# Patient Record
Sex: MALE | Race: WHITE | NOT HISPANIC OR LATINO | Employment: OTHER | URBAN - NONMETROPOLITAN AREA
[De-identification: names, ages, dates, MRNs, and addresses within clinical notes are randomized per-mention and may not be internally consistent; named-entity substitution may affect disease eponyms.]

---

## 2017-04-12 DIAGNOSIS — N40.0 HYPERTROPHY OF PROSTATE: Primary | ICD-10-CM

## 2017-04-12 LAB — PSA SERPL-MCNC: 2.8 NG/ML (ref 0–4)

## 2017-04-17 ENCOUNTER — OFFICE VISIT (OUTPATIENT)
Dept: UROLOGY | Facility: CLINIC | Age: 72
End: 2017-04-17

## 2017-04-17 VITALS
SYSTOLIC BLOOD PRESSURE: 144 MMHG | DIASTOLIC BLOOD PRESSURE: 82 MMHG | HEIGHT: 71 IN | BODY MASS INDEX: 29.82 KG/M2 | WEIGHT: 213 LBS | TEMPERATURE: 97.4 F

## 2017-04-17 DIAGNOSIS — N52.9 IMPOTENCE OF ORGANIC ORIGIN: ICD-10-CM

## 2017-04-17 DIAGNOSIS — N48.1 BALANITIS: ICD-10-CM

## 2017-04-17 DIAGNOSIS — N40.1 BPH (BENIGN PROSTATIC HYPERTROPHY) WITH URINARY OBSTRUCTION: Primary | ICD-10-CM

## 2017-04-17 DIAGNOSIS — N13.8 BPH (BENIGN PROSTATIC HYPERTROPHY) WITH URINARY OBSTRUCTION: Primary | ICD-10-CM

## 2017-04-17 LAB
BILIRUB BLD-MCNC: NEGATIVE MG/DL
CLARITY, POC: CLEAR
COLOR UR: YELLOW
GLUCOSE UR STRIP-MCNC: NEGATIVE MG/DL
KETONES UR QL: NEGATIVE
LEUKOCYTE EST, POC: NEGATIVE
NITRITE UR-MCNC: NEGATIVE MG/ML
PH UR: 6 [PH] (ref 5–8)
PROT UR STRIP-MCNC: NEGATIVE MG/DL
RBC # UR STRIP: NEGATIVE /UL
SP GR UR: 1.02 (ref 1–1.03)
UROBILINOGEN UR QL: NORMAL

## 2017-04-17 PROCEDURE — 81003 URINALYSIS AUTO W/O SCOPE: CPT | Performed by: UROLOGY

## 2017-04-17 PROCEDURE — 99214 OFFICE O/P EST MOD 30 MIN: CPT | Performed by: UROLOGY

## 2017-04-17 RX ORDER — GLYBURIDE 5 MG/1
TABLET ORAL
Refills: 1 | COMMUNITY
Start: 2017-04-12 | End: 2020-12-17

## 2017-04-17 RX ORDER — ASPIRIN 81 MG/1
81 TABLET ORAL DAILY
COMMUNITY

## 2017-04-17 RX ORDER — LOSARTAN POTASSIUM 50 MG/1
50 TABLET ORAL DAILY
COMMUNITY
End: 2020-12-17

## 2017-04-17 RX ORDER — LOVASTATIN 40 MG/1
TABLET ORAL
Refills: 3 | COMMUNITY
Start: 2017-04-06 | End: 2020-12-17

## 2017-04-17 RX ORDER — CLOTRIMAZOLE AND BETAMETHASONE DIPROPIONATE 10; .64 MG/G; MG/G
CREAM TOPICAL 2 TIMES DAILY
Qty: 15 G | Refills: 0 | Status: SHIPPED | OUTPATIENT
Start: 2017-04-17 | End: 2018-06-15 | Stop reason: SDUPTHER

## 2017-04-17 NOTE — PROGRESS NOTES
Subjective    Mr. Tinoco is 72 y.o. male    Chief Complaint: Penile Swelling    History of Present Illness     Benign Prostatic Hypertrophy  Patient complains of lower urinary tract symptoms. He reports frequency and urgency. He denies intermittency and weak stream. Patient states symptoms are of mild severity. Onset of symptoms was several years ago and was gradual in onset. His AUA Symptom Score is, 5/35.He reports a history of no complicating symptoms. He denies flank pain, gross hematuria, kidney stones and recurrent UTI.  Patient has tried Laser ablation therapy of prostate  with improvement. Last PSA was 2.8 .        Erectile Dysfunction  Patient complains of erectile dysfunction. Onset of dysfunction was several years ago and was gradual in onset.  Patient states the nature of difficulty is both attaining and maintaining erection. Full erections occur never. Partial erections occur never. Libido is not affected. Risk factors for ED include diabetes mellitus. Patient denies history of pelvic radiation.  Previous treatment of ED includes Viagra.    Genital Lesion  Patient is here for a genital lesion.  The location of the lesion is penis.  The lesion has been present for 2month(s). The description is swelling.  The courseimproving.  Treatment response is antibiotic cream.  Associated symptoms include no dysuria.     The following portions of the patient's history were reviewed and updated as appropriate: allergies, current medications, past family history, past medical history, past social history, past surgical history and problem list.    Review of Systems   Constitutional: Negative for chills and fever.   Gastrointestinal: Negative for abdominal pain, anal bleeding and blood in stool.   Genitourinary: Positive for penile pain. Negative for flank pain and hematuria.         Current Outpatient Prescriptions:   •  aspirin 81 MG EC tablet, Take 81 mg by mouth Daily., Disp: , Rfl:   •  glyBURIDE (DIAbeta) 5 MG  "tablet, TAKE 1 TABLET EVERY DAY, Disp: , Rfl: 1  •  losartan (COZAAR) 50 MG tablet, Take 50 mg by mouth Daily., Disp: , Rfl:   •  lovastatin (MEVACOR) 40 MG tablet, TAKE 1 TABLET BY MOUTH AT BEDTIME, Disp: , Rfl: 3  •  metFORMIN (GLUCOPHAGE) 500 MG tablet, Take 500 mg by mouth 2 (Two) Times a Day With Meals., Disp: , Rfl:   •  clotrimazole-betamethasone (LOTRISONE) 1-0.05 % cream, Apply  topically 2 (Two) Times a Day., Disp: 15 g, Rfl: 0    Past Medical History:   Diagnosis Date   • Diabetes mellitus    • Hyperlipidemia    • Hypertension        Past Surgical History:   Procedure Laterality Date   • HERNIA REPAIR     • KNEE SURGERY         Social History     Social History   • Marital status:      Spouse name: N/A   • Number of children: N/A   • Years of education: N/A     Social History Main Topics   • Smoking status: Never Smoker   • Smokeless tobacco: None   • Alcohol use No   • Drug use: None   • Sexual activity: Not Asked     Other Topics Concern   • None     Social History Narrative   • None       Family History   Problem Relation Age of Onset   • No Known Problems Father    • No Known Problems Mother        Objective    /82  Temp 97.4 °F (36.3 °C)  Ht 71\" (180.3 cm)  Wt 213 lb (96.6 kg)  BMI 29.71 kg/m2    Physical Exam   Constitutional: He is oriented to person, place, and time. He appears well-developed and well-nourished. No distress.   HENT:   Nose: Nose normal.   Neck: Normal range of motion. Neck supple. No tracheal deviation present. No thyromegaly present.   Cardiovascular: Normal rate, regular rhythm and intact distal pulses.    No significant edema or tenderness    Pulmonary/Chest: Breath sounds normal. No accessory muscle usage. No respiratory distress.   Abdominal: Soft. Bowel sounds are normal. He exhibits no distension, no ascites and no mass. There is no hepatosplenomegaly. There is no tenderness. There is no rebound, no guarding and no CVA tenderness. No hernia.   Stool " specimen is not indicated for my portion of the exam   Genitourinary: Testes normal. Rectal exam shows no mass, no tenderness, anal tone normal and guaiac negative stool. Tender: no nodules. Right testis shows no mass, no swelling and no tenderness. Left testis shows no mass, no swelling and no tenderness. No penile tenderness (no lesion or deformities).   Genitourinary Comments:  The urethral meatus normal in position without evidence of stricture. Epididymis without mass or tenderness. Vas Deferens is palpably normal.Penis with some erythema and swelling in coronal sulcus   Lymphadenopathy:     He has no cervical adenopathy. No inguinal adenopathy noted on the right or left side.        Right: No inguinal adenopathy present.        Left: No inguinal adenopathy present.   Neurological: He is alert and oriented to person, place, and time.   Skin: Skin is warm and dry. No rash noted. He is not diaphoretic. No pallor.   Psychiatric: He has a normal mood and affect. His behavior is normal.   Vitals reviewed.          Results for orders placed or performed in visit on 04/17/17   POC Urinalysis Dipstick, Automated   Result Value Ref Range    Color Yellow Yellow, Straw, Dark Yellow, Amanda    Clarity, UA Clear Clear    Glucose, UA Negative Negative, 1000 mg/dL (3+) mg/dL    Bilirubin Negative Negative    Ketones, UA Negative Negative    Specific Gravity  1.025 1.005 - 1.030    Blood, UA Negative Negative    pH, Urine 6.0 5.0 - 8.0    Protein, POC Negative Negative mg/dL    Urobilinogen, UA Normal Normal    Leukocytes Negative Negative    Nitrite, UA Negative Negative     Assessment and Plan    Gary was seen today for benign prostatic hypertrophy.    Diagnoses and all orders for this visit:    BPH (benign prostatic hypertrophy) with urinary obstruction  -     POC Urinalysis Dipstick, Automated  -     PSA; Future    Impotence of organic origin    Balanitis  -     clotrimazole-betamethasone (LOTRISONE) 1-0.05 % cream; Apply   topically 2 (Two) Times a Day.          Follow-up in 1 year with PSA.  I did give him clobetasol cream.  He has done very well following greenlight laser ablation of prostate.

## 2017-07-21 ENCOUNTER — HOSPITAL ENCOUNTER (OUTPATIENT)
Dept: VASCULAR LAB | Age: 72
Discharge: HOME | End: 2017-07-21
Payer: MEDICARE

## 2017-07-21 ENCOUNTER — HOSPITAL ENCOUNTER (OUTPATIENT)
Dept: ULTRASOUND IMAGING | Age: 72
Discharge: HOME | End: 2017-07-21
Payer: MEDICARE

## 2017-07-21 DIAGNOSIS — I71.9 AORTIC ANEURYSM WITHOUT RUPTURE, UNSPECIFIED PORTION OF AORTA (HCC): ICD-10-CM

## 2017-07-21 DIAGNOSIS — R09.89 SYMPTOMS INVOLVING CARDIOVASCULAR SYSTEM: Primary | ICD-10-CM

## 2017-07-21 PROCEDURE — 93978 VASCULAR STUDY: CPT

## 2017-07-21 PROCEDURE — 93880 EXTRACRANIAL BILAT STUDY: CPT

## 2018-01-25 ENCOUNTER — APPOINTMENT (OUTPATIENT)
Dept: CT IMAGING | Facility: HOSPITAL | Age: 73
End: 2018-01-25

## 2018-01-25 ENCOUNTER — HOSPITAL ENCOUNTER (EMERGENCY)
Facility: HOSPITAL | Age: 73
Discharge: HOME | End: 2018-01-25
Attending: EMERGENCY MEDICINE | Admitting: EMERGENCY MEDICINE

## 2018-01-25 VITALS
HEIGHT: 71 IN | OXYGEN SATURATION: 98 % | HEART RATE: 71 BPM | WEIGHT: 206 LBS | BODY MASS INDEX: 28.84 KG/M2 | SYSTOLIC BLOOD PRESSURE: 154 MMHG | RESPIRATION RATE: 16 BRPM | TEMPERATURE: 97.3 F | DIASTOLIC BLOOD PRESSURE: 79 MMHG

## 2018-01-25 DIAGNOSIS — R10.31 RIGHT LOWER QUADRANT ABDOMINAL PAIN: Primary | ICD-10-CM

## 2018-01-25 LAB
ALBUMIN SERPL-MCNC: 4.4 G/DL (ref 3.5–5)
ALBUMIN/GLOB SERPL: 1.4 G/DL (ref 1.1–2.5)
ALP SERPL-CCNC: 45 U/L (ref 24–120)
ALT SERPL W P-5'-P-CCNC: 43 U/L (ref 0–54)
ANION GAP SERPL CALCULATED.3IONS-SCNC: 16 MMOL/L (ref 4–13)
AST SERPL-CCNC: 38 U/L (ref 7–45)
BASOPHILS # BLD AUTO: 0.03 10*3/MM3 (ref 0–0.2)
BASOPHILS NFR BLD AUTO: 0.6 % (ref 0–2)
BILIRUB SERPL-MCNC: 0.5 MG/DL (ref 0.1–1)
BILIRUB UR QL STRIP: NEGATIVE
BUN BLD-MCNC: 18 MG/DL (ref 5–21)
BUN/CREAT SERPL: 21.7 (ref 7–25)
CALCIUM SPEC-SCNC: 9.4 MG/DL (ref 8.4–10.4)
CHLORIDE SERPL-SCNC: 99 MMOL/L (ref 98–110)
CLARITY UR: CLEAR
CO2 SERPL-SCNC: 23 MMOL/L (ref 24–31)
COLOR UR: YELLOW
CREAT BLD-MCNC: 0.83 MG/DL (ref 0.5–1.4)
DEPRECATED RDW RBC AUTO: 37 FL (ref 40–54)
EOSINOPHIL # BLD AUTO: 0.07 10*3/MM3 (ref 0–0.7)
EOSINOPHIL NFR BLD AUTO: 1.4 % (ref 0–4)
ERYTHROCYTE [DISTWIDTH] IN BLOOD BY AUTOMATED COUNT: 13 % (ref 12–15)
GFR SERPL CREATININE-BSD FRML MDRD: 91 ML/MIN/1.73
GLOBULIN UR ELPH-MCNC: 3.2 GM/DL
GLUCOSE BLD-MCNC: 208 MG/DL (ref 70–100)
GLUCOSE UR STRIP-MCNC: ABNORMAL MG/DL
HCT VFR BLD AUTO: 38.8 % (ref 40–52)
HGB BLD-MCNC: 13.3 G/DL (ref 14–18)
HGB UR QL STRIP.AUTO: NEGATIVE
IMM GRANULOCYTES # BLD: 0.02 10*3/MM3 (ref 0–0.03)
IMM GRANULOCYTES NFR BLD: 0.4 % (ref 0–5)
KETONES UR QL STRIP: NEGATIVE
LEUKOCYTE ESTERASE UR QL STRIP.AUTO: NEGATIVE
LIPASE SERPL-CCNC: 98 U/L (ref 23–203)
LYMPHOCYTES # BLD AUTO: 1.62 10*3/MM3 (ref 0.72–4.86)
LYMPHOCYTES NFR BLD AUTO: 33.3 % (ref 15–45)
MCH RBC QN AUTO: 27.3 PG (ref 28–32)
MCHC RBC AUTO-ENTMCNC: 34.3 G/DL (ref 33–36)
MCV RBC AUTO: 79.5 FL (ref 82–95)
MONOCYTES # BLD AUTO: 0.47 10*3/MM3 (ref 0.19–1.3)
MONOCYTES NFR BLD AUTO: 9.7 % (ref 4–12)
NEUTROPHILS # BLD AUTO: 2.66 10*3/MM3 (ref 1.87–8.4)
NEUTROPHILS NFR BLD AUTO: 54.6 % (ref 39–78)
NITRITE UR QL STRIP: NEGATIVE
NRBC BLD MANUAL-RTO: 0 /100 WBC (ref 0–0)
PH UR STRIP.AUTO: 5.5 [PH] (ref 5–8)
PLATELET # BLD AUTO: 174 10*3/MM3 (ref 130–400)
PMV BLD AUTO: 9.8 FL (ref 6–12)
POTASSIUM BLD-SCNC: 4.3 MMOL/L (ref 3.5–5.3)
PROT SERPL-MCNC: 7.6 G/DL (ref 6.3–8.7)
PROT UR QL STRIP: NEGATIVE
RBC # BLD AUTO: 4.88 10*6/MM3 (ref 4.8–5.9)
SODIUM BLD-SCNC: 138 MMOL/L (ref 135–145)
SP GR UR STRIP: 1.02 (ref 1–1.03)
UROBILINOGEN UR QL STRIP: ABNORMAL
WBC NRBC COR # BLD: 4.87 10*3/MM3 (ref 4.8–10.8)

## 2018-01-25 PROCEDURE — 83690 ASSAY OF LIPASE: CPT | Performed by: EMERGENCY MEDICINE

## 2018-01-25 PROCEDURE — 80053 COMPREHEN METABOLIC PANEL: CPT | Performed by: EMERGENCY MEDICINE

## 2018-01-25 PROCEDURE — 96361 HYDRATE IV INFUSION ADD-ON: CPT

## 2018-01-25 PROCEDURE — 99284 EMERGENCY DEPT VISIT MOD MDM: CPT

## 2018-01-25 PROCEDURE — 85025 COMPLETE CBC W/AUTO DIFF WBC: CPT | Performed by: EMERGENCY MEDICINE

## 2018-01-25 PROCEDURE — 96360 HYDRATION IV INFUSION INIT: CPT

## 2018-01-25 PROCEDURE — 81003 URINALYSIS AUTO W/O SCOPE: CPT | Performed by: EMERGENCY MEDICINE

## 2018-01-25 PROCEDURE — 74177 CT ABD & PELVIS W/CONTRAST: CPT

## 2018-01-25 PROCEDURE — 0 IOPAMIDOL 61 % SOLUTION: Performed by: EMERGENCY MEDICINE

## 2018-01-25 RX ORDER — SODIUM CHLORIDE 0.9 % (FLUSH) 0.9 %
10 SYRINGE (ML) INJECTION AS NEEDED
Status: DISCONTINUED | OUTPATIENT
Start: 2018-01-25 | End: 2018-01-25 | Stop reason: HOSPADM

## 2018-01-25 RX ORDER — SODIUM CHLORIDE 9 MG/ML
125 INJECTION, SOLUTION INTRAVENOUS CONTINUOUS
Status: DISCONTINUED | OUTPATIENT
Start: 2018-01-25 | End: 2018-01-25 | Stop reason: HOSPADM

## 2018-01-25 RX ADMIN — IOPAMIDOL 100 ML: 612 INJECTION, SOLUTION INTRAVENOUS at 09:44

## 2018-01-25 RX ADMIN — SODIUM CHLORIDE 125 ML/HR: 9 INJECTION, SOLUTION INTRAVENOUS at 08:58

## 2018-01-25 NOTE — ED PROVIDER NOTES
Subjective   HPI Comments: C/o pain in right inguinal area where he says he has had surgery for hernia x 2, once as child and again in 1980.  Started hurting again about 3 days ago and has waxed and waned but worse this AM.  Has not noticed any bulging.  Does seem more painful with laying down during night.    Patient is a 72 y.o. male presenting with abdominal pain.   History provided by:  Patient   used: No    Abdominal Pain   Pain location:  RLQ  Pain quality: aching    Pain radiates to:  Does not radiate  Pain severity:  Moderate  Onset quality:  Gradual  Duration:  3 days  Timing:  Constant  Progression:  Worsening  Chronicity:  New  Context: not alcohol use, not awakening from sleep, not diet changes, not eating, not laxative use, not medication withdrawal, not previous surgeries, not recent illness, not recent sexual activity, not recent travel, not retching, not sick contacts, not suspicious food intake and not trauma    Relieved by:  Nothing  Worsened by:  Nothing  Ineffective treatments:  None tried  Associated symptoms: nausea    Associated symptoms: no anorexia, no belching, no chest pain, no chills, no constipation, no cough, no diarrhea, no dysuria, no fatigue, no fever, no flatus, no hematemesis, no hematochezia, no hematuria, no melena, no shortness of breath, no sore throat, no vaginal bleeding, no vaginal discharge and no vomiting    Risk factors: no alcohol abuse, no aspirin use, not elderly, has not had multiple surgeries, no NSAID use, not obese, not pregnant and no recent hospitalization        Review of Systems   Constitutional: Negative.  Negative for chills, fatigue and fever.   HENT: Negative.  Negative for sore throat.    Respiratory: Negative.  Negative for cough and shortness of breath.    Cardiovascular: Negative.  Negative for chest pain.   Gastrointestinal: Positive for abdominal pain and nausea. Negative for anorexia, constipation, diarrhea, flatus, hematemesis,  hematochezia, melena and vomiting.   Genitourinary: Negative.  Negative for dysuria, hematuria, vaginal bleeding and vaginal discharge.   Musculoskeletal: Negative.    Neurological: Negative.    Hematological: Negative.    Psychiatric/Behavioral: Negative.    All other systems reviewed and are negative.      Past Medical History:   Diagnosis Date   • Diabetes mellitus    • Hyperlipidemia    • Hypertension        No Known Allergies    Past Surgical History:   Procedure Laterality Date   • HERNIA REPAIR     • KNEE SURGERY         Family History   Problem Relation Age of Onset   • No Known Problems Father    • No Known Problems Mother        Social History     Social History   • Marital status:      Spouse name: N/A   • Number of children: N/A   • Years of education: N/A     Social History Main Topics   • Smoking status: Never Smoker   • Smokeless tobacco: None   • Alcohol use No   • Drug use: None   • Sexual activity: Not Asked     Other Topics Concern   • None     Social History Narrative       Prior to Admission medications    Medication Sig Start Date End Date Taking? Authorizing Provider   aspirin 81 MG EC tablet Take 81 mg by mouth Daily.    Historical Provider, MD   clotrimazole-betamethasone (LOTRISONE) 1-0.05 % cream Apply  topically 2 (Two) Times a Day. 4/17/17   Braxton Messer MD   glyBURIDE (DIAbeta) 5 MG tablet TAKE 1 TABLET EVERY DAY 4/12/17   Historical Provider, MD   losartan (COZAAR) 50 MG tablet Take 50 mg by mouth Daily.    Historical Provider, MD   lovastatin (MEVACOR) 40 MG tablet TAKE 1 TABLET BY MOUTH AT BEDTIME 4/6/17   Historical Provider, MD   metFORMIN (GLUCOPHAGE) 500 MG tablet Take 500 mg by mouth 2 (Two) Times a Day With Meals.    Historical Provider, MD       Medications   sodium chloride 0.9 % flush 10 mL (not administered)   sodium chloride 0.9 % infusion (0 mL/hr Intravenous Stopped 1/25/18 1122)   iopamidol (ISOVUE-300) 61 % injection 100 mL (100 mL Intravenous Given  1/25/18 0944)       Vitals:    01/25/18 1121   BP: 154/79   Pulse: 71   Resp: 16   Temp: 97.3 °F (36.3 °C)   SpO2: 98%         Objective   Physical Exam   Constitutional: He is oriented to person, place, and time. He appears well-developed and well-nourished.   HENT:   Head: Normocephalic and atraumatic.   Neck: Normal range of motion. Neck supple.   Cardiovascular: Normal rate and regular rhythm.    Pulmonary/Chest: Effort normal and breath sounds normal.   Abdominal: Soft. Bowel sounds are normal. He exhibits no mass. There is no tenderness. No hernia.   Musculoskeletal: Normal range of motion.   Neurological: He is alert and oriented to person, place, and time.   Skin: Skin is warm and dry.   Psychiatric: He has a normal mood and affect. His behavior is normal.   Nursing note and vitals reviewed.      Procedures         Lab Results (last 24 hours)     Procedure Component Value Units Date/Time    CBC & Differential [[id number]] Collected:  01/25/18 0858    Specimen:  Blood Updated:  01/25/18 0906    Narrative:       The following orders were created for panel order CBC & Differential.  Procedure                               Abnormality         Status                     ---------                               -----------         ------                     CBC Auto Differential[[id number]]         Abnormal            Final result                 Please view results for these tests on the individual orders.    Comprehensive Metabolic Panel [[id number]]  (Abnormal) Collected:  01/25/18 0858    Specimen:  Blood Updated:  01/25/18 0915     Glucose 208 (H) mg/dL      BUN 18 mg/dL      Creatinine 0.83 mg/dL      Sodium 138 mmol/L      Potassium 4.3 mmol/L      Chloride 99 mmol/L      CO2 23.0 (L) mmol/L      Calcium 9.4 mg/dL      Total Protein 7.6 g/dL      Albumin 4.40 g/dL      ALT (SGPT) 43 U/L      AST (SGOT) 38 U/L      Alkaline Phosphatase 45 U/L      Total Bilirubin 0.5 mg/dL      eGFR Non African Amer 91 mL/min/1.73       Globulin 3.2 gm/dL      A/G Ratio 1.4 g/dL      BUN/Creatinine Ratio 21.7     Anion Gap 16.0 (H) mmol/L     Narrative:       The MDRD GFR formula is only valid for adults with stable renal function between ages 18 and 70.    Lipase [[id number]]  (Normal) Collected:  01/25/18 0858    Specimen:  Blood Updated:  01/25/18 0915     Lipase 98 U/L     CBC Auto Differential [[id number]]  (Abnormal) Collected:  01/25/18 0858    Specimen:  Blood Updated:  01/25/18 0906     WBC 4.87 10*3/mm3      RBC 4.88 10*6/mm3      Hemoglobin 13.3 (L) g/dL      Hematocrit 38.8 (L) %      MCV 79.5 (L) fL      MCH 27.3 (L) pg      MCHC 34.3 g/dL      RDW 13.0 %      RDW-SD 37.0 (L) fl      MPV 9.8 fL      Platelets 174 10*3/mm3      Neutrophil % 54.6 %      Lymphocyte % 33.3 %      Monocyte % 9.7 %      Eosinophil % 1.4 %      Basophil % 0.6 %      Immature Grans % 0.4 %      Neutrophils, Absolute 2.66 10*3/mm3      Lymphocytes, Absolute 1.62 10*3/mm3      Monocytes, Absolute 0.47 10*3/mm3      Eosinophils, Absolute 0.07 10*3/mm3      Basophils, Absolute 0.03 10*3/mm3      Immature Grans, Absolute 0.02 10*3/mm3      nRBC 0.0 /100 WBC     Urinalysis With / Culture If Indicated - Urine, Clean Catch [[id number]]  (Abnormal) Collected:  01/25/18 0918    Specimen:  Urine from Urine, Clean Catch Updated:  01/25/18 0935     Color, UA Yellow     Appearance, UA Clear     pH, UA 5.5     Specific Gravity, UA 1.021     Glucose,  mg/dL (1+) (A)     Ketones, UA Negative     Bilirubin, UA Negative     Blood, UA Negative     Protein, UA Negative     Leuk Esterase, UA Negative     Nitrite, UA Negative     Urobilinogen, UA 0.2 E.U./dL    Narrative:       Urine microscopic not indicated.          CT Abdomen Pelvis With Contrast   Final Result   Impression:       1. No acute findings in the abdomen or pelvis.   2. Marked sclerosis about the L4-L5 disc space. While this may be   degenerative, infectious etiology cannot be excluded. If there is back   pain  with signs of infection would recommend MRI with/without contrast.   3. Incidental fatty liver and prostatomegaly.   This report was finalized on 01/25/2018 10:15 by  Guillermo Block, .          ED Course  ED Course   Comment By Time   Told patient and his wife that CT was okay so this is probably just irritated tissues in that old surgical site.  Nothing critical at this point and can follow up with PCP or surgeon if problem continues. Vishal Bernardo Jr., MD 01/25 1223          MDM  Number of Diagnoses or Management Options  Right lower quadrant abdominal pain: new and requires workup     Amount and/or Complexity of Data Reviewed  Clinical lab tests: ordered and reviewed  Tests in the radiology section of CPT®: ordered and reviewed    Risk of Complications, Morbidity, and/or Mortality  Presenting problems: moderate  Diagnostic procedures: moderate  Management options: moderate    Patient Progress  Patient progress: stable      Final diagnoses:   Right lower quadrant abdominal pain          Vishal Bernardo Jr., MD  01/25/18 4715

## 2018-01-29 NOTE — ED NOTES
"ED Call Back Questions    1. How are you doing since leaving the Emergency Department?    Still in pain.  PCP did not help.  Told if worse to come back to ER.  2. Do you have any questions about your discharge instructions? No     3. Have you filled your new prescriptions yet? Yes   a. Do you have any questions about those medications? No     4. Were you able to make a follow-up appointment with the physician? Yes     5. Do you have a primary care physician? Yes   a. If No, would you like for me to set you up with one? N/A  i. If Yes, “I will have our ED  give you a call right back at this number to work with you on the best time for an appointment.”    6. We are always looking to get better at what we do. Do you have any suggestions for what we can do to be even better? No   a. If Yes, \"Thank you for sharing your concerns. I apologize. I will follow up with our manager and patient . Would you like someone to call you back?\" N/A    7. Is there anything else I can do for you? No     "

## 2018-04-17 ENCOUNTER — RESULTS ENCOUNTER (OUTPATIENT)
Dept: UROLOGY | Facility: CLINIC | Age: 73
End: 2018-04-17

## 2018-04-17 DIAGNOSIS — N40.1 BENIGN PROSTATIC HYPERPLASIA WITH URINARY OBSTRUCTION: ICD-10-CM

## 2018-04-17 DIAGNOSIS — N13.8 BENIGN PROSTATIC HYPERPLASIA WITH URINARY OBSTRUCTION: ICD-10-CM

## 2018-06-08 LAB — PSA SERPL-MCNC: 2.17 NG/ML (ref 0–4)

## 2018-06-14 NOTE — PROGRESS NOTES
Subjective    Mr. Tinoco is 73 y.o. male    Chief Complaint: Penile Swelling    History of Present Illness    Benign Prostatic Hypertrophy  Patient complains of lower urinary tract symptoms. He reports frequency and urgency. He denies intermittency and weak stream. Patient states symptoms are of mild severity. Onset of symptoms was several years ago and was gradual in onset. His AUA Symptom Score is, 1/35.He reports a history of no complicating symptoms. He denies flank pain, gross hematuria, kidney stones and recurrent UTI.  Patient has tried Laser ablation therapy of prostate  with improvement. Last PSA was 2.170 on 06/08/2018.     Erectile Dysfunction  Patient complains of erectile dysfunction. Onset of dysfunction was several years ago and was gradual in onset.  Patient states the nature of difficulty is both attaining and maintaining erection. Full erections occur never. Partial erections occur never. Libido is not affected. Risk factors for ED include diabetes mellitus. Patient denies history of pelvic radiation.  Previous treatment of ED includes Viagra.       The following portions of the patient's history were reviewed and updated as appropriate: allergies, current medications, past family history, past medical history, past social history, past surgical history and problem list.    Review of Systems   Constitutional: Negative for chills and fever.   Gastrointestinal: Negative for abdominal pain, anal bleeding and blood in stool.   Genitourinary: Negative for flank pain, frequency, hematuria and urgency.         Current Outpatient Prescriptions:   •  aspirin 81 MG EC tablet, Take 81 mg by mouth Daily., Disp: , Rfl:   •  clotrimazole-betamethasone (LOTRISONE) 1-0.05 % cream, Apply  topically Every 12 (Twelve) Hours., Disp: 15 g, Rfl: 0  •  glyBURIDE (DIAbeta) 5 MG tablet, TAKE 1 TABLET EVERY DAY, Disp: , Rfl: 1  •  losartan (COZAAR) 50 MG tablet, Take 50 mg by mouth Daily., Disp: , Rfl:   •  lovastatin  "(MEVACOR) 40 MG tablet, TAKE 1 TABLET BY MOUTH AT BEDTIME, Disp: , Rfl: 3  •  metFORMIN (GLUCOPHAGE) 500 MG tablet, Take 500 mg by mouth 2 (Two) Times a Day With Meals., Disp: , Rfl:     Past Medical History:   Diagnosis Date   • Diabetes mellitus    • Hyperlipidemia    • Hypertension        Past Surgical History:   Procedure Laterality Date   • HERNIA REPAIR     • KNEE SURGERY         Social History     Social History   • Marital status:      Social History Main Topics   • Smoking status: Never Smoker   • Alcohol use No   • Drug use: Unknown     Other Topics Concern   • Not on file       Family History   Problem Relation Age of Onset   • No Known Problems Father    • No Known Problems Mother        Objective    /84   Temp 98 °F (36.7 °C)   Ht 180.3 cm (71\")   Wt 96.2 kg (212 lb)   BMI 29.57 kg/m²     Physical Exam   Constitutional: He is oriented to person, place, and time. He appears well-developed and well-nourished. No distress.   HENT:   Nose: Nose normal.   Neck: Normal range of motion. Neck supple. No tracheal deviation present. No thyromegaly present.   Cardiovascular: Normal rate, regular rhythm and intact distal pulses.    No significant edema or tenderness    Pulmonary/Chest: Breath sounds normal. No accessory muscle usage. No respiratory distress.   Abdominal: Soft. Bowel sounds are normal. He exhibits no distension, no ascites and no mass. There is no hepatosplenomegaly. There is no tenderness. There is no rebound, no guarding and no CVA tenderness. No hernia.   Stool specimen is not indicated for my portion of the exam   Genitourinary: Testes normal. Rectal exam shows no mass, no tenderness, anal tone normal and guaiac negative stool. Tender: no nodules. Right testis shows no mass, no swelling and no tenderness. Left testis shows no mass, no swelling and no tenderness. No penile tenderness (no lesion or deformities).   Genitourinary Comments:  The urethral meatus normal in position " without evidence of stricture. Epididymis without mass or tenderness. Vas Deferens is palpably normal.Penis with some erythema and swelling in coronal sulcus   Lymphadenopathy:     He has no cervical adenopathy. No inguinal adenopathy noted on the right or left side.        Right: No inguinal adenopathy present.        Left: No inguinal adenopathy present.   Neurological: He is alert and oriented to person, place, and time.   Skin: Skin is warm and dry. No rash noted. He is not diaphoretic. No pallor.   Psychiatric: He has a normal mood and affect. His behavior is normal.   Vitals reviewed.          Results for orders placed or performed in visit on 06/15/18   POC Urinalysis Dipstick, Multipro   Result Value Ref Range    Color Yellow Yellow, Straw, Dark Yellow, Amanda    Clarity, UA Clear Clear    Glucose,  mg/dL (A) Negative, 1000 mg/dL (3+) mg/dL    Bilirubin Negative Negative    Ketones, UA Negative Negative    Specific Gravity  1.015 1.005 - 1.030    Blood, UA Negative Negative    pH, Urine 5.0 5.0 - 8.0    Protein, POC Negative Negative mg/dL    Urobilinogen, UA Normal Normal    Nitrite, UA Negative Negative    Leukocytes Negative Negative     Assessment and Plan    Gary was seen today for bph (benign prostatic hypertrophy) with urinary obstruction.    Diagnoses and all orders for this visit:    Benign prostatic hyperplasia with urinary obstruction  -     POC Urinalysis Dipstick, Multipro  -     PSA DIAGNOSTIC; Future    Balanitis  -     clotrimazole-betamethasone (LOTRISONE) 1-0.05 % cream; Apply  topically Every 12 (Twelve) Hours.    Impotence of organic origin      Patient doing very well.  His AUA symptom score is 1/35.  He has a history of greenlight laser ablation of prostate in the past.  He does have some issues with balanitis and he will continue the clotrimazole betamethasone cream.

## 2018-06-15 ENCOUNTER — OFFICE VISIT (OUTPATIENT)
Dept: UROLOGY | Facility: CLINIC | Age: 73
End: 2018-06-15

## 2018-06-15 VITALS
WEIGHT: 212 LBS | SYSTOLIC BLOOD PRESSURE: 138 MMHG | HEIGHT: 71 IN | BODY MASS INDEX: 29.68 KG/M2 | TEMPERATURE: 98 F | DIASTOLIC BLOOD PRESSURE: 84 MMHG

## 2018-06-15 DIAGNOSIS — N13.8 BENIGN PROSTATIC HYPERPLASIA WITH URINARY OBSTRUCTION: Primary | ICD-10-CM

## 2018-06-15 DIAGNOSIS — N40.1 BENIGN PROSTATIC HYPERPLASIA WITH URINARY OBSTRUCTION: Primary | ICD-10-CM

## 2018-06-15 DIAGNOSIS — N52.9 IMPOTENCE OF ORGANIC ORIGIN: ICD-10-CM

## 2018-06-15 DIAGNOSIS — N48.1 BALANITIS: ICD-10-CM

## 2018-06-15 LAB
BILIRUB BLD-MCNC: NEGATIVE MG/DL
CLARITY, POC: CLEAR
COLOR UR: YELLOW
GLUCOSE UR STRIP-MCNC: ABNORMAL MG/DL
KETONES UR QL: NEGATIVE
LEUKOCYTE EST, POC: NEGATIVE
NITRITE UR-MCNC: NEGATIVE MG/ML
PH UR: 5 [PH] (ref 5–8)
PROT UR STRIP-MCNC: NEGATIVE MG/DL
RBC # UR STRIP: NEGATIVE /UL
SP GR UR: 1.01 (ref 1–1.03)
UROBILINOGEN UR QL: NORMAL

## 2018-06-15 PROCEDURE — 81001 URINALYSIS AUTO W/SCOPE: CPT | Performed by: UROLOGY

## 2018-06-15 PROCEDURE — 99213 OFFICE O/P EST LOW 20 MIN: CPT | Performed by: UROLOGY

## 2018-06-15 RX ORDER — CLOTRIMAZOLE AND BETAMETHASONE DIPROPIONATE 10; .64 MG/G; MG/G
CREAM TOPICAL EVERY 12 HOURS SCHEDULED
Qty: 15 G | Refills: 0 | Status: SHIPPED | OUTPATIENT
Start: 2018-06-15 | End: 2018-06-15 | Stop reason: SDUPTHER

## 2018-06-15 RX ORDER — CLOTRIMAZOLE AND BETAMETHASONE DIPROPIONATE 10; .64 MG/G; MG/G
CREAM TOPICAL EVERY 12 HOURS SCHEDULED
Qty: 15 G | Refills: 11 | Status: SHIPPED | OUTPATIENT
Start: 2018-06-15 | End: 2019-06-19

## 2018-06-15 NOTE — PATIENT INSTRUCTIONS

## 2018-08-06 ENCOUNTER — TRANSCRIBE ORDERS (OUTPATIENT)
Dept: ADMINISTRATIVE | Facility: HOSPITAL | Age: 73
End: 2018-08-06

## 2018-08-06 DIAGNOSIS — M25.512 LEFT SHOULDER PAIN, UNSPECIFIED CHRONICITY: Primary | ICD-10-CM

## 2018-08-07 ENCOUNTER — HOSPITAL ENCOUNTER (OUTPATIENT)
Dept: MRI IMAGING | Facility: HOSPITAL | Age: 73
Discharge: HOME | End: 2018-08-07
Attending: PHYSICAL MEDICINE & REHABILITATION | Admitting: PHYSICAL MEDICINE & REHABILITATION

## 2018-08-07 DIAGNOSIS — M25.512 LEFT SHOULDER PAIN, UNSPECIFIED CHRONICITY: ICD-10-CM

## 2018-08-07 PROCEDURE — 73221 MRI JOINT UPR EXTREM W/O DYE: CPT

## 2018-09-06 DIAGNOSIS — N48.1 BALANITIS: ICD-10-CM

## 2018-09-06 RX ORDER — CLOTRIMAZOLE AND BETAMETHASONE DIPROPIONATE 10; .64 MG/G; MG/G
CREAM TOPICAL EVERY 12 HOURS SCHEDULED
Qty: 15 G | Refills: 0 | Status: SHIPPED | OUTPATIENT
Start: 2018-09-06 | End: 2019-04-15 | Stop reason: SDUPTHER

## 2019-02-26 ENCOUNTER — HOSPITAL ENCOUNTER (OUTPATIENT)
Dept: GENERAL RADIOLOGY | Facility: HOSPITAL | Age: 74
Discharge: HOME | End: 2019-02-26

## 2019-02-26 ENCOUNTER — HOSPITAL ENCOUNTER (OUTPATIENT)
Dept: ULTRASOUND IMAGING | Facility: HOSPITAL | Age: 74
Discharge: HOME | End: 2019-02-26
Admitting: INTERNAL MEDICINE

## 2019-02-26 ENCOUNTER — TRANSCRIBE ORDERS (OUTPATIENT)
Dept: ADMINISTRATIVE | Facility: HOSPITAL | Age: 74
End: 2019-02-26

## 2019-02-26 DIAGNOSIS — R07.0 THROAT BURNING: ICD-10-CM

## 2019-02-26 DIAGNOSIS — R06.2 WHEEZING ON AUSCULTATION: ICD-10-CM

## 2019-02-26 DIAGNOSIS — R06.2 WHEEZING ON AUSCULTATION: Primary | ICD-10-CM

## 2019-02-26 DIAGNOSIS — Q89.2 SUBSTERNAL THYROID: ICD-10-CM

## 2019-02-26 PROCEDURE — 71046 X-RAY EXAM CHEST 2 VIEWS: CPT

## 2019-02-26 PROCEDURE — 76536 US EXAM OF HEAD AND NECK: CPT

## 2019-04-15 DIAGNOSIS — N48.1 BALANITIS: ICD-10-CM

## 2019-04-15 RX ORDER — CLOTRIMAZOLE AND BETAMETHASONE DIPROPIONATE 10; .64 MG/G; MG/G
CREAM TOPICAL EVERY 12 HOURS SCHEDULED
Qty: 15 G | Refills: 0 | Status: SHIPPED | OUTPATIENT
Start: 2019-04-15 | End: 2019-06-19 | Stop reason: SDUPTHER

## 2019-06-13 ENCOUNTER — TELEPHONE (OUTPATIENT)
Dept: UROLOGY | Facility: CLINIC | Age: 74
End: 2019-06-13

## 2019-06-13 NOTE — TELEPHONE ENCOUNTER
Called and left patient a message about their missed lab appointment. I asked for them to call back to reschedule their lab appointment and I let them know that this has to be done prior to the appointment on 6/19/19 with .

## 2019-06-15 ENCOUNTER — RESULTS ENCOUNTER (OUTPATIENT)
Dept: UROLOGY | Facility: CLINIC | Age: 74
End: 2019-06-15

## 2019-06-15 DIAGNOSIS — N13.8 BENIGN PROSTATIC HYPERPLASIA WITH URINARY OBSTRUCTION: ICD-10-CM

## 2019-06-15 DIAGNOSIS — N40.1 BENIGN PROSTATIC HYPERPLASIA WITH URINARY OBSTRUCTION: ICD-10-CM

## 2019-06-15 LAB — PSA SERPL-MCNC: 2.32 NG/ML (ref 0–4)

## 2019-06-17 NOTE — PROGRESS NOTES
Subjective    Mr. Tinoco is 74 y.o. male    Chief Complaint: Penile Swelling    History of Present Illness     Benign Prostatic Hypertrophy  Patient complains of lower urinary tract symptoms. He reports frequency and urgency. He denies intermittency and weak stream. Patient states symptoms are of mild severity. Onset of symptoms was several years ago and was gradual in onset. His AUA Symptom Score is, 16/35.He reports a history of no complicating symptoms. He denies flank pain, gross hematuria, kidney stones and recurrent UTI.  Patient has tried Laser ablation therapy of prostate  with improvement. Last PSA was 2.32 .  Lab Results   Component Value Date    PSA 2.320 06/14/2019    PSA 2.170 06/08/2018    PSA 2.800 04/12/2017          Erectile Dysfunction  Patient complains of erectile dysfunction. Onset of dysfunction was several years ago and was gradual in onset.  Patient states the nature of difficulty is both attaining and maintaining erection. Full erections occur never. Partial erections occur never. Libido is not affected. Risk factors for ED include diabetes mellitus. Patient denies history of pelvic radiation.  Previous treatment of ED includes Viagra.    The following portions of the patient's history were reviewed and updated as appropriate: allergies, current medications, past family history, past medical history, past social history, past surgical history and problem list.    Review of Systems   Constitutional: Negative for chills and fever.   Gastrointestinal: Negative for abdominal pain, anal bleeding and blood in stool.   Genitourinary: Negative for decreased urine volume, difficulty urinating, discharge, dysuria, enuresis, flank pain, frequency, genital sores, hematuria, penile pain, penile swelling, scrotal swelling, testicular pain and urgency.         Current Outpatient Medications:   •  aspirin 81 MG EC tablet, Take 81 mg by mouth Daily., Disp: , Rfl:   •  clotrimazole-betamethasone (LOTRISONE)  "1-0.05 % cream, Apply  topically Every 12 (Twelve) Hours., Disp: 15 g, Rfl: 11  •  clotrimazole-betamethasone (LOTRISONE) 1-0.05 % cream, APPLY TOPICALLY EVERY 12 (TWELVE) HOURS., Disp: 15 g, Rfl: 0  •  glyBURIDE (DIAbeta) 5 MG tablet, TAKE 1 TABLET EVERY DAY, Disp: , Rfl: 1  •  lovastatin (MEVACOR) 40 MG tablet, TAKE 1 TABLET BY MOUTH AT BEDTIME, Disp: , Rfl: 3  •  metFORMIN (GLUCOPHAGE) 500 MG tablet, Take 500 mg by mouth 2 (Two) Times a Day With Meals., Disp: , Rfl:   •  losartan (COZAAR) 50 MG tablet, Take 50 mg by mouth Daily., Disp: , Rfl:     Past Medical History:   Diagnosis Date   • Diabetes mellitus (CMS/HCC)    • Hyperlipidemia    • Hypertension        Past Surgical History:   Procedure Laterality Date   • HERNIA REPAIR     • KNEE SURGERY         Social History     Socioeconomic History   • Marital status:      Spouse name: Not on file   • Number of children: Not on file   • Years of education: Not on file   • Highest education level: Not on file   Tobacco Use   • Smoking status: Never Smoker   Substance and Sexual Activity   • Alcohol use: No       Family History   Problem Relation Age of Onset   • No Known Problems Father    • No Known Problems Mother        Objective    Temp 98.2 °F (36.8 °C)   Ht 180.3 cm (71\")   Wt 91.1 kg (200 lb 12.8 oz)   BMI 28.01 kg/m²     Physical Exam   Constitutional: He is oriented to person, place, and time. He appears well-developed and well-nourished. No distress.   HENT:   Nose: Nose normal.   Neck: Normal range of motion. Neck supple. No tracheal deviation present. No thyromegaly present.   Cardiovascular: Normal rate, regular rhythm and intact distal pulses.   No significant edema or tenderness    Pulmonary/Chest: Breath sounds normal. No accessory muscle usage. No respiratory distress.   Abdominal: Soft. Bowel sounds are normal. He exhibits no distension, no ascites and no mass. There is no hepatosplenomegaly. There is no tenderness. There is no rebound, no " guarding and no CVA tenderness. No hernia.   Stool specimen is not indicated for my portion of the exam   Genitourinary: Testes normal. Rectal exam shows no mass, no tenderness, anal tone normal and guaiac negative stool. Tender: no nodules. Right testis shows no mass, no swelling and no tenderness. Left testis shows no mass, no swelling and no tenderness. No penile tenderness (no lesion or deformities).   Genitourinary Comments:  The urethral meatus normal in position without evidence of stricture. Epididymis without mass or tenderness. Vas Deferens is palpably normal.   Lymphadenopathy:     He has no cervical adenopathy. No inguinal adenopathy noted on the right or left side.        Right: No inguinal adenopathy present.        Left: No inguinal adenopathy present.   Neurological: He is alert and oriented to person, place, and time.   Skin: Skin is warm and dry. No rash noted. He is not diaphoretic. No pallor.   Psychiatric: He has a normal mood and affect. His behavior is normal.   Vitals reviewed.          Results for orders placed or performed in visit on 06/19/19   POC Urinalysis Dipstick, Multipro   Result Value Ref Range    Color Yellow Yellow, Straw, Dark Yellow, Amanda    Clarity, UA Clear Clear    Glucose, UA Negative Negative, 1000 mg/dL (3+) mg/dL    Bilirubin Negative Negative    Ketones, UA Negative Negative    Specific Gravity  1.020 1.005 - 1.030    Blood, UA Negative Negative    pH, Urine 5.5 5.0 - 8.0    Protein, POC 30 mg/dL (A) Negative mg/dL    Urobilinogen, UA Normal Normal    Nitrite, UA Negative Negative    Leukocytes Negative Negative   Patient's Body mass index is 28.01 kg/m². BMI is above normal parameters. Recommendations include: educational material.    Assessment and Plan    Diagnoses and all orders for this visit:    Benign prostatic hyperplasia with urinary obstruction  -     POC Urinalysis Dipstick, Multipro    Balanitis    Impotence of organic origin      Patient doing very well.   His AUA symptom score is 16/35.  He has a history of greenlight laser ablation of prostate in the past.  He does have some issues with balanitis and he will continue the clotrimazole betamethasone cream.

## 2019-06-19 ENCOUNTER — TELEPHONE (OUTPATIENT)
Dept: UROLOGY | Facility: CLINIC | Age: 74
End: 2019-06-19

## 2019-06-19 ENCOUNTER — OFFICE VISIT (OUTPATIENT)
Dept: UROLOGY | Facility: CLINIC | Age: 74
End: 2019-06-19

## 2019-06-19 VITALS — TEMPERATURE: 98.2 F | WEIGHT: 200.8 LBS | BODY MASS INDEX: 28.11 KG/M2 | HEIGHT: 71 IN

## 2019-06-19 DIAGNOSIS — N52.9 IMPOTENCE OF ORGANIC ORIGIN: ICD-10-CM

## 2019-06-19 DIAGNOSIS — N48.1 BALANITIS: ICD-10-CM

## 2019-06-19 DIAGNOSIS — N40.1 BENIGN PROSTATIC HYPERPLASIA WITH URINARY OBSTRUCTION: Primary | ICD-10-CM

## 2019-06-19 DIAGNOSIS — N13.8 BENIGN PROSTATIC HYPERPLASIA WITH URINARY OBSTRUCTION: Primary | ICD-10-CM

## 2019-06-19 LAB
BILIRUB BLD-MCNC: NEGATIVE MG/DL
CLARITY, POC: CLEAR
COLOR UR: YELLOW
GLUCOSE UR STRIP-MCNC: NEGATIVE MG/DL
KETONES UR QL: NEGATIVE
LEUKOCYTE EST, POC: NEGATIVE
NITRITE UR-MCNC: NEGATIVE MG/ML
PH UR: 5.5 [PH] (ref 5–8)
PROT UR STRIP-MCNC: ABNORMAL MG/DL
RBC # UR STRIP: NEGATIVE /UL
SP GR UR: 1.02 (ref 1–1.03)
UROBILINOGEN UR QL: NORMAL

## 2019-06-19 PROCEDURE — 99214 OFFICE O/P EST MOD 30 MIN: CPT | Performed by: UROLOGY

## 2019-06-19 PROCEDURE — 81001 URINALYSIS AUTO W/SCOPE: CPT | Performed by: UROLOGY

## 2019-06-19 RX ORDER — CLOTRIMAZOLE AND BETAMETHASONE DIPROPIONATE 10; .64 MG/G; MG/G
CREAM TOPICAL EVERY 12 HOURS SCHEDULED
Qty: 45 G | Refills: 1 | Status: SHIPPED | OUTPATIENT
Start: 2019-06-19 | End: 2020-07-22

## 2019-06-19 NOTE — PATIENT INSTRUCTIONS

## 2019-06-19 NOTE — TELEPHONE ENCOUNTER
Pt stopped by bc he forgot to let Dr Messer know at his appt today that he is no longer taking the losartan due to him coughing.    Losartan was prescribed by another provider.      Thanks,     Jorge

## 2019-08-12 ENCOUNTER — HOSPITAL ENCOUNTER (OUTPATIENT)
Dept: ULTRASOUND IMAGING | Facility: HOSPITAL | Age: 74
Discharge: HOME | End: 2019-08-12
Admitting: INTERNAL MEDICINE

## 2019-08-12 ENCOUNTER — TRANSCRIBE ORDERS (OUTPATIENT)
Dept: ADMINISTRATIVE | Facility: HOSPITAL | Age: 74
End: 2019-08-12

## 2019-08-12 DIAGNOSIS — E04.1 RIGHT THYROID NODULE: ICD-10-CM

## 2019-08-12 DIAGNOSIS — E04.1 RIGHT THYROID NODULE: Primary | ICD-10-CM

## 2019-08-12 PROCEDURE — 76536 US EXAM OF HEAD AND NECK: CPT

## 2020-07-22 ENCOUNTER — OFFICE VISIT (OUTPATIENT)
Dept: INTERNAL MEDICINE | Facility: CLINIC | Age: 75
End: 2020-07-22

## 2020-07-22 VITALS
OXYGEN SATURATION: 99 % | TEMPERATURE: 98.4 F | SYSTOLIC BLOOD PRESSURE: 144 MMHG | BODY MASS INDEX: 27.91 KG/M2 | DIASTOLIC BLOOD PRESSURE: 88 MMHG | HEART RATE: 79 BPM | WEIGHT: 199.38 LBS | RESPIRATION RATE: 18 BRPM | HEIGHT: 71 IN

## 2020-07-22 DIAGNOSIS — E11.65 TYPE 2 DIABETES MELLITUS WITH HYPERGLYCEMIA, UNSPECIFIED WHETHER LONG TERM INSULIN USE (HCC): Primary | ICD-10-CM

## 2020-07-22 DIAGNOSIS — I10 HYPERTENSION, BENIGN: ICD-10-CM

## 2020-07-22 PROBLEM — D63.8 ANEMIA OF CHRONIC DISEASE: Status: ACTIVE | Noted: 2020-07-22

## 2020-07-22 PROBLEM — E04.1 RIGHT THYROID NODULE: Status: ACTIVE | Noted: 2020-07-22

## 2020-07-22 PROBLEM — E11.9 TYPE 2 DIABETES MELLITUS WITHOUT COMPLICATION, WITHOUT LONG-TERM CURRENT USE OF INSULIN (HCC): Status: ACTIVE | Noted: 2020-07-22

## 2020-07-22 PROBLEM — Q89.2 SUBSTERNAL THYROID: Status: ACTIVE | Noted: 2020-07-22

## 2020-07-22 PROBLEM — Z12.5 SCREENING PSA (PROSTATE SPECIFIC ANTIGEN): Status: ACTIVE | Noted: 2020-07-22

## 2020-07-22 PROBLEM — R79.89 ABNORMAL LFTS: Status: ACTIVE | Noted: 2020-07-22

## 2020-07-22 LAB — HBA1C MFR BLD: 7.5 %

## 2020-07-22 PROCEDURE — 83036 HEMOGLOBIN GLYCOSYLATED A1C: CPT | Performed by: NURSE PRACTITIONER

## 2020-07-22 PROCEDURE — 99214 OFFICE O/P EST MOD 30 MIN: CPT | Performed by: NURSE PRACTITIONER

## 2020-07-22 NOTE — PATIENT INSTRUCTIONS

## 2020-07-22 NOTE — PROGRESS NOTES
"Subjective   Gary Tinoco is a 75 y.o. male.   Chief Complaint   Patient presents with   • Diabetes     3 month follow-up.  A1C - 7.5   • Hyperlipidemia   • Hypertension       Mr. Bonner is a 75-year-old male who presents to the clinic today as a diabetic follow-up.  His A1c today is 7.5 and was around 10 at the last visit.  Patient reports that he does occasionally forget to take his metformin or glipizide as prescribed.  Patient reports that at last follow-up with Dr. Bond he was advised to take his glipizide before breakfast and dinner and metformin twice a day with meals as well he reports that most consistently he is not taking the second pills.  He reports that this is related to intermittently forgetting to take the dose.  Patient states that this occasionally occurs with his blood pressure medicine as well.  Patient blood pressure initially evaluated was 144/88 with a heart rate of 79.  However I reexamined it after seated for 5 to 10 minutes and blood pressure had improved to 138/89 with a heart rate of 76.  Patient denies any side effects with the current medication would be the reason why he does not take it he just reports \"I occasionally just forget it and sometimes I like to DrTemitope myself and think I do not need the medication I need \".  Patient is motivated to take his medication as we have prescribed it.  His A1c had improved and requested to go back to metformin 850 once a day and glipizide 5 once a day.  He reports that he has been walking more and has been reducing his food portions, concentrated sweets, and drinking 6 to 8 glasses of water daily he is currently living at home with his wife and both are trying to motivate each other to be more active and healthy.       The following portions of the patient's history were reviewed and updated as appropriate: allergies, current medications, past family history, past medical history, past social history, past surgical history and problem " list.    Review of Systems   Constitutional: Negative for activity change, appetite change, fatigue, fever, unexpected weight gain and unexpected weight loss.   HENT: Negative for swollen glands, trouble swallowing and voice change.    Eyes: Negative for blurred vision and visual disturbance.   Respiratory: Negative for cough and shortness of breath.    Cardiovascular: Negative for chest pain, palpitations and leg swelling.   Gastrointestinal: Negative for abdominal pain, constipation, diarrhea, nausea, vomiting and indigestion.   Endocrine: Negative for cold intolerance, heat intolerance, polydipsia and polyphagia.   Genitourinary: Negative for dysuria and frequency.   Musculoskeletal: Negative for arthralgias, back pain, joint swelling and neck pain.   Skin: Negative for color change, rash and skin lesions.   Neurological: Negative for dizziness, weakness, headache, memory problem and confusion.   Hematological: Does not bruise/bleed easily.   Psychiatric/Behavioral: Negative for agitation, hallucinations and suicidal ideas. The patient is not nervous/anxious.        Objective   Past Medical History:   Diagnosis Date   • Diabetes mellitus (CMS/HCC)    • Hyperlipidemia    • Hypertension       Past Surgical History:   Procedure Laterality Date   • HERNIA REPAIR     • KNEE SURGERY          Current Outpatient Medications:   •  aspirin 81 MG EC tablet, Take 81 mg by mouth Daily., Disp: , Rfl:   •  glyBURIDE (DIAbeta) 5 MG tablet, TAKE 1 TABLET EVERY DAY, Disp: , Rfl: 1  •  losartan (COZAAR) 50 MG tablet, Take 50 mg by mouth Daily., Disp: , Rfl:   •  lovastatin (MEVACOR) 40 MG tablet, TAKE 1 TABLET BY MOUTH AT BEDTIME, Disp: , Rfl: 3  •  metFORMIN (GLUCOPHAGE) 850 MG tablet, Take 850 tablets by mouth Daily., Disp: , Rfl:      Vitals:    07/22/20 0917   BP: 144/88   Pulse: 79   Resp: 18   Temp: 98.4 °F (36.9 °C)   SpO2: 99%         07/22/20 0917   Weight: 90.4 kg (199 lb 6 oz)     Patient's Body mass index is 27.81  kg/m². BMI is within normal parameters. No follow-up required..      Physical Exam   Constitutional: He is oriented to person, place, and time. He appears well-developed and well-nourished.   HENT:   Head: Normocephalic and atraumatic.   Right Ear: Tympanic membrane, external ear and ear canal normal. Decreased hearing is noted.   Left Ear: Tympanic membrane, external ear and ear canal normal. Decreased hearing is noted.   Nose: Nose normal.   Mouth/Throat: Uvula is midline and oropharynx is clear and moist.   Wears hearing aids bilaterally   Eyes: Pupils are equal, round, and reactive to light. Conjunctivae and EOM are normal.   Neck: Normal range of motion. Neck supple. Carotid bruit is not present. No thyromegaly present.   Cardiovascular: Normal rate, regular rhythm, normal heart sounds and intact distal pulses.   Pulmonary/Chest: Effort normal and breath sounds normal.   Abdominal: Soft. Bowel sounds are normal. There is no tenderness.   Musculoskeletal: Normal range of motion.   Lymphadenopathy:     He has no cervical adenopathy.   Neurological: He is alert and oriented to person, place, and time. He has normal strength. He displays a negative Romberg sign.   Skin: Skin is warm, dry and intact. Capillary refill takes less than 2 seconds. Turgor is normal. No rash noted.   Psychiatric: He has a normal mood and affect. His speech is normal and behavior is normal. Judgment and thought content normal. Cognition and memory are normal.   Patient reported forgetting to take medication.  Asked questions about whether or not he is having memory changes.  Patient reports that he is not having memory changes, just occasionally forgetful.    Patient did not have time to complete Mini-Mental exam today.   Nursing note and vitals reviewed.            Assessment/Plan   Diagnoses and all orders for this visit:    1. Type 2 diabetes mellitus with hyperglycemia, unspecified whether long term insulin use (CMS/[facility]) (Primary)  -      POC Glycosylated Hemoglobin (Hb A1C)    2. Hypertension, benign      Patient will continue current diabetic regiment.  Advised to be compliant with recommendations.  Patient to continue increasing exercise as tolerated and working on reducing concentrated sweets and continue to drink 6 to 8 glasses of water a day.  Patient is due for annual assessment and would like Medicare wellness at next visit.  Advised patient for better medication compliance to use medication box.  Patient reports that he is using a med box, he just sometimes does not want to take it per patient report.  However, he reports that he will do better for the next 3 months.  Patient's blood pressure was initially elevated, however he admitted to intermittently taking his blood pressure medicine.  Advised patient on the risks of rebound hypertension and other adverse effects to not taking blood pressure medicine as prescribed.  Patient reported understanding and will take medication as prescribed.  We will see patient in 3 months to re-assess his A1c and to collect yearly labs and for Medicare wellness visit.  Patient would like to discuss immunizations at this next visit as well.

## 2020-09-11 ENCOUNTER — OFFICE VISIT (OUTPATIENT)
Dept: UROLOGY | Facility: CLINIC | Age: 75
End: 2020-09-11

## 2020-09-11 VITALS — HEIGHT: 71 IN | BODY MASS INDEX: 29.03 KG/M2 | WEIGHT: 207.4 LBS | TEMPERATURE: 97.8 F

## 2020-09-11 DIAGNOSIS — N40.1 BENIGN PROSTATIC HYPERPLASIA WITH URINARY OBSTRUCTION: Primary | ICD-10-CM

## 2020-09-11 DIAGNOSIS — N48.1 BALANITIS: ICD-10-CM

## 2020-09-11 DIAGNOSIS — N52.9 IMPOTENCE OF ORGANIC ORIGIN: ICD-10-CM

## 2020-09-11 DIAGNOSIS — N13.8 BENIGN PROSTATIC HYPERPLASIA WITH URINARY OBSTRUCTION: Primary | ICD-10-CM

## 2020-09-11 LAB
BILIRUB BLD-MCNC: NEGATIVE MG/DL
CLARITY, POC: CLEAR
COLOR UR: YELLOW
GLUCOSE UR STRIP-MCNC: NEGATIVE MG/DL
KETONES UR QL: NEGATIVE
LEUKOCYTE EST, POC: NEGATIVE
NITRITE UR-MCNC: NEGATIVE MG/ML
PH UR: 5 [PH] (ref 5–8)
PROT UR STRIP-MCNC: NEGATIVE MG/DL
RBC # UR STRIP: NEGATIVE /UL
SP GR UR: 1.01 (ref 1–1.03)
UROBILINOGEN UR QL: NORMAL

## 2020-09-11 PROCEDURE — 99214 OFFICE O/P EST MOD 30 MIN: CPT | Performed by: UROLOGY

## 2020-09-11 PROCEDURE — 81003 URINALYSIS AUTO W/O SCOPE: CPT | Performed by: UROLOGY

## 2020-09-11 RX ORDER — CLOTRIMAZOLE AND BETAMETHASONE DIPROPIONATE 10; .64 MG/G; MG/G
CREAM TOPICAL 2 TIMES DAILY
Qty: 45 G | Refills: 1 | Status: SHIPPED | OUTPATIENT
Start: 2020-09-11 | End: 2021-04-21

## 2020-09-11 NOTE — PROGRESS NOTES
Subjective    Mr. Tinoco is 75 y.o. male    Chief Complaint: BPH    History of Present Illness  Benign Prostatic Hypertrophy  Patient complains of lower urinary tract symptoms. He reports frequency and urgency. He denies intermittency and weak stream. Patient states symptoms are of mild severity. Onset of symptoms was several years ago and was gradual in onset. His AUA Symptom Score is, 9/35.He reports a history of no complicating symptoms. He denies flank pain, gross hematuria, kidney stones and recurrent UTI.  Patient has tried Laser ablation therapy of prostate  with improvement. Last PSA was 2.32 .  The following portions of the patient's history were reviewed and updated as appropriate: allergies, current medications, past family history, past medical history, past social history, past surgical history and problem list.    Review of Systems   Constitutional: Negative for chills and fever.   Gastrointestinal: Negative for abdominal pain, anal bleeding and blood in stool.   Genitourinary: Negative for dysuria, frequency, hematuria and urgency.         Current Outpatient Medications:   •  aspirin 81 MG EC tablet, Take 81 mg by mouth Daily., Disp: , Rfl:   •  glyBURIDE (DIAbeta) 5 MG tablet, TAKE 1 TABLET EVERY DAY, Disp: , Rfl: 1  •  losartan (COZAAR) 50 MG tablet, Take 50 mg by mouth Daily., Disp: , Rfl:   •  lovastatin (MEVACOR) 40 MG tablet, TAKE 1 TABLET BY MOUTH AT BEDTIME, Disp: , Rfl: 3  •  metFORMIN (GLUCOPHAGE) 850 MG tablet, Take 850 tablets by mouth Daily., Disp: , Rfl:   •  clotrimazole-betamethasone (LOTRISONE) 1-0.05 % cream, Apply  topically to the appropriate area as directed 2 (Two) Times a Day., Disp: 45 g, Rfl: 1    Past Medical History:   Diagnosis Date   • Diabetes mellitus (CMS/HCC)    • Hyperlipidemia    • Hypertension        Past Surgical History:   Procedure Laterality Date   • HERNIA REPAIR     • KNEE SURGERY         Social History     Socioeconomic History   • Marital status:       "Spouse name: Not on file   • Number of children: Not on file   • Years of education: Not on file   • Highest education level: Not on file   Tobacco Use   • Smoking status: Never Smoker   • Smokeless tobacco: Never Used   Substance and Sexual Activity   • Alcohol use: No   • Drug use: Never   • Sexual activity: Defer       Family History   Problem Relation Age of Onset   • No Known Problems Father    • No Known Problems Mother        Objective    Temp 97.8 °F (36.6 °C) (Temporal)   Ht 180.3 cm (71\")   Wt 94.1 kg (207 lb 6.4 oz)   BMI 28.93 kg/m²     Physical Exam   Constitutional: He is oriented to person, place, and time. He appears well-developed and well-nourished. No distress.   HENT:   Nose: Nose normal.   Neck: Normal range of motion. Neck supple. No tracheal deviation present. No thyromegaly present.   Cardiovascular: Normal rate, regular rhythm and intact distal pulses.   No significant edema or tenderness    Pulmonary/Chest: Breath sounds normal. No accessory muscle usage. No respiratory distress.   Abdominal: Soft. Bowel sounds are normal. He exhibits no distension, no ascites and no mass. There is no hepatosplenomegaly. There is no tenderness. There is no rebound, no guarding and no CVA tenderness. No hernia.   Stool specimen is not indicated for my portion of the exam   Genitourinary: Testes normal. Rectal exam shows no mass, no tenderness, anal tone normal and guaiac negative stool. Tender: no nodules. Right testis shows no mass, no swelling and no tenderness. Left testis shows no mass, no swelling and no tenderness. No penile tenderness (no lesion or deformities).   Genitourinary Comments:  The urethral meatus normal in position without evidence of stricture. Epididymis without mass or tenderness. Vas Deferens is palpably normal. 35 gm prostate non tender   Lymphadenopathy:     He has no cervical adenopathy. No inguinal adenopathy noted on the right or left side.        Right: No inguinal adenopathy " present.        Left: No inguinal adenopathy present.   Neurological: He is alert and oriented to person, place, and time.   Skin: Skin is warm and dry. No rash noted. He is not diaphoretic. No pallor.   Psychiatric: He has a normal mood and affect. His behavior is normal.   Vitals reviewed.          Results for orders placed or performed in visit on 09/11/20   POC Urinalysis Dipstick, Multipro   Result Value Ref Range    Color Yellow Yellow, Straw, Dark Yellow, Amanda    Clarity, UA Clear Clear    Glucose, UA Negative Negative, 1000 mg/dL (3+) mg/dL    Bilirubin Negative Negative    Ketones, UA Negative Negative    Specific Gravity  1.015 1.005 - 1.030    Blood, UA Negative Negative    pH, Urine 5.0 5.0 - 8.0    Protein, POC Negative Negative mg/dL    Urobilinogen, UA Normal Normal    Nitrite, UA Negative Negative    Leukocytes Negative Negative     Assessment and Plan    Diagnoses and all orders for this visit:    Benign prostatic hyperplasia with urinary obstruction  -     POC Urinalysis Dipstick, Multipro    Balanitis  -     clotrimazole-betamethasone (LOTRISONE) 1-0.05 % cream; Apply  topically to the appropriate area as directed 2 (Two) Times a Day.    Impotence of organic origin           Patient doing very well.  His AUA symptom score is 9/35.  He has a history of greenlight laser ablation of prostate in the past.  He does have some issues with balanitis and he will continue the clotrimazole betamethasone cream.

## 2020-12-17 RX ORDER — LOSARTAN POTASSIUM AND HYDROCHLOROTHIAZIDE 25; 100 MG/1; MG/1
TABLET ORAL
Qty: 90 TABLET | Refills: 3 | Status: SHIPPED | OUTPATIENT
Start: 2020-12-17 | End: 2021-12-07

## 2020-12-17 RX ORDER — LOVASTATIN 40 MG/1
TABLET ORAL
Qty: 90 TABLET | Refills: 3 | Status: SHIPPED | OUTPATIENT
Start: 2020-12-17 | End: 2021-12-07

## 2020-12-17 RX ORDER — GLYBURIDE 5 MG/1
TABLET ORAL
Qty: 180 TABLET | Refills: 3 | Status: SHIPPED | OUTPATIENT
Start: 2020-12-17 | End: 2021-12-07

## 2021-01-07 ENCOUNTER — TELEPHONE (OUTPATIENT)
Dept: INTERNAL MEDICINE | Facility: CLINIC | Age: 76
End: 2021-01-07

## 2021-01-07 NOTE — TELEPHONE ENCOUNTER
Checked Goodreads and Epic - he had pneumo 23 in 2018, so is due for Prevnar 13.  He plans to come in for flu and Prevnar 13 tomorrow, while Dr. Bond is here, in the 9:00 hour.

## 2021-01-07 NOTE — TELEPHONE ENCOUNTER
PT CALLED INQUIRING IF HE COULD RECEIVE A PNEUMONIA SHOT WHEN HE COMES IN FOR HIS FLU SHOT    CALLBACK [phone]

## 2021-01-08 ENCOUNTER — FLU SHOT (OUTPATIENT)
Dept: INTERNAL MEDICINE | Facility: CLINIC | Age: 76
End: 2021-01-08

## 2021-01-08 DIAGNOSIS — Z23 NEED FOR IMMUNIZATION AGAINST INFLUENZA: ICD-10-CM

## 2021-01-08 DIAGNOSIS — Z23 NEED FOR VACCINATION AGAINST STREPTOCOCCUS PNEUMONIAE USING PNEUMOCOCCAL CONJUGATE VACCINE 13: Primary | ICD-10-CM

## 2021-01-08 PROCEDURE — G0008 ADMIN INFLUENZA VIRUS VAC: HCPCS | Performed by: INTERNAL MEDICINE

## 2021-01-08 PROCEDURE — 90694 VACC AIIV4 NO PRSRV 0.5ML IM: CPT | Performed by: INTERNAL MEDICINE

## 2021-01-08 PROCEDURE — G0009 ADMIN PNEUMOCOCCAL VACCINE: HCPCS | Performed by: INTERNAL MEDICINE

## 2021-01-08 PROCEDURE — 90670 PCV13 VACCINE IM: CPT | Performed by: INTERNAL MEDICINE

## 2021-01-20 ENCOUNTER — OFFICE VISIT (OUTPATIENT)
Dept: INTERNAL MEDICINE | Facility: CLINIC | Age: 76
End: 2021-01-20

## 2021-01-20 VITALS
TEMPERATURE: 97.5 F | BODY MASS INDEX: 28.98 KG/M2 | DIASTOLIC BLOOD PRESSURE: 80 MMHG | WEIGHT: 207 LBS | OXYGEN SATURATION: 98 % | SYSTOLIC BLOOD PRESSURE: 132 MMHG | HEIGHT: 71 IN | HEART RATE: 75 BPM

## 2021-01-20 DIAGNOSIS — E11.9 ENCOUNTER FOR DIABETIC FOOT EXAM (HCC): ICD-10-CM

## 2021-01-20 DIAGNOSIS — Z11.59 NEED FOR HEPATITIS C SCREENING TEST: ICD-10-CM

## 2021-01-20 DIAGNOSIS — I10 HYPERTENSION, BENIGN: ICD-10-CM

## 2021-01-20 DIAGNOSIS — Z12.5 SCREENING PSA (PROSTATE SPECIFIC ANTIGEN): Primary | ICD-10-CM

## 2021-01-20 DIAGNOSIS — E11.9 TYPE 2 DIABETES MELLITUS WITHOUT COMPLICATION, WITHOUT LONG-TERM CURRENT USE OF INSULIN (HCC): ICD-10-CM

## 2021-01-20 LAB — HBA1C MFR BLD: 7.6 %

## 2021-01-20 PROCEDURE — 83036 HEMOGLOBIN GLYCOSYLATED A1C: CPT | Performed by: INTERNAL MEDICINE

## 2021-01-20 PROCEDURE — G0439 PPPS, SUBSEQ VISIT: HCPCS | Performed by: INTERNAL MEDICINE

## 2021-01-20 NOTE — PROGRESS NOTES
The ABCs of the Annual Wellness Visit  Initial Medicare Wellness Visit    Chief Complaint   Patient presents with   • Medicare Wellness-Initial Visit     A1C:        Subjective   History of Present Illness:  Gary Tinoco is a 75 y.o. male who presents for an Initial Medicare Wellness Visit.  This is patient's annual wellness visit.  He also is a diabetic who has been borderline controlled in the past.  He does not monitor his glucoses at home.  He is asking whether he may have become resistant some of the pills that he takes for his diabetes.  He is also telling me about a posturing that he is experiencing in the left arm as if he were holding a cup or glass.  He is able to straighten that arm out this is a new finding.    HEALTH RISK ASSESSMENT    Recent Hospitalizations:  No hospitalization(s) within the last year.    Current Medical Providers:  Patient Care Team:  George Bond MD as PCP - General (Internal Medicine)  Braxton Messer MD as Consulting Physician (Urology)    Smoking Status:  Social History     Tobacco Use   Smoking Status Never Smoker   Smokeless Tobacco Never Used       Alcohol Consumption:  Social History     Substance and Sexual Activity   Alcohol Use No       Depression Screen:   PHQ-2/PHQ-9 Depression Screening 1/20/2021   Little interest or pleasure in doing things 0   Feeling down, depressed, or hopeless 0   Total Score 0       Fall Risk Screen:  STEADI Fall Risk Assessment was completed, and patient is at LOW risk for falls.Assessment completed on:1/20/2021    Health Habits and Functional and Cognitive Screening:  Functional & Cognitive Status 1/20/2021   Do you have difficulty preparing food and eating? No   Do you have difficulty bathing yourself, getting dressed or grooming yourself? No   Do you have difficulty using the toilet? No   Do you have difficulty moving around from place to place? No   Do you have trouble with steps or getting out of a bed or a chair? No   Current  Diet Well Balanced Diet   Dental Exam Up to date   Eye Exam Up to date   Exercise (times per week) 0 times per week   Current Exercise Activities Include No Regular Exercise   Do you need help using the phone?  No   Are you deaf or do you have serious difficulty hearing?  No   Do you need help with transportation? No   Do you need help shopping? No   Do you need help preparing meals?  No   Do you need help with housework?  No   Do you need help with laundry? No   Do you need help taking your medications? No   Do you need help managing money? No   Do you ever drive or ride in a car without wearing a seat belt? No   Have you felt unusual stress, anger or loneliness in the last month? No   Who do you live with? Spouse   If you need help, do you have trouble finding someone available to you? No   Have you been bothered in the last four weeks by sexual problems? No   Do you have difficulty concentrating, remembering or making decisions? No         Does the patient have evidence of cognitive impairment? No    Asprin use counseling:Taking ASA appropriately as indicated    Age-appropriate Screening Schedule:  Refer to the list below for future screening recommendations based on patient's age, sex and/or medical conditions. Orders for these recommended tests are listed in the plan section. The patient has been provided with a written plan.    Health Maintenance   Topic Date Due   • URINE MICROALBUMIN  1945   • TDAP/TD VACCINES (1 - Tdap) 02/14/1964   • ZOSTER VACCINE (1 of 2) 02/14/1995   • DIABETIC FOOT EXAM  04/12/2017   • LIPID PANEL  04/17/2017   • HEMOGLOBIN A1C  01/22/2021   • DIABETIC EYE EXAM  05/15/2021   • COLONOSCOPY  01/20/2025   • INFLUENZA VACCINE  Completed          The following portions of the patient's history were reviewed and updated as appropriate: allergies, current medications, past family history, past medical history, past social history, past surgical history and problem list.    Outpatient  Medications Prior to Visit   Medication Sig Dispense Refill   • aspirin 81 MG EC tablet Take 81 mg by mouth Daily.     • clotrimazole-betamethasone (LOTRISONE) 1-0.05 % cream Apply  topically to the appropriate area as directed 2 (Two) Times a Day. 45 g 1   • glyburide (DIAbeta) 5 MG tablet TAKE 1 TABLET BY MOUTH TWICE A  tablet 3   • losartan-hydrochlorothiazide (HYZAAR) 100-25 MG per tablet TAKE 1 TABLET BY MOUTH EVERY DAY 90 tablet 3   • lovastatin (MEVACOR) 40 MG tablet TAKE 1 TABLET BY MOUTH EVERY DAY 90 tablet 3   • metFORMIN (GLUCOPHAGE) 850 MG tablet Take 850 tablets by mouth Daily.       No facility-administered medications prior to visit.        Patient Active Problem List   Diagnosis   • Abnormal LFTs   • Anemia of chronic disease   • Hypertension, benign   • Right thyroid nodule   • Substernal thyroid   • Screening PSA (prostate specific antigen)   • Type 2 diabetes mellitus without complication, without long-term current use of insulin (CMS/[facility])       Advanced Care Planning:  ACP discussion was held with the patient during this visit. Patient does not have an advance directive, information provided.    Review of Systems   Constitutional: Negative for activity change, appetite change and chills.   HENT: Negative for congestion, ear pain and facial swelling.    Eyes: Negative for pain, discharge and itching.   Respiratory: Negative for apnea, cough and shortness of breath.    Cardiovascular: Negative for chest pain, palpitations and leg swelling.   Gastrointestinal: Negative for abdominal distention, abdominal pain and anal bleeding.   Endocrine: Negative for cold intolerance and heat intolerance.   Genitourinary: Negative for difficulty urinating, dysuria and flank pain.   Musculoskeletal: Negative for arthralgias, back pain and joint swelling.   Skin: Negative for color change, pallor and rash.   Allergic/Immunologic: Negative for environmental allergies and food allergies.   Neurological:  "Negative for dizziness and facial asymmetry.        Posturing left arm he is able to straighten his arm he holds the arm many times in a flexed position as if he were holding a cup.   Hematological: Negative for adenopathy. Does not bruise/bleed easily.   Psychiatric/Behavioral: Negative for agitation, behavioral problems and confusion.       Compared to one year ago, the patient feels his physical health is the same.  Compared to one year ago, the patient feels his mental health is the same.    Reviewed chart for potential of high risk medication in the elderly: yes  Reviewed chart for potential of harmful drug interactions in the elderly:yes    Objective         Vitals:    01/20/21 1057   BP: 132/80   BP Location: Left arm   Patient Position: Sitting   Cuff Size: Adult   Pulse: 75   Temp: 97.5 °F (36.4 °C)   TempSrc: Temporal   SpO2: 98%   Weight: 93.9 kg (207 lb)   Height: 180.3 cm (71\")   PainSc: 0-No pain       Body mass index is 28.87 kg/m².  Discussed the patient's BMI with him. The BMI is above average; BMI management plan is completed.    Physical Exam  Vitals signs and nursing note reviewed.   Constitutional:       General: He is not in acute distress.     Appearance: Normal appearance. He is well-developed.   HENT:      Head: Normocephalic and atraumatic.      Right Ear: External ear normal.      Left Ear: External ear normal.      Nose: Nose normal.   Eyes:      Extraocular Movements: Extraocular movements intact.      Conjunctiva/sclera: Conjunctivae normal.      Pupils: Pupils are equal, round, and reactive to light.   Neck:      Musculoskeletal: Normal range of motion and neck supple. No neck rigidity or muscular tenderness.   Cardiovascular:      Rate and Rhythm: Normal rate and regular rhythm.      Pulses: Normal pulses.      Heart sounds: Normal heart sounds.   Pulmonary:      Effort: Pulmonary effort is normal.      Breath sounds: Normal breath sounds.   Abdominal:      General: Bowel sounds are " normal.      Palpations: Abdomen is soft.   Musculoskeletal: Normal range of motion.      Comments: I did a thorough examination of his left arm his strength is good he showed me the posturing that he is experiencing.  I watched him as we were talking and he indeed does pull his left arm up in a flexed position as if he is holding a cup.  When asked he can straighten his arm without difficulty.  He also has basically a flat affect did not see any evidence of tremors.  His ambulation seems to be normal   Skin:     General: Skin is warm and dry.   Neurological:      General: No focal deficit present.      Mental Status: He is alert and oriented to person, place, and time.   Psychiatric:         Mood and Affect: Mood normal.         Behavior: Behavior normal.               Assessment/Plan   Medicare Risks and Personalized Health Plan  CMS Preventative Services Quick Reference  Advance Directive Discussion  Cardiovascular risk  Colon Cancer Screening  Immunizations Discussed/Encouraged (specific immunizations; adacel Tdap and Shingrix )  Prostate Cancer Screening     The above risks/problems have been discussed with the patient.  Pertinent information has been shared with the patient in the After Visit Summary.  Follow up plans and orders are seen below in the Assessment/Plan Section.    Diagnoses and all orders for this visit:    1. Screening PSA (prostate specific antigen) (Primary)  -     PSA Screen    2. Need for hepatitis C screening test  -     Hepatitis C Antibody    3. Hypertension, benign  -     Comprehensive Metabolic Panel  -     CBC & Differential  -     Lipid Panel  -     TSH  -     Uric Acid  -     Urinalysis With Microscopic - Urine, Clean Catch  -     MicroAlbumin, Urine, Random - Urine, Clean Catch    4. Type 2 diabetes mellitus without complication, without long-term current use of insulin (CMS/[facility])  -     Comprehensive Metabolic Panel  -     CBC & Differential  -     Lipid Panel  -     TSH  -     Uric  Acid  -     Urinalysis With Microscopic - Urine, Clean Catch  -     MicroAlbumin, Urine, Random - Urine, Clean Catch  -     POC Glycosylated Hemoglobin (Hb A1C)    5. Encounter for diabetic foot exam (CMS/[facility])  -     POC Glycosylated Hemoglobin (Hb A1C)      Follow Up:  No follow-ups on file.  Patient is a poorly controlled diabetic we have talked about some additional therapies he is interested in trying a once a week Trulicity we are going to go ahead and start that I am asking for repeat BMP in 4 weeks to make sure that his renal function is good with that we will review his lab work from his wellness evaluation as soon as it is available.  It is likely that I will need to decrease the glyburide or the Metformin as he begins the use of Trulicity.  I have asked him to monitor his sugars on a regular basis and let us know if his sugars begin to drop.  Otherwise we will see him back in 3 months.    An After Visit Summary and PPPS were given to the patient.

## 2021-01-21 LAB
ALBUMIN SERPL-MCNC: 4.2 G/DL (ref 3.5–5.2)
ALBUMIN/GLOB SERPL: 1.4 G/DL
ALP SERPL-CCNC: 47 U/L (ref 39–117)
ALT SERPL-CCNC: 23 U/L (ref 1–41)
APPEARANCE UR: CLEAR
AST SERPL-CCNC: 21 U/L (ref 1–40)
BACTERIA #/AREA URNS HPF: NORMAL /HPF
BASOPHILS # BLD AUTO: 0.04 10*3/MM3 (ref 0–0.2)
BASOPHILS NFR BLD AUTO: 0.6 % (ref 0–1.5)
BILIRUB SERPL-MCNC: 0.4 MG/DL (ref 0–1.2)
BILIRUB UR QL STRIP: NEGATIVE
BUN SERPL-MCNC: 22 MG/DL (ref 8–23)
BUN/CREAT SERPL: 25.3 (ref 7–25)
CALCIUM SERPL-MCNC: 9.1 MG/DL (ref 8.6–10.5)
CASTS URNS MICRO: NORMAL
CHLORIDE SERPL-SCNC: 100 MMOL/L (ref 98–107)
CHOLEST SERPL-MCNC: 161 MG/DL (ref 0–200)
CO2 SERPL-SCNC: 24.6 MMOL/L (ref 22–29)
COLOR UR: YELLOW
CREAT SERPL-MCNC: 0.87 MG/DL (ref 0.76–1.27)
EOSINOPHIL # BLD AUTO: 0.08 10*3/MM3 (ref 0–0.4)
EOSINOPHIL NFR BLD AUTO: 1.1 % (ref 0.3–6.2)
EPI CELLS #/AREA URNS HPF: NORMAL /HPF
ERYTHROCYTE [DISTWIDTH] IN BLOOD BY AUTOMATED COUNT: 13.4 % (ref 12.3–15.4)
GLOBULIN SER CALC-MCNC: 3.1 GM/DL
GLUCOSE SERPL-MCNC: 188 MG/DL (ref 65–99)
GLUCOSE UR QL: NEGATIVE
HCT VFR BLD AUTO: 40.2 % (ref 37.5–51)
HCV AB S/CO SERPL IA: <0.1 S/CO RATIO (ref 0–0.9)
HDLC SERPL-MCNC: 32 MG/DL (ref 40–60)
HGB BLD-MCNC: 13.8 G/DL (ref 13–17.7)
HGB UR QL STRIP: NEGATIVE
IMM GRANULOCYTES # BLD AUTO: 0.03 10*3/MM3 (ref 0–0.05)
IMM GRANULOCYTES NFR BLD AUTO: 0.4 % (ref 0–0.5)
KETONES UR QL STRIP: NEGATIVE
LDLC SERPL CALC-MCNC: 69 MG/DL (ref 0–100)
LEUKOCYTE ESTERASE UR QL STRIP: NEGATIVE
LYMPHOCYTES # BLD AUTO: 2.25 10*3/MM3 (ref 0.7–3.1)
LYMPHOCYTES NFR BLD AUTO: 32.2 % (ref 19.6–45.3)
MCH RBC QN AUTO: 29.2 PG (ref 26.6–33)
MCHC RBC AUTO-ENTMCNC: 34.3 G/DL (ref 31.5–35.7)
MCV RBC AUTO: 85.2 FL (ref 79–97)
MICROALBUMIN UR-MCNC: 13.2 UG/ML
MONOCYTES # BLD AUTO: 0.58 10*3/MM3 (ref 0.1–0.9)
MONOCYTES NFR BLD AUTO: 8.3 % (ref 5–12)
NEUTROPHILS # BLD AUTO: 4 10*3/MM3 (ref 1.7–7)
NEUTROPHILS NFR BLD AUTO: 57.4 % (ref 42.7–76)
NITRITE UR QL STRIP: NEGATIVE
NRBC BLD AUTO-RTO: 0 /100 WBC (ref 0–0.2)
PH UR STRIP: 5.5 [PH] (ref 5–8)
PLATELET # BLD AUTO: 182 10*3/MM3 (ref 140–450)
POTASSIUM SERPL-SCNC: 4.1 MMOL/L (ref 3.5–5.2)
PROT SERPL-MCNC: 7.3 G/DL (ref 6–8.5)
PROT UR QL STRIP: NEGATIVE
PSA SERPL-MCNC: 2.29 NG/ML (ref 0–4)
RBC # BLD AUTO: 4.72 10*6/MM3 (ref 4.14–5.8)
RBC #/AREA URNS HPF: NORMAL /HPF
SODIUM SERPL-SCNC: 136 MMOL/L (ref 136–145)
SP GR UR: 1.02 (ref 1–1.03)
TRIGL SERPL-MCNC: 379 MG/DL (ref 0–150)
TSH SERPL DL<=0.005 MIU/L-ACNC: 1.12 UIU/ML (ref 0.27–4.2)
URATE SERPL-MCNC: 6.5 MG/DL (ref 3.4–7)
UROBILINOGEN UR STRIP-MCNC: NORMAL MG/DL
VLDLC SERPL CALC-MCNC: 60 MG/DL (ref 5–40)
WBC # BLD AUTO: 6.98 10*3/MM3 (ref 3.4–10.8)
WBC #/AREA URNS HPF: NORMAL /HPF

## 2021-01-22 ENCOUNTER — TELEPHONE (OUTPATIENT)
Dept: INTERNAL MEDICINE | Facility: CLINIC | Age: 76
End: 2021-01-22

## 2021-01-22 NOTE — TELEPHONE ENCOUNTER
PATIENT STATES THAT THE PEN THAT  WANTED HIM TO GET HIS INSURANCE WILL NOT COVER. PLEASE ADVISE.  HE WOULD LIKE TO KNOW IF THRE IS AN ALTERNATIVE.  BEST CALL BACK [phone]

## 2021-01-25 NOTE — TELEPHONE ENCOUNTER
He will need to contact his insurance to see if there is an alternative that is covered in that category.

## 2021-01-26 NOTE — TELEPHONE ENCOUNTER
LM on VM that he will need to call his insurance and see if they will cover the others in this category and let us know.

## 2021-02-04 ENCOUNTER — IMMUNIZATION (OUTPATIENT)
Dept: VACCINE CLINIC | Facility: HOSPITAL | Age: 76
End: 2021-02-04

## 2021-02-04 PROCEDURE — 0011A: CPT | Performed by: OBSTETRICS & GYNECOLOGY

## 2021-02-04 PROCEDURE — 91301 HC SARSCO02 VAC 100MCG/0.5ML IM: CPT | Performed by: OBSTETRICS & GYNECOLOGY

## 2021-02-24 PROBLEM — Z86.010 HISTORY OF ADENOMATOUS POLYP OF COLON: Status: ACTIVE | Noted: 2021-02-24

## 2021-02-24 PROBLEM — Z86.0101 HISTORY OF ADENOMATOUS POLYP OF COLON: Status: ACTIVE | Noted: 2021-02-24

## 2021-02-24 NOTE — PROGRESS NOTES
[facility]    Primary Physician George Bond MD    2/25/2021    Gary Tinoco   1945      Chief Complaint   Patient presents with   • Colonoscopy       Subjective     HPI    Gary Tinoco is a 76 y.o. male who presents as a referral for preventative maintenance. He has no complaints of nausea or vomiting. No change in bowels. No wt loss. No BRBPR. No melena. No abdominal pain.        Last colonoscopy was 4/2016 noting a colon polyp ( path tubular adenomatous) and diverticulosis. The patient does not  have history of colon cancer.  There is  family history of colon polyps daughter. There is not a family history of colon cancer.     Past Medical History:   Diagnosis Date   • Diabetes mellitus (CMS/HCC)    • Diverticulosis    • History of adenomatous polyp of colon    • Hyperlipidemia    • Hypertension        Past Surgical History:   Procedure Laterality Date   • COLONOSCOPY  04/19/2016    polyp, tubular adenoma, diverticulosis   • HERNIA REPAIR     • KNEE SURGERY         Outpatient Medications Marked as Taking for the 2/25/21 encounter (Office Visit) with Kristan Diaz APRN   Medication Sig Dispense Refill   • clotrimazole-betamethasone (LOTRISONE) 1-0.05 % cream Apply  topically to the appropriate area as directed 2 (Two) Times a Day. (Patient taking differently: Apply  topically to the appropriate area as directed As Needed (rare).) 45 g 1   • glyburide (DIAbeta) 5 MG tablet TAKE 1 TABLET BY MOUTH TWICE A  tablet 3   • losartan-hydrochlorothiazide (HYZAAR) 100-25 MG per tablet TAKE 1 TABLET BY MOUTH EVERY DAY 90 tablet 3   • lovastatin (MEVACOR) 40 MG tablet TAKE 1 TABLET BY MOUTH EVERY DAY 90 tablet 3   • metFORMIN (GLUCOPHAGE) 850 MG tablet Take 850 tablets by mouth Daily.         No Known Allergies    Social History     Socioeconomic History   • Marital status:      Spouse name: Not on file   • Number of children: Not on file   • Years of education: Not on file    • Highest education level: Not on file   Tobacco Use   • Smoking status: Never Smoker   • Smokeless tobacco: Never Used   Substance and Sexual Activity   • Alcohol use: No   • Drug use: Never   • Sexual activity: Defer       Family History   Problem Relation Age of Onset   • No Known Problems Father    • No Known Problems Mother    • Colon cancer Neg Hx        Review of Systems   Constitutional: Negative for chills, fever and unexpected weight change.   Respiratory: Negative for cough, shortness of breath and wheezing.    Cardiovascular: Negative for chest pain and palpitations.   Gastrointestinal: Negative for abdominal distention, abdominal pain, anal bleeding, blood in stool, constipation, diarrhea, nausea and vomiting.       Objective     Vitals:    02/25/21 0856   BP: 138/68   Pulse: 79   Temp: 96.6 °F (35.9 °C)   SpO2: 98%         02/25/21  0856   Weight: 95.7 kg (211 lb)     Body mass index is 29.02 kg/m².    Physical Exam  Vitals signs reviewed.   Constitutional:       General: He is not in acute distress.  Cardiovascular:      Rate and Rhythm: Normal rate and regular rhythm.      Heart sounds: Normal heart sounds.   Pulmonary:      Effort: Pulmonary effort is normal.      Breath sounds: Normal breath sounds.   Abdominal:      General: Bowel sounds are normal. There is no distension.      Palpations: Abdomen is soft.      Tenderness: There is no abdominal tenderness.   Skin:     General: Skin is warm and dry.   Neurological:      Mental Status: He is alert.         Imaging Results (Most Recent)     None          Assessment/Plan     Diagnoses and all orders for this visit:    1. History of adenomatous polyp of colon (Primary)  -     Case Request; Standing  -     Case Request    2. Family hx colonic polyps    3. Hypertension, benign    4. Type 2 diabetes mellitus without complication, without long-term current use of insulin (CMS/[facility])    Other orders  -     Follow Anesthesia Guidelines / Protocol; Future  -      Implement Anesthesia Orders Day of Procedure; Standing  -     Obtain Informed Consent; Standing  -     Obtain Informed Consent; Future  -     polyethylene glycol (Golytely) 236 g solution; Take 4,000 mL by mouth 1 (One) Time for 1 dose. Take as directed per instruction sheet.  Dispense: 4000 mL; Refill: 0      Plan for colonoscopy. The patient was advised to take any blood pressure or heart  medications the morning of  procedure if that is when he/she normally takes. The patient was instructed how to adjust diabetes medications the am of procedure.              Body mass index is 29.02 kg/m².    Patient's Body mass index is 29.02 kg/m². BMI is within normal parameters. No follow-up required..      COLONOSCOPY WITH ANESTHESIA (N/A)  All risks, benefits, alternatives, and indications of colonoscopy procedure have been discussed with the patient. Risks to include perforation of the colon requiring possible surgery or colostomy, risk of bleeding from biopsies or removal of colon tissue, possibility of missing a colon polyp or cancer, or adverse drug reaction.  Benefits to include the diagnosis and management of disease of the colon and rectum. Alternatives to include barium enema, radiographic evaluation, lab testing or no intervention. Pt verbalizes understanding and agrees.         DELVIS Ayon      EMR Dragon/transcription disclaimer:  Much of this encounter note is electronic transcription/translation of spoken language to printed text.  The electronic translation of spoken language may be erroneous, or at times, nonsensical words or phrases may be inadvertently transcribed.  Although I have reviewed the note for such errors, some may still exist.

## 2021-02-25 ENCOUNTER — OFFICE VISIT (OUTPATIENT)
Dept: GASTROENTEROLOGY | Facility: CLINIC | Age: 76
End: 2021-02-25

## 2021-02-25 VITALS
DIASTOLIC BLOOD PRESSURE: 68 MMHG | SYSTOLIC BLOOD PRESSURE: 138 MMHG | TEMPERATURE: 96.6 F | HEART RATE: 79 BPM | OXYGEN SATURATION: 98 % | WEIGHT: 211 LBS | HEIGHT: 72 IN | BODY MASS INDEX: 28.58 KG/M2

## 2021-02-25 DIAGNOSIS — Z83.71 FAMILY HX COLONIC POLYPS: ICD-10-CM

## 2021-02-25 DIAGNOSIS — I10 HYPERTENSION, BENIGN: ICD-10-CM

## 2021-02-25 DIAGNOSIS — E11.9 TYPE 2 DIABETES MELLITUS WITHOUT COMPLICATION, WITHOUT LONG-TERM CURRENT USE OF INSULIN (HCC): ICD-10-CM

## 2021-02-25 DIAGNOSIS — Z86.010 HISTORY OF ADENOMATOUS POLYP OF COLON: Primary | ICD-10-CM

## 2021-02-25 PROBLEM — Z83.719 FAMILY HX COLONIC POLYPS: Status: ACTIVE | Noted: 2021-02-25

## 2021-02-25 PROCEDURE — 99214 OFFICE O/P EST MOD 30 MIN: CPT | Performed by: NURSE PRACTITIONER

## 2021-02-25 RX ORDER — POLYETHYLENE GLYCOL 3350, SODIUM SULFATE ANHYDROUS, SODIUM BICARBONATE, SODIUM CHLORIDE, POTASSIUM CHLORIDE 236; 22.74; 6.74; 5.86; 2.97 G/4L; G/4L; G/4L; G/4L; G/4L
4 POWDER, FOR SOLUTION ORAL ONCE
Qty: 4000 ML | Refills: 0 | Status: SHIPPED | OUTPATIENT
Start: 2021-02-25 | End: 2021-02-25

## 2021-03-04 ENCOUNTER — IMMUNIZATION (OUTPATIENT)
Dept: VACCINE CLINIC | Facility: HOSPITAL | Age: 76
End: 2021-03-04

## 2021-03-04 PROCEDURE — 91301 HC SARSCO02 VAC 100MCG/0.5ML IM: CPT | Performed by: OBSTETRICS & GYNECOLOGY

## 2021-03-04 PROCEDURE — 0012A: CPT | Performed by: OBSTETRICS & GYNECOLOGY

## 2021-03-15 ENCOUNTER — TRANSCRIBE ORDERS (OUTPATIENT)
Dept: ADMINISTRATIVE | Facility: HOSPITAL | Age: 76
End: 2021-03-15

## 2021-03-15 DIAGNOSIS — Z11.59 SCREENING FOR VIRAL DISEASE: Primary | ICD-10-CM

## 2021-03-16 ENCOUNTER — LAB (OUTPATIENT)
Dept: LAB | Facility: HOSPITAL | Age: 76
End: 2021-03-16

## 2021-03-16 LAB — SARS-COV-2 ORF1AB RESP QL NAA+PROBE: NOT DETECTED

## 2021-03-16 PROCEDURE — U0004 COV-19 TEST NON-CDC HGH THRU: HCPCS | Performed by: INTERNAL MEDICINE

## 2021-03-16 PROCEDURE — U0005 INFEC AGEN DETEC AMPLI PROBE: HCPCS | Performed by: INTERNAL MEDICINE

## 2021-03-16 PROCEDURE — C9803 HOPD COVID-19 SPEC COLLECT: HCPCS | Performed by: INTERNAL MEDICINE

## 2021-03-19 ENCOUNTER — TELEPHONE (OUTPATIENT)
Dept: GASTROENTEROLOGY | Facility: CLINIC | Age: 76
End: 2021-03-19

## 2021-03-19 ENCOUNTER — ANESTHESIA EVENT (OUTPATIENT)
Dept: GASTROENTEROLOGY | Facility: HOSPITAL | Age: 76
End: 2021-03-19

## 2021-03-19 ENCOUNTER — HOSPITAL ENCOUNTER (OUTPATIENT)
Facility: HOSPITAL | Age: 76
Setting detail: HOSPITAL OUTPATIENT SURGERY
Discharge: HOME | End: 2021-03-19
Attending: INTERNAL MEDICINE | Admitting: INTERNAL MEDICINE

## 2021-03-19 ENCOUNTER — ANESTHESIA (OUTPATIENT)
Dept: GASTROENTEROLOGY | Facility: HOSPITAL | Age: 76
End: 2021-03-19

## 2021-03-19 ENCOUNTER — PREP FOR SURGERY (OUTPATIENT)
Dept: OTHER | Facility: HOSPITAL | Age: 76
End: 2021-03-19

## 2021-03-19 VITALS
HEIGHT: 71 IN | TEMPERATURE: 97 F | BODY MASS INDEX: 28.42 KG/M2 | SYSTOLIC BLOOD PRESSURE: 141 MMHG | RESPIRATION RATE: 22 BRPM | HEART RATE: 73 BPM | DIASTOLIC BLOOD PRESSURE: 86 MMHG | OXYGEN SATURATION: 95 % | WEIGHT: 203 LBS

## 2021-03-19 DIAGNOSIS — Z86.010 HISTORY OF ADENOMATOUS POLYP OF COLON: Primary | ICD-10-CM

## 2021-03-19 PROCEDURE — G0105 COLORECTAL SCRN; HI RISK IND: HCPCS | Performed by: INTERNAL MEDICINE

## 2021-03-19 PROCEDURE — 25010000002 PROPOFOL 10 MG/ML EMULSION: Performed by: NURSE ANESTHETIST, CERTIFIED REGISTERED

## 2021-03-19 RX ORDER — ONDANSETRON 2 MG/ML
4 INJECTION INTRAMUSCULAR; INTRAVENOUS ONCE AS NEEDED
Status: DISCONTINUED | OUTPATIENT
Start: 2021-03-19 | End: 2021-03-19 | Stop reason: HOSPADM

## 2021-03-19 RX ORDER — LIDOCAINE HYDROCHLORIDE 20 MG/ML
INJECTION, SOLUTION EPIDURAL; INFILTRATION; INTRACAUDAL; PERINEURAL AS NEEDED
Status: DISCONTINUED | OUTPATIENT
Start: 2021-03-19 | End: 2021-03-19 | Stop reason: SURG

## 2021-03-19 RX ORDER — LIDOCAINE HYDROCHLORIDE 10 MG/ML
0.5 INJECTION, SOLUTION EPIDURAL; INFILTRATION; INTRACAUDAL; PERINEURAL ONCE AS NEEDED
Status: DISCONTINUED | OUTPATIENT
Start: 2021-03-19 | End: 2021-03-19 | Stop reason: HOSPADM

## 2021-03-19 RX ORDER — PROPOFOL 10 MG/ML
VIAL (ML) INTRAVENOUS AS NEEDED
Status: DISCONTINUED | OUTPATIENT
Start: 2021-03-19 | End: 2021-03-19 | Stop reason: SURG

## 2021-03-19 RX ORDER — SODIUM CHLORIDE 9 MG/ML
500 INJECTION, SOLUTION INTRAVENOUS CONTINUOUS PRN
Status: DISCONTINUED | OUTPATIENT
Start: 2021-03-19 | End: 2021-03-19 | Stop reason: HOSPADM

## 2021-03-19 RX ORDER — SODIUM CHLORIDE 0.9 % (FLUSH) 0.9 %
10 SYRINGE (ML) INJECTION AS NEEDED
Status: DISCONTINUED | OUTPATIENT
Start: 2021-03-19 | End: 2021-03-19 | Stop reason: HOSPADM

## 2021-03-19 RX ADMIN — PROPOFOL 70 MG: 10 INJECTION, EMULSION INTRAVENOUS at 08:27

## 2021-03-19 RX ADMIN — PROPOFOL 30 MG: 10 INJECTION, EMULSION INTRAVENOUS at 08:28

## 2021-03-19 RX ADMIN — SODIUM CHLORIDE 500 ML: 9 INJECTION, SOLUTION INTRAVENOUS at 07:24

## 2021-03-19 RX ADMIN — LIDOCAINE HYDROCHLORIDE 80 MG: 20 INJECTION, SOLUTION EPIDURAL; INFILTRATION; INTRACAUDAL; PERINEURAL at 08:27

## 2021-03-19 NOTE — TELEPHONE ENCOUNTER
He presented for colonoscopy today but had an inadequate prep.  He does run constipated.  He took MiraLAX prep but it was not enough.    Can you please contact him and reschedule colonoscopy.  We may be able to do it Monday.  If he is able to do it Monday and we have an opening on the schedule been having to a clear liquid diet over the weekend.  I would suggest 2 days prior he drink 2 bottles of magnesium citrate.  Then on day 1 he will need either MiraLAX prep or Clenpiq.  Preferably he may do better Clenpiq.  If we are not able to do this on Monday this coming week then I would suggest day 1 he drank 3 bottles of magnesium citrate and then on day 2 the Clenpiq and on day 3 the colonoscopy.

## 2021-03-19 NOTE — ANESTHESIA PREPROCEDURE EVALUATION
Anesthesia Evaluation     no history of anesthetic complications:  NPO Solid Status: > 8 hours  NPO Liquid Status: > 2 hours           Airway   Mallampati: II  TM distance: >3 FB  Neck ROM: full  No difficulty expected  Dental      Pulmonary    (-) sleep apnea, not a smoker    ROS comment: Dry cough, cold x 4 days. No fever  Cardiovascular   Exercise tolerance: good (4-7 METS)    (+) hypertension, hyperlipidemia,   (-) CAD, angina      Neuro/Psych  (-) seizures, TIA, CVA  GI/Hepatic/Renal/Endo    (+)   diabetes mellitus type 2,   (-) liver disease, no renal disease    Musculoskeletal     Abdominal    Substance History      OB/GYN          Other                        Anesthesia Plan    ASA 2     MAC     intravenous induction     Anesthetic plan, all risks, benefits, and alternatives have been provided, discussed and informed consent has been obtained with: patient.

## 2021-03-19 NOTE — TELEPHONE ENCOUNTER
Spoke with PT and wife.  PT is scheduled for 4-1-21 @ 6:30.    Called Clenpiq into [facility].   Will fax  instructions to Pharmacy.    Spoke with PT that [facility] did have the prep.

## 2021-03-19 NOTE — ANESTHESIA POSTPROCEDURE EVALUATION
Patient: Gary Tinoco    Procedure Summary     Date: 03/19/21 Room / Location: [facility] ENDOSCOPY 5 / [facility]    Anesthesia Start: 0823 Anesthesia Stop: 0835    Procedure: COLONOSCOPY WITH ANESTHESIA (N/A ) Diagnosis:       History of adenomatous polyp of colon      (History of adenomatous polyp of colon [Z86.010])    Surgeons: Philip Ferrari MD Provider: David Magallon CRNA    Anesthesia Type: MAC ASA Status: 2          Anesthesia Type: MAC    Vitals  No vitals data found for the desired time range.          Post Anesthesia Care and Evaluation    Patient location during evaluation: PHASE II  Patient participation: complete - patient participated  Level of consciousness: awake  Pain score: 0  Pain management: adequate  Airway patency: patent  Anesthetic complications: No anesthetic complications  PONV Status: none  Cardiovascular status: acceptable  Respiratory status: acceptable  Hydration status: acceptable

## 2021-03-25 ENCOUNTER — TRANSCRIBE ORDERS (OUTPATIENT)
Dept: LAB | Facility: HOSPITAL | Age: 76
End: 2021-03-25

## 2021-03-25 DIAGNOSIS — Z01.818 PRE-OP TESTING: Primary | ICD-10-CM

## 2021-03-29 ENCOUNTER — LAB (OUTPATIENT)
Dept: LAB | Facility: HOSPITAL | Age: 76
End: 2021-03-29

## 2021-03-29 LAB — SARS-COV-2 ORF1AB RESP QL NAA+PROBE: NOT DETECTED

## 2021-03-29 PROCEDURE — U0004 COV-19 TEST NON-CDC HGH THRU: HCPCS | Performed by: INTERNAL MEDICINE

## 2021-03-29 PROCEDURE — C9803 HOPD COVID-19 SPEC COLLECT: HCPCS | Performed by: INTERNAL MEDICINE

## 2021-03-29 PROCEDURE — U0005 INFEC AGEN DETEC AMPLI PROBE: HCPCS | Performed by: INTERNAL MEDICINE

## 2021-03-31 ENCOUNTER — ANESTHESIA EVENT (OUTPATIENT)
Dept: GASTROENTEROLOGY | Facility: HOSPITAL | Age: 76
End: 2021-03-31

## 2021-04-01 ENCOUNTER — HOSPITAL ENCOUNTER (OUTPATIENT)
Facility: HOSPITAL | Age: 76
Setting detail: HOSPITAL OUTPATIENT SURGERY
Discharge: HOME | End: 2021-04-01
Attending: INTERNAL MEDICINE | Admitting: INTERNAL MEDICINE

## 2021-04-01 ENCOUNTER — ANESTHESIA (OUTPATIENT)
Dept: GASTROENTEROLOGY | Facility: HOSPITAL | Age: 76
End: 2021-04-01

## 2021-04-01 VITALS
HEIGHT: 71 IN | WEIGHT: 201 LBS | SYSTOLIC BLOOD PRESSURE: 135 MMHG | DIASTOLIC BLOOD PRESSURE: 69 MMHG | BODY MASS INDEX: 28.14 KG/M2 | TEMPERATURE: 97.6 F | HEART RATE: 78 BPM | RESPIRATION RATE: 12 BRPM | OXYGEN SATURATION: 96 %

## 2021-04-01 DIAGNOSIS — Z86.010 HISTORY OF ADENOMATOUS POLYP OF COLON: ICD-10-CM

## 2021-04-01 LAB — GLUCOSE BLDC GLUCOMTR-MCNC: 131 MG/DL (ref 70–130)

## 2021-04-01 PROCEDURE — 45385 COLONOSCOPY W/LESION REMOVAL: CPT | Performed by: INTERNAL MEDICINE

## 2021-04-01 PROCEDURE — 25010000002 PROPOFOL 10 MG/ML EMULSION: Performed by: NURSE ANESTHETIST, CERTIFIED REGISTERED

## 2021-04-01 PROCEDURE — 82962 GLUCOSE BLOOD TEST: CPT

## 2021-04-01 PROCEDURE — 88305 TISSUE EXAM BY PATHOLOGIST: CPT | Performed by: INTERNAL MEDICINE

## 2021-04-01 RX ORDER — PROPOFOL 10 MG/ML
VIAL (ML) INTRAVENOUS AS NEEDED
Status: DISCONTINUED | OUTPATIENT
Start: 2021-04-01 | End: 2021-04-01 | Stop reason: SURG

## 2021-04-01 RX ORDER — SODIUM CHLORIDE 0.9 % (FLUSH) 0.9 %
10 SYRINGE (ML) INJECTION EVERY 12 HOURS SCHEDULED
Status: DISCONTINUED | OUTPATIENT
Start: 2021-04-01 | End: 2021-04-01 | Stop reason: HOSPADM

## 2021-04-01 RX ORDER — ONDANSETRON 2 MG/ML
4 INJECTION INTRAMUSCULAR; INTRAVENOUS ONCE AS NEEDED
Status: DISCONTINUED | OUTPATIENT
Start: 2021-04-01 | End: 2021-04-01 | Stop reason: HOSPADM

## 2021-04-01 RX ORDER — LIDOCAINE HYDROCHLORIDE 20 MG/ML
INJECTION, SOLUTION EPIDURAL; INFILTRATION; INTRACAUDAL; PERINEURAL AS NEEDED
Status: DISCONTINUED | OUTPATIENT
Start: 2021-04-01 | End: 2021-04-01 | Stop reason: SURG

## 2021-04-01 RX ORDER — SODIUM CHLORIDE 9 MG/ML
100 INJECTION, SOLUTION INTRAVENOUS CONTINUOUS
Status: DISCONTINUED | OUTPATIENT
Start: 2021-04-01 | End: 2021-04-01 | Stop reason: HOSPADM

## 2021-04-01 RX ORDER — SODIUM CHLORIDE 0.9 % (FLUSH) 0.9 %
10 SYRINGE (ML) INJECTION AS NEEDED
Status: DISCONTINUED | OUTPATIENT
Start: 2021-04-01 | End: 2021-04-01 | Stop reason: HOSPADM

## 2021-04-01 RX ADMIN — PROPOFOL 75 MG: 10 INJECTION, EMULSION INTRAVENOUS at 08:56

## 2021-04-01 RX ADMIN — PROPOFOL 100 MG: 10 INJECTION, EMULSION INTRAVENOUS at 08:50

## 2021-04-01 RX ADMIN — SODIUM CHLORIDE 100 ML/HR: 9 INJECTION, SOLUTION INTRAVENOUS at 08:00

## 2021-04-01 RX ADMIN — PROPOFOL 50 MG: 10 INJECTION, EMULSION INTRAVENOUS at 09:03

## 2021-04-01 RX ADMIN — PROPOFOL 50 MG: 10 INJECTION, EMULSION INTRAVENOUS at 09:07

## 2021-04-01 RX ADMIN — LIDOCAINE HYDROCHLORIDE 50 MG: 20 INJECTION, SOLUTION EPIDURAL; INFILTRATION; INTRACAUDAL; PERINEURAL at 08:46

## 2021-04-01 RX ADMIN — PROPOFOL 100 MG: 10 INJECTION, EMULSION INTRAVENOUS at 08:46

## 2021-04-01 NOTE — ANESTHESIA POSTPROCEDURE EVALUATION
Patient: Gary Tinoco    Procedure Summary     Date: 04/01/21 Room / Location: [facility] ENDOSCOPY 6 / [facility]    Anesthesia Start: 0842 Anesthesia Stop: 0914    Procedure: COLONOSCOPY WITH ANESTHESIA (N/A ) Diagnosis:       History of adenomatous polyp of colon      (History of adenomatous polyp of colon [Z86.010])    Surgeons: Philip Ferrari MD Provider: Laurent Ibanez CRNA    Anesthesia Type: MAC ASA Status: 2          Anesthesia Type: MAC    Vitals  Vitals Value Taken Time   /61 04/01/21 0912   Temp     Pulse 79 04/01/21 0914   Resp 17 04/01/21 0912   SpO2 95 % 04/01/21 0914   Vitals shown include unvalidated device data.        Post Anesthesia Care and Evaluation    Patient location during evaluation: PHASE II  Patient participation: complete - patient participated  Level of consciousness: awake  Pain score: 0  Pain management: adequate  Airway patency: patent  Anesthetic complications: No anesthetic complications  PONV Status: none  Cardiovascular status: acceptable  Respiratory status: acceptable  Hydration status: acceptable

## 2021-04-01 NOTE — H&P
[facility] Gastroenterology  Pre Procedure History & Physical    Chief Complaint:   Colon polyps    Subjective     HPI:   The patient has a history of colon polyps who presents for exam.    Past Medical History:   Past Medical History:   Diagnosis Date   • Diabetes mellitus (CMS/HCC)    • Diverticulosis    • History of adenomatous polyp of colon    • Hyperlipidemia    • Hypertension        Past Surgical History:  Past Surgical History:   Procedure Laterality Date   • COLONOSCOPY  04/19/2016    polyp, tubular adenoma, diverticulosis   • COLONOSCOPY N/A 3/19/2021    Procedure: COLONOSCOPY WITH ANESTHESIA;  Surgeon: Philip Ferrari MD;  Location: [facility] ENDOSCOPY;  Service: Gastroenterology;  Laterality: N/A;  pre: hx polyps  post: poor prep  George Bond MD   • HERNIA REPAIR     • KNEE SURGERY         Family History:  Family History   Problem Relation Age of Onset   • No Known Problems Father    • No Known Problems Mother    • Colon cancer Neg Hx        Social History:   reports that he has never smoked. He has never used smokeless tobacco. He reports that he does not drink alcohol and does not use drugs.    Medications:   Prior to Admission medications    Medication Sig Start Date End Date Taking? Authorizing Provider   aspirin 81 MG EC tablet Take 81 mg by mouth Daily.   Yes Kasia Henry MD   glyburide (DIAbeta) 5 MG tablet TAKE 1 TABLET BY MOUTH TWICE A DAY 12/17/20  Yes Nunu Arreola APRN   losartan-hydrochlorothiazide (HYZAAR) 100-25 MG per tablet TAKE 1 TABLET BY MOUTH EVERY DAY 12/17/20  Yes Nunu Arreola APRN   lovastatin (MEVACOR) 40 MG tablet TAKE 1 TABLET BY MOUTH EVERY DAY 12/17/20  Yes Nunu Arreola APRN   metFORMIN (GLUCOPHAGE) 850 MG tablet Take 850 tablets by mouth Daily.   Yes Kasia Henry MD   clotrimazole-betamethasone (LOTRISONE) 1-0.05 % cream Apply  topically to the appropriate area as directed 2 (Two) Times a Day.  Patient  "taking differently: Apply  topically to the appropriate area as directed As Needed (rare). 9/11/20   Braxton Messer MD   Dulaglutide 0.75 MG/0.5ML solution pen-injector Inject 0.75 mg under the skin into the appropriate area as directed 1 (One) Time Per Week. 1/20/21   George Bond MD       Allergies:  Patient has no known allergies.    ROS:    General: Weight stable  Resp: No SOA  Cardiovascular: No CP    Objective     Blood pressure 156/69, pulse 77, temperature 97.6 °F (36.4 °C), temperature source Temporal, resp. rate 22, height 180.3 cm (71\"), weight 91.2 kg (201 lb), SpO2 96 %.    Physical Exam   Constitutional: Pt is oriented to person, place, and in no distress.   Cardiovascular: Normal rate, regular rhythm.    Pulmonary/Chest: Effort normal. No respiratory distress.   Abdominal:  Non-distended.  Psychiatric: Mood, memory, affect and judgment appear normal.     Assessment/Plan     Diagnosis:  Colon polyps    Anticipated Surgical Procedure:  Colonoscopy    The risks, benefits, and alternatives of this procedure have been discussed with the patient or the responsible party- the patient understands and agrees to proceed.      EMR Dragon/transcription disclaimer:  Much of this encounter note is electronic transcription/translation of spoken language to printed text.  The electronic translation of spoken language may be erroneous, or at times, nonsensical words or phrases may be inadvertently transcribed.  Although I have reviewed the note for such errors, some may still exist.  "

## 2021-04-02 LAB
LAB AP CASE REPORT: NORMAL
PATH REPORT.FINAL DX SPEC: NORMAL
PATH REPORT.GROSS SPEC: NORMAL

## 2021-04-16 ENCOUNTER — TELEPHONE (OUTPATIENT)
Dept: INTERNAL MEDICINE | Facility: CLINIC | Age: 76
End: 2021-04-16

## 2021-04-16 NOTE — TELEPHONE ENCOUNTER
Caller: Gary Tinoco    Relationship: Self    Best call back number: [phone]    What is the best time to reach you:   ANYTIME    Who are you requesting to speak with (clinical staff, provider,  specific staff member):   PCP OR LAB    Do you know the name of the person who called: GARY TINOCO    What was the call regarding:   PATIENT WILL BE COMING IN TO HAVE BLOOD TEST DONE    Do you require a callback:   NO

## 2021-04-21 ENCOUNTER — OFFICE VISIT (OUTPATIENT)
Dept: INTERNAL MEDICINE | Facility: CLINIC | Age: 76
End: 2021-04-21

## 2021-04-21 VITALS
DIASTOLIC BLOOD PRESSURE: 74 MMHG | WEIGHT: 206 LBS | HEIGHT: 71 IN | TEMPERATURE: 97.7 F | HEART RATE: 78 BPM | BODY MASS INDEX: 28.84 KG/M2 | OXYGEN SATURATION: 97 % | RESPIRATION RATE: 18 BRPM | SYSTOLIC BLOOD PRESSURE: 134 MMHG

## 2021-04-21 DIAGNOSIS — E11.9 TYPE 2 DIABETES MELLITUS WITHOUT COMPLICATION, WITHOUT LONG-TERM CURRENT USE OF INSULIN (HCC): ICD-10-CM

## 2021-04-21 DIAGNOSIS — E11.9 ENCOUNTER FOR DIABETIC FOOT EXAM (HCC): Primary | ICD-10-CM

## 2021-04-21 DIAGNOSIS — I10 HYPERTENSION, BENIGN: ICD-10-CM

## 2021-04-21 LAB — HBA1C MFR BLD: 6.4 %

## 2021-04-21 PROCEDURE — 83036 HEMOGLOBIN GLYCOSYLATED A1C: CPT | Performed by: INTERNAL MEDICINE

## 2021-04-21 PROCEDURE — 99214 OFFICE O/P EST MOD 30 MIN: CPT | Performed by: INTERNAL MEDICINE

## 2021-04-21 NOTE — PROGRESS NOTES
Subjective   Gary Tinoco is a 76 y.o. male.   Chief Complaint   Patient presents with   • Diabetes     A1C - 6.4   • Hypertension       History of Present Illness this is a 6-month follow-up for patient with diabetes and hypertension his A1c today is 6.4 his blood pressure is 134/74 he is taking his medications as prescribed    The following portions of the patient's history were reviewed and updated as appropriate: allergies, current medications, past family history, past medical history, past social history, past surgical history and problem list.    Review of Systems   Constitutional: Negative for activity change, appetite change, fatigue, fever, unexpected weight gain and unexpected weight loss.   HENT: Negative for swollen glands, trouble swallowing and voice change.    Eyes: Negative for blurred vision and visual disturbance.   Respiratory: Negative for cough and shortness of breath.    Cardiovascular: Negative for chest pain, palpitations and leg swelling.   Gastrointestinal: Negative for abdominal pain, constipation, diarrhea, nausea, vomiting and indigestion.   Endocrine: Negative for cold intolerance, heat intolerance, polydipsia and polyphagia.   Genitourinary: Negative for dysuria and frequency.   Musculoskeletal: Negative for arthralgias, back pain, joint swelling and neck pain.   Skin: Negative for color change, rash and skin lesions.   Neurological: Negative for dizziness, weakness, headache, memory problem and confusion.   Hematological: Does not bruise/bleed easily.   Psychiatric/Behavioral: Negative for agitation, hallucinations and suicidal ideas. The patient is not nervous/anxious.        Objective   Past Medical History:   Diagnosis Date   • Diabetes mellitus (CMS/HCC)    • Diverticulosis    • History of adenomatous polyp of colon    • Hyperlipidemia    • Hypertension       Past Surgical History:   Procedure Laterality Date   • COLONOSCOPY  04/19/2016    polyp, tubular adenoma, diverticulosis    • COLONOSCOPY N/A 3/19/2021    Procedure: COLONOSCOPY WITH ANESTHESIA;  Surgeon: Philip Ferrari MD;  Location: [facility] ENDOSCOPY;  Service: Gastroenterology;  Laterality: N/A;  pre: hx polyps  post: poor prep  George Bond MD   • COLONOSCOPY N/A 4/1/2021    Procedure: COLONOSCOPY WITH ANESTHESIA;  Surgeon: Philip Ferrari MD;  Location: [facility] ENDOSCOPY;  Service: Gastroenterology;  Laterality: N/A;  preop; hx of polyps  postop; polyps   PCP George Bond    • HERNIA REPAIR     • KNEE SURGERY          Current Outpatient Medications:   •  aspirin 81 MG EC tablet, Take 81 mg by mouth Daily., Disp: , Rfl:   •  glyburide (DIAbeta) 5 MG tablet, TAKE 1 TABLET BY MOUTH TWICE A DAY, Disp: 180 tablet, Rfl: 3  •  losartan-hydrochlorothiazide (HYZAAR) 100-25 MG per tablet, TAKE 1 TABLET BY MOUTH EVERY DAY, Disp: 90 tablet, Rfl: 3  •  lovastatin (MEVACOR) 40 MG tablet, TAKE 1 TABLET BY MOUTH EVERY DAY, Disp: 90 tablet, Rfl: 3  •  metFORMIN (GLUCOPHAGE) 850 MG tablet, Take 850 tablets by mouth Daily., Disp: , Rfl:      Vitals:    04/21/21 1118   BP: 134/74   Pulse: 78   Resp: 18   Temp: 97.7 °F (36.5 °C)   SpO2: 97%         04/21/21  1118   Weight: 93.4 kg (206 lb)     Patient's Body mass index is 28.73 kg/m². BMI is .      Physical Exam  Vitals and nursing note reviewed.   Constitutional:       General: He is not in acute distress.     Appearance: Normal appearance. He is well-developed.   HENT:      Head: Normocephalic and atraumatic.      Right Ear: External ear normal.      Left Ear: External ear normal.      Nose: Nose normal.   Eyes:      Extraocular Movements: Extraocular movements intact.      Conjunctiva/sclera: Conjunctivae normal.      Pupils: Pupils are equal, round, and reactive to light.   Cardiovascular:      Rate and Rhythm: Normal rate and regular rhythm.      Pulses: Normal pulses.           Dorsalis pedis pulses are 2+ on the right side and 2+ on the left side.        Posterior tibial pulses are  2+ on the right side and 2+ on the left side.      Heart sounds: Normal heart sounds.   Pulmonary:      Effort: Pulmonary effort is normal.      Breath sounds: Normal breath sounds.   Abdominal:      General: Bowel sounds are normal.      Palpations: Abdomen is soft.   Musculoskeletal:         General: Normal range of motion.      Cervical back: Normal range of motion and neck supple. No rigidity. No muscular tenderness.   Feet:      Right foot:      Protective Sensation: 2 sites tested. 2 sites sensed.      Skin integrity: Skin integrity normal.      Left foot:      Protective Sensation: 2 sites tested. 2 sites sensed.      Skin integrity: Skin integrity normal.   Skin:     General: Skin is warm and dry.   Neurological:      General: No focal deficit present.      Mental Status: He is alert and oriented to person, place, and time.   Psychiatric:         Mood and Affect: Mood normal.         Behavior: Behavior normal.               Assessment/Plan   Diagnoses and all orders for this visit:    1. Encounter for diabetic foot exam (CMS/[facility]) (Primary)  -     POC Glycosylated Hemoglobin (Hb A1C)    2. Hypertension, benign    3. Type 2 diabetes mellitus without complication, without long-term current use of insulin (CMS/[facility])      At this point patient is here for routine diabetic follow-up of hypertension follow-up he is taking his medication as prescribed I am making no adjustment in his current medication we will see him back in 6 months.

## 2021-09-16 ENCOUNTER — OFFICE VISIT (OUTPATIENT)
Dept: UROLOGY | Facility: CLINIC | Age: 76
End: 2021-09-16

## 2021-09-16 VITALS — HEIGHT: 72 IN | BODY MASS INDEX: 27.71 KG/M2 | WEIGHT: 204.6 LBS | TEMPERATURE: 97.6 F

## 2021-09-16 DIAGNOSIS — N52.9 IMPOTENCE OF ORGANIC ORIGIN: ICD-10-CM

## 2021-09-16 DIAGNOSIS — N40.1 BENIGN PROSTATIC HYPERPLASIA WITH URINARY OBSTRUCTION: Primary | ICD-10-CM

## 2021-09-16 DIAGNOSIS — N48.1 BALANITIS: ICD-10-CM

## 2021-09-16 DIAGNOSIS — N13.8 BENIGN PROSTATIC HYPERPLASIA WITH URINARY OBSTRUCTION: Primary | ICD-10-CM

## 2021-09-16 PROCEDURE — 99214 OFFICE O/P EST MOD 30 MIN: CPT | Performed by: UROLOGY

## 2021-09-16 NOTE — PROGRESS NOTES
Subjective    Mr. Tinoco is 76 y.o. male    Chief Complaint: BPH.    History of Present Illness  Benign Prostatic Hypertrophy  Patient complains of lower urinary tract symptoms. He reports frequency, intermittency, weak stream, nocturia and urgency. He denies straining and incomplete emptying. Patient states symptoms are of mild severity. Onset of symptoms was several years ago and was gradual in onset. His AUA Symptom Score is, 7/35.He reports a history of no complicating symptoms. He denies flank pain, gross hematuria, kidney stones and recurrent UTI.  Patient has tried Laser ablation therapy of prostate  with improvement. Last PSA was 2.32 . PSA screening discontinued due to age.     The following portions of the patient's history were reviewed and updated as appropriate: allergies, current medications, past family history, past medical history, past social history, past surgical history and problem list.    Review of Systems   Constitutional: Negative for chills and fever.   Gastrointestinal: Negative for abdominal pain, anal bleeding and blood in stool.   Genitourinary: Negative for dysuria, frequency, hematuria and urgency.         Current Outpatient Medications:   •  aspirin 81 MG EC tablet, Take 81 mg by mouth Daily., Disp: , Rfl:   •  glyburide (DIAbeta) 5 MG tablet, TAKE 1 TABLET BY MOUTH TWICE A DAY, Disp: 180 tablet, Rfl: 3  •  losartan-hydrochlorothiazide (HYZAAR) 100-25 MG per tablet, TAKE 1 TABLET BY MOUTH EVERY DAY, Disp: 90 tablet, Rfl: 3  •  lovastatin (MEVACOR) 40 MG tablet, TAKE 1 TABLET BY MOUTH EVERY DAY, Disp: 90 tablet, Rfl: 3  •  metFORMIN (GLUCOPHAGE) 850 MG tablet, Take 850 tablets by mouth Daily., Disp: , Rfl:     Past Medical History:   Diagnosis Date   • Diabetes mellitus (CMS/HCC)    • Diverticulosis    • History of adenomatous polyp of colon    • Hyperlipidemia    • Hypertension        Past Surgical History:   Procedure Laterality Date   • COLONOSCOPY  04/19/2016    polyp, tubular  "adenoma, diverticulosis   • COLONOSCOPY N/A 3/19/2021    Procedure: COLONOSCOPY WITH ANESTHESIA;  Surgeon: Philip Ferrari MD;  Location:  [facility] ENDOSCOPY;  Service: Gastroenterology;  Laterality: N/A;  pre: hx polyps  post: poor prep  George Bond MD   • COLONOSCOPY N/A 4/1/2021    Procedure: COLONOSCOPY WITH ANESTHESIA;  Surgeon: Philip Ferrari MD;  Location:  [facility] ENDOSCOPY;  Service: Gastroenterology;  Laterality: N/A;  preop; hx of polyps  postop; polyps   PCP George Bond    • HERNIA REPAIR     • KNEE SURGERY         Social History     Socioeconomic History   • Marital status:      Spouse name: Not on file   • Number of children: Not on file   • Years of education: Not on file   • Highest education level: Not on file   Tobacco Use   • Smoking status: Never Smoker   • Smokeless tobacco: Never Used   Vaping Use   • Vaping Use: Never used   Substance and Sexual Activity   • Alcohol use: No   • Drug use: Never   • Sexual activity: Defer       Family History   Problem Relation Age of Onset   • No Known Problems Father    • No Known Problems Mother    • Colon cancer Neg Hx        Objective    Temp 97.6 °F (36.4 °C) (Temporal)   Ht 181.6 cm (71.5\")   Wt 92.8 kg (204 lb 9.6 oz)   BMI 28.14 kg/m²     Physical Exam  Genitourinary:     Comments: BONY 50 gm prostate smooth no nodules            Results for orders placed or performed in visit on 04/21/21   POC Glycosylated Hemoglobin (Hb A1C)    Specimen: Blood   Result Value Ref Range    Hemoglobin A1C 6.4 %     Assessment and Plan    Diagnoses and all orders for this visit:    1. Benign prostatic hyperplasia with urinary obstruction (Primary)  -     Cancel: POC Urinalysis Dipstick, Multipro    2. Balanitis    3. Impotence of organic origin    Patient doing very well.  His AUA symptom score is 7/35.  He has a history of greenlight laser ablation of prostate in the past.      He does have some issues with intermittent balanitis.    Continue the " clotrimazole betamethasone cream.    Follow-up in 1 year.

## 2021-10-15 ENCOUNTER — CLINICAL SUPPORT (OUTPATIENT)
Dept: INTERNAL MEDICINE | Facility: CLINIC | Age: 76
End: 2021-10-15

## 2021-10-15 DIAGNOSIS — Z23 NEED FOR INFLUENZA VACCINATION: Primary | ICD-10-CM

## 2021-10-15 PROCEDURE — G0008 ADMIN INFLUENZA VIRUS VAC: HCPCS | Performed by: NURSE PRACTITIONER

## 2021-10-15 PROCEDURE — 90662 IIV NO PRSV INCREASED AG IM: CPT | Performed by: NURSE PRACTITIONER

## 2021-10-21 ENCOUNTER — OFFICE VISIT (OUTPATIENT)
Dept: INTERNAL MEDICINE | Facility: CLINIC | Age: 76
End: 2021-10-21

## 2021-10-21 VITALS
DIASTOLIC BLOOD PRESSURE: 58 MMHG | HEART RATE: 75 BPM | SYSTOLIC BLOOD PRESSURE: 130 MMHG | TEMPERATURE: 97.1 F | HEIGHT: 72 IN | WEIGHT: 204 LBS | OXYGEN SATURATION: 98 % | BODY MASS INDEX: 27.63 KG/M2

## 2021-10-21 DIAGNOSIS — E11.65 TYPE 2 DIABETES MELLITUS WITH HYPERGLYCEMIA, WITHOUT LONG-TERM CURRENT USE OF INSULIN (HCC): ICD-10-CM

## 2021-10-21 DIAGNOSIS — I10 HYPERTENSION, BENIGN: ICD-10-CM

## 2021-10-21 DIAGNOSIS — G20 PARKINSON DISEASE (HCC): Primary | ICD-10-CM

## 2021-10-21 LAB
EXPIRATION DATE: NORMAL
HBA1C MFR BLD: 7.3 %
Lab: NORMAL

## 2021-10-21 PROCEDURE — 99214 OFFICE O/P EST MOD 30 MIN: CPT | Performed by: INTERNAL MEDICINE

## 2021-10-21 PROCEDURE — 83036 HEMOGLOBIN GLYCOSYLATED A1C: CPT | Performed by: INTERNAL MEDICINE

## 2021-10-21 PROCEDURE — 3051F HG A1C>EQUAL 7.0%<8.0%: CPT | Performed by: INTERNAL MEDICINE

## 2021-10-21 PROCEDURE — 3046F HEMOGLOBIN A1C LEVEL >9.0%: CPT | Performed by: INTERNAL MEDICINE

## 2021-10-21 NOTE — PROGRESS NOTES
Subjective   Gary Tinoco is a 76 y.o. male.   Chief Complaint   Patient presents with   • Hypertension     6 month follow up   • Diabetes     a1c: 7.3       History of Present Illness patient is here for follow-up of hypertension diabetes.  He has A1c of 7.3 his blood pressure is 130/58 he is taking medications as prescribed.  I noticed on talking to patient that he seemed to have a flat affect seem to be ill stiffer than usual when I asked him about a tremor he said he was wondering if he is developed Parkinson's.    The following portions of the patient's history were reviewed and updated as appropriate: allergies, current medications, past family history, past medical history, past social history, past surgical history and problem list.    Review of Systems   Constitutional: Negative for activity change, appetite change, fatigue, fever, unexpected weight gain and unexpected weight loss.   HENT: Negative for swollen glands, trouble swallowing and voice change.    Eyes: Negative for blurred vision and visual disturbance.   Respiratory: Negative for cough and shortness of breath.    Cardiovascular: Negative for chest pain, palpitations and leg swelling.   Gastrointestinal: Negative for abdominal pain, constipation, diarrhea, nausea, vomiting and indigestion.   Endocrine: Negative for cold intolerance, heat intolerance, polydipsia and polyphagia.   Genitourinary: Negative for dysuria and frequency.   Musculoskeletal: Negative for arthralgias, back pain, joint swelling and neck pain.   Skin: Negative for color change, rash and skin lesions.   Neurological: Negative for dizziness, weakness, headache, memory problem and confusion.   Hematological: Does not bruise/bleed easily.   Psychiatric/Behavioral: Negative for agitation, hallucinations and suicidal ideas. The patient is not nervous/anxious.        Objective   Past Medical History:   Diagnosis Date   • Diabetes mellitus (HCC)    • Diverticulosis    • History of  adenomatous polyp of colon    • Hyperlipidemia    • Hypertension       Past Surgical History:   Procedure Laterality Date   • COLONOSCOPY  04/19/2016    polyp, tubular adenoma, diverticulosis   • COLONOSCOPY N/A 3/19/2021    Procedure: COLONOSCOPY WITH ANESTHESIA;  Surgeon: Philip Ferrari MD;  Location: [facility] ENDOSCOPY;  Service: Gastroenterology;  Laterality: N/A;  pre: hx polyps  post: poor prep  George Bond MD   • COLONOSCOPY N/A 4/1/2021    Procedure: COLONOSCOPY WITH ANESTHESIA;  Surgeon: Philip Ferrari MD;  Location: [facility] ENDOSCOPY;  Service: Gastroenterology;  Laterality: N/A;  preop; hx of polyps  postop; polyps   PCP George Bond    • HERNIA REPAIR     • KNEE SURGERY          Current Outpatient Medications:   •  aspirin 81 MG EC tablet, Take 81 mg by mouth Daily., Disp: , Rfl:   •  glyburide (DIAbeta) 5 MG tablet, TAKE 1 TABLET BY MOUTH TWICE A DAY (Patient taking differently: Take 5 mg by mouth Daily With Breakfast.), Disp: 180 tablet, Rfl: 3  •  losartan-hydrochlorothiazide (HYZAAR) 100-25 MG per tablet, TAKE 1 TABLET BY MOUTH EVERY DAY, Disp: 90 tablet, Rfl: 3  •  lovastatin (MEVACOR) 40 MG tablet, TAKE 1 TABLET BY MOUTH EVERY DAY, Disp: 90 tablet, Rfl: 3  •  metFORMIN (GLUCOPHAGE) 850 MG tablet, Take 850 tablets by mouth Daily., Disp: , Rfl:       Vitals:    10/21/21 1111   BP: 130/58   Pulse: 75   Temp: 97.1 °F (36.2 °C)   SpO2: 98%         10/21/21  1111   Weight: 92.5 kg (204 lb)       Body mass index is 28.06 kg/m².    Physical Exam  Vitals and nursing note reviewed.   Constitutional:       General: He is not in acute distress.     Appearance: Normal appearance. He is well-developed.   HENT:      Head: Normocephalic and atraumatic.      Comments: Flat affect     Right Ear: External ear normal.      Left Ear: External ear normal.      Nose: Nose normal.   Eyes:      Extraocular Movements: Extraocular movements intact.      Conjunctiva/sclera: Conjunctivae normal.      Pupils: Pupils  are equal, round, and reactive to light.   Cardiovascular:      Rate and Rhythm: Normal rate and regular rhythm.      Pulses: Normal pulses.      Heart sounds: Normal heart sounds.   Pulmonary:      Effort: Pulmonary effort is normal.      Breath sounds: Normal breath sounds.   Abdominal:      General: Bowel sounds are normal.      Palpations: Abdomen is soft.   Musculoskeletal:         General: Normal range of motion.      Cervical back: Normal range of motion and neck supple. No rigidity. No muscular tenderness.      Comments: I observed patient walking he walks on the unit base   Skin:     General: Skin is warm and dry.   Neurological:      General: No focal deficit present.      Mental Status: He is alert and oriented to person, place, and time.      Comments: Very mild tremor of left hand   Psychiatric:         Mood and Affect: Mood normal.         Behavior: Behavior normal.               Assessment/Plan   Diagnoses and all orders for this visit:    1. Parkinson disease (HCC) (Primary)    2. Type 2 diabetes mellitus with hyperglycemia, without long-term current use of insulin (HCC)    3. Hypertension, benign      At today's visit believe patient has developed a new onset Parkinson's disease certainly there is been a change since I have last seen patient his diabetes and his blood pressure seem to be adequately controlled at this point no changes were made in his medication I did encourage him to continue to take his medications as prescribed.  I offered him an appointment with neurology or beginning medication he will like to defer at this time and just monitor his condition he will be seen back in about 6 months.

## 2021-12-06 ENCOUNTER — HOSPITAL ENCOUNTER (OUTPATIENT)
Dept: GENERAL RADIOLOGY | Age: 76
Discharge: HOME | End: 2021-12-06
Payer: MEDICARE

## 2021-12-06 ENCOUNTER — OFFICE VISIT (OUTPATIENT)
Dept: URGENT CARE | Age: 76
End: 2021-12-06
Payer: MEDICARE

## 2021-12-06 VITALS
SYSTOLIC BLOOD PRESSURE: 150 MMHG | TEMPERATURE: 97.4 F | BODY MASS INDEX: 27.85 KG/M2 | WEIGHT: 205.6 LBS | RESPIRATION RATE: 18 BRPM | DIASTOLIC BLOOD PRESSURE: 68 MMHG | OXYGEN SATURATION: 96 % | HEART RATE: 71 BPM | HEIGHT: 72 IN

## 2021-12-06 DIAGNOSIS — R05.9 COUGH: ICD-10-CM

## 2021-12-06 DIAGNOSIS — R05.9 COUGH: Primary | ICD-10-CM

## 2021-12-06 LAB
ADENOVIRUS BY PCR: NOT DETECTED
BORDETELLA PARAPERTUSSIS BY PCR: NOT DETECTED
BORDETELLA PERTUSSIS BY PCR: NOT DETECTED
CHLAMYDOPHILIA PNEUMONIAE BY PCR: NOT DETECTED
CORONAVIRUS 229E BY PCR: NOT DETECTED
CORONAVIRUS HKU1 BY PCR: NOT DETECTED
CORONAVIRUS NL63 BY PCR: NOT DETECTED
CORONAVIRUS OC43 BY PCR: NOT DETECTED
HUMAN METAPNEUMOVIRUS BY PCR: NOT DETECTED
HUMAN RHINOVIRUS/ENTEROVIRUS BY PCR: NOT DETECTED
INFLUENZA A BY PCR: NOT DETECTED
INFLUENZA B BY PCR: NOT DETECTED
MYCOPLASMA PNEUMONIAE BY PCR: NOT DETECTED
PARAINFLUENZA VIRUS 1 BY PCR: NOT DETECTED
PARAINFLUENZA VIRUS 2 BY PCR: NOT DETECTED
PARAINFLUENZA VIRUS 3 BY PCR: NOT DETECTED
PARAINFLUENZA VIRUS 4 BY PCR: NOT DETECTED
RESPIRATORY SYNCYTIAL VIRUS BY PCR: NOT DETECTED
SARS-COV-2, PCR: NOT DETECTED

## 2021-12-06 PROCEDURE — 4040F PNEUMOC VAC/ADMIN/RCVD: CPT | Performed by: NURSE PRACTITIONER

## 2021-12-06 PROCEDURE — G8417 CALC BMI ABV UP PARAM F/U: HCPCS | Performed by: NURSE PRACTITIONER

## 2021-12-06 PROCEDURE — G8484 FLU IMMUNIZE NO ADMIN: HCPCS | Performed by: NURSE PRACTITIONER

## 2021-12-06 PROCEDURE — G8427 DOCREV CUR MEDS BY ELIG CLIN: HCPCS | Performed by: NURSE PRACTITIONER

## 2021-12-06 PROCEDURE — 99214 OFFICE O/P EST MOD 30 MIN: CPT | Performed by: NURSE PRACTITIONER

## 2021-12-06 PROCEDURE — 1036F TOBACCO NON-USER: CPT | Performed by: NURSE PRACTITIONER

## 2021-12-06 PROCEDURE — 1123F ACP DISCUSS/DSCN MKR DOCD: CPT | Performed by: NURSE PRACTITIONER

## 2021-12-06 PROCEDURE — 71046 X-RAY EXAM CHEST 2 VIEWS: CPT

## 2021-12-06 RX ORDER — METHYLPREDNISOLONE 4 MG/1
TABLET ORAL
Qty: 1 KIT | Refills: 0 | Status: SHIPPED | OUTPATIENT
Start: 2021-12-06 | End: 2021-12-12

## 2021-12-06 ASSESSMENT — ENCOUNTER SYMPTOMS
EYES NEGATIVE: 1
RHINORRHEA: 1
VOICE CHANGE: 0
DIARRHEA: 0
COUGH: 1
GASTROINTESTINAL NEGATIVE: 1
NAUSEA: 0
CHEST TIGHTNESS: 0
VOMITING: 0
SHORTNESS OF BREATH: 1
SORE THROAT: 0
WHEEZING: 0
ABDOMINAL PAIN: 0

## 2021-12-06 NOTE — PROGRESS NOTES
[address] [facility]  [address]  [address] [address]  Dept: [phone]  Dept Fax: [phone]  Loc: [phone]     Faye Emanuel is a 68 y.o. male who presents today for his medical conditions/complaintsas noted below. Faye Emanuel is c/o of Cough, Wheezing, and Shortness of Breath        HPI:     Cough  This is a new problem. The current episode started in the past 7 days. The problem has been unchanged. The cough is productive of sputum. Associated symptoms include nasal congestion, postnasal drip, rhinorrhea and shortness of breath. Pertinent negatives include no chest pain, chills, fever, headaches, rash, sore throat or wheezing. The symptoms are aggravated by lying down. He has tried nothing for the symptoms. His past medical history is significant for environmental allergies. Past Medical History:   Diagnosis Date    Diabetes     Hypertension     Tinnitus       Past Surgical History:   Procedure Laterality Date    HERNIA REPAIR      2    KNEE SURGERY         Family History   Problem Relation Age of Onset    High Cholesterol Mother     Cancer Father     Cancer Brother        Social History     Tobacco Use    Smoking status: Never Smoker    Smokeless tobacco: Never Used   Substance Use Topics    Alcohol use: Yes     Alcohol/week: 0.0 standard drinks      Current Outpatient Medications   Medication Sig Dispense Refill    methylPREDNISolone (MEDROL DOSEPACK) 4 MG tablet Take by mouth. 1 kit 0    metformin (GLUCOPHAGE) 500 MG tablet Take 500 mg by mouth 2 times daily (with meals).  aspirin 81 MG chewable tablet Take 81 mg by mouth daily.  GLYBURIDE PO Take  by mouth.  METOPROLOL TARTRATE by Does not apply route.         levothyroxine (SYNTHROID) 112 MCG tablet Take 112 mcg by mouth Daily (Patient not taking: Reported on 12/6/2021)      levETIRAcetam (KEPPRA) 500 MG tablet Take 500 mg by mouth 2 times daily (Patient not taking: Reported on 12/6/2021)       No current facility-administered medications for this visit. No Known Allergies    Health Maintenance   Topic Date Due    Hepatitis C screen  Never done    COVID-19 Vaccine (1) Never done    DTaP/Tdap/Td vaccine (1 - Tdap) Never done    Shingles Vaccine (1 of 2) Never done   [employer] Visit (AWV)  Never done    Flu vaccine  Completed    Pneumococcal 65+ years Vaccine  Completed    Hepatitis A vaccine  Aged Out    Hepatitis B vaccine  Aged Out    Hib vaccine  Aged Out    Meningococcal (ACWY) vaccine  Aged Out       Subjective:     Review of Systems   Constitutional: Negative. Negative for activity change, appetite change, chills, fatigue and fever. HENT: Positive for congestion, postnasal drip, rhinorrhea and sneezing. Negative for sore throat and voice change. Eyes: Negative. Respiratory: Positive for cough and shortness of breath. Negative for chest tightness and wheezing. Cardiovascular: Negative. Negative for chest pain. Gastrointestinal: Negative. Negative for abdominal pain, diarrhea, nausea and vomiting. Genitourinary: Negative. Musculoskeletal: Negative. Skin: Negative. Negative for rash. Allergic/Immunologic: Positive for environmental allergies. Neurological: Negative. Negative for headaches. Psychiatric/Behavioral: Negative. Objective:     Physical Exam  Vitals reviewed. Constitutional:       Appearance: Normal appearance. He is well-developed. HENT:      Head: Normocephalic. Right Ear: Hearing, tympanic membrane, ear canal and external ear normal.      Left Ear: Hearing, tympanic membrane, ear canal and external ear normal.      Nose: Nose normal.      Right Sinus: No maxillary sinus tenderness or frontal sinus tenderness. Left Sinus: No maxillary sinus tenderness or frontal sinus tenderness. Mouth/Throat:      Lips: Pink.       Mouth: Mucous membranes are moist.      Pharynx: Oropharynx is

## 2021-12-06 NOTE — PATIENT INSTRUCTIONS
1. Will call with results of xray for further treatment recommendations    1. Quarantine until covid results confirmed  2. Increase fluids with gatorade  3. Take tylenol/motrin as needed for fever/body aches  4. Take antihistamine, decongestant, flonase as needed as discussed (mucinex, zyrtec)  5.  Follow up if symptoms worsen or fail to improve: SOB, not able to urinate, extreme fatigue

## 2021-12-07 RX ORDER — LOSARTAN POTASSIUM AND HYDROCHLOROTHIAZIDE 25; 100 MG/1; MG/1
TABLET ORAL
Qty: 90 TABLET | Refills: 0 | Status: SHIPPED | OUTPATIENT
Start: 2021-12-07 | End: 2022-02-28

## 2021-12-07 RX ORDER — LOVASTATIN 40 MG/1
TABLET ORAL
Qty: 90 TABLET | Refills: 0 | Status: SHIPPED | OUTPATIENT
Start: 2021-12-07 | End: 2022-02-28

## 2021-12-07 RX ORDER — GLYBURIDE 5 MG/1
TABLET ORAL
Qty: 180 TABLET | Refills: 0 | Status: SHIPPED | OUTPATIENT
Start: 2021-12-07 | End: 2022-02-28

## 2021-12-10 ENCOUNTER — TELEPHONE (OUTPATIENT)
Dept: INTERNAL MEDICINE | Facility: CLINIC | Age: 76
End: 2021-12-10

## 2021-12-10 NOTE — TELEPHONE ENCOUNTER
Caller: Ceci Tinocolis    Relationship: Emergency Contact    Best call back number: [phone]    What medication are you requesting:   COUGH SUPPRESANT    What are your current symptoms:   EXCESSIVE COUGHING    How long have you been experiencing symptoms:   SINCE SUNDAY    Have you had these symptoms before:    [x] Yes  [] No    Have you been treated for these symptoms before:   [x] Yes  [] No    If a prescription is needed, what is your preferred pharmacy and phone number: [facility] #[id number] - [place], [place] - [address] [phone] [facility] [phone]      Additional notes:  N/A

## 2022-01-28 ENCOUNTER — OFFICE VISIT (OUTPATIENT)
Age: 77
End: 2022-01-28
Payer: MEDICARE

## 2022-01-28 VITALS
BODY MASS INDEX: 28.28 KG/M2 | DIASTOLIC BLOOD PRESSURE: 70 MMHG | SYSTOLIC BLOOD PRESSURE: 156 MMHG | OXYGEN SATURATION: 96 % | TEMPERATURE: 99.3 F | RESPIRATION RATE: 26 BRPM | HEART RATE: 85 BPM | HEIGHT: 71 IN | WEIGHT: 202 LBS

## 2022-01-28 DIAGNOSIS — Z11.52 ENCOUNTER FOR SCREENING FOR COVID-19: Primary | ICD-10-CM

## 2022-01-28 LAB — SARS-COV-2, PCR: DETECTED

## 2022-01-28 PROCEDURE — 4040F PNEUMOC VAC/ADMIN/RCVD: CPT | Performed by: NURSE PRACTITIONER

## 2022-01-28 PROCEDURE — 1123F ACP DISCUSS/DSCN MKR DOCD: CPT | Performed by: NURSE PRACTITIONER

## 2022-01-28 PROCEDURE — G8417 CALC BMI ABV UP PARAM F/U: HCPCS | Performed by: NURSE PRACTITIONER

## 2022-01-28 PROCEDURE — G8484 FLU IMMUNIZE NO ADMIN: HCPCS | Performed by: NURSE PRACTITIONER

## 2022-01-28 PROCEDURE — G8427 DOCREV CUR MEDS BY ELIG CLIN: HCPCS | Performed by: NURSE PRACTITIONER

## 2022-01-28 PROCEDURE — 1036F TOBACCO NON-USER: CPT | Performed by: NURSE PRACTITIONER

## 2022-01-28 PROCEDURE — 99212 OFFICE O/P EST SF 10 MIN: CPT | Performed by: NURSE PRACTITIONER

## 2022-01-28 RX ORDER — LOVASTATIN 40 MG/1
TABLET ORAL
COMMUNITY
Start: 2021-12-07

## 2022-01-28 RX ORDER — LOSARTAN POTASSIUM AND HYDROCHLOROTHIAZIDE 25; 100 MG/1; MG/1
TABLET ORAL
COMMUNITY
Start: 2021-12-07

## 2022-01-28 NOTE — PROGRESS NOTES
Osorio Leonard presents to the clinic today requesting COVID-19 testing. He complains of cough and fever. He has had unknown positive exposure. On exam, patient appears in no acute distress. Speech is clear. Breathing is unlabored. Moves all extremities and is ambulatory. We will order a COVID test today to be performed in clinic. The patient and appropriate authorities will be informed of the results. He should quarantine at home until results are returned. If he tests positive, he should quarantine for a total of 10 days after symptoms began and 24 hours after fever resolves without fever reducing medications per CDC guidelines. Patient was advised that even if asymptomatic, after a positive result he should quarantine for 10 days per [facility] guidelines. Patient left in stable condition. Total of 15minutes spent, which includes face to face with patient, record review, evaluation, planning, and education as well as coordination of his care.

## 2022-02-28 RX ORDER — GLYBURIDE 5 MG/1
TABLET ORAL
Qty: 180 TABLET | Refills: 1 | Status: SHIPPED | OUTPATIENT
Start: 2022-02-28 | End: 2022-08-26 | Stop reason: SDUPTHER

## 2022-02-28 RX ORDER — LOSARTAN POTASSIUM AND HYDROCHLOROTHIAZIDE 25; 100 MG/1; MG/1
TABLET ORAL
Qty: 90 TABLET | Refills: 1 | Status: SHIPPED | OUTPATIENT
Start: 2022-02-28 | End: 2022-08-26 | Stop reason: SDUPTHER

## 2022-02-28 RX ORDER — LOVASTATIN 40 MG/1
TABLET ORAL
Qty: 90 TABLET | Refills: 1 | Status: SHIPPED | OUTPATIENT
Start: 2022-02-28 | End: 2022-08-26 | Stop reason: SDUPTHER

## 2022-04-19 ENCOUNTER — OFFICE VISIT (OUTPATIENT)
Dept: INTERNAL MEDICINE | Facility: CLINIC | Age: 77
End: 2022-04-19

## 2022-04-19 VITALS
OXYGEN SATURATION: 98 % | DIASTOLIC BLOOD PRESSURE: 80 MMHG | HEIGHT: 72 IN | BODY MASS INDEX: 27.77 KG/M2 | SYSTOLIC BLOOD PRESSURE: 136 MMHG | HEART RATE: 68 BPM | TEMPERATURE: 96.9 F | WEIGHT: 205 LBS

## 2022-04-19 DIAGNOSIS — E78.2 MIXED HYPERLIPIDEMIA: ICD-10-CM

## 2022-04-19 DIAGNOSIS — R53.1 LEFT-SIDED WEAKNESS: ICD-10-CM

## 2022-04-19 DIAGNOSIS — E11.9 TYPE 2 DIABETES MELLITUS WITHOUT COMPLICATION, WITHOUT LONG-TERM CURRENT USE OF INSULIN: ICD-10-CM

## 2022-04-19 DIAGNOSIS — D63.8 ANEMIA OF CHRONIC DISEASE: ICD-10-CM

## 2022-04-19 DIAGNOSIS — I10 HYPERTENSION, BENIGN: ICD-10-CM

## 2022-04-19 DIAGNOSIS — Z00.00 ENCOUNTER FOR SUBSEQUENT ANNUAL WELLNESS VISIT (AWV) IN MEDICARE PATIENT: Primary | ICD-10-CM

## 2022-04-19 PROCEDURE — 1159F MED LIST DOCD IN RCRD: CPT | Performed by: FAMILY MEDICINE

## 2022-04-19 PROCEDURE — 1126F AMNT PAIN NOTED NONE PRSNT: CPT | Performed by: FAMILY MEDICINE

## 2022-04-19 PROCEDURE — 99214 OFFICE O/P EST MOD 30 MIN: CPT | Performed by: FAMILY MEDICINE

## 2022-04-19 PROCEDURE — G0439 PPPS, SUBSEQ VISIT: HCPCS | Performed by: FAMILY MEDICINE

## 2022-04-19 PROCEDURE — 1170F FXNL STATUS ASSESSED: CPT | Performed by: FAMILY MEDICINE

## 2022-04-19 NOTE — PROGRESS NOTES
The ABCs of the Annual Wellness Visit  Subsequent Medicare Wellness Visit    Chief Complaint   Patient presents with   • Medicare Wellness-subsequent   • other     Pt concerned about bruising and cutting easily       Subjective    History of Present Illness:  Gary Tinoco is a 77 y.o. male who presents for a Subsequent Medicare Wellness Visit.  Patient presents for subsequent Medicare wellness visit.  He feels he is doing fairly well overall.  He does have some issues with his left side.  He thinks it feels weaker than the right side.  He notices that he has some alteration in his foot with gait.  He also notices that he is carrying his arm in a more elbow bent at 90 degree angles and stiff position he can straighten it out however.  He also notes that his vocal quality is changing.  He feels his voice is getting softer.  He tries to take his medications routinely.  However he is not always able to do this he sometimes forgets.  Does not routinely monitor his blood pressure.  He does not routinely check his blood sugar.  He is tolerating the medications he is prescribed fairly well.  He is not describing any hypoglycemic episodes.  He does try to stay active.  He notes he can still  his 35 pound great grand daughter.  The following portions of the patient's history were reviewed and   updated as appropriate: allergies, current medications, past family history, past medical history, past social history, past surgical history and problem list.    Compared to one year ago, the patient feels his physical   health is the same.    Compared to one year ago, the patient feels his mental   health is better.    Recent Hospitalizations:  He was not admitted to the hospital during the last year.       Current Medical Providers:  Patient Care Team:  Nadia Shaver DO as PCP - General (Family Medicine)  Braxton Messer MD as Consulting Physician (Urology)    Outpatient Medications Prior to Visit   Medication  "Sig Dispense Refill   • glyburide (DIAbeta) 5 MG tablet TAKE 1 TABLET BY MOUTH TWICE A  tablet 1   • losartan-hydrochlorothiazide (HYZAAR) 100-25 MG per tablet TAKE 1 TABLET BY MOUTH EVERY DAY 90 tablet 1   • lovastatin (MEVACOR) 40 MG tablet TAKE 1 TABLET BY MOUTH EVERY DAY 90 tablet 1   • metFORMIN (GLUCOPHAGE) 850 MG tablet Take 850 tablets by mouth Daily.     • aspirin 81 MG EC tablet Take 81 mg by mouth Daily.       No facility-administered medications prior to visit.       No opioid medication identified on active medication list. I have reviewed chart for other potential  high risk medication/s and harmful drug interactions in the elderly.          Aspirin is on active medication list. Aspirin use is indicated based on review of current medical condition/s. Pros and cons of this therapy have been discussed today. Benefits of this medication outweigh potential harm.  Patient has been encouraged to continue taking this medication.  .      Patient Active Problem List   Diagnosis   • Abnormal LFTs   • Anemia of chronic disease   • Hypertension, benign   • Right thyroid nodule   • Substernal thyroid   • Screening PSA (prostate specific antigen)   • Type 2 diabetes mellitus without complication, without long-term current use of insulin (HCC)   • History of adenomatous polyp of colon   • Family hx colonic polyps   • Mixed hyperlipidemia     Advance Care Planning  Advance Directive is not on file.  ACP discussion was declined by the patient. Patient does not have an advance directive, declines further assistance.          Objective    Vitals:    04/19/22 1030   BP: 136/80   BP Location: Left arm   Patient Position: Sitting   Cuff Size: Adult   Pulse: 68   Temp: 96.9 °F (36.1 °C)   TempSrc: Temporal   SpO2: 98%   Weight: 93 kg (205 lb)   Height: 181.6 cm (71.5\")   PainSc: 0-No pain     BMI Readings from Last 1 Encounters:   04/19/22 28.19 kg/m²   BMI is above normal parameters. Recommendations include: exercise " counseling and nutrition counseling    Does the patient have evidence of cognitive impairment? No    Physical Exam  Vitals and nursing note reviewed.   Constitutional:       Appearance: Normal appearance.   HENT:      Head: Normocephalic and atraumatic.      Right Ear: Tympanic membrane, ear canal and external ear normal.      Left Ear: Tympanic membrane, ear canal and external ear normal.      Nose: Nose normal.      Mouth/Throat:      Mouth: Mucous membranes are moist.      Pharynx: Oropharynx is clear.   Eyes:      Extraocular Movements: Extraocular movements intact.      Conjunctiva/sclera: Conjunctivae normal.      Pupils: Pupils are equal, round, and reactive to light.   Cardiovascular:      Rate and Rhythm: Normal rate and regular rhythm.      Pulses: Normal pulses.      Heart sounds: No murmur heard.    No friction rub. No gallop.   Pulmonary:      Effort: Pulmonary effort is normal.      Breath sounds: Normal breath sounds.   Abdominal:      General: Bowel sounds are normal.      Palpations: Abdomen is soft.   Musculoskeletal:         General: Normal range of motion.      Cervical back: Normal range of motion and neck supple.   Skin:     General: Skin is warm and dry.      Capillary Refill: Capillary refill takes less than 2 seconds.   Neurological:      General: No focal deficit present.      Mental Status: He is alert and oriented to person, place, and time.      Cranial Nerves: No cranial nerve deficit.      Comments: Visually the left side of the face seems to be a little more lax than the right side of the face    Left  is weaker than right.  He is however right-handed.    No tremor noted.   Psychiatric:         Mood and Affect: Mood normal.         Behavior: Behavior normal.         Thought Content: Thought content normal.       Lab Results   Component Value Date    CHLPL 145 04/12/2022    TRIG 193 (H) 04/12/2022    HDL 33 (L) 04/12/2022    LDL 79 04/12/2022    VLDL 33 04/12/2022    HGBA1C 6.70  (H) 04/12/2022            HEALTH RISK ASSESSMENT    Smoking Status:  Social History     Tobacco Use   Smoking Status Never Smoker   Smokeless Tobacco Never Used     Alcohol Consumption:  Social History     Substance and Sexual Activity   Alcohol Use No     Fall Risk Screen:    ISAI Fall Risk Assessment was completed, and patient is at HIGH risk for falls. Assessment completed on:4/19/2022    Depression Screening:  PHQ-2/PHQ-9 Depression Screening 4/19/2022   Retired Total Score -   Little Interest or Pleasure in Doing Things 0-->not at all   Feeling Down, Depressed or Hopeless 0-->not at all   PHQ-9: Brief Depression Severity Measure Score 0       Health Habits and Functional and Cognitive Screening:  Functional & Cognitive Status 4/19/2022   Do you have difficulty preparing food and eating? No   Do you have difficulty bathing yourself, getting dressed or grooming yourself? No   Do you have difficulty using the toilet? No   Do you have difficulty moving around from place to place? No   Do you have trouble with steps or getting out of a bed or a chair? No   Current Diet Well Balanced Diet   Dental Exam Up to date   Eye Exam Not up to date   Exercise (times per week) 0 times per week   Current Exercises Include No Regular Exercise   Current Exercise Activities Include -   Do you need help using the phone?  No   Are you deaf or do you have serious difficulty hearing?  Yes   Do you need help with transportation? No   Do you need help shopping? No   Do you need help preparing meals?  No   Do you need help with housework?  No   Do you need help with laundry? No   Do you need help taking your medications? No   Do you need help managing money? No   Do you ever drive or ride in a car without wearing a seat belt? No   Have you felt unusual stress, anger or loneliness in the last month? No   Who do you live with? Spouse   If you need help, do you have trouble finding someone available to you? No   Have you been bothered in  the last four weeks by sexual problems? No   Do you have difficulty concentrating, remembering or making decisions? No       Age-appropriate Screening Schedule:  Refer to the list below for future screening recommendations based on patient's age, sex and/or medical conditions. Orders for these recommended tests are listed in the plan section. The patient has been provided with a written plan.    Health Maintenance   Topic Date Due   • ZOSTER VACCINE (1 of 2) Never done   • TDAP/TD VACCINES (2 - Tdap) 05/05/2014   • DIABETIC EYE EXAM  02/02/2022   • INFLUENZA VACCINE  08/01/2022   • HEMOGLOBIN A1C  10/12/2022   • LIPID PANEL  04/12/2023   • URINE MICROALBUMIN  04/12/2023              Assessment/Plan   CMS Preventative Services Quick Reference  Risk Factors Identified During Encounter  Obesity/Overweight   The above risks/problems have been discussed with the patient.  Follow up actions/plans if indicated are seen below in the Assessment/Plan Section.  Pertinent information has been shared with the patient in the After Visit Summary.    Diagnoses and all orders for this visit:    1. Encounter for subsequent annual wellness visit (AWV) in Medicare patient (Primary)    2. Left-sided weakness  -     Ambulatory Referral to Neurology    3. Hypertension, benign  Continue current medications    4. Anemia of chronic disease  Continue current medications  Monitor lab routinely    5. Mixed hyperlipidemia  Low-cholesterol diet continue current medications recommend diabetes diet    6.  Diabetes mellitus type 2 without long-term use of insulin  Continue current medications.  Check blood sugar daily.    Follow Up:   Return in about 6 months (around 10/19/2022).     An After Visit Summary and PPPS were made available to the patient.

## 2022-07-12 ENCOUNTER — OFFICE VISIT (OUTPATIENT)
Dept: NEUROLOGY | Facility: CLINIC | Age: 77
End: 2022-07-12

## 2022-07-12 VITALS
DIASTOLIC BLOOD PRESSURE: 68 MMHG | HEIGHT: 72 IN | BODY MASS INDEX: 27.41 KG/M2 | OXYGEN SATURATION: 95 % | WEIGHT: 202.4 LBS | SYSTOLIC BLOOD PRESSURE: 114 MMHG | HEART RATE: 79 BPM

## 2022-07-12 DIAGNOSIS — G20 PARKINSON'S DISEASE: Primary | ICD-10-CM

## 2022-07-12 PROCEDURE — 99204 OFFICE O/P NEW MOD 45 MIN: CPT | Performed by: PSYCHIATRY & NEUROLOGY

## 2022-07-12 NOTE — PROGRESS NOTES
"Chief Complaint    Subjective        Gary Tinoco presents to [facility]    77-year-old male who was referred for evaluation of left-sided issues.  He states he has been having issues for about a year.  He will walk with his left arm flexed.  He also states that instead of walking heel-to-toe heel walk toe to heel with his left foot.  He can stumble but not fall.  This is been going on for a couple of years.  He states people of been having trouble hearing him for about a year.  He states he has had a doctor previously tell him he had an early stage of Parkinson's disease.  He says he is pretty sedentary except for mowing and swimming in [place].  When trying to tucking his shirt, his hand will vibrate, but he is able to stop it.  When sleeping, no one has told him he acts out his dreams but he does not sleep in the same bed as his wife.          Past Medical History:   Diagnosis Date   • Diabetes mellitus (HCC)    • Diverticulosis    • History of adenomatous polyp of colon    • Hyperlipidemia    • Hypertension           Current Outpatient Medications:   •  aspirin 81 MG EC tablet, Take 81 mg by mouth Daily., Disp: , Rfl:   •  glyburide (DIAbeta) 5 MG tablet, TAKE 1 TABLET BY MOUTH TWICE A DAY (Patient taking differently: Take 5 mg by mouth Daily With Breakfast.), Disp: 180 tablet, Rfl: 1  •  losartan-hydrochlorothiazide (HYZAAR) 100-25 MG per tablet, TAKE 1 TABLET BY MOUTH EVERY DAY, Disp: 90 tablet, Rfl: 1  •  lovastatin (MEVACOR) 40 MG tablet, TAKE 1 TABLET BY MOUTH EVERY DAY, Disp: 90 tablet, Rfl: 1  •  metFORMIN (GLUCOPHAGE) 850 MG tablet, Take 850 tablets by mouth Daily., Disp: , Rfl:        Objective   Vital Signs:   /68 (BP Location: Left arm, Patient Position: Sitting, Cuff Size: Large Adult)   Pulse 79   Ht 181.6 cm (71.5\")   Wt 91.8 kg (202 lb 6.4 oz)   SpO2 95%   BMI 27.84 kg/m²     Physical Exam  Constitutional:       General: He is awake.   Eyes:      " Extraocular Movements: Extraocular movements intact.      Pupils: Pupils are equal, round, and reactive to light.   Neurological:      Mental Status: He is alert.      Deep Tendon Reflexes: Strength normal and reflexes are normal and symmetric.   Psychiatric:         Speech: Speech normal.        Neurological Exam  Mental Status  Awake and alert. Oriented to person, place and time. Recent and remote memory are intact. Speech is normal. Language is fluent with no aphasia. Attention and concentration are normal. Fund of knowledge is appropriate for level of education.    Cranial Nerves  CN II: Visual fields full to confrontation.  CN III, IV, VI: Extraocular movements intact bilaterally. Pupils equal round and reactive to light bilaterally.  CN V: Facial sensation is normal.  CN VII: Full and symmetric facial movement.  CN IX, X: Palate elevates symmetrically  CN XI: Shoulder shrug strength is normal.  CN XII: Tongue midline without atrophy or fasciculations.    Motor  Normal muscle bulk throughout. Strength is 5/5 throughout all four extremities.  Hypomimia  Bradykinesia and rigidity on left more than right  No tremor  No glabellar reflex  .    Sensory  Light touch is normal in upper and lower extremities.     Reflexes  Deep tendon reflexes are 2+ and symmetric in all four extremities.    Coordination  Right: Finger-to-nose normal.Left: Finger-to-nose normal.    Gait   Abnormal pull test.  Decreased arm swing left, quick gait.      Result Review :                     Assessment and Plan   77-year-old male with bradykinesia, soft voice, and some gait issues.  Parkinsonism on exam.  We will give a trial of Sinemet.    Plan:    1.  Sinemet 25/100 1 tab 3 times daily.  2.  Discussed importance of exercise.  3.  Follow-up 6 weeks.        Follow Up   No follow-ups on file.  Patient was given instructions and counseling regarding his condition or for health maintenance advice. Please see specific information pulled into the  AVS if appropriate.

## 2022-08-23 ENCOUNTER — OFFICE VISIT (OUTPATIENT)
Dept: NEUROLOGY | Facility: CLINIC | Age: 77
End: 2022-08-23

## 2022-08-23 VITALS
DIASTOLIC BLOOD PRESSURE: 60 MMHG | BODY MASS INDEX: 27.74 KG/M2 | SYSTOLIC BLOOD PRESSURE: 132 MMHG | HEIGHT: 72 IN | HEART RATE: 95 BPM | WEIGHT: 204.8 LBS | OXYGEN SATURATION: 95 %

## 2022-08-23 DIAGNOSIS — G20 PARKINSON'S DISEASE: ICD-10-CM

## 2022-08-23 PROCEDURE — 99214 OFFICE O/P EST MOD 30 MIN: CPT | Performed by: PSYCHIATRY & NEUROLOGY

## 2022-08-23 NOTE — PROGRESS NOTES
"Chief Complaint    Subjective        Gary Tinoco presents to [facility]    77-year-old male here for follow-up.  Since starting Sinemet, his wife has noticed improvement in his walking.  He says his left leg works better than it did.  However he thinks he is somewhat loopy and dragging at times.  Unsure if this is the medication.  He has good days and bad days.          Past Medical History:   Diagnosis Date   • Diabetes mellitus (HCC)    • Diverticulosis    • History of adenomatous polyp of colon    • Hyperlipidemia    • Hypertension           Current Outpatient Medications:   •  aspirin 81 MG EC tablet, Take 81 mg by mouth Daily., Disp: , Rfl:   •  carbidopa-levodopa (Sinemet)  MG per tablet, Take 1 tablet by mouth 3 (Three) Times a Day., Disp: 60 tablet, Rfl: 2  •  glyburide (DIAbeta) 5 MG tablet, TAKE 1 TABLET BY MOUTH TWICE A DAY (Patient taking differently: Take 5 mg by mouth Daily With Breakfast.), Disp: 180 tablet, Rfl: 1  •  losartan-hydrochlorothiazide (HYZAAR) 100-25 MG per tablet, TAKE 1 TABLET BY MOUTH EVERY DAY, Disp: 90 tablet, Rfl: 1  •  lovastatin (MEVACOR) 40 MG tablet, TAKE 1 TABLET BY MOUTH EVERY DAY, Disp: 90 tablet, Rfl: 1  •  metFORMIN (GLUCOPHAGE) 850 MG tablet, Take 850 tablets by mouth Daily., Disp: , Rfl:        Objective   Vital Signs:   /60 (BP Location: Left arm, Patient Position: Sitting, Cuff Size: Large Adult)   Pulse 95   Ht 181.6 cm (71.5\")   Wt 92.9 kg (204 lb 12.8 oz)   SpO2 95%   BMI 28.17 kg/m²     Physical Exam   Neurological Exam     Motor  Normal muscle bulk throughout. Strength is 5/5 throughout all four extremities.  Hypomimia  Bradykinesia and rigidity on left more than right  No tremor  No glabellar reflex      Gait   Abnormal pull test.  Decreased arm swing left, quick gait.    Result Review :                     Assessment and Plan   77-year-old male with Parkinson's disease.  Has had some minimal improvement on a small " dose so we will increase.  Discussed physical therapy and physical activity programs for Parkinson's disease.  He would like to hold off at this time.     Plan:     1.   Increase Sinemet 25/100 to 2 tabs 3 times daily.  2.  Discussed importance of exercise.  3.  Follow-up 3 months.           Follow Up   No follow-ups on file.  Patient was given instructions and counseling regarding his condition or for health maintenance advice. Please see specific information pulled into the AVS if appropriate.

## 2022-08-26 RX ORDER — LOVASTATIN 40 MG/1
40 TABLET ORAL DAILY
Qty: 90 TABLET | Refills: 1 | Status: SHIPPED | OUTPATIENT
Start: 2022-08-26 | End: 2023-01-31

## 2022-08-26 RX ORDER — LOSARTAN POTASSIUM AND HYDROCHLOROTHIAZIDE 25; 100 MG/1; MG/1
1 TABLET ORAL DAILY
Qty: 90 TABLET | Refills: 1 | Status: SHIPPED | OUTPATIENT
Start: 2022-08-26 | End: 2023-01-31

## 2022-08-26 RX ORDER — GLYBURIDE 5 MG/1
5 TABLET ORAL 2 TIMES DAILY
Qty: 180 TABLET | Refills: 1 | Status: SHIPPED | OUTPATIENT
Start: 2022-08-26 | End: 2023-01-31

## 2022-09-14 NOTE — PROGRESS NOTES
Subjective    Mr. Tinoco is 77 y.o. male    Chief Complaint: BPH    History of Present Illness  Benign Prostatic Hypertrophy  Patient complains of lower urinary tract symptoms. He reports incomplete emptying. Patient states symptoms are of mild severity. Onset of symptoms was several years ago and was gradual in onset. His AUA Symptom Score is, 7/35.He reports a history of no complicating symptoms. He denies flank pain, gross hematuria, kidney stones and recurrent UTI.  Patient has tried Laser ablation therapy of prostate  with improvement. Last PSA was 2.32 . PSA screening discontinued due to age.     Lab Results   Component Value Date    PSA 2.190 04/12/2022    PSA 2.290 01/20/2021    PSA 2.320 06/14/2019         The following portions of the patient's history were reviewed and updated as appropriate: allergies, current medications, past family history, past medical history, past social history, past surgical history and problem list.    Review of Systems   Constitutional: Negative for chills and fever.   Gastrointestinal: Negative for abdominal pain, anal bleeding and blood in stool.   Genitourinary: Negative for dysuria and hematuria.         Current Outpatient Medications:   •  carbidopa-levodopa (Sinemet)  MG per tablet, Take 2 tablets by mouth 3 (Three) Times a Day. (Patient taking differently: Take 2 tablets by mouth 2 (Two) Times a Day.), Disp: 180 tablet, Rfl: 5  •  glyburide (DIAbeta) 5 MG tablet, Take 1 tablet by mouth 2 (Two) Times a Day., Disp: 180 tablet, Rfl: 1  •  losartan-hydrochlorothiazide (HYZAAR) 100-25 MG per tablet, Take 1 tablet by mouth Daily., Disp: 90 tablet, Rfl: 1  •  lovastatin (MEVACOR) 40 MG tablet, Take 1 tablet by mouth Daily., Disp: 90 tablet, Rfl: 1  •  metFORMIN (GLUCOPHAGE) 850 MG tablet, Take 850 tablets by mouth Daily., Disp: , Rfl:   •  aspirin 81 MG EC tablet, Take 81 mg by mouth Daily., Disp: , Rfl:     Past Medical History:   Diagnosis Date   • Diabetes mellitus (HCC)  "   • Diverticulosis    • History of adenomatous polyp of colon    • Hyperlipidemia    • Hypertension        Past Surgical History:   Procedure Laterality Date   • COLONOSCOPY  04/19/2016    polyp, tubular adenoma, diverticulosis   • COLONOSCOPY N/A 3/19/2021    Procedure: COLONOSCOPY WITH ANESTHESIA;  Surgeon: Philip Ferrari MD;  Location: [facility] ENDOSCOPY;  Service: Gastroenterology;  Laterality: N/A;  pre: hx polyps  post: poor prep  George Bond MD   • COLONOSCOPY N/A 4/1/2021    Procedure: COLONOSCOPY WITH ANESTHESIA;  Surgeon: Philip Ferrari MD;  Location: [facility] ENDOSCOPY;  Service: Gastroenterology;  Laterality: N/A;  preop; hx of polyps  postop; polyps   PCP George Bond    • HERNIA REPAIR     • KNEE SURGERY         Social History     Socioeconomic History   • Marital status:    Tobacco Use   • Smoking status: Never Smoker   • Smokeless tobacco: Never Used   Vaping Use   • Vaping Use: Never used   Substance and Sexual Activity   • Alcohol use: No   • Drug use: Never   • Sexual activity: Not Currently     Partners: Female       Family History   Problem Relation Age of Onset   • No Known Problems Father    • No Known Problems Mother    • Colon cancer Neg Hx        Objective    Temp 97.8 °F (36.6 °C)   Ht 180.3 cm (71\")   Wt 91.8 kg (202 lb 6.4 oz)   BMI 28.23 kg/m²     Physical Exam        Results for orders placed or performed in visit on 09/16/22   POC Urinalysis Dipstick, Multipro    Specimen: Urine   Result Value Ref Range    Color Yellow Yellow, Straw, Dark Yellow, Amanda    Clarity, UA Clear Clear    Glucose, UA Negative Negative mg/dL    Bilirubin Negative Negative    Ketones, UA Negative Negative    Specific Gravity  1.015 1.005 - 1.030    Blood, UA Negative Negative    pH, Urine 6.5 5.0 - 8.0    Protein, POC Negative Negative mg/dL    Urobilinogen, UA 0.2 E.U./dL Normal, 0.2 E.U./dL    Nitrite, UA Negative Negative    Leukocytes Negative Negative     Assessment and " Plan    Diagnoses and all orders for this visit:    1. Benign prostatic hyperplasia with urinary obstruction (Primary)  -     POC Urinalysis Dipstick, Multipro    2. Balanitis    3. Impotence of organic origin      Patient doing very well.  His AUA symptom score is 7/35.  He has a history of greenlight laser ablation of prostate in the past.       He does have some issues with intermittent balanitis.     Continue the clotrimazole betamethasone cream.     Follow-up in 1 year.

## 2022-09-16 ENCOUNTER — OFFICE VISIT (OUTPATIENT)
Dept: UROLOGY | Facility: CLINIC | Age: 77
End: 2022-09-16

## 2022-09-16 VITALS — WEIGHT: 202.4 LBS | HEIGHT: 71 IN | TEMPERATURE: 97.8 F | BODY MASS INDEX: 28.34 KG/M2

## 2022-09-16 DIAGNOSIS — N13.8 BENIGN PROSTATIC HYPERPLASIA WITH URINARY OBSTRUCTION: Primary | ICD-10-CM

## 2022-09-16 DIAGNOSIS — N52.9 IMPOTENCE OF ORGANIC ORIGIN: ICD-10-CM

## 2022-09-16 DIAGNOSIS — N40.1 BENIGN PROSTATIC HYPERPLASIA WITH URINARY OBSTRUCTION: Primary | ICD-10-CM

## 2022-09-16 DIAGNOSIS — N48.1 BALANITIS: ICD-10-CM

## 2022-09-16 LAB
BILIRUB BLD-MCNC: NEGATIVE MG/DL
CLARITY, POC: CLEAR
COLOR UR: YELLOW
GLUCOSE UR STRIP-MCNC: NEGATIVE MG/DL
KETONES UR QL: NEGATIVE
LEUKOCYTE EST, POC: NEGATIVE
NITRITE UR-MCNC: NEGATIVE MG/ML
PH UR: 6.5 [PH] (ref 5–8)
PROT UR STRIP-MCNC: NEGATIVE MG/DL
RBC # UR STRIP: NEGATIVE /UL
SP GR UR: 1.01 (ref 1–1.03)
UROBILINOGEN UR QL: NORMAL

## 2022-09-16 PROCEDURE — 81003 URINALYSIS AUTO W/O SCOPE: CPT | Performed by: UROLOGY

## 2022-09-16 PROCEDURE — 99213 OFFICE O/P EST LOW 20 MIN: CPT | Performed by: UROLOGY

## 2022-10-25 ENCOUNTER — OFFICE VISIT (OUTPATIENT)
Dept: INTERNAL MEDICINE | Facility: CLINIC | Age: 77
End: 2022-10-25

## 2022-10-25 VITALS
SYSTOLIC BLOOD PRESSURE: 108 MMHG | OXYGEN SATURATION: 95 % | DIASTOLIC BLOOD PRESSURE: 80 MMHG | WEIGHT: 206 LBS | HEART RATE: 91 BPM | TEMPERATURE: 97.3 F | BODY MASS INDEX: 28.84 KG/M2 | HEIGHT: 71 IN

## 2022-10-25 DIAGNOSIS — E78.2 MIXED HYPERLIPIDEMIA: ICD-10-CM

## 2022-10-25 DIAGNOSIS — E66.3 OVERWEIGHT WITH BODY MASS INDEX (BMI) OF 28 TO 28.9 IN ADULT: ICD-10-CM

## 2022-10-25 DIAGNOSIS — I10 HYPERTENSION, BENIGN: ICD-10-CM

## 2022-10-25 DIAGNOSIS — E11.9 TYPE 2 DIABETES MELLITUS WITHOUT COMPLICATION, WITHOUT LONG-TERM CURRENT USE OF INSULIN: Primary | ICD-10-CM

## 2022-10-25 LAB — HBA1C MFR BLD: 6.7 %

## 2022-10-25 PROCEDURE — 3044F HG A1C LEVEL LT 7.0%: CPT

## 2022-10-25 PROCEDURE — 99214 OFFICE O/P EST MOD 30 MIN: CPT

## 2022-10-25 PROCEDURE — 83036 HEMOGLOBIN GLYCOSYLATED A1C: CPT

## 2022-10-25 NOTE — PROGRESS NOTES
"Chief Complaint  Hyperlipidemia (6 mo f/u) and Diabetes (A1c: 6.7)    Subjective        Gary Tinoco presents to [facility]  History of Present Illness  6-month follow-up on diabetes and hyperlipidemia  Hyperlipidemia  Pertinent negatives include no chest pain, focal sensory loss, focal weakness or shortness of breath.   Diabetes  Pertinent negatives for hypoglycemia include no confusion, dizziness or headaches. Associated symptoms include visual change. Pertinent negatives for diabetes include no chest pain, no fatigue and no weakness.   Neurologic Problem  The patient's primary symptoms include slurred speech and a visual change. The patient's pertinent negatives include no altered mental status, clumsiness, focal sensory loss, focal weakness, loss of balance, memory loss, near-syncope, syncope or weakness. The current episode started more than 1 month ago. The neurological problem developed gradually. The problem has been gradually worsening since onset. There was left-sided, lower extremity and upper extremity focality noted. Pertinent negatives include no abdominal pain, auditory change, aura, back pain, bladder incontinence, bowel incontinence, chest pain, confusion, dizziness, fatigue, fever, headaches, light-headedness, nausea, neck pain, palpitations, shortness of breath, vertigo or vomiting.       Objective   Vital Signs:  /80 (BP Location: Left arm, Patient Position: Sitting, Cuff Size: Adult)   Pulse 91   Temp 97.3 °F (36.3 °C) (Temporal)   Ht 180.3 cm (71\")   Wt 93.4 kg (206 lb)   SpO2 95%   BMI 28.73 kg/m²   Estimated body mass index is 28.73 kg/m² as calculated from the following:    Height as of this encounter: 180.3 cm (71\").    Weight as of this encounter: 93.4 kg (206 lb).    BMI is >= 25 and <30. (Overweight) The following options were offered after discussion;: exercise counseling/recommendations and nutrition counseling/recommendations      Physical " Exam  Vitals and nursing note reviewed.   Constitutional:       Appearance: Normal appearance. He is overweight.   HENT:      Head: Normocephalic and atraumatic.      Right Ear: External ear normal.      Left Ear: External ear normal.      Nose: Nose normal.   Eyes:      Extraocular Movements: Extraocular movements intact.      Conjunctiva/sclera: Conjunctivae normal.      Pupils: Pupils are equal, round, and reactive to light.   Cardiovascular:      Rate and Rhythm: Normal rate and regular rhythm.      Pulses: Normal pulses.      Heart sounds: Normal heart sounds.   Pulmonary:      Effort: Pulmonary effort is normal.      Breath sounds: Normal breath sounds.   Skin:     General: Skin is warm and dry.   Neurological:      General: No focal deficit present.      Mental Status: He is alert and oriented to person, place, and time. Mental status is at baseline.      GCS: GCS eye subscore is 4. GCS verbal subscore is 5. GCS motor subscore is 6.   Psychiatric:         Mood and Affect: Mood normal.         Behavior: Behavior normal.         Thought Content: Thought content normal.         Judgment: Judgment normal.        Result Review :                Assessment and Plan   Diagnoses and all orders for this visit:    1. Type 2 diabetes mellitus without complication, without long-term current use of insulin (HCC) (Primary)  -     POC Glycated Hemoglobin, Total  -     Comprehensive metabolic panel    2. Mixed hyperlipidemia  -     Lipid Panel    3. Hypertension, benign  -     Comprehensive metabolic panel    4. Overweight with body mass index (BMI) of 28 to 28.9 in adult    Patient is seen today for a 6-month follow-up on hyperlipidemia and diabetes.  Blood pressure satisfactory in office today at 108/80.  POC hemoglobin A1c in office is 6.7, unchanged from last level.  I have encouraged the patient to continue his metformin and glyburide as well as attempt to implement diet modifications.  Patient has not had any lab work  prior to today's visit, will have patient return for lipid panel and CMP when he is fasting.  Triglycerides were elevated in April, however they were improved from the previous level.  Patient advised to continue taking lovastatin 40 mg daily.  Can follow-up in 6 months for recheck.    Plan:  1.  Follow-up in 6 months for recheck, pending normal labs.  2.  Continue medications as prescribed.         Follow Up   Return in about 6 months (around 4/25/2023) for Recheck.  Patient was given instructions and counseling regarding his condition or for health maintenance advice. Please see specific information pulled into the AVS if appropriate.

## 2023-01-05 ENCOUNTER — OFFICE VISIT (OUTPATIENT)
Dept: NEUROLOGY | Facility: CLINIC | Age: 78
End: 2023-01-05
Payer: MEDICARE

## 2023-01-05 VITALS
OXYGEN SATURATION: 96 % | HEIGHT: 71 IN | SYSTOLIC BLOOD PRESSURE: 116 MMHG | RESPIRATION RATE: 16 BRPM | DIASTOLIC BLOOD PRESSURE: 72 MMHG | HEART RATE: 78 BPM | BODY MASS INDEX: 28.84 KG/M2 | WEIGHT: 206 LBS

## 2023-01-05 DIAGNOSIS — G20 PARKINSON'S DISEASE: Primary | ICD-10-CM

## 2023-01-05 PROCEDURE — 99214 OFFICE O/P EST MOD 30 MIN: CPT | Performed by: PSYCHIATRY & NEUROLOGY

## 2023-01-05 NOTE — PROGRESS NOTES
Chief Complaint    Subjective        Gary Tinoco presents to [facility]    History of Present Illness  77-year-old male here for follow-up.  He thinks he has been doing very well since last visit.  The plan at last visit was to increase his Sinemet.  He tells me that this hurts his stomach especially when he takes more than 1 pill at a time.  Therefore he currently is only taking 1 pill once a day.  Occasionally he will take it twice a day.  However he states he feels so much better, that sometimes he forgets he has Parkinson's disease.  He mostly complains of trouble with his left arm.  He tells me that his wife tells him his left leg is dragging at times.  He also tells me he is going to start physical exercise program.      Past Medical History:   Diagnosis Date   • Diabetes mellitus (HCC)    • Diverticulosis    • History of adenomatous polyp of colon    • Hyperlipidemia    • Hypertension           Current Outpatient Medications:   •  metFORMIN (GLUCOPHAGE) 850 MG tablet, Take 1 tablet by mouth Daily., Disp: 90 tablet, Rfl: 3  •  aspirin 81 MG EC tablet, Take 81 mg by mouth Daily., Disp: , Rfl:   •  carbidopa-levodopa (Sinemet)  MG per tablet, Take 2 tablets by mouth 3 (Three) Times a Day. (Patient taking differently: Take 2 tablets by mouth 2 (Two) Times a Day.), Disp: 180 tablet, Rfl: 5  •  glyburide (DIAbeta) 5 MG tablet, Take 1 tablet by mouth 2 (Two) Times a Day., Disp: 180 tablet, Rfl: 1  •  losartan-hydrochlorothiazide (HYZAAR) 100-25 MG per tablet, Take 1 tablet by mouth Daily., Disp: 90 tablet, Rfl: 1  •  lovastatin (MEVACOR) 40 MG tablet, Take 1 tablet by mouth Daily., Disp: 90 tablet, Rfl: 1       Objective   Vital Signs:   There were no vitals taken for this visit.    Physical Exam   Neurological Exam     Motor  Normal muscle bulk throughout. Strength is 5/5 throughout all four extremities.  Hypomimia  Bradykinesia and rigidity on left more than right  No tremor  No  glabellar reflex      Gait   Abnormal pull test.  Decreased arm swing left, quick gait.    Result Review :                     Assessment and Plan   77-year-old male with Parkinson's disease.  He has had improvement on Sinemet.  However he tells me it hurts his stomach and so he is only taking 1 pill once a day right now.  I discussed that his symptoms could be improved by taking more.  We also can consider trying a different medication like amantadine or dopamine agonist if he would rather.  However he tells me he would like to continue doing what he is currently doing for now.     Plan:     1.    Continue Sinemet 25/100.  I recommend 3 times a day, but he can take Lasix if that is all he tolerates.  2.  I agree with physical exercise.  Can consider PT in the future.  3.  Follow-up 6 months.        Follow Up   No follow-ups on file.  Patient was given instructions and counseling regarding his condition or for health maintenance advice. Please see specific information pulled into the AVS if appropriate.

## 2023-01-31 RX ORDER — LOSARTAN POTASSIUM AND HYDROCHLOROTHIAZIDE 25; 100 MG/1; MG/1
TABLET ORAL
Qty: 90 TABLET | Refills: 1 | Status: SHIPPED | OUTPATIENT
Start: 2023-01-31

## 2023-01-31 RX ORDER — GLYBURIDE 5 MG/1
TABLET ORAL
Qty: 180 TABLET | Refills: 1 | Status: SHIPPED | OUTPATIENT
Start: 2023-01-31

## 2023-01-31 RX ORDER — LOVASTATIN 40 MG/1
TABLET ORAL
Qty: 90 TABLET | Refills: 1 | Status: SHIPPED | OUTPATIENT
Start: 2023-01-31

## 2023-04-24 ENCOUNTER — OFFICE VISIT (OUTPATIENT)
Dept: INTERNAL MEDICINE | Facility: CLINIC | Age: 78
End: 2023-04-24
Payer: MEDICARE

## 2023-04-24 VITALS
DIASTOLIC BLOOD PRESSURE: 72 MMHG | BODY MASS INDEX: 28 KG/M2 | HEART RATE: 85 BPM | TEMPERATURE: 97.3 F | OXYGEN SATURATION: 95 % | SYSTOLIC BLOOD PRESSURE: 136 MMHG | WEIGHT: 200 LBS | HEIGHT: 71 IN

## 2023-04-24 DIAGNOSIS — E78.2 MIXED HYPERLIPIDEMIA: ICD-10-CM

## 2023-04-24 DIAGNOSIS — Z12.5 SCREENING PSA (PROSTATE SPECIFIC ANTIGEN): ICD-10-CM

## 2023-04-24 DIAGNOSIS — I10 HYPERTENSION, BENIGN: ICD-10-CM

## 2023-04-24 DIAGNOSIS — Z79.899 ENCOUNTER FOR LONG-TERM (CURRENT) USE OF OTHER MEDICATIONS: ICD-10-CM

## 2023-04-24 DIAGNOSIS — H61.23 BILATERAL IMPACTED CERUMEN: ICD-10-CM

## 2023-04-24 DIAGNOSIS — Z00.00 ENCOUNTER FOR SUBSEQUENT ANNUAL WELLNESS VISIT (AWV) IN MEDICARE PATIENT: Primary | ICD-10-CM

## 2023-04-24 DIAGNOSIS — E11.9 TYPE 2 DIABETES MELLITUS WITHOUT COMPLICATION, WITHOUT LONG-TERM CURRENT USE OF INSULIN: ICD-10-CM

## 2023-04-24 DIAGNOSIS — G20 PARKINSON'S DISEASE: ICD-10-CM

## 2023-04-24 LAB — HBA1C MFR BLD: 6.8 %

## 2023-04-24 NOTE — PROGRESS NOTES
The ABCs of the Annual Wellness Visit  Subsequent Medicare Wellness Visit    Subjective      Gary Tinoco is a 78 y.o. male who presents for a Subsequent Medicare Wellness Visit.    The following portions of the patient's history were reviewed and   updated as appropriate: allergies, current medications, past family history, past medical history, past social history, past surgical history and problem list.    Compared to one year ago, the patient feels his physical   health is worse.    Compared to one year ago, the patient feels his mental   health is worse.    Recent Hospitalizations:  He was not admitted to the hospital during the last year.       Current Medical Providers:  Patient Care Team:  Rosy Bobo APRN as PCP - General (Nurse Practitioner)  Braxton Messer MD as Consulting Physician (Urology)  Melanie Snyder MD as Consulting Physician (Neurology)    Outpatient Medications Prior to Visit   Medication Sig Dispense Refill   • aspirin 81 MG EC tablet Take 1 tablet by mouth Daily.     • carbidopa-levodopa (Sinemet)  MG per tablet Take 2 tablets by mouth 3 (Three) Times a Day. (Patient taking differently: Take 2 tablets by mouth 2 (Two) Times a Day.) 180 tablet 5   • glyburide (DIAbeta) 5 MG tablet TAKE 1 TABLET BY MOUTH TWICE A  tablet 1   • losartan-hydrochlorothiazide (HYZAAR) 100-25 MG per tablet TAKE 1 TABLET BY MOUTH EVERY DAY 90 tablet 1   • lovastatin (MEVACOR) 40 MG tablet TAKE 1 TABLET BY MOUTH EVERY DAY 90 tablet 1   • metFORMIN (GLUCOPHAGE) 850 MG tablet Take 1 tablet by mouth Daily. 90 tablet 3     No facility-administered medications prior to visit.       No opioid medication identified on active medication list. I have reviewed chart for other potential  high risk medication/s and harmful drug interactions in the elderly.          Aspirin is on active medication list. Patient is no longer taking this..      Patient Active Problem List   Diagnosis   • Abnormal LFTs  "  • Anemia of chronic disease   • Hypertension, benign   • Right thyroid nodule   • Substernal thyroid   • Screening PSA (prostate specific antigen)   • Type 2 diabetes mellitus without complication, without long-term current use of insulin   • History of adenomatous polyp of colon   • Family hx colonic polyps   • Mixed hyperlipidemia     Advance Care Planning   Advance Care Planning     Advance Directive is not on file.  ACP discussion was held with the patient during this visit. Patient does not have an advance directive, information provided.     Objective    Vitals:    23 1026   BP: 136/72   BP Location: Left arm   Patient Position: Sitting   Cuff Size: Adult   Pulse: 85   Temp: 97.3 °F (36.3 °C)   TempSrc: Temporal   SpO2: 95%   Weight: 90.7 kg (200 lb)   Height: 180.3 cm (71\")   PainSc: 0-No pain     Estimated body mass index is 27.89 kg/m² as calculated from the following:    Height as of this encounter: 180.3 cm (71\").    Weight as of this encounter: 90.7 kg (200 lb).    BMI is >= 25 and <30. (Overweight) The following options were offered after discussion;: exercise counseling/recommendations and nutrition counseling/recommendations      Does the patient have evidence of cognitive impairment?   No    Lab Results   Component Value Date    HGBA1C 6.8 2023          HEALTH RISK ASSESSMENT    Smoking Status:  Social History     Tobacco Use   Smoking Status Never   Smokeless Tobacco Never     Alcohol Consumption:  Social History     Substance and Sexual Activity   Alcohol Use No     Fall Risk Screen:    STEADI Fall Risk Assessment was completed, and patient is at LOW risk for falls.Assessment completed on:2023    Depression Screenin/24/2023    10:35 AM   PHQ-2/PHQ-9 Depression Screening   Little Interest or Pleasure in Doing Things 0-->not at all   Feeling Down, Depressed or Hopeless 0-->not at all   PHQ-9: Brief Depression Severity Measure Score 0       Health Habits and Functional and " Cognitive Screenin/24/2023    10:35 AM   Functional & Cognitive Status   Do you have difficulty preparing food and eating? No   Do you have difficulty bathing yourself, getting dressed or grooming yourself? No   Do you have difficulty using the toilet? No   Do you have difficulty moving around from place to place? No   Do you have trouble with steps or getting out of a bed or a chair? Yes   Current Diet Well Balanced Diet   Dental Exam Up to date   Eye Exam Up to date   Exercise (times per week) 0 times per week   Current Exercises Include No Regular Exercise   Do you need help using the phone?  No   Are you deaf or do you have serious difficulty hearing?  No   Do you need help with transportation? No   Do you need help shopping? No   Do you need help preparing meals?  No   Do you need help with housework?  No   Do you need help with laundry? No   Do you need help taking your medications? No   Do you need help managing money? No   Do you ever drive or ride in a car without wearing a seat belt? No   Have you felt unusual stress, anger or loneliness in the last month? No   Who do you live with? Spouse   If you need help, do you have trouble finding someone available to you? No   Have you been bothered in the last four weeks by sexual problems? No   Do you have difficulty concentrating, remembering or making decisions? No       Age-appropriate Screening Schedule:  Refer to the list below for future screening recommendations based on patient's age, sex and/or medical conditions. Orders for these recommended tests are listed in the plan section. The patient has been provided with a written plan.    Health Maintenance   Topic Date Due   • ZOSTER VACCINE (1 of 2) Never done   • TDAP/TD VACCINES (2 - Tdap) 2014   • COVID-19 Vaccine (3 - Booster for Moderna series) 2021   • LIPID PANEL  2023   • URINE MICROALBUMIN  2023   • INFLUENZA VACCINE  2023   • HEMOGLOBIN A1C  10/24/2023   •  DIABETIC EYE EXAM  02/22/2024   • ANNUAL WELLNESS VISIT  04/24/2024   • COLORECTAL CANCER SCREENING  04/01/2026   • HEPATITIS C SCREENING  Completed   • Pneumococcal Vaccine 65+  Completed                  CMS Preventative Services Quick Reference  Risk Factors Identified During Encounter:    Immunizations Discussed/Encouraged: Shingrix  Inactivity/Sedentary: Patient was advised to exercise at least 150 minutes a week per CDC recommendations.  Vision Screening Recommended    The above risks/problems have been discussed with the patient.  Pertinent information has been shared with the patient in the After Visit Summary.    Diagnoses and all orders for this visit:    1. Encounter for subsequent annual wellness visit (AWV) in Medicare patient (Primary)    2. Type 2 diabetes mellitus without complication, without long-term current use of insulin  -     Microalbumin / Creatinine Urine Ratio - Urine, Clean Catch  -     POC Glycated Hemoglobin, Total    3. Mixed hyperlipidemia  -     Lipid Panel  -     TSH Rfx On Abnormal To Free T4    4. Hypertension, benign  -     Comprehensive Metabolic Panel  -     CBC & Differential  -     Urinalysis With Microscopic If Indicated (No Culture) - Urine, Clean Catch    5. Screening PSA (prostate specific antigen)  -     PSA Screen    6. Encounter for long-term (current) use of other medications    7. Bilateral impacted cerumen    8. Parkinson's disease      Patient presents to the office today for subsequent Medicare wellness exam.  He tells me that besides Parkinson's he feels as though he is doing fairly well overall.  He is concerned that his Parkinson's continues to get worse.  He does complain of weakness on the left side of his body which he states has been his normal for some time now.  He is seeing neurology for this.  He does not take the Sinemet as prescribed 3 times a day due to GI side effects, and rather is taking this once a day.  At his last office visit with neurology it  was offered to place the patient on a different medication however he declined.    Patient will have labs drawn today as above.  We will follow-up with these results and make changes to plan of care as necessary.  Patient's blood pressure is reasonable in the office at 136/72.  He does have a monitor at home but has not been monitoring his blood pressure regularly.  He denies any chest pain, shortness of breath, dizziness/lightheadedness.    The patient's hemoglobin A1c is 6.8%, only up slightly from October at 6.7%.  He is taking metformin and glyburide and states he has been on this regimen for years.  He does not typically watch his diet at home but does not check his blood glucose.  We will continue current regimen.    The patient does note a weaker urine stream at times.  He feels as though he urinates more frequently throughout the day, only gets up about once at night to urinate.  He is not having any pain or burning with urination.  He denies any fever or chills.  Urinalysis will be checked today.    The patient's last colonoscopy was in 2021 which did show polyps.  Recommendations to repeat in 5 years.  At that point, the patient will be over 80 years old so we discussed that continued colorectal cancer screening is no longer indicated given his age.  He is going to the dentist every 6 months for cleanings.  He states he goes to the eye doctor about every 2 years but he feels as though he needs new glasses now and will be making an appointment soon with them.  He does not wear hearing aids.  He does not necessarily feel as though he has any trouble with his hearing.  He did have bilateral impacted cerumen noted on exam today which was successfully irrigated.    Patient declines any vaccines today.    Follow Up:   Next Medicare Wellness visit to be scheduled in 1 year.      An After Visit Summary and PPPS were made available to the patient.

## 2023-04-24 NOTE — PROGRESS NOTES
Procedure   Ear Cerumen Removal    Date/Time: 4/24/2023 12:56 PM  Performed by: Rosy Bobo APRN  Authorized by: Rosy Bobo APRN     Anesthesia:  Local Anesthetic: none  Location details: left ear and right ear  Patient tolerance: patient tolerated the procedure well with no immediate complications  Procedure type: irrigation   Sedation:  Patient sedated: no

## 2023-04-25 LAB
ALBUMIN SERPL-MCNC: 4.7 G/DL (ref 3.5–5.2)
ALBUMIN/CREAT UR: 34 MG/G CREAT (ref 0–29)
ALBUMIN/GLOB SERPL: 1.7 G/DL
ALP SERPL-CCNC: 49 U/L (ref 39–117)
ALT SERPL-CCNC: 10 U/L (ref 1–41)
APPEARANCE UR: CLEAR
AST SERPL-CCNC: 16 U/L (ref 1–40)
BASOPHILS # BLD AUTO: 0.03 10*3/MM3 (ref 0–0.2)
BASOPHILS NFR BLD AUTO: 0.5 % (ref 0–1.5)
BILIRUB SERPL-MCNC: 0.6 MG/DL (ref 0–1.2)
BILIRUB UR QL STRIP: NEGATIVE
BUN SERPL-MCNC: 19 MG/DL (ref 8–23)
BUN/CREAT SERPL: 19.6 (ref 7–25)
CALCIUM SERPL-MCNC: 10.1 MG/DL (ref 8.6–10.5)
CHLORIDE SERPL-SCNC: 99 MMOL/L (ref 98–107)
CHOLEST SERPL-MCNC: 159 MG/DL (ref 0–200)
CO2 SERPL-SCNC: 28.3 MMOL/L (ref 22–29)
COLOR UR: YELLOW
CREAT SERPL-MCNC: 0.97 MG/DL (ref 0.76–1.27)
CREAT UR-MCNC: 58.7 MG/DL
EGFRCR SERPLBLD CKD-EPI 2021: 79.9 ML/MIN/1.73
EOSINOPHIL # BLD AUTO: 0.09 10*3/MM3 (ref 0–0.4)
EOSINOPHIL NFR BLD AUTO: 1.6 % (ref 0.3–6.2)
ERYTHROCYTE [DISTWIDTH] IN BLOOD BY AUTOMATED COUNT: 13.1 % (ref 12.3–15.4)
GLOBULIN SER CALC-MCNC: 2.7 GM/DL
GLUCOSE SERPL-MCNC: 111 MG/DL (ref 65–99)
GLUCOSE UR QL STRIP: NEGATIVE
HCT VFR BLD AUTO: 41.5 % (ref 37.5–51)
HDLC SERPL-MCNC: 37 MG/DL (ref 40–60)
HGB BLD-MCNC: 14 G/DL (ref 13–17.7)
HGB UR QL STRIP: NEGATIVE
IMM GRANULOCYTES # BLD AUTO: 0.01 10*3/MM3 (ref 0–0.05)
IMM GRANULOCYTES NFR BLD AUTO: 0.2 % (ref 0–0.5)
KETONES UR QL STRIP: NEGATIVE
LDLC SERPL CALC-MCNC: 93 MG/DL (ref 0–100)
LEUKOCYTE ESTERASE UR QL STRIP: NEGATIVE
LYMPHOCYTES # BLD AUTO: 1.56 10*3/MM3 (ref 0.7–3.1)
LYMPHOCYTES NFR BLD AUTO: 27.7 % (ref 19.6–45.3)
MCH RBC QN AUTO: 28.6 PG (ref 26.6–33)
MCHC RBC AUTO-ENTMCNC: 33.7 G/DL (ref 31.5–35.7)
MCV RBC AUTO: 84.7 FL (ref 79–97)
MICROALBUMIN UR-MCNC: 20 UG/ML
MONOCYTES # BLD AUTO: 0.6 10*3/MM3 (ref 0.1–0.9)
MONOCYTES NFR BLD AUTO: 10.7 % (ref 5–12)
NEUTROPHILS # BLD AUTO: 3.34 10*3/MM3 (ref 1.7–7)
NEUTROPHILS NFR BLD AUTO: 59.3 % (ref 42.7–76)
NITRITE UR QL STRIP: NEGATIVE
NRBC BLD AUTO-RTO: 0 /100 WBC (ref 0–0.2)
PH UR STRIP: 7 [PH] (ref 5–8)
PLATELET # BLD AUTO: 191 10*3/MM3 (ref 140–450)
POTASSIUM SERPL-SCNC: 4.5 MMOL/L (ref 3.5–5.2)
PROT SERPL-MCNC: 7.4 G/DL (ref 6–8.5)
PROT UR QL STRIP: NEGATIVE
PSA SERPL-MCNC: 2.63 NG/ML (ref 0–4)
RBC # BLD AUTO: 4.9 10*6/MM3 (ref 4.14–5.8)
SODIUM SERPL-SCNC: 137 MMOL/L (ref 136–145)
SP GR UR STRIP: 1.01 (ref 1–1.03)
TRIGL SERPL-MCNC: 165 MG/DL (ref 0–150)
TSH SERPL DL<=0.005 MIU/L-ACNC: 1 UIU/ML (ref 0.27–4.2)
UROBILINOGEN UR STRIP-MCNC: NORMAL MG/DL
VLDLC SERPL CALC-MCNC: 29 MG/DL (ref 5–40)
WBC # BLD AUTO: 5.63 10*3/MM3 (ref 3.4–10.8)

## 2023-04-26 NOTE — PROGRESS NOTES
Labs overall look good. Triglycerides have made some improvement compared to last year. Normal PSA. No new recommendations at this time.

## 2023-08-24 RX ORDER — GLYBURIDE 5 MG/1
5 TABLET ORAL
Qty: 90 TABLET | Refills: 2 | Status: SHIPPED | OUTPATIENT
Start: 2023-08-24

## 2023-08-24 RX ORDER — LOSARTAN POTASSIUM AND HYDROCHLOROTHIAZIDE 25; 100 MG/1; MG/1
TABLET ORAL
Qty: 90 TABLET | Refills: 1 | Status: SHIPPED | OUTPATIENT
Start: 2023-08-24

## 2023-08-24 RX ORDER — LOVASTATIN 40 MG/1
TABLET ORAL
Qty: 90 TABLET | Refills: 1 | Status: SHIPPED | OUTPATIENT
Start: 2023-08-24

## 2023-10-23 ENCOUNTER — OFFICE VISIT (OUTPATIENT)
Dept: INTERNAL MEDICINE | Facility: CLINIC | Age: 78
End: 2023-10-23
Payer: MEDICARE

## 2023-10-23 VITALS
WEIGHT: 205 LBS | HEART RATE: 75 BPM | BODY MASS INDEX: 28.7 KG/M2 | OXYGEN SATURATION: 96 % | DIASTOLIC BLOOD PRESSURE: 70 MMHG | RESPIRATION RATE: 18 BRPM | SYSTOLIC BLOOD PRESSURE: 146 MMHG | HEIGHT: 71 IN | TEMPERATURE: 98.7 F

## 2023-10-23 DIAGNOSIS — M51.36 DDD (DEGENERATIVE DISC DISEASE), LUMBAR: ICD-10-CM

## 2023-10-23 DIAGNOSIS — R60.0 BILATERAL LOWER EXTREMITY EDEMA: ICD-10-CM

## 2023-10-23 DIAGNOSIS — E11.9 TYPE 2 DIABETES MELLITUS WITHOUT COMPLICATION, WITHOUT LONG-TERM CURRENT USE OF INSULIN: Primary | ICD-10-CM

## 2023-10-23 DIAGNOSIS — Z23 FLU VACCINE NEED: ICD-10-CM

## 2023-10-23 DIAGNOSIS — G20.A2 PARKINSON'S DISEASE WITH FLUCTUATING MANIFESTATIONS, UNSPECIFIED WHETHER DYSKINESIA PRESENT: ICD-10-CM

## 2023-10-23 DIAGNOSIS — I10 HYPERTENSION, BENIGN: ICD-10-CM

## 2023-10-23 LAB
EXPIRATION DATE: ABNORMAL
HBA1C MFR BLD: 6.7 % (ref 4.5–5.7)
Lab: ABNORMAL

## 2023-10-23 RX ORDER — TRAMADOL HYDROCHLORIDE 50 MG/1
50 TABLET ORAL EVERY 12 HOURS PRN
Qty: 30 TABLET | Refills: 0 | Status: SHIPPED | OUTPATIENT
Start: 2023-10-23

## 2023-10-23 RX ORDER — TRAMADOL HYDROCHLORIDE 50 MG/1
50 TABLET ORAL 3 TIMES DAILY
Status: CANCELLED | OUTPATIENT
Start: 2023-10-23

## 2023-10-23 NOTE — PROGRESS NOTES
Subjective     Chief Complaint:  Bilateral ankle/pedal edema    HPI:  Patient presents to the office today for a 6-month follow-up regarding hypertension and diabetes management.  He does complain of bilateral pedal/ankle edema for the last few months which has been intermittently occurring.  Please see assessment and plan below.    Patient's PMR from outside medical facility reviewed and noted.    Past Medical History:   Past Medical History:   Diagnosis Date    Cataract     had surgery in Feb 2023 both eyes    Colon polyp     Diabetes mellitus     Difficulty walking     Diverticulosis     History of adenomatous polyp of colon     Hyperlipidemia     Hypertension     Parkinson's disease     Shingles      Past Surgical History:  Past Surgical History:   Procedure Laterality Date    COLONOSCOPY  04/19/2016    polyp, tubular adenoma, diverticulosis    COLONOSCOPY N/A 03/19/2021    Procedure: COLONOSCOPY WITH ANESTHESIA;  Surgeon: Philip Ferrari MD;  Location: [facility] ENDOSCOPY;  Service: Gastroenterology;  Laterality: N/A;  pre: hx polyps  post: poor prep  George Bond MD    COLONOSCOPY N/A 04/01/2021    Procedure: COLONOSCOPY WITH ANESTHESIA;  Surgeon: Philip Ferrari MD;  Location: [facility] ENDOSCOPY;  Service: Gastroenterology;  Laterality: N/A;  preop; hx of polyps  postop; polyps   PCP George Bond     EYE SURGERY  feb 2023    HERNIA REPAIR      JOINT REPLACEMENT      KNEE SURGERY      VASECTOMY  1974     Social History:  reports that he has never smoked. He has never been exposed to tobacco smoke. He has never used smokeless tobacco. He reports that he does not drink alcohol and does not use drugs.    Family History: family history includes Dementia in his mother; Parkinsonism in his father.      Allergies:  No Known Allergies  Medications:  Prior to Admission medications    Medication Sig Start Date End Date Taking? Authorizing Provider   Denta 5000 Plus 1.1 % cream APPLY A THIN RIBBON TO BRUSH  "BRUSH FOR 2 MINUTES PREFERABLY AT BEDTIME 6/9/23  Yes ProviderKasia MD   glyburide (DIAbeta) 5 MG tablet Take 1 tablet by mouth Daily With Breakfast. 8/24/23  Yes Rosy Bobo APRN   losartan-hydrochlorothiazide (HYZAAR) 100-25 MG per tablet TAKE 1 TABLET BY MOUTH EVERY DAY 8/24/23  Yes Rosy Bobo APRN   lovastatin (MEVACOR) 40 MG tablet TAKE 1 TABLET BY MOUTH EVERY DAY 8/24/23  Yes Rosy Bobo APRN   metFORMIN (GLUCOPHAGE) 850 MG tablet Take 1 tablet by mouth Daily. 12/1/22  Yes Harry, [place] CDELVIS   naloxone (NARCAN) 4 MG/0.1ML nasal spray PLEASE SEE ATTACHED FOR DETAILED DIRECTIONS 5/31/23  Yes ProviderKasia MD   traMADol (ULTRAM) 50 MG tablet Take 1 tablet by mouth 3 (Three) Times a Day. 5/31/23  Yes ProviderKasia MD       Objective     Vital Signs: /70 (BP Location: Left arm, Patient Position: Sitting, Cuff Size: Adult)   Pulse 75   Temp 98.7 °F (37.1 °C) (Infrared)   Resp 18   Ht 180.3 cm (71\")   Wt 93 kg (205 lb)   SpO2 96%   BMI 28.59 kg/m²   Physical Exam  Vitals and nursing note reviewed.   Constitutional:       General: He is not in acute distress.     Appearance: He is not ill-appearing or toxic-appearing.   HENT:      Head: Normocephalic and atraumatic.      Mouth/Throat:      Mouth: Mucous membranes are moist.      Pharynx: Oropharynx is clear.   Cardiovascular:      Rate and Rhythm: Normal rate and regular rhythm.      Pulses: Normal pulses.      Heart sounds: Normal heart sounds.   Pulmonary:      Effort: Pulmonary effort is normal.      Breath sounds: No wheezing, rhonchi or rales.   Abdominal:      General: Bowel sounds are normal. There is no distension.      Palpations: Abdomen is soft.      Tenderness: There is no abdominal tenderness.   Musculoskeletal:         General: No swelling or tenderness. Normal range of motion.      Cervical back: Normal range of motion and neck supple. No tenderness.   Skin:     General: Skin is warm " and dry.      Findings: No erythema or rash.   Neurological:      General: No focal deficit present.      Mental Status: He is alert and oriented to person, place, and time.      Comments: Shuffling parkinsonian gait   Psychiatric:         Mood and Affect: Mood normal.         Behavior: Behavior normal.         Thought Content: Thought content normal.         Judgment: Judgment normal.      Comments: Masked facies     Results Reviewed:  Office Visit with Melanie Snyder MD (07/10/2023)     Assessment / Plan     Assessment/Plan:  Diagnoses and all orders for this visit:    1. Type 2 diabetes mellitus without complication, without long-term current use of insulin (Primary)  -     Cancel: POCT urinalysis dipstick, automated  -     POC Glycosylated Hemoglobin (Hb A1C)    2. Flu vaccine need  -     Fluzone High-Dose 65+yrs    3. Bilateral lower extremity edema  -     Compression Stockings    4. Hypertension, benign  -     Basic metabolic panel    5. DDD (degenerative disc disease), lumbar  -     traMADol (ULTRAM) 50 MG tablet; Take 1 tablet by mouth Every 12 (Twelve) Hours As Needed for Moderate Pain.  Dispense: 30 tablet; Refill: 0    6. Parkinson's disease with fluctuating manifestations, unspecified whether dyskinesia present       Patient presents to the office today for a 6-month follow-up regarding hypertension and diabetes management.  His hemoglobin A1c has slightly improved from 6.8-6.7.  He does not check his blood glucose at home.  He does try to watch what he is eating and states he tries to stay away from sugar as best he can but he actually switched from sweet and low to regular sugar in his coffee.  We discussed regular sugar is okay in moderation and he expresses understanding.  We will continue present regimen with metformin and glyburide.    Blood pressure is slightly elevated at 146/70.  The patient's blood pressure has run more elevated the last couple of times that he has been in the office.  He does  not check his blood pressure at home but does have a machine to do so.  He denies any chest pain, shortness of breath, dizziness/lightheadedness or headaches.  We will continue the patient's present regimen for now and continue to monitor. Will check a BMP today to assess stability of renal function and electrolytes on current regimen.     The patient complains of bilateral ankle and pedal edema for the last several months.  This is typically worse toward the end of the day and has improved after he has slept for the night.  He does sit with his feet down a lot during the day.  He indicates that he is fairly inactive due to Parkinson symptoms.  He has made no changes with his diet and indicates he does not add much salt to his foods.  He does not eat much frozen or canned foods.  He again denies shortness of breath.  He denies orthopnea or paroxysmal nocturnal dyspnea. Feel likely his symptoms are secondary to venous insufficiency as he is sitting with his legs in a dependent position for a majority of the day.  Recommended that the patient try to elevate his lower extremities when at rest and have also prescribed compression stockings for the patient to use.  We also discussed staying adequately hydrated have asked patient to let us know if his symptoms do not improve with the above measures before his next office visit.     The patient requests refill for tramadol, which he uses very sparingly for back pain and sciatic pain.  We have not filled this medication for the patient recently, it appears it was last filled by Dr. Barajas in May.  I did review a CT of the abdomen pelvis from 2018 which showed marked sclerosis at the L4-5 disc space.  We will refill, but will need to get a urine drug screen and CSA at the patient's next appointment.    Patient received flu vaccine in the office today.    Return in about 3 months (around 1/23/2024) for Recheck. Please get UDS/CSA for Ultram. unless patient needs to be seen  sooner or acute issues arise.    I have discussed the patient results/orders and and plan/recommendation with them at today's visit.      Rosy Bobo, DELVIS   10/23/2023    Answers submitted by the patient for this visit:  Primary Reason for Visit (Submitted on 10/22/2023)  What is the primary reason for your visit?: Other  Other (Submitted on 10/22/2023)  Please describe your symptoms.: just a checkup  Have you had these symptoms before?: No  How long have you been having these symptoms?: 1-4 days

## 2023-10-24 LAB
BUN SERPL-MCNC: 21 MG/DL (ref 8–23)
BUN/CREAT SERPL: 21 (ref 7–25)
CALCIUM SERPL-MCNC: 9.7 MG/DL (ref 8.6–10.5)
CHLORIDE SERPL-SCNC: 102 MMOL/L (ref 98–107)
CO2 SERPL-SCNC: 27.4 MMOL/L (ref 22–29)
CREAT SERPL-MCNC: 1 MG/DL (ref 0.76–1.27)
EGFRCR SERPLBLD CKD-EPI 2021: 77 ML/MIN/1.73
GLUCOSE SERPL-MCNC: 113 MG/DL (ref 65–99)
POTASSIUM SERPL-SCNC: 4.4 MMOL/L (ref 3.5–5.2)
SODIUM SERPL-SCNC: 139 MMOL/L (ref 136–145)

## 2023-10-24 NOTE — PROGRESS NOTES
Kidney function and electrolytes appear stable at this time.  Patient to continue current medications.

## 2023-12-26 ENCOUNTER — TELEPHONE (OUTPATIENT)
Dept: NEUROLOGY | Facility: CLINIC | Age: 78
End: 2023-12-26
Payer: COMMERCIAL

## 2023-12-26 NOTE — TELEPHONE ENCOUNTER
CHRISTOFER in regards to patient's appointment for Jan. 16th.  This appointment has been changed to Feb. 23rd at 2:45 pm.

## 2024-01-23 ENCOUNTER — OFFICE VISIT (OUTPATIENT)
Dept: INTERNAL MEDICINE | Facility: CLINIC | Age: 79
End: 2024-01-23
Payer: MEDICARE

## 2024-01-23 VITALS
DIASTOLIC BLOOD PRESSURE: 72 MMHG | TEMPERATURE: 98.2 F | RESPIRATION RATE: 16 BRPM | HEIGHT: 71 IN | BODY MASS INDEX: 27.58 KG/M2 | SYSTOLIC BLOOD PRESSURE: 136 MMHG | WEIGHT: 197 LBS | OXYGEN SATURATION: 96 % | HEART RATE: 82 BPM

## 2024-01-23 DIAGNOSIS — H61.23 BILATERAL IMPACTED CERUMEN: ICD-10-CM

## 2024-01-23 DIAGNOSIS — Z79.899 ENCOUNTER FOR LONG-TERM (CURRENT) USE OF OTHER MEDICATIONS: Primary | ICD-10-CM

## 2024-01-23 DIAGNOSIS — R10.9 LEFT FLANK PAIN: ICD-10-CM

## 2024-01-23 DIAGNOSIS — E11.9 TYPE 2 DIABETES MELLITUS WITHOUT COMPLICATION, WITHOUT LONG-TERM CURRENT USE OF INSULIN: ICD-10-CM

## 2024-01-23 DIAGNOSIS — I10 HYPERTENSION, BENIGN: ICD-10-CM

## 2024-01-23 LAB
BILIRUB BLD-MCNC: NEGATIVE MG/DL
CLARITY, POC: CLEAR
COLOR UR: YELLOW
EXPIRATION DATE: ABNORMAL
GLUCOSE UR STRIP-MCNC: NEGATIVE MG/DL
HBA1C MFR BLD: 6.8 % (ref 4.5–5.7)
KETONES UR QL: NEGATIVE
LEUKOCYTE EST, POC: NEGATIVE
Lab: ABNORMAL
NITRITE UR-MCNC: NEGATIVE MG/ML
PH UR: 6 [PH] (ref 5–8)
PROT UR STRIP-MCNC: NEGATIVE MG/DL
RBC # UR STRIP: NEGATIVE /UL
SP GR UR: 1.02 (ref 1–1.03)
UROBILINOGEN UR QL: NORMAL

## 2024-01-23 RX ORDER — GLYBURIDE 5 MG/1
5 TABLET ORAL 2 TIMES DAILY WITH MEALS
Start: 2024-01-23

## 2024-01-23 NOTE — PROGRESS NOTES
Subjective     Chief Complaint:  Ear fullness.  Left flank pain.    HPI:  Patient presents to the office today for 3-month follow-up regarding hypertension and diabetes management.  Today, he is complaining of a left flank pressure when needing to urinate.  He is unsure exactly how long this has been occurring but states it has been many months.  He is also complaining of some bilateral ear fullness, has had to have cerumen irrigation in the past.  Please see assessment and plan below.    Patient's PMR from outside medical facility reviewed and noted.    Past Medical History:   Past Medical History:   Diagnosis Date    Cataract     had surgery in Feb 2023 both eyes    Colon polyp     Diabetes mellitus     Difficulty walking     Diverticulosis     History of adenomatous polyp of colon     Hyperlipidemia     Hypertension     Parkinson's disease     Shingles      Past Surgical History:  Past Surgical History:   Procedure Laterality Date    COLONOSCOPY  04/19/2016    polyp, tubular adenoma, diverticulosis    COLONOSCOPY N/A 03/19/2021    Procedure: COLONOSCOPY WITH ANESTHESIA;  Surgeon: Philip Ferrari MD;  Location: [facility] ENDOSCOPY;  Service: Gastroenterology;  Laterality: N/A;  pre: hx polyps  post: poor prep  George Bond MD    COLONOSCOPY N/A 04/01/2021    Procedure: COLONOSCOPY WITH ANESTHESIA;  Surgeon: Philip Ferrari MD;  Location: [facility] ENDOSCOPY;  Service: Gastroenterology;  Laterality: N/A;  preop; hx of polyps  postop; polyps   PCP George Bond     EYE SURGERY  feb 2023    HERNIA REPAIR      JOINT REPLACEMENT      KNEE SURGERY      VASECTOMY  1974     Social History:  reports that he has never smoked. He has never been exposed to tobacco smoke. He has never used smokeless tobacco. He reports that he does not drink alcohol and does not use drugs.    Family History: family history includes Dementia in his mother; Parkinsonism in his father.      Allergies:  No Known  "Allergies  Medications:  Prior to Admission medications    Medication Sig Start Date End Date Taking? Authorizing Provider   Denta 5000 Plus 1.1 % cream APPLY A THIN RIBBON TO BRUSH BRUSH FOR 2 MINUTES PREFERABLY AT BEDTIME 6/9/23  Yes Kasia Henry MD   glyburide (DIAbeta) 5 MG tablet Take 1 tablet by mouth 2 (Two) Times a Day With Meals. 1/23/24  Yes Rosy Bobo APRN   losartan-hydrochlorothiazide (HYZAAR) 100-25 MG per tablet TAKE 1 TABLET BY MOUTH EVERY DAY 8/24/23  Yes Rosy Bobo APRN   lovastatin (MEVACOR) 40 MG tablet TAKE 1 TABLET BY MOUTH EVERY DAY 8/24/23  Yes Rosy Bobo APRN   metFORMIN (GLUCOPHAGE) 850 MG tablet TAKE 1 TABLET BY MOUTH EVERY DAY 12/1/23  Yes Rosy Bobo APRN   traMADol (ULTRAM) 50 MG tablet Take 1 tablet by mouth Every 12 (Twelve) Hours As Needed for Moderate Pain. 10/23/23  Yes Rosy Bobo APRN   glyburide (DIAbeta) 5 MG tablet Take 1 tablet by mouth Daily With Breakfast. 8/24/23 1/23/24 Yes Rosy Bobo APRN   naloxone (NARCAN) 4 MG/0.1ML nasal spray PLEASE SEE ATTACHED FOR DETAILED DIRECTIONS  Patient not taking: Reported on 1/23/2024 5/31/23   Kasia Henry MD       Objective     Vital Signs: /72 (BP Location: Left arm, Patient Position: Sitting, Cuff Size: Adult)   Pulse 82   Temp 98.2 °F (36.8 °C) (Infrared)   Resp 16   Ht 180.3 cm (71\")   Wt 89.4 kg (197 lb)   SpO2 96%   BMI 27.48 kg/m²   Physical Exam  Vitals and nursing note reviewed.   Constitutional:       General: He is not in acute distress.     Appearance: He is not ill-appearing or toxic-appearing.   HENT:      Head: Normocephalic and atraumatic.      Right Ear: There is impacted cerumen.      Left Ear: There is impacted cerumen.      Mouth/Throat:      Mouth: Mucous membranes are moist.      Pharynx: Oropharynx is clear.   Cardiovascular:      Rate and Rhythm: Normal rate and regular rhythm.      Pulses: Normal pulses.      Heart sounds: Normal " heart sounds.   Pulmonary:      Effort: Pulmonary effort is normal.      Breath sounds: No wheezing, rhonchi or rales.   Abdominal:      General: Bowel sounds are normal. There is no distension.      Palpations: Abdomen is soft.      Tenderness: There is no abdominal tenderness.   Musculoskeletal:         General: No swelling or tenderness. Normal range of motion.      Cervical back: Normal range of motion and neck supple. No tenderness.   Skin:     General: Skin is warm and dry.      Findings: No erythema or rash.   Neurological:      General: No focal deficit present.      Mental Status: He is alert and oriented to person, place, and time.      Comments: Masked facies. Shuffling gait. Hand tremor consistent with Parkinsons   Psychiatric:         Mood and Affect: Mood normal.         Behavior: Behavior normal.         Thought Content: Thought content normal.         Judgment: Judgment normal.       Results Reviewed:  POC Glycosylated Hemoglobin (Hb A1C) (01/23/2024 09:57)   POC Glycosylated Hemoglobin (Hb A1C) (10/23/2023 10:43)   Basic metabolic panel (10/23/2023 10:05)   Assessment / Plan     Assessment/Plan:  Diagnoses and all orders for this visit:    1. Encounter for long-term (current) use of other medications (Primary)  -     ToxASSURE Select 13 (MW) - Urine, Clean Catch    2. Type 2 diabetes mellitus without complication, without long-term current use of insulin  -     POC Glycosylated Hemoglobin (Hb A1C)  -     glyburide (DIAbeta) 5 MG tablet; Take 1 tablet by mouth 2 (Two) Times a Day With Meals.    3. Left flank pain  -     POC Urinalysis Dipstick, Multipro    4. Hypertension, benign    5. Bilateral impacted cerumen       Patient presents to the office today for a 3-month follow-up regarding hypertension and diabetes management.  His blood pressure is well-controlled at 136/72.  He does not monitor this at home.  He tells me that he can tell when his blood pressure is elevated at home because he  typically will feel more tired than usual, which he has not been feeling lately.  He denies any chest pain, shortness of breath, dizziness/lightheadedness or headaches.  Will continue Hyzaar 100-25 mg daily.  Last renal function and electrolytes assessed in October were within normal limits.    Hemoglobin A1c is stable today at 6.8.  This was last noted at 6.7 in October.  Patient does not check his blood glucose at home.  The patient does wonder if he should change his medication regimen or not as he has been on metformin for 25 years.  As his current regimen is continuing to keep his A1c stable, I believe it is reasonable to continue this at this time.  He did tell me that he has been taking glyburide 5 mg twice daily for quite a while, but we had this written as once daily dosing.  As he is tolerating this without incident, okay to continue.    Patient does continue with ongoing intermittently occurring lower extremity edema, which we had discussed at his last office visit as well.  I do believe this is likely secondary to him sitting in a dependent position for a large majority of the day.  We had discussed use of compression stockings at his last office visit, which he is not wearing on a consistent basis so we did discuss trying to wear these more consistently throughout the day.  We also discussed elevation of the lower extremities when at rest.  He does not have any edema noted on exam today.    Today, patient is complaining of a left flank pressure when needing to urinate.  He is unsure exactly how long this has been occurring but states it has been many months.  He states this is not happening with every episode of urination but typically happens with his first morning urination.  He states this sensation typically goes away after his second urination of the day.  He is not having any burning with urination and has not noted any changes in the color of his urine.  His stream is weak, does have known BPH.   He does feel as though he is able to empty his bladder.  His urinalysis today was unremarkable, no presence of blood or other abnormal cells.  Will continue to monitor, but could consider trial of Flomax to see if this helps with symptoms.  If symptoms persist or change, may also pursue renal ultrasound.    Return in about 3 months (around 4/23/2024) for Medicare Wellness. unless patient needs to be seen sooner or acute issues arise.    I have discussed the patient results/orders and and plan/recommendation with them at today's visit.      Rosy Bobo, DELVIS   01/23/2024        Answers submitted by the patient for this visit:  Primary Reason for Visit (Submitted on 1/22/2024)  What is the primary reason for your visit?: Other  Other (Submitted on 1/22/2024)  Please describe your symptoms.: no symptoms  Have you had these symptoms before?: No  How long have you been having these symptoms?: 1-4 days

## 2024-01-24 ENCOUNTER — TELEPHONE (OUTPATIENT)
Dept: INTERNAL MEDICINE | Facility: CLINIC | Age: 79
End: 2024-01-24

## 2024-01-24 DIAGNOSIS — M51.36 DDD (DEGENERATIVE DISC DISEASE), LUMBAR: ICD-10-CM

## 2024-01-24 RX ORDER — TRAMADOL HYDROCHLORIDE 50 MG/1
50 TABLET ORAL EVERY 12 HOURS PRN
Qty: 30 TABLET | Refills: 1 | Status: SHIPPED | OUTPATIENT
Start: 2024-01-24

## 2024-01-24 NOTE — TELEPHONE ENCOUNTER
Caller: Katelyn Tinoco    Relationship: Emergency Contact    Best call back number: [phone]    What was the call regarding: STATES THAT     traMADol (ULTRAM) 50 MG tablet   W[facility] S[id number] [place] [facility] [address] - [phone] [facility] [phone] FX

## 2024-01-31 LAB — DRUGS UR: NORMAL

## 2024-02-23 ENCOUNTER — OFFICE VISIT (OUTPATIENT)
Dept: NEUROLOGY | Facility: CLINIC | Age: 79
End: 2024-02-23
Payer: MEDICARE

## 2024-02-23 VITALS
WEIGHT: 205 LBS | HEIGHT: 71 IN | BODY MASS INDEX: 28.7 KG/M2 | HEART RATE: 80 BPM | RESPIRATION RATE: 18 BRPM | DIASTOLIC BLOOD PRESSURE: 80 MMHG | SYSTOLIC BLOOD PRESSURE: 140 MMHG

## 2024-02-23 DIAGNOSIS — G20.A2 PARKINSON'S DISEASE WITH FLUCTUATING MANIFESTATIONS, UNSPECIFIED WHETHER DYSKINESIA PRESENT: Primary | ICD-10-CM

## 2024-02-23 PROCEDURE — 3077F SYST BP >= 140 MM HG: CPT | Performed by: PSYCHIATRY & NEUROLOGY

## 2024-02-23 PROCEDURE — 3079F DIAST BP 80-89 MM HG: CPT | Performed by: PSYCHIATRY & NEUROLOGY

## 2024-02-23 PROCEDURE — 1159F MED LIST DOCD IN RCRD: CPT | Performed by: PSYCHIATRY & NEUROLOGY

## 2024-02-23 PROCEDURE — 1160F RVW MEDS BY RX/DR IN RCRD: CPT | Performed by: PSYCHIATRY & NEUROLOGY

## 2024-02-23 PROCEDURE — 99214 OFFICE O/P EST MOD 30 MIN: CPT | Performed by: PSYCHIATRY & NEUROLOGY

## 2024-02-23 RX ORDER — LOVASTATIN 40 MG/1
TABLET ORAL
Qty: 90 TABLET | Refills: 3 | Status: SHIPPED | OUTPATIENT
Start: 2024-02-23

## 2024-02-23 RX ORDER — AMANTADINE HYDROCHLORIDE 100 MG/1
100 CAPSULE, GELATIN COATED ORAL 2 TIMES DAILY
Qty: 60 CAPSULE | Refills: 2 | Status: SHIPPED | OUTPATIENT
Start: 2024-02-23 | End: 2024-05-23

## 2024-02-23 RX ORDER — LOSARTAN POTASSIUM AND HYDROCHLOROTHIAZIDE 25; 100 MG/1; MG/1
TABLET ORAL
Qty: 90 TABLET | Refills: 1 | Status: SHIPPED | OUTPATIENT
Start: 2024-02-23

## 2024-02-23 NOTE — PROGRESS NOTES
"Chief Complaint    Subjective        Gary Tinoco presents to [facility]    History of Present Illness  79-year-old male here for follow-up.  Previously could not tolerate Sinemet.  I referred him to PT and he did not go.  He thinks he is doing well.      Past Medical History:   Diagnosis Date    Cataract     had surgery in Feb 2023 both eyes    Colon polyp     Diabetes mellitus     Difficulty walking     Diverticulosis     History of adenomatous polyp of colon     Hyperlipidemia     Hypertension     Parkinson's disease     Shingles           Current Outpatient Medications:     Denta 5000 Plus 1.1 % cream, APPLY A THIN RIBBON TO BRUSH BRUSH FOR 2 MINUTES PREFERABLY AT BEDTIME, Disp: , Rfl:     glyburide (DIAbeta) 5 MG tablet, Take 1 tablet by mouth 2 (Two) Times a Day With Meals., Disp: , Rfl:     losartan-hydrochlorothiazide (HYZAAR) 100-25 MG per tablet, TAKE 1 TABLET BY MOUTH EVERY DAY, Disp: 90 tablet, Rfl: 1    lovastatin (MEVACOR) 40 MG tablet, TAKE 1 TABLET BY MOUTH EVERY DAY, Disp: 90 tablet, Rfl: 3    metFORMIN (GLUCOPHAGE) 850 MG tablet, TAKE 1 TABLET BY MOUTH EVERY DAY, Disp: 90 tablet, Rfl: 3    naloxone (NARCAN) 4 MG/0.1ML nasal spray, , Disp: , Rfl:     traMADol (ULTRAM) 50 MG tablet, Take 1 tablet by mouth Every 12 (Twelve) Hours As Needed for Moderate Pain., Disp: 30 tablet, Rfl: 1       Objective   Vital Signs:   /80 (BP Location: Left arm, Patient Position: Sitting)   Pulse 80   Resp 18   Ht 180.3 cm (71\")   Wt 93 kg (205 lb)   BMI 28.59 kg/m²     Physical Exam   Neurological Exam     Motor  Normal muscle bulk throughout. Strength is 5/5 throughout all four extremities.  Hypomimia  Bradykinesia and rigidity on left more than right  No tremor  No glabellar reflex      Gait   Abnormal pull test.  Decreased arm swing left>right    Result Review :                     Assessment and Plan   79-year-old male with Parkinson's disease.  He has had improvement on " Sinemet.  However he has been unable to tolerate this medication and has now stopped taking it.  At last visit, he did not wish to trial another medication.  I wanted him to do PT for gait and balance and he did not do this.  He does look worse today and so I did discuss with him trialing other medications.  We will trial amantadine and I will also refer him for big and loud therapy.     Plan:     1.  Start amantadine 100 mg twice daily.  2.  Referral to PT for big and loud therapy.  3.  Follow-up 3 months.      Follow Up   No follow-ups on file.  Patient was given instructions and counseling regarding his condition or for health maintenance advice. Please see specific information pulled into the AVS if appropriate.

## 2024-04-05 ENCOUNTER — TELEPHONE (OUTPATIENT)
Dept: INTERNAL MEDICINE | Facility: CLINIC | Age: 79
End: 2024-04-05

## 2024-04-05 DIAGNOSIS — E11.9 TYPE 2 DIABETES MELLITUS WITHOUT COMPLICATION, WITHOUT LONG-TERM CURRENT USE OF INSULIN: Primary | ICD-10-CM

## 2024-04-05 RX ORDER — GLIPIZIDE 5 MG/1
5 TABLET ORAL
Qty: 180 TABLET | Refills: 2 | Status: SHIPPED | OUTPATIENT
Start: 2024-04-05

## 2024-04-24 ENCOUNTER — LAB (OUTPATIENT)
Dept: INTERNAL MEDICINE | Facility: CLINIC | Age: 79
End: 2024-04-24
Payer: MEDICARE

## 2024-04-24 DIAGNOSIS — I10 HYPERTENSION, BENIGN: ICD-10-CM

## 2024-04-24 DIAGNOSIS — E11.9 TYPE 2 DIABETES MELLITUS WITHOUT COMPLICATION, WITHOUT LONG-TERM CURRENT USE OF INSULIN: Primary | ICD-10-CM

## 2024-04-24 DIAGNOSIS — E78.2 MIXED HYPERLIPIDEMIA: ICD-10-CM

## 2024-04-25 ENCOUNTER — OFFICE VISIT (OUTPATIENT)
Dept: INTERNAL MEDICINE | Facility: CLINIC | Age: 79
End: 2024-04-25
Payer: MEDICARE

## 2024-04-25 VITALS
DIASTOLIC BLOOD PRESSURE: 60 MMHG | SYSTOLIC BLOOD PRESSURE: 118 MMHG | WEIGHT: 198.4 LBS | OXYGEN SATURATION: 97 % | TEMPERATURE: 97.6 F | HEART RATE: 85 BPM | BODY MASS INDEX: 27.77 KG/M2 | HEIGHT: 71 IN

## 2024-04-25 DIAGNOSIS — E78.2 MIXED HYPERLIPIDEMIA: ICD-10-CM

## 2024-04-25 DIAGNOSIS — E11.9 TYPE 2 DIABETES MELLITUS WITHOUT COMPLICATION, WITHOUT LONG-TERM CURRENT USE OF INSULIN: ICD-10-CM

## 2024-04-25 DIAGNOSIS — I10 HYPERTENSION, BENIGN: ICD-10-CM

## 2024-04-25 DIAGNOSIS — H61.23 BILATERAL IMPACTED CERUMEN: ICD-10-CM

## 2024-04-25 DIAGNOSIS — M51.36 DDD (DEGENERATIVE DISC DISEASE), LUMBAR: ICD-10-CM

## 2024-04-25 DIAGNOSIS — Z00.00 ENCOUNTER FOR SUBSEQUENT ANNUAL WELLNESS VISIT (AWV) IN MEDICARE PATIENT: Primary | ICD-10-CM

## 2024-04-25 LAB
ALBUMIN SERPL-MCNC: 4.3 G/DL (ref 3.5–5.2)
ALBUMIN/CREAT UR: 32 MG/G CREAT (ref 0–29)
ALBUMIN/GLOB SERPL: 1.6 G/DL
ALP SERPL-CCNC: 68 U/L (ref 39–117)
ALT SERPL-CCNC: 14 U/L (ref 1–41)
APPEARANCE UR: CLEAR
AST SERPL-CCNC: 18 U/L (ref 1–40)
BACTERIA #/AREA URNS HPF: NORMAL /HPF
BASOPHILS # BLD AUTO: 0.04 10*3/MM3 (ref 0–0.2)
BASOPHILS NFR BLD AUTO: 0.7 % (ref 0–1.5)
BILIRUB SERPL-MCNC: 0.5 MG/DL (ref 0–1.2)
BILIRUB UR QL STRIP: NEGATIVE
BUN SERPL-MCNC: 22 MG/DL (ref 8–23)
BUN/CREAT SERPL: 21.4 (ref 7–25)
CALCIUM SERPL-MCNC: 9.6 MG/DL (ref 8.6–10.5)
CASTS URNS MICRO: NORMAL
CHLORIDE SERPL-SCNC: 100 MMOL/L (ref 98–107)
CHOLEST SERPL-MCNC: 161 MG/DL (ref 0–200)
CO2 SERPL-SCNC: 28.6 MMOL/L (ref 22–29)
COLOR UR: YELLOW
CREAT SERPL-MCNC: 1.03 MG/DL (ref 0.76–1.27)
CREAT UR-MCNC: 132.9 MG/DL
EGFRCR SERPLBLD CKD-EPI 2021: 73.9 ML/MIN/1.73
EOSINOPHIL # BLD AUTO: 0.06 10*3/MM3 (ref 0–0.4)
EOSINOPHIL NFR BLD AUTO: 1.1 % (ref 0.3–6.2)
EPI CELLS #/AREA URNS HPF: NORMAL /HPF
ERYTHROCYTE [DISTWIDTH] IN BLOOD BY AUTOMATED COUNT: 12.9 % (ref 12.3–15.4)
GLOBULIN SER CALC-MCNC: 2.7 GM/DL
GLUCOSE SERPL-MCNC: 96 MG/DL (ref 65–99)
GLUCOSE UR QL STRIP: NEGATIVE
HBA1C MFR BLD: 6.5 % (ref 4.8–5.6)
HCT VFR BLD AUTO: 43 % (ref 37.5–51)
HDLC SERPL-MCNC: 33 MG/DL (ref 40–60)
HGB BLD-MCNC: 13.8 G/DL (ref 13–17.7)
HGB UR QL STRIP: NEGATIVE
IMM GRANULOCYTES # BLD AUTO: 0.03 10*3/MM3 (ref 0–0.05)
IMM GRANULOCYTES NFR BLD AUTO: 0.5 % (ref 0–0.5)
KETONES UR QL STRIP: ABNORMAL
LDLC SERPL CALC-MCNC: 93 MG/DL (ref 0–100)
LEUKOCYTE ESTERASE UR QL STRIP: ABNORMAL
LYMPHOCYTES # BLD AUTO: 1.87 10*3/MM3 (ref 0.7–3.1)
LYMPHOCYTES NFR BLD AUTO: 33.8 % (ref 19.6–45.3)
MCH RBC QN AUTO: 27.8 PG (ref 26.6–33)
MCHC RBC AUTO-ENTMCNC: 32.1 G/DL (ref 31.5–35.7)
MCV RBC AUTO: 86.7 FL (ref 79–97)
MICROALBUMIN UR-MCNC: 42.7 UG/ML
MONOCYTES # BLD AUTO: 0.53 10*3/MM3 (ref 0.1–0.9)
MONOCYTES NFR BLD AUTO: 9.6 % (ref 5–12)
NEUTROPHILS # BLD AUTO: 3.01 10*3/MM3 (ref 1.7–7)
NEUTROPHILS NFR BLD AUTO: 54.3 % (ref 42.7–76)
NITRITE UR QL STRIP: NEGATIVE
NRBC BLD AUTO-RTO: 0 /100 WBC (ref 0–0.2)
PH UR STRIP: 6.5 [PH] (ref 5–8)
PLATELET # BLD AUTO: 187 10*3/MM3 (ref 140–450)
POTASSIUM SERPL-SCNC: 4.5 MMOL/L (ref 3.5–5.2)
PROT SERPL-MCNC: 7 G/DL (ref 6–8.5)
PROT UR QL STRIP: ABNORMAL
RBC # BLD AUTO: 4.96 10*6/MM3 (ref 4.14–5.8)
RBC #/AREA URNS HPF: NORMAL /HPF
SODIUM SERPL-SCNC: 137 MMOL/L (ref 136–145)
SP GR UR STRIP: 1.02 (ref 1–1.03)
TRIGL SERPL-MCNC: 205 MG/DL (ref 0–150)
TSH SERPL DL<=0.005 MIU/L-ACNC: 1.09 UIU/ML (ref 0.27–4.2)
UROBILINOGEN UR STRIP-MCNC: ABNORMAL MG/DL
VLDLC SERPL CALC-MCNC: 35 MG/DL (ref 5–40)
WBC # BLD AUTO: 5.54 10*3/MM3 (ref 3.4–10.8)
WBC #/AREA URNS HPF: NORMAL /HPF

## 2024-04-25 RX ORDER — GLYBURIDE 5 MG/1
5 TABLET ORAL
COMMUNITY
Start: 2024-04-21

## 2024-04-25 RX ORDER — TRAMADOL HYDROCHLORIDE 50 MG/1
50 TABLET ORAL EVERY 12 HOURS PRN
Qty: 30 TABLET | Refills: 1 | Status: SHIPPED | OUTPATIENT
Start: 2024-04-25

## 2024-04-25 NOTE — PROGRESS NOTES
The ABCs of the Annual Wellness Visit  Subsequent Medicare Wellness Visit    Subjective      Gary Tinoco is a 79 y.o. male who presents for a Subsequent Medicare Wellness Visit.    The following portions of the patient's history were reviewed and   updated as appropriate: allergies, current medications, past family history, past medical history, past social history, past surgical history, and problem list.    Compared to one year ago, the patient feels his physical   health is worse.    Compared to one year ago, the patient feels his mental   health is the same.    Recent Hospitalizations:  He was not admitted to the hospital during the last year.       Current Medical Providers:  Patient Care Team:  Rosy Bobo APRN as PCP - General (Nurse Practitioner)  Braxton Messer MD as Consulting Physician (Urology)  Melanie Snyder MD as Consulting Physician (Neurology)    Outpatient Medications Prior to Visit   Medication Sig Dispense Refill    Denta 5000 Plus 1.1 % cream APPLY A THIN RIBBON TO BRUSH BRUSH FOR 2 MINUTES PREFERABLY AT BEDTIME      glyburide (DIAbeta) 5 MG tablet Take 1 tablet by mouth Daily With Breakfast.      losartan-hydrochlorothiazide (HYZAAR) 100-25 MG per tablet TAKE 1 TABLET BY MOUTH EVERY DAY 90 tablet 1    lovastatin (MEVACOR) 40 MG tablet TAKE 1 TABLET BY MOUTH EVERY DAY 90 tablet 3    metFORMIN (GLUCOPHAGE) 850 MG tablet TAKE 1 TABLET BY MOUTH EVERY DAY 90 tablet 3    traMADol (ULTRAM) 50 MG tablet Take 1 tablet by mouth Every 12 (Twelve) Hours As Needed for Moderate Pain. 30 tablet 1    amantadine (SYMMETREL) 100 MG capsule Take 1 capsule by mouth 2 (Two) Times a Day for 90 days. (Patient not taking: Reported on 4/25/2024) 60 capsule 2    glipizide (Glucotrol) 5 MG tablet Take 1 tablet by mouth 2 (Two) Times a Day Before Meals. (Patient not taking: Reported on 4/25/2024) 180 tablet 2    naloxone (NARCAN) 4 MG/0.1ML nasal spray  (Patient not taking: Reported on 4/25/2024)    "    No facility-administered medications prior to visit.       Opioid medication/s are on active medication list.  and I have evaluated his active treatment plan and pain score trends (see table).  Vitals:    04/25/24 0953   PainSc: 0-No pain     I have reviewed the chart for potential of high risk medication and harmful drug interactions in the elderly.          Aspirin is not on active medication list.  Aspirin use is not indicated based on review of current medical condition/s. Risk of harm outweighs potential benefits.  .    Patient Active Problem List   Diagnosis    Abnormal LFTs    Anemia of chronic disease    Hypertension, benign    Right thyroid nodule    Substernal thyroid    Screening PSA (prostate specific antigen)    Type 2 diabetes mellitus without complication, without long-term current use of insulin    History of adenomatous polyp of colon    Family hx colonic polyps    Mixed hyperlipidemia    Parkinson's disease with fluctuating manifestations     Advance Care Planning   Advance Care Planning     Advance Directive is not on file.  ACP discussion was held with the patient during this visit. Patient does not have an advance directive, declines further assistance.     Objective    Vitals:    04/25/24 0953   BP: 118/60   BP Location: Left arm   Patient Position: Sitting   Cuff Size: Adult   Pulse: 85   Temp: 97.6 °F (36.4 °C)   TempSrc: Temporal   SpO2: 97%   Weight: 90 kg (198 lb 6.4 oz)   Height: 180.3 cm (70.98\")   PainSc: 0-No pain     Estimated body mass index is 27.68 kg/m² as calculated from the following:    Height as of this encounter: 180.3 cm (70.98\").    Weight as of this encounter: 90 kg (198 lb 6.4 oz).           Does the patient have evidence of cognitive impairment?   No    Lab Results   Component Value Date    CHLPL 161 04/24/2024    TRIG 205 (H) 04/24/2024    HDL 33 (L) 04/24/2024    LDL 93 04/24/2024    VLDL 35 04/24/2024    HGBA1C 6.50 (H) 04/24/2024          HEALTH RISK " ASSESSMENT    Smoking Status:  Social History     Tobacco Use   Smoking Status Never    Passive exposure: Never   Smokeless Tobacco Never     Alcohol Consumption:  Social History     Substance and Sexual Activity   Alcohol Use No     Fall Risk Screen:    STEADI Fall Risk Assessment was completed, and patient is at LOW risk for falls.Assessment completed on:2024    Depression Screenin/25/2024    10:00 AM   PHQ-2/PHQ-9 Depression Screening   Little Interest or Pleasure in Doing Things 0-->not at all   Feeling Down, Depressed or Hopeless 0-->not at all   PHQ-9: Brief Depression Severity Measure Score 0       Health Habits and Functional and Cognitive Screenin/25/2024     9:59 AM   Functional & Cognitive Status   Do you have difficulty preparing food and eating? No   Do you have difficulty bathing yourself, getting dressed or grooming yourself? No   Do you have difficulty using the toilet? No   Do you have difficulty moving around from place to place? No   Do you have trouble with steps or getting out of a bed or a chair? No   Current Diet Well Balanced Diet   Dental Exam Up to date   Eye Exam Up to date   Exercise (times per week) 0 times per week   Current Exercises Include No Regular Exercise   Do you need help using the phone?  No   Are you deaf or do you have serious difficulty hearing?  No   Do you need help to go to places out of walking distance? No   Do you need help shopping? No   Do you need help preparing meals?  No   Do you need help with housework?  No   Do you need help with laundry? No   Do you need help taking your medications? No   Do you need help managing money? No   Do you ever drive or ride in a car without wearing a seat belt? No   Have you felt unusual stress, anger or loneliness in the last month? No   Who do you live with? Spouse   If you need help, do you have trouble finding someone available to you? No   Have you been bothered in the last four weeks by sexual problems?  No   Do you have difficulty concentrating, remembering or making decisions? Yes       Age-appropriate Screening Schedule:  Refer to the list below for future screening recommendations based on patient's age, sex and/or medical conditions. Orders for these recommended tests are listed in the plan section. The patient has been provided with a written plan.    Health Maintenance   Topic Date Due    DIABETIC EYE EXAM  02/22/2024    ZOSTER VACCINE (1 of 2) 04/25/2024 (Originally [birth year]/14/1995)    COVID-19 Vaccine (3 - 2023-24 season) 04/25/2025 (Originally 9/1/2023)    RSV Vaccine - Adults (1 - 1-dose 60+ series) 04/25/2025 (Originally 2/14/2005)    TDAP/TD VACCINES (2 - Tdap) 04/25/2025 (Originally 5/5/2014)    BMI FOLLOWUP  07/10/2024    INFLUENZA VACCINE  08/01/2024    HEMOGLOBIN A1C  10/24/2024    LIPID PANEL  04/24/2025    URINE MICROALBUMIN  04/24/2025    ANNUAL WELLNESS VISIT  04/25/2025    COLORECTAL CANCER SCREENING  04/01/2026    HEPATITIS C SCREENING  Completed    Pneumococcal Vaccine 65+  Completed                  CMS Preventative Services Quick Reference  Risk Factors Identified During Encounter:    Immunizations Discussed/Encouraged: Tdap, Shingrix, COVID19, and RSV (Respiratory Syncytial Virus)  Inactivity/Sedentary: Patient was advised to exercise at least 150 minutes a week per CDC recommendations.    The above risks/problems have been discussed with the patient.  Pertinent information has been shared with the patient in the After Visit Summary.    Diagnoses and all orders for this visit:    1. Encounter for subsequent annual wellness visit (AWV) in Medicare patient (Primary)    2. DDD (degenerative disc disease), lumbar  -     traMADol (ULTRAM) 50 MG tablet; Take 1 tablet by mouth Every 12 (Twelve) Hours As Needed for Moderate Pain.  Dispense: 30 tablet; Refill: 1    3. Hypertension, benign    4. Mixed hyperlipidemia    5. Type 2 diabetes mellitus without complication, without long-term current use of  insulin    6. Bilateral impacted cerumen  -     Ear Cerumen Removal      Patient presents to the office today for subsequent Medicare wellness exam.  We did review his labs together in the office today.  Normal CMP, TSH, CBC.  Blood pressure is well-controlled at 118/60.  Patient does not typically monitor this at home.  Continue present regimen with losartan-HCTZ.  Will need to continue to monitor renal function and electrolytes closely.    Hemoglobin A1c has shown improvement from his last office visit at 6.5.  This was previously 6.8.  He did call our office and let us know that glyburide would no longer be covered by insurance so we did switch him to glipizide.  He had just picked up a new prescription of the glyburide so he is finishing out this prescription prior to switching to glipizide.  He does not check his blood glucose at home but does have a monitor in order to do so.  He does not feel as though he has had any episodes where his blood glucose is low.  We discussed that when he switches to glipizide he will need to monitor his blood sugar on a more consistent basis.  He expresses understanding.  He does tell me he switched to an artificial sweetener in the last several months.    Cholesterol is fairly stable at this time.  Triglycerides have slightly worsened since assessed last year from 165-205.  Continue lovastatin 40 mg daily.  Patient is quite sedentary due to Parkinson's, but we did discuss if he were to increase his activity slightly this may help improve cholesterol as well.    Patient does follow with neurology for Parkinson's.  He was last seen in February.  Dr. Snyder did note worsening of symptoms off of Sinemet (patient was unable to tolerate medication).  Patient has since been started on amantadine.  Neurology had mentioned referring the patient for big and loud therapy.  I do not see that a referral was made, but I am unsure if anywhere in the area offers this any longer.  We will need to  try to figure this out as the patient would greatly benefit from this.    Patient indicates he has been to the dentist and the eye doctor within the last year, and tries to keep up-to-date with at least yearly screenings.  He declines any vaccines at this time.    Patient's last colonoscopy was in 2021 with presence of polyps noted.  It was recommended for her to repeat this in 5 years.  At that point, he will be over 80 years old and likely risk would outweigh benefit of this.  Patient was never a smoker, does not qualify for lung cancer screening.  No longer qualifies for prostate cancer screening secondary to age.  We did check a PSA last year which was normal at 2.6.    The patient requests refill for tramadol, which he uses very sparingly for back pain and sciatic pain.  UDS and CSA are on file.  PDMP reviewed and is appropriate.    Follow Up:   Next Medicare Wellness visit to be scheduled in 1 year.      An After Visit Summary and PPPS were made available to the patient.      Ear Cerumen Removal    Date/Time: 4/25/2024 4:59 PM    Performed by: Rosy Bobo APRN  Authorized by: Rosy Bobo APRN    Anesthesia:  Local Anesthetic: none  Location details: left ear and right ear  Patient tolerance: patient tolerated the procedure well with no immediate complications  Procedure type: irrigation   Sedation:  Patient sedated: no

## 2024-04-26 LAB
PSA SERPL-MCNC: 3.76 NG/ML (ref 0–4)
WRITTEN AUTHORIZATION: NORMAL

## 2024-05-20 RX ORDER — GLYBURIDE 5 MG/1
5 TABLET ORAL
Qty: 90 TABLET | Refills: 2 | OUTPATIENT
Start: 2024-05-20

## 2024-05-23 ENCOUNTER — OFFICE VISIT (OUTPATIENT)
Dept: NEUROLOGY | Facility: CLINIC | Age: 79
End: 2024-05-23
Payer: MEDICARE

## 2024-05-23 VITALS
WEIGHT: 194 LBS | SYSTOLIC BLOOD PRESSURE: 136 MMHG | BODY MASS INDEX: 27.16 KG/M2 | HEIGHT: 71 IN | DIASTOLIC BLOOD PRESSURE: 82 MMHG | HEART RATE: 71 BPM | OXYGEN SATURATION: 97 %

## 2024-05-23 DIAGNOSIS — G20.A2 PARKINSON'S DISEASE WITH FLUCTUATING MANIFESTATIONS, UNSPECIFIED WHETHER DYSKINESIA PRESENT: Primary | ICD-10-CM

## 2024-05-23 DIAGNOSIS — G20.A2 PARKINSON'S DISEASE WITH FLUCTUATING MANIFESTATIONS, UNSPECIFIED WHETHER DYSKINESIA PRESENT: ICD-10-CM

## 2024-05-23 PROCEDURE — 99214 OFFICE O/P EST MOD 30 MIN: CPT | Performed by: PSYCHIATRY & NEUROLOGY

## 2024-05-23 PROCEDURE — 1160F RVW MEDS BY RX/DR IN RCRD: CPT | Performed by: PSYCHIATRY & NEUROLOGY

## 2024-05-23 PROCEDURE — 3075F SYST BP GE 130 - 139MM HG: CPT | Performed by: PSYCHIATRY & NEUROLOGY

## 2024-05-23 PROCEDURE — 1159F MED LIST DOCD IN RCRD: CPT | Performed by: PSYCHIATRY & NEUROLOGY

## 2024-05-23 PROCEDURE — 3079F DIAST BP 80-89 MM HG: CPT | Performed by: PSYCHIATRY & NEUROLOGY

## 2024-05-23 RX ORDER — AMANTADINE HYDROCHLORIDE 100 MG/1
100 CAPSULE, GELATIN COATED ORAL 2 TIMES DAILY
Qty: 60 CAPSULE | Refills: 5 | Status: SHIPPED | OUTPATIENT
Start: 2024-05-23 | End: 2024-11-19

## 2024-05-23 RX ORDER — AMANTADINE HYDROCHLORIDE 100 MG/1
100 CAPSULE, GELATIN COATED ORAL 2 TIMES DAILY
Qty: 180 CAPSULE | OUTPATIENT
Start: 2024-05-23

## 2024-05-23 NOTE — PROGRESS NOTES
Chief Complaint    Subjective        Gary Tinoco presents to [facility]    History of Present Illness  79-year-old male here for follow-up.  He does not think he has noticed any benefit with amantadine.  However he tells me that his wife thought he could use his left hand better when he for started taking it.      Past Medical History:   Diagnosis Date    Cataract     had surgery in Feb 2023 both eyes    Colon polyp     Diabetes mellitus     Difficulty walking     Diverticulosis     History of adenomatous polyp of colon     Hyperlipidemia     Hypertension     Parkinson's disease     Shingles           Current Outpatient Medications:     amantadine (SYMMETREL) 100 MG capsule, Take 1 capsule by mouth 2 (Two) Times a Day for 90 days. (Patient not taking: Reported on 4/25/2024), Disp: 60 capsule, Rfl: 2    Denta 5000 Plus 1.1 % cream, APPLY A THIN RIBBON TO BRUSH BRUSH FOR 2 MINUTES PREFERABLY AT BEDTIME, Disp: , Rfl:     glipizide (Glucotrol) 5 MG tablet, Take 1 tablet by mouth 2 (Two) Times a Day Before Meals. (Patient not taking: Reported on 4/25/2024), Disp: 180 tablet, Rfl: 2    glyburide (DIAbeta) 5 MG tablet, Take 1 tablet by mouth Daily With Breakfast., Disp: , Rfl:     losartan-hydrochlorothiazide (HYZAAR) 100-25 MG per tablet, TAKE 1 TABLET BY MOUTH EVERY DAY, Disp: 90 tablet, Rfl: 1    lovastatin (MEVACOR) 40 MG tablet, TAKE 1 TABLET BY MOUTH EVERY DAY, Disp: 90 tablet, Rfl: 3    metFORMIN (GLUCOPHAGE) 850 MG tablet, TAKE 1 TABLET BY MOUTH EVERY DAY, Disp: 90 tablet, Rfl: 3    naloxone (NARCAN) 4 MG/0.1ML nasal spray, , Disp: , Rfl:     traMADol (ULTRAM) 50 MG tablet, Take 1 tablet by mouth Every 12 (Twelve) Hours As Needed for Moderate Pain., Disp: 30 tablet, Rfl: 1       Objective   Vital Signs:   There were no vitals taken for this visit.    Physical Exam   Neurological Exam     Motor  Normal muscle bulk throughout. Strength is 5/5 throughout all four  extremities.  Hypomimia  Bradykinesia and rigidity on left more than right  No tremor  No glabellar reflex      Gait   Abnormal pull test.  Decreased arm swing left>right    Result Review :                     Assessment and Plan   79-year-old male with Parkinson's disease.  He has had improvement on Sinemet.  He initially had benefit on Sinemet, but then had side effects and was unable to tolerate it.  He then trialed amantadine which he has not noticed benefit.  We discussed several options including trialing a dopamine agonist, DBS, versus sending to a Parkinson's specialist.  At this time he states he would rather retry the Sinemet with the amantadine.     Plan:     1.  Continue amantadine 100 mg twice daily.  2.  Restart Sinemet 25/100 1 TID, can increase to 2 TID in a week.   3. New Referral to PT for big and loud therapy.  4.  Follow-up 3 months.      Follow Up   No follow-ups on file.  Patient was given instructions and counseling regarding his condition or for health maintenance advice. Please see specific information pulled into the AVS if appropriate.

## 2024-05-23 NOTE — PATIENT INSTRUCTIONS
Restart carbidopa/levodopa 1 tab three times a day. Can increase to 2 tabs 3 times a day in a week if you are tolerating.   Continue Amantadine 1 pill twice a day.  I'm sending new order for physical therapy.

## 2024-07-09 DIAGNOSIS — M51.36 DDD (DEGENERATIVE DISC DISEASE), LUMBAR: ICD-10-CM

## 2024-07-09 RX ORDER — TRAMADOL HYDROCHLORIDE 50 MG/1
50 TABLET ORAL EVERY 12 HOURS PRN
Qty: 30 TABLET | Refills: 1 | Status: SHIPPED | OUTPATIENT
Start: 2024-07-09

## 2024-07-09 NOTE — TELEPHONE ENCOUNTER
"    Caller: Gary Tinoco \"Clive\"    Relationship: Self    Best call back number: [phone]     Requested Prescriptions:   Requested Prescriptions     Pending Prescriptions Disp Refills    traMADol (ULTRAM) 50 MG tablet 30 tablet 1     Sig: Take 1 tablet by mouth Every 12 (Twelve) Hours As Needed for Moderate Pain.        Pharmacy where request should be sent: [facility]/PHARMACY #[id number] - [facility] [place] - [address] [phone] [facility] [phone]      Last office visit with prescribing clinician: 4/25/2024   Last telemedicine visit with prescribing clinician: Visit date not found   Next office visit with prescribing clinician: 10/28/2024     Additional details provided by patient: PATIENT IS COMPLETELY OUT    Does the patient have less than a 3 day supply:  [x] Yes  [] No    Would you like a call back once the refill request has been completed: [x] Yes [] No    If the office needs to give you a call back, can they leave a voicemail: [x] Yes [] No    Rochelle Sam Rep   07/09/24 09:59 CDT           "

## 2024-07-31 ENCOUNTER — TELEPHONE (OUTPATIENT)
Dept: NEUROLOGY | Facility: CLINIC | Age: 79
End: 2024-07-31
Payer: MEDICARE

## 2024-07-31 DIAGNOSIS — G20.A2 PARKINSON'S DISEASE WITH FLUCTUATING MANIFESTATIONS, UNSPECIFIED WHETHER DYSKINESIA PRESENT: Primary | ICD-10-CM

## 2024-07-31 NOTE — TELEPHONE ENCOUNTER
"Caller: Gary Tinoco \"Clive\"    Relationship: Self    Best call back number:   Telephone Information:   Mobile [phone]       What orders are you requesting (i.e. lab or imaging): PHYSICAL THERAPY     In what timeframe would the patient need to come in: SOON    Where will you receive your lab/imaging services: [facility]    Additional notes: PT WAS NOT BE ABLE TO BE CONTACTED TO SCHEDULE THE APPOINTMENT, REFERRAL WAS CLOSED OUT. PER MEDICARE THE PT NEEDS AN NEW ORDER AS THEY  IN A 30 DAY TIME FRAME   "

## 2024-08-16 RX ORDER — LOSARTAN POTASSIUM AND HYDROCHLOROTHIAZIDE 25; 100 MG/1; MG/1
TABLET ORAL
Qty: 90 TABLET | Refills: 1 | Status: SHIPPED | OUTPATIENT
Start: 2024-08-16

## 2024-08-19 NOTE — PROGRESS NOTES
"Chief Complaint    Subjective        Gary Tinoco presents to [facility]    History of Present Illness  79-year-old male here for follow-up.  His wife presents with him today as well.  Restarted Sinemet at last visit.  He reports that he is only been able to take 1 pill twice a day, because it upsets his stomach.  Causes nausea.  This is better when he takes it with meals.  Wife does not think she has noticed any improvement with this.  She did initially notice improvement with amantadine.  She also reports that his personality has changed over time.  He becomes more frustrated and aggressive.  Reports that he was not like this in the past.  She also reports that he will report hearing her calling for him when she was not.  He reports that he will see \"effigies\" out of the corner of his eye.  He thought this was due to his eyeglasses.  He and his wife both report that his main symptom is slowness.             Current Outpatient Medications:     amantadine (SYMMETREL) 100 MG capsule, Take 1 capsule by mouth 2 (Two) Times a Day for 180 days., Disp: 60 capsule, Rfl: 5    carbidopa-levodopa (SINEMET)  MG per tablet, Take 1 tablet by mouth 3 (Three) Times a Day., Disp: , Rfl:     Denta 5000 Plus 1.1 % cream, APPLY A THIN RIBBON TO BRUSH BRUSH FOR 2 MINUTES PREFERABLY AT BEDTIME, Disp: , Rfl:     glyburide (DIAbeta) 5 MG tablet, Take 1 tablet by mouth Daily With Breakfast., Disp: , Rfl:     losartan-hydrochlorothiazide (HYZAAR) 100-25 MG per tablet, TAKE 1 TABLET BY MOUTH EVERY DAY, Disp: 90 tablet, Rfl: 1    lovastatin (MEVACOR) 40 MG tablet, TAKE 1 TABLET BY MOUTH EVERY DAY, Disp: 90 tablet, Rfl: 3    metFORMIN (GLUCOPHAGE) 850 MG tablet, TAKE 1 TABLET BY MOUTH EVERY DAY, Disp: 90 tablet, Rfl: 3    naloxone (NARCAN) 4 MG/0.1ML nasal spray, , Disp: , Rfl:     traMADol (ULTRAM) 50 MG tablet, Take 1 tablet by mouth Every 12 (Twelve) Hours As Needed for Moderate Pain., Disp: 30 tablet, " "Rfl: 1       Objective   Vital Signs:   /78   Pulse 85   Ht 180.3 cm (71\")   Wt 87.1 kg (192 lb)   SpO2 98%   BMI 26.78 kg/m²     Physical Exam   Neurological Exam     Motor  Normal muscle bulk throughout. Strength is 5/5 throughout all four extremities.  Hypomimia  Bradykinesia and rigidity on left more than right  No tremor  No glabellar reflex      Gait   Abnormal pull test.  Decreased arm swing left>right    Result Review :                     Assessment and Plan   79-year-old male with presumed Parkinson's disease.  He potentially had some improvement initially on Sinemet.  However he was unable to tolerate higher doses due to nausea.  Wife noted amantadine worked initially but no longer.  Due to her report of him having personality changes and potentially hallucinations, I am more concerned about something like Lewy body dementia.  However he has never been able to tolerate high enough dose of Sinemet to know whether it has helped him symptomatically.  We discussed several options regarding this.  He would like to try the increasing on his own without me giving him any supplemental medicine to help the nausea.  Will also refer him to movement disorder specialist.     Plan:     1.  Continue amantadine 100 mg twice daily.  2.  Increase Sinemet 25/100 to 2 tabs TID.  If he is having significant nausea, can give him supplemental medication.  3.  Is set up for PT, pending visit in the next few weeks.  4.  Referral to movement disorder specialist.    I spent 45 minutes caring for Gary on this date of service. This time includes time spent by me in the following activities:preparing for the visit, obtaining and/or reviewing a separately obtained history, counseling and educating the patient/family/caregiver, ordering medications, tests, or procedures, referring and communicating with other health care professionals , and documenting information in the medical record    Follow Up   No follow-ups on " file.  Patient was given instructions and counseling regarding his condition or for health maintenance advice. Please see specific information pulled into the AVS if appropriate.

## 2024-08-20 ENCOUNTER — OFFICE VISIT (OUTPATIENT)
Dept: NEUROLOGY | Facility: CLINIC | Age: 79
End: 2024-08-20
Payer: MEDICARE

## 2024-08-20 VITALS
HEIGHT: 71 IN | SYSTOLIC BLOOD PRESSURE: 126 MMHG | DIASTOLIC BLOOD PRESSURE: 78 MMHG | WEIGHT: 192 LBS | OXYGEN SATURATION: 98 % | HEART RATE: 85 BPM | BODY MASS INDEX: 26.88 KG/M2

## 2024-08-20 DIAGNOSIS — G20.A2 PARKINSON'S DISEASE WITH FLUCTUATING MANIFESTATIONS, UNSPECIFIED WHETHER DYSKINESIA PRESENT: ICD-10-CM

## 2024-08-21 NOTE — PROGRESS NOTES
Subjective    Mr. Tinoco is 79 y.o. male    Chief Complaint: BPH    History of Present Illness    Patient complains of lower urinary tract symptoms. He reports incomplete emptying. Patient states symptoms are of mild severity. Onset of symptoms was several years ago and was gradual in onset. His AUA Symptom Score is, 11/35.He reports a history of no complicating symptoms. He denies flank pain, gross hematuria, kidney stones and recurrent UTI.  Patient has tried Laser ablation therapy of prostate  with improvement. Last PSA was 2.32 . PSA screening discontinued due to age.      The following portions of the patient's history were reviewed and updated as appropriate: allergies, current medications, past family history, past medical history, past social history, past surgical history and problem list.    Review of Systems      Current Outpatient Medications:   •  amantadine (SYMMETREL) 100 MG capsule, Take 1 capsule by mouth 2 (Two) Times a Day for 180 days., Disp: 60 capsule, Rfl: 5  •  carbidopa-levodopa (SINEMET)  MG per tablet, Take 1 tablet by mouth 3 (Three) Times a Day., Disp: , Rfl:   •  clotrimazole-betamethasone (LOTRISONE) 1-0.05 % cream, Apply 1 Application topically to the appropriate area as directed 2 (Two) Times a Day., Disp: 15 g, Rfl: 1  •  Denta 5000 Plus 1.1 % cream, APPLY A THIN RIBBON TO BRUSH BRUSH FOR 2 MINUTES PREFERABLY AT BEDTIME, Disp: , Rfl:   •  glyburide (DIAbeta) 5 MG tablet, Take 1 tablet by mouth Daily With Breakfast., Disp: , Rfl:   •  losartan-hydrochlorothiazide (HYZAAR) 100-25 MG per tablet, TAKE 1 TABLET BY MOUTH EVERY DAY, Disp: 90 tablet, Rfl: 1  •  lovastatin (MEVACOR) 40 MG tablet, TAKE 1 TABLET BY MOUTH EVERY DAY, Disp: 90 tablet, Rfl: 3  •  metFORMIN (GLUCOPHAGE) 850 MG tablet, TAKE 1 TABLET BY MOUTH EVERY DAY, Disp: 90 tablet, Rfl: 3  •  naloxone (NARCAN) 4 MG/0.1ML nasal spray, , Disp: , Rfl:   •  traMADol (ULTRAM) 50 MG tablet, Take 1 tablet by mouth Every 12 (Twelve)  "Hours As Needed for Moderate Pain., Disp: 30 tablet, Rfl: 1    Past Medical History:   Diagnosis Date   • Cataract     had surgery in Feb 2023 both eyes   • Colon polyp    • Diabetes mellitus    • Difficulty walking    • Diverticulosis    • History of adenomatous polyp of colon    • Hyperlipidemia    • Hypertension    • Parkinson's disease    • Shingles        Past Surgical History:   Procedure Laterality Date   • COLONOSCOPY  04/19/2016    polyp, tubular adenoma, diverticulosis   • COLONOSCOPY N/A 03/19/2021    Procedure: COLONOSCOPY WITH ANESTHESIA;  Surgeon: Philip Ferrari MD;  Location:  [facility];  Service: Gastroenterology;  Laterality: N/A;  pre: hx polyps  post: poor prep  George Bond MD   • COLONOSCOPY N/A 04/01/2021    Procedure: COLONOSCOPY WITH ANESTHESIA;  Surgeon: Philip Ferrari MD;  Location:  [facility];  Service: Gastroenterology;  Laterality: N/A;  preop; hx of polyps  postop; polyps   PCP George Bond    • EYE SURGERY  feb 2023   • HERNIA REPAIR     • JOINT REPLACEMENT     • KNEE SURGERY     • VASECTOMY  1974       Social History     Socioeconomic History   • Marital status:    Tobacco Use   • Smoking status: Never     Passive exposure: Never   • Smokeless tobacco: Never   Vaping Use   • Vaping status: Never Used   Substance and Sexual Activity   • Alcohol use: Not Currently   • Drug use: Never   • Sexual activity: Not Currently     Partners: Female     Birth control/protection: Surgical       Family History   Problem Relation Age of Onset   • Parkinsonism Father    • Dementia Mother    • Colon cancer Neg Hx        Objective    Temp 97.4 °F (36.3 °C)   Ht 180.3 cm (71\")   Wt 89.8 kg (198 lb)   BMI 27.62 kg/m²     Physical Exam        Results for orders placed or performed in visit on 04/24/24   Comprehensive Metabolic Panel    Specimen: Blood   Result Value Ref Range    Glucose 96 65 - 99 mg/dL    BUN 22 8 - 23 mg/dL    Creatinine 1.03 0.76 - 1.27 mg/dL    EGFR " Result 73.9 >60.0 mL/min/1.73    BUN/Creatinine Ratio 21.4 7.0 - 25.0    Sodium 137 136 - 145 mmol/L    Potassium 4.5 3.5 - 5.2 mmol/L    Chloride 100 98 - 107 mmol/L    Total CO2 28.6 22.0 - 29.0 mmol/L    Calcium 9.6 8.6 - 10.5 mg/dL    Total Protein 7.0 6.0 - 8.5 g/dL    Albumin 4.3 3.5 - 5.2 g/dL    Globulin 2.7 gm/dL    A/G Ratio 1.6 g/dL    Total Bilirubin 0.5 0.0 - 1.2 mg/dL    Alkaline Phosphatase 68 39 - 117 U/L    AST (SGOT) 18 1 - 40 U/L    ALT (SGPT) 14 1 - 41 U/L   Lipid Panel    Specimen: Blood   Result Value Ref Range    Total Cholesterol 161 0 - 200 mg/dL    Triglycerides 205 (H) 0 - 150 mg/dL    HDL Cholesterol 33 (L) 40 - 60 mg/dL    VLDL Cholesterol Clive 35 5 - 40 mg/dL    LDL Chol Calc (NIH) 93 0 - 100 mg/dL   TSH Rfx On Abnormal To Free T4    Specimen: Blood   Result Value Ref Range    TSH 1.090 0.270 - 4.200 uIU/mL   Hemoglobin A1c    Specimen: Blood   Result Value Ref Range    Hemoglobin A1C 6.50 (H) 4.80 - 5.60 %   Microalbumin / Creatinine Urine Ratio - Urine, Clean Catch    Specimen: Urine, Clean Catch   Result Value Ref Range    Creatinine, Urine 132.9 Not Estab. mg/dL    Microalbumin, Urine 42.7 Not Estab. ug/mL    Microalbumin/Creatinine Ratio 32 (H) 0 - 29 mg/g creat   Microscopic Examination -   Result Value Ref Range    WBC, UA 0-2 /HPF    RBC, UA 0-2 /HPF    Epithelial Cells (non renal) 0-2 /HPF    Cast Type Comment     Bacteria, UA Comment None Seen /HPF   PSA Screen   Result Value Ref Range    PSA 3.760 0.000 - 4.000 ng/mL   Written Authorization   Result Value Ref Range    Written Authorization Comment    CBC & Differential    Specimen: Blood   Result Value Ref Range    WBC 5.54 3.40 - 10.80 10*3/mm3    RBC 4.96 4.14 - 5.80 10*6/mm3    Hemoglobin 13.8 13.0 - 17.7 g/dL    Hematocrit 43.0 37.5 - 51.0 %    MCV 86.7 79.0 - 97.0 fL    MCH 27.8 26.6 - 33.0 pg    MCHC 32.1 31.5 - 35.7 g/dL    RDW 12.9 12.3 - 15.4 %    Platelets 187 140 - 450 10*3/mm3    Neutrophil Rel % 54.3 42.7 - 76.0 %     Lymphocyte Rel % 33.8 19.6 - 45.3 %    Monocyte Rel % 9.6 5.0 - 12.0 %    Eosinophil Rel % 1.1 0.3 - 6.2 %    Basophil Rel % 0.7 0.0 - 1.5 %    Neutrophils Absolute 3.01 1.70 - 7.00 10*3/mm3    Lymphocytes Absolute 1.87 0.70 - 3.10 10*3/mm3    Monocytes Absolute 0.53 0.10 - 0.90 10*3/mm3    Eosinophils Absolute 0.06 0.00 - 0.40 10*3/mm3    Basophils Absolute 0.04 0.00 - 0.20 10*3/mm3    Immature Granulocyte Rel % 0.5 0.0 - 0.5 %    Immature Grans Absolute 0.03 0.00 - 0.05 10*3/mm3    nRBC 0.0 0.0 - 0.2 /100 WBC   Urinalysis With Microscopic - Urine, Clean Catch    Specimen: Urine, Clean Catch   Result Value Ref Range    Specific Gravity, UA 1.022 1.005 - 1.030    pH, UA 6.5 5.0 - 8.0    Color, UA Yellow     Appearance, UA Clear Clear    Leukocytes, UA Trace (A) Negative    Protein Trace (A) Negative    Glucose, UA Negative Negative    Ketones Trace (A) Negative    Blood, UA Negative Negative    Bilirubin, UA Negative Negative    Urobilinogen, UA Comment     Nitrite, UA Negative Negative     IPSS Questionnaire (AUA-7):  Incomplete emptying  Over the past month, how often have you had a sensation of not emptying your bladder completely after you finished urinating?: Not at all (09/04/24 1104)  Frequency  Over the past month, how often have you had to urinate again less than two hours after you finishied urinating ?: About half the time (09/04/24 1104)  Intermittency  Over the past month, how often have you found you stopped and started again several time when you urinated ?: Less than half the time (09/04/24 1104)  Urgency  Over the last month, how often have you found it difficult  have you found it difficult to postpone urination ?: Less than half the time (09/04/24 1104)  Weak Stream  Over the past month, how often have you had a weak urinary stream ?: About half the time (09/04/24 1104)  Straining  Over the past month, how often have you had to push or strain to begin urination ?: Not at all (09/04/24  1104)  Nocturia  Over the past month, how many times did you most typically get up to urinate from the time you went to bed until the time you got up in the morning ?: 1 time (09/04/24 1104)  Quality of life due to urinary symptoms  If you were to spend the rest of your life with your urinary condition the way it is now, how would feel about that?: Mixed - about equally satisfied (09/04/24 1104)    Scores  Total IPSS Score: (!) 11 (09/04/24 1104)  Total Score = Symptomatic Level: Moderately symptomatic: 8-19 (09/04/24 1104)        Assessment and Plan    Diagnoses and all orders for this visit:    1. BPH with obstruction/lower urinary tract symptoms (Primary)  -     Cancel: POC Urinalysis Dipstick, Multipro    2. Urge incontinence of urine    3. Balanitis  -     clotrimazole-betamethasone (LOTRISONE) 1-0.05 % cream; Apply 1 Application topically to the appropriate area as directed 2 (Two) Times a Day.  Dispense: 15 g; Refill: 1         Patient doing very well.  His AUA symptom score is 11/35.  He has a history of greenlight laser ablation of prostate in the past.  He is having some urgency incontinence. We discussed management of this and he does not want to pursue pharmacologic therapy.      He does have some issues with intermittent balanitis.     Continue the clotrimazole betamethasone cream.     Follow-up in 1 year.

## 2024-09-04 ENCOUNTER — OFFICE VISIT (OUTPATIENT)
Dept: UROLOGY | Facility: CLINIC | Age: 79
End: 2024-09-04
Payer: MEDICARE

## 2024-09-04 VITALS — HEIGHT: 71 IN | WEIGHT: 198 LBS | BODY MASS INDEX: 27.72 KG/M2 | TEMPERATURE: 97.4 F

## 2024-09-04 DIAGNOSIS — N40.1 BPH WITH OBSTRUCTION/LOWER URINARY TRACT SYMPTOMS: Primary | ICD-10-CM

## 2024-09-04 DIAGNOSIS — N39.41 URGE INCONTINENCE OF URINE: ICD-10-CM

## 2024-09-04 DIAGNOSIS — N48.1 BALANITIS: ICD-10-CM

## 2024-09-04 DIAGNOSIS — N13.8 BPH WITH OBSTRUCTION/LOWER URINARY TRACT SYMPTOMS: Primary | ICD-10-CM

## 2024-09-04 PROCEDURE — 1160F RVW MEDS BY RX/DR IN RCRD: CPT | Performed by: UROLOGY

## 2024-09-04 PROCEDURE — 99214 OFFICE O/P EST MOD 30 MIN: CPT | Performed by: UROLOGY

## 2024-09-04 PROCEDURE — 1159F MED LIST DOCD IN RCRD: CPT | Performed by: UROLOGY

## 2024-09-04 RX ORDER — CLOTRIMAZOLE AND BETAMETHASONE DIPROPIONATE 10; .64 MG/G; MG/G
1 CREAM TOPICAL 2 TIMES DAILY
Qty: 15 G | Refills: 1 | Status: SHIPPED | OUTPATIENT
Start: 2024-09-04

## 2024-09-27 ENCOUNTER — HOSPITAL ENCOUNTER (OUTPATIENT)
Dept: PHYSICAL THERAPY | Facility: HOSPITAL | Age: 79
Setting detail: THERAPIES SERIES
Discharge: HOME | End: 2024-09-27
Payer: MEDICARE

## 2024-09-27 ENCOUNTER — TELEPHONE (OUTPATIENT)
Dept: NEUROLOGY | Facility: CLINIC | Age: 79
End: 2024-09-27
Payer: MEDICARE

## 2024-09-27 DIAGNOSIS — G20.A2 PARKINSON'S DISEASE WITH FLUCTUATING MANIFESTATIONS, UNSPECIFIED WHETHER DYSKINESIA PRESENT: Primary | ICD-10-CM

## 2024-09-27 PROCEDURE — 97162 PT EVAL MOD COMPLEX 30 MIN: CPT

## 2024-09-27 NOTE — TELEPHONE ENCOUNTER
Caller: Ceci Tinocolis    Relationship: Emergency Contact    Best call back number: [phone]    What is the medical concern/diagnosis: SCIATIC NERVE    What specialty or service is being requested: THERAPY FOR SCIATIC NERVE PAIN AND OCCUPATIONAL THERAPY    What is the provider, practice or medical service name:  [facility]     What is the office location: [address] [place] [address] [facility]    What is the office phone number:  [phone]    Any additional details: STATED HE SEES PHYSICAL THERAPY AND THEY ARE NEEDING A REFERRAL FOR HIS SCIATIC NERVE AND OCCUPATIONAL THERAPY.    PLEASE ADVISE

## 2024-10-10 ENCOUNTER — HOSPITAL ENCOUNTER (OUTPATIENT)
Dept: OCCUPATIONAL THERAPY | Facility: HOSPITAL | Age: 79
Setting detail: THERAPIES SERIES
Discharge: HOME | End: 2024-10-10
Payer: MEDICARE

## 2024-10-10 ENCOUNTER — HOSPITAL ENCOUNTER (OUTPATIENT)
Dept: PHYSICAL THERAPY | Facility: HOSPITAL | Age: 79
Setting detail: THERAPIES SERIES
Discharge: HOME | End: 2024-10-10
Payer: MEDICARE

## 2024-10-10 DIAGNOSIS — G20.A1 PARKINSON'S DISEASE, UNSPECIFIED WHETHER DYSKINESIA PRESENT, UNSPECIFIED WHETHER MANIFESTATIONS FLUCTUATE: ICD-10-CM

## 2024-10-10 DIAGNOSIS — Z78.9 DECREASED ACTIVITIES OF DAILY LIVING (ADL): ICD-10-CM

## 2024-10-10 DIAGNOSIS — R29.818 FINE MOTOR IMPAIRMENT: Primary | ICD-10-CM

## 2024-10-10 DIAGNOSIS — R29.898 FINE MOTOR IMPAIRMENT: Primary | ICD-10-CM

## 2024-10-10 DIAGNOSIS — G20.A2 PARKINSON'S DISEASE WITH FLUCTUATING MANIFESTATIONS, UNSPECIFIED WHETHER DYSKINESIA PRESENT: Primary | ICD-10-CM

## 2024-10-10 PROCEDURE — 97110 THERAPEUTIC EXERCISES: CPT

## 2024-10-10 PROCEDURE — 97166 OT EVAL MOD COMPLEX 45 MIN: CPT

## 2024-10-10 PROCEDURE — 97112 NEUROMUSCULAR REEDUCATION: CPT

## 2024-10-10 NOTE — THERAPY TREATMENT NOTE
"Physical Therapy Treatment Note  [facility]  A department [facility]  [address], [place] [address]    Patient: Gary Tinoco                                                 Visit Date: 10/10/2024  :     1945    Referring practitioner:    Melanie Snyder MD  Date of Initial Visit:          Type: THERAPY  Episode: Parkinson's disease manifestations  Number of visits this episode: 2    Visit Diagnoses:    ICD-10-CM ICD-9-CM   1. Parkinson's disease with fluctuating manifestations, unspecified whether dyskinesia present  G20.A2 332.0     SUBJECTIVE     Subjective:He reports B LE \"sciatic nerve pain\" he has HA pain sometimes       PAIN: 2/10         OBJECTIVE     Objective     Therapeutic Exercises    86303 Units Comments   B pec and thoracic stretches with pink bolster in sitting      B unilateral DF stretches                    Timed Minutes 23      Neuromuscular Reeducation     50653 Comments   Stepping strategy with metronome 60 bpm then alt stepping    Walking with metronome 100 f x 2 80 bpm                Timed Minutes 17       Therapy Education/Self Care 83889   Education offered today    Medbride Code    Ongoing HEP   Assign when appropriate    Timed Minutes        Total Timed Treatment:     40   mins  Total Time of Visit:             40   mins         ASSESSMENT/PLAN     GOALS  Goals                                          Progress Note due by 10/27/24                                                      Recert due by 24   STG by: 6 weeks Comments Date Status   Improve gait mechanics to demonstrate increased arm swing and trunk rotation  walking with metronome made an improvement  10/10  Ongoing   Able to ambulate during 6 MWT w/o increase in foot drag throughout test                             LTG by: 12 weeks          Lott balance score of 53/56 or better IE: 39/56       Improve FGA by >/=4 points         Improve 6MTW by 270 ft to meet MDC for " Parkinson's disease IE: 957 FT w/o break         Anticipated CPT codes: Gait Training 80243, Therapeutic Exercise 02729, Manual Therapy 99620, Therapeutic Activity 68347, Neuromuscular ReEducation 38101, and Self Care/Home Management 67817      Assessment/Plan     ASSESSMENT:   He brought up sciatica a few times during the session but I advised him that some of the issues with that may be relieved by focusing on the bigger issues that aggravate it brought on by a progressive condition such as Parkinson's     PLAN:   Focus on patient education and encouragement as well as ask probing questions for level of acceptance.    SIGNATURE: Pierce Keane PTA, [place] License #: [id number]  Electronically Signed on 10/10/2024        [address] [place] [address]  [phone]

## 2024-10-10 NOTE — THERAPY EVALUATION
Occupational Therapy Initial Evaluation and Plan of Care  [facility]  A department of [facility] [address], [place] [address]    Patient: Gary Tinoco   : 1945  Referring practitioner: Melanie Snyder MD  Date of Initial Visit: 10/10/2024  Today's Date: 10/10/2024    Visit Diagnoses:    ICD-10-CM ICD-9-CM   1. Parkinson's disease, unspecified whether dyskinesia present, unspecified whether manifestations fluctuate  G20.A1 332.0     Past Medical History:   Diagnosis Date    Cataract     had surgery in 2023 both eyes    Colon polyp     Diabetes mellitus     Difficulty walking     Diverticulosis     History of adenomatous polyp of colon     Hyperlipidemia     Hypertension     Parkinson's disease     Shingles      Past Surgical History:   Procedure Laterality Date    COLONOSCOPY  2016    polyp, tubular adenoma, diverticulosis    COLONOSCOPY N/A 2021    Procedure: COLONOSCOPY WITH ANESTHESIA;  Surgeon: Philip Ferrari MD;  Location: [facility];  Service: Gastroenterology;  Laterality: N/A;  pre: hx polyps  post: poor prep  George Bond MD    COLONOSCOPY N/A 2021    Procedure: COLONOSCOPY WITH ANESTHESIA;  Surgeon: Philip Ferrari MD;  Location: [facility];  Service: Gastroenterology;  Laterality: N/A;  preop; hx of polyps  postop; polyps   PCP George Bond     EYE SURGERY  2023    HERNIA REPAIR      JOINT REPLACEMENT      KNEE SURGERY      VASECTOMY  1974         SUBJECTIVE     Subjective Evaluation    History of Present Illness  Onset date: 3 years.  Mechanism of injury: Pt presents with spouse for initial OT evaluation. Pt was diagnosed with Parkinson's Disease approx. 3 years ago. Pt has PMH of HTN (well maintained with medication) and T2DM (well maintained with medication). Pt reports PLOF before diagnosis as completely independent with all BADL/IADL  tasks. Pt's main concerns include L UE weakness, decreased dexterity in B hands (L worse than R), ADL performance, weakened core strength, choking/swallowing, and bed mobility. Pt is currently receiving PT for balance deficits.       Patient Occupation: Retired  of [employer]  Current pain ratin  At best pain ratin  At worst pain rating: 10  Location: Siatic Nerve  Relieving factors: change in position and medications (Tramadol for pain)  Aggravating factors: prolonged positioning, standing, ambulation and movement    Social Support  Lives in: one-story house (1 NICK, walk in shower, shower chair, FWW)  Lives with: spouse    Hand dominance: right    Treatments  Previous treatment: physical therapy and medication  Current treatment: medication and physical therapy  Patient Goals  Patient goals for therapy: increased strength and independence with ADLs/IADLs  Patient goal: improved FMC       Outcome Measure:   9 Hole Peg Test: Left: 46.31s  Right: 35.14s    PT G-Codes  Outcome Measure Options: Quick DASH  Quick DASH Score: 27.27    OBJECTIVE     Objective          Active Range of Motion   Left Shoulder   Normal active range of motion    Right Shoulder   Normal active range of motion    Left Elbow   Normal active range of motion    Right Elbow   Normal active range of motion    Left Wrist   Normal active range of motion    Right Wrist   Normal active range of motion    Additional Active Range of Motion Details  B Hands AROM WNL     Strength/Myotome Testing     Left Shoulder     Planes of Motion   Flexion: 4   Extension: 4   Abduction: 4   Adduction: 4     Right Shoulder     Planes of Motion   Flexion: 4+   Extension: 4+   Abduction: 4+   Adduction: 4+     Left Elbow   Flexion: 4+  Extension: 4+    Right Elbow   Flexion: 5  Extension: 5    Left Wrist/Hand   Wrist extension: 4  Wrist flexion: 4  Radial deviation: 4  Ulnar deviation: 4     (2nd hand position)     Trial 1:  60 lbs    Trial 2: 60 lbs    Trial 3: 60 lbs    Average: 60 lbs    Thumb Strength  Key/Lateral Pinch     Trial 1: 16 lbs    Trial 2: 15 lbs    Trial 3: 15 lbs    Average: 15.33 lbs    Right Wrist/Hand   Wrist extension: 4+  Wrist flexion: 4+  Radial deviation: 4+  Ulnar deviation: 4+     (2nd hand position)     Trial 1: 65 lbs    Trial 2: 62 lbs    Trial 3: 60 lbs    Average: 62.33 lbs    Thumb Strength   Key/Lateral Pinch     Trial 1: 16 lbs    Trial 2: 15.5 lbs    Trial 3: 15 lbs    Average: 15.5 lbs      Vision assessment: normal and wears corrected lenses/contact.    Cognitive status: oriented to Person, Place, Time, and Situation    Sensation: Pt reports numbness and tingling in B feet.    Cranial Nerves: normal    Reflexes: reflexes are normal and symmetric    Tone: Grossly Normal    Midline orientation: Midline    Fine Motor:   Rapid alternating movements of the hands, fingers, and forearms: Able to perform  Sequential finger-to-thumb opposition: Able to perform  Alternates fists and finger-to-nose tests: Able to perform    ADL Assessment:   Dressing: Mod A ; Pt and his wife report difficulty with donning shirts both overhead and button, donning and tying shoes, donning socks, and manipulating belt buckle and pants buttons.   Bathing: Independent ; Pt reports increased time needed and uses shower chair.   Toileting: Independent   Grooming: Min A with nail care; Independent for shaving using R hand; Independent for Oral Care   Feeding: Independent ; Pt's wife reports he is uncoordinated with utensils and often spills foods. Dependent for cutting using fork and knife. Pt and his wife also report pt chokes quite often and struggles with swallowing.     IADL Assessment:   Driving: Independent ; Wife reports concerns   Shopping: No longer performs.   Cleaning: Independent  light indoor cleaning; Push mows yard edges and completes remainder on riding mower, struggles to get on and off mower.   Cooking:  Independent light meal prep  Socializing with friends/family: Pt attends breakfast and has coffee with friends at [employer] every morning.       Cerebellar exam: no tremors noted, finger to nose without dysmetria bilaterally, performs thumb to finger exercise without difficulty, and rapid alternating movements in the upper extremities were within normal limits    Therapy Education/Self Care 29072   Education offered today Discussed purpose and benefit of OT, POC, and goals. Discussed addition of Speech Therapy for swallowing and voice concerns.    Medbride Code    Ongoing HEP   Will establish next session.    Timed Minutes      Total Timed Treatment:     0   mins  Total Time of Visit:            45   mins    ASSESSMENT/PLAN     GOALS:  Goals                                          Progress Note due by 11/10/24                                                      Recert due by 1/7/25   STG by: 11/10/24 Comments Date Status   Pt will be independent with daily completion of a HEP to address deficits from Parkinson's Disease affecting ADL/IADL's.  10/11/24 NEW    Pt will display improved B UE FMC by completing the 9 hole peg test in 25 seconds or less.  10/11/24 NEW    Pt will complete clothing fasteners including buttons, zippers, and martine with Mod I to improve ADL performance.  10/11/24 NEW         LTG by: 1/7/25      Pt will increase B hand key pinch strength to 20# for improved performance with IADL tasks.  10/11/24 NEW   Pt will perform all aspects of self-feeding with Mod I to reduce caregiver burden and improve ADL performance.   10/11/24 NEW   Pt will increase core strength to improve performance during bed mobility for safety and independence with ADLs.   10/11/24 NEW            Anticipated CPT codes: Therapeutic Exercise 14933, Therapeutic Activity 07302, and Self Care/Home Management 34193    Assessment & Plan       Assessment  Impairments: abnormal coordination, abnormal gait, abnormal or restricted ROM,  activity intolerance, impaired balance, impaired physical strength, lacks appropriate home exercise program and safety issue   Functional limitations: carrying objects, walking, pulling, pushing, moving in bed, sitting, reaching behind back, reaching overhead and unable to perform repetitive tasks   Assessment details: Pt demonstrates functional deficits from Parkinson's Disease which are affecting his daily occupational performance. Pt struggles with FM skills associated with dressing and feeding tasks. Pt's strength in B UE and thumbs has been decreasing as his disease progresses. Pt still has above average  strength in B hands based on norms for his age and gender. Pt also reports increased difficulty with bed mobility tasks d/t weakness in his core and B UE. Pt would benefit from skilled OP OT to address these deficits and improve his overall occupational performance. Pt also reported concerns regarding swallowing and decreased volume in his voice. Pt would benefit from a referral to speech therapy to assess these concerns.     Plan  Therapy options: will be seen for skilled therapy services  Planned therapy interventions: ADL retraining, motor coordination training, fine motor coordination training, strengthening, home exercise program, therapeutic activities, transfer training and IADL retraining  Frequency: 1x week  Duration in weeks: 12  Treatment plan discussed with: patient and caregiver  Plan details: Will address deficits in B FMC, B thumb strength, ADL//IADL performance, and bed mobility. Complete BITS assessments to address wife's concern regarding pt's ability to drive.       SIGNATURE: Carly Owen OT, [place] License #: [id number]  Electronically Signed on 10/10/2024    Initial Certification  Certification Period: 10/10/2024 through 1/7/2025  I certify that the therapy services are furnished while this patient is under my care.  The services outlined above are required by this patient, and will be  reviewed every 90 days.     PHYSICIAN: Melanie Snyder MD (NPI: [id number])    Signature: _______________________________________DATE: _________    Please sign and return via fax to [phone].   Thank you so much for letting us work with Gary. I appreciate your letting us work with your patients. If you have any questions or concerns, please don't hesitate to contact me.

## 2024-10-11 DIAGNOSIS — G20.A2 PARKINSON'S DISEASE WITH FLUCTUATING MANIFESTATIONS, UNSPECIFIED WHETHER DYSKINESIA PRESENT: Primary | ICD-10-CM

## 2024-10-11 DIAGNOSIS — R13.10 DYSPHAGIA, UNSPECIFIED TYPE: ICD-10-CM

## 2024-10-15 ENCOUNTER — HOSPITAL ENCOUNTER (OUTPATIENT)
Dept: PHYSICAL THERAPY | Facility: HOSPITAL | Age: 79
Setting detail: THERAPIES SERIES
Discharge: HOME | End: 2024-10-15
Payer: MEDICARE

## 2024-10-15 DIAGNOSIS — G20.A2 PARKINSON'S DISEASE WITH FLUCTUATING MANIFESTATIONS, UNSPECIFIED WHETHER DYSKINESIA PRESENT: Primary | ICD-10-CM

## 2024-10-15 PROCEDURE — 97112 NEUROMUSCULAR REEDUCATION: CPT

## 2024-10-15 NOTE — PROGRESS NOTES
Physical Therapy Treatment Note  [facility]  A department [facility]  [address], [place] [address]    Patient: Gary Tinoco                                                 Visit Date: 10/15/2024  :     1945    Referring practitioner:    Melanie Snyder MD  Date of Initial Visit:          Type: THERAPY  Episode: Parkinson's disease manifestations  Number of visits this episode: 3    Visit Diagnoses:    ICD-10-CM ICD-9-CM   1. Parkinson's disease with fluctuating manifestations, unspecified whether dyskinesia present  G20.A2 332.0     SUBJECTIVE     Subjective: He took a pain pill for his sciatic nerve pain.    PAIN: 6-7/10       OBJECTIVE     Objective     Neuromuscular Reeducation     70423 Comments   High velocity large amplitude mvmts     Timed Minutes 45       Therapy Education/Self Care 02135   Education offered today See below   Medbride Code    Ongoing HEP   High velocity, large amplitude dynamic UE/LE movements   Timed Minutes        Total Timed Treatment:     45   mins  Total Time of Visit:             45   mins         ASSESSMENT/PLAN      GOALS  Goals                                          Progress Note due by 10/27/24                                                      Recert due by 24   STG by: 6 weeks Comments Date Status   Improve gait mechanics to demonstrate increased arm swing and trunk rotation  walking with metronome made an improvement  10/10  Ongoing   Able to ambulate during 6 MWT w/o increase in foot drag throughout test         LTG by: 12 weeks          Lott balance score of 53/56 or better IE: 39/56       Improve FGA by >/=4 points         Improve 6MTW by 270 ft to meet MDC for Parkinson's disease IE: 957 FT w/o break          Anticipated CPT codes: Gait Training 65921, Therapeutic Exercise 13050, Manual Therapy 06058, Therapeutic Activity 31962, Neuromuscular ReEducation 38932, and Self Care/Home Management  01602      Assessment/Plan     ASSESSMENT: Initiated program for large amplitude, high velocity UE/LE dynamic movements today in clinic and assigned HEP for the same.     PLAN: progress high velocity large amplitude movements    SIGNATURE: Niru Smith PT DPT, [place] License #: [id number]  Electronically Signed on 10/15/2024        [place] [address]  [phone]

## 2024-10-17 ENCOUNTER — HOSPITAL ENCOUNTER (OUTPATIENT)
Dept: PHYSICAL THERAPY | Facility: HOSPITAL | Age: 79
Setting detail: THERAPIES SERIES
Discharge: HOME | End: 2024-10-17
Payer: MEDICARE

## 2024-10-17 DIAGNOSIS — G20.A2 PARKINSON'S DISEASE WITH FLUCTUATING MANIFESTATIONS, UNSPECIFIED WHETHER DYSKINESIA PRESENT: Primary | ICD-10-CM

## 2024-10-17 PROCEDURE — 97112 NEUROMUSCULAR REEDUCATION: CPT

## 2024-10-17 PROCEDURE — 97110 THERAPEUTIC EXERCISES: CPT

## 2024-10-17 NOTE — THERAPY TREATMENT NOTE
Physical Therapy Treatment Note  [facility]  A department [place] [address], [place] [address]    Patient: Gary Tinoco                                                 Visit Date: 10/17/2024  :     1945    Referring practitioner:    Melanie Snyder MD  Date of Initial Visit:          Type: THERAPY  Episode: Parkinson's disease manifestations  Number of visits this episode: 4    Visit Diagnoses:    ICD-10-CM ICD-9-CM   1. Parkinson's disease with fluctuating manifestations, unspecified whether dyskinesia present  G20.A2 332.0     SUBJECTIVE     Subjective:He denies are falls and reports R buttock and sciatic pain       PAIN: 7/10         OBJECTIVE     Objective     Neuromuscular Reeducation     32973 Comments   Stepping strategy with low hurdles varied staggering    Barrel race walk with racer cones followed by radha-n-weave with racer cones    Standing on airex performing Kim Peg board first RH then standing on theradisks alternating LH/RH    [place] box RH only standing on wobble board L<>R        Timed Minutes 32      Therapeutic Exercises    00380 Units Comments   B LE Shuttle Press with eccentric focus 6 bands x 5 min     B unilateral LE Shuttle Press with eccentric focus 4 bands x 3:30 min                    Timed Minutes 13        Therapy Education/Self Care 52272   Education offered today    Medbride Code    Ongoing HEP   High velocity, large amplitude dynamic UE/LE movements    Timed Minutes        Total Timed Treatment:     45   mins  Total Time of Visit:             45   mins         ASSESSMENT/PLAN     GOALS  Goals                                          Progress Note due by 10/27/24                                                      Recert due by 24   STG by: 6 weeks Comments Date Status   Improve gait mechanics to demonstrate increased arm swing and trunk rotation  walking with metronome made an improvement  10/10  Ongoing   Able to  ambulate during 6 MWT w/o increase in foot drag throughout test         LTG by: 12 weeks          Lott balance score of 53/56 or better IE: 39/56       Improve FGA by >/=4 points         Improve 6MTW by 270 ft to meet MDC for Parkinson's disease IE: 957 FT w/o break         Anticipated CPT codes: Gait Training 87496, Therapeutic Exercise 42808, Manual Therapy 46721, Therapeutic Activity 04654, Neuromuscular ReEducation 94972, and Self Care/Home Management 90924      Assessment/Plan     ASSESSMENT:   I was really impressed with his overall improvement in his mobility, tricia, and B toe clearance as compared to when I treated him on 10/10/24    PLAN:   Continue to work on improving his overall mobility but consider having him perform Nutcracker walk.    SIGNATURE: [facility], [place] License #: [id number]  Electronically Signed on 10/17/2024        [facility] [place] [address]  [phone]

## 2024-10-22 ENCOUNTER — HOSPITAL ENCOUNTER (OUTPATIENT)
Dept: PHYSICAL THERAPY | Facility: HOSPITAL | Age: 79
Setting detail: THERAPIES SERIES
Discharge: HOME | End: 2024-10-22
Payer: MEDICARE

## 2024-10-22 ENCOUNTER — HOSPITAL ENCOUNTER (OUTPATIENT)
Dept: OCCUPATIONAL THERAPY | Facility: HOSPITAL | Age: 79
Setting detail: THERAPIES SERIES
Discharge: HOME | End: 2024-10-22
Payer: MEDICARE

## 2024-10-22 DIAGNOSIS — G20.A1 PARKINSON'S DISEASE, UNSPECIFIED WHETHER DYSKINESIA PRESENT, UNSPECIFIED WHETHER MANIFESTATIONS FLUCTUATE: ICD-10-CM

## 2024-10-22 DIAGNOSIS — R29.898 FINE MOTOR IMPAIRMENT: Primary | ICD-10-CM

## 2024-10-22 DIAGNOSIS — R29.818 FINE MOTOR IMPAIRMENT: Primary | ICD-10-CM

## 2024-10-22 DIAGNOSIS — Z78.9 DECREASED ACTIVITIES OF DAILY LIVING (ADL): ICD-10-CM

## 2024-10-22 DIAGNOSIS — G20.A2 PARKINSON'S DISEASE WITH FLUCTUATING MANIFESTATIONS, UNSPECIFIED WHETHER DYSKINESIA PRESENT: Primary | ICD-10-CM

## 2024-10-22 PROCEDURE — 97530 THERAPEUTIC ACTIVITIES: CPT

## 2024-10-22 PROCEDURE — 97110 THERAPEUTIC EXERCISES: CPT

## 2024-10-22 PROCEDURE — 97112 NEUROMUSCULAR REEDUCATION: CPT

## 2024-10-22 NOTE — THERAPY TREATMENT NOTE
Occupational Therapy Treatment Note  [facility] Therapy Services  A department [place] [address], [place] [address]    Patient: Gary Tinoco                                                 Visit Date: 10/22/2024  :     1945    Referring practitioner:    Melanie Snyder MD  Date of Initial Visit:          Type: THERAPY  Episode: C impaired from Parkinson's Disease  Number of visits this episode: 2    Visit Diagnoses:    ICD-10-CM ICD-9-CM   1. Fine motor impairment  R29.818 V49.89    R29.898    2. Decreased activities of daily living (ADL)  Z78.9 V49.89   3. Parkinson's disease, unspecified whether dyskinesia present, unspecified whether manifestations fluctuate  G20.A1 332.0     SUBJECTIVE     Subjective:  Pt reports he has had better days. Pt reported no falls, but wife reports a fall during toilet transfer.  She reports his feet slid and he fell backward and broke the back of toilet.     PAIN: 5/10; R hip constant pain        OBJECTIVE     Objective     Therapeutic Exercises    72569 Units Comments   Pt completed B hand/wrist strengthening in all planes using light resistance flex bar to simulate opening various jars and containers performing 1 set of 10 reps.     Pt completed B hand and thumb strengthening using power web: finger extension, finger flexion, thumb flexion. Completed 1 set of 10 reps each hand.      Reviewed and practiced thera-putty exercises. Red thera-putty and handout provided.                Timed Minutes 25     Therapeutic Activities    45841 Comments   Toothpick  with standard tweezers using R hand mod dropping, L hand mod dropping. Required increased time for completion.     Perfection game completed using B hands, demonstrating min difficulty. Required increased time for completion.                 Timed Minutes 20     Therapy Education/Self Care 04045   Education offered today Reviewed and practiced thera-putty exercises. Red  thera-putty and handout provided.     Education provided on swallowing therapy through speech and importance of swallow study completion.    Medbride Code    Ongoing HEP   Thera-Putty  Strengthening Exercises    Timed Minutes        Total Timed Treatment:     45   mins  Total Time of Visit:             45   mins         ASSESSMENT/PLAN     GOALS  Goals                                          Progress Note due by 11/10/24                                                      Recert due by 1/7/25   STG by: 11/10/24 Comments Date Status   Pt will be independent with daily completion of a HEP to address deficits from Parkinson's Disease affecting ADL/IADL's.  Added thera-putty  strengthening exercises to HEP     Pt's wife reports minimal compliance to previously prescribed PT HEP.    10/11/24 NEW    Pt will display improved B UE FMC by completing the 9 hole peg test in 25 seconds or less.  Completed 2 FM activities with min-mod difficulty.  10/11/24 NEW    Pt will complete clothing fasteners including buttons, zippers, and martine with Mod I to improve ADL performance.  Pt reports max difficulty clasping his wife's bra this morning.  10/11/24 NEW             LTG by: 1/7/25         Pt will increase B hand key pinch strength to 20# for improved performance with IADL tasks.  Completed thumb strengthening exercises with power web  10/11/24 NEW   Pt will perform all aspects of self-feeding with Mod I to reduce caregiver burden and improve ADL performance.    10/11/24 NEW   Pt will increase core strength to improve performance during bed mobility for safety and independence with ADLs.    10/11/24 NEW                 Anticipated CPT codes: Therapeutic Exercise 66335, Therapeutic Activity 91833, and Self Care/Home Management 55078    Assessment/Plan     ASSESSMENT:   Pt did well during his first OT tx session. Pt tolerated B hand and thumb strengthening and FM activities well. Pt was given HEP for B hand and thumb  strengthening. Education was provided on the importance of completing both OT and PT HEPs daily, as pt's wife reports poor compliance to PT HEP.   Therapist, pt, and pt's wife also discussed the Speak Out program offered through [facility] for voice therapy, as it was recommended by our speech therapist. OT encouraged pt and his wife to schedule a swallow study as there are still concerns regarding the increase in choking and coughing when eating and drinking. Contact information for scheduling swallow study was provided to pt's wife.     PLAN:   Will continue to address deficits in B FMC, B thumb strength, ADL//IADL performance, and bed mobility. Complete BITS assessments next session.     SIGNATURE: Carly Owen OT, [place] License #: [id number]  Electronically Signed on 10/22/2024        [address] [place] [address]  [phone]

## 2024-10-22 NOTE — PROGRESS NOTES
"Physical Therapy Treatment Note  [facility]  A department of [facility]  [address], [place] [address]    Patient: Gary Tinoco                                                 Visit Date: 10/22/2024  :     1945    Referring practitioner:    Melanie Snyder MD  Date of Initial Visit:          Type: THERAPY  Episode: Parkinson's disease manifestations  Number of visits this episode: 5    Visit Diagnoses:    ICD-10-CM ICD-9-CM   1. Parkinson's disease with fluctuating manifestations, unspecified whether dyskinesia present  G20.A2 332.0       SUBJECTIVE     Subjective: He didn't know how to adapt his HEP to home.    PAIN: 6-7/10       OBJECTIVE     Objective     Neuromuscular Reeducation     50637 Comments   Metronome 6\" LE taps stomp    Metronome half quinton UE clap/slap    Metronome uppercut    Metronome UE cross body punch    High velocity large amplitude mvmts  Adapting to hanging on to counter at home for safety   Timed Minutes 40       Therapy Education/Self Care 54155   Education offered today See below   Medbride Code    Ongoing HEP   High velocity, large amplitude dynamic UE/LE movements   Timed Minutes        Total Timed Treatment:     40   mins  Total Time of Visit:             40   mins         ASSESSMENT/PLAN      GOALS  Goals                                          Progress Note due by 10/27/24                                                      Recert due by 24   STG by: 6 weeks Comments Date Status   Improve gait mechanics to demonstrate increased arm swing and trunk rotation  walking with metronome made an improvement  10/10  Ongoing   Able to ambulate during 6 MWT w/o increase in foot drag throughout test         LTG by: 12 weeks          Lott balance score of 53/56 or better IE: 39/56       Improve FGA by >/=4 points         Improve 6MTW by 270 ft to meet MDC for Parkinson's disease IE: 957 FT w/o break          Anticipated CPT codes: Gait " Training 85213, Therapeutic Exercise 20209, Manual Therapy 77141, Therapeutic Activity 58796, Neuromuscular ReEducation 32616, and Self Care/Home Management 52211      Assessment/Plan     ASSESSMENT: Adapted HEP for holding on to counter to enhance pt confidence and safety. He demo'd good carryover of high velocity large amplitude movements during UE dynamic task to metronome.    PLAN: progress high velocity large amplitude movements    SIGNATURE: Niru Smith PT DPT, [place] License #: [id number]  Electronically Signed on 10/22/2024        [address] [address]  [phone]

## 2024-10-24 ENCOUNTER — HOSPITAL ENCOUNTER (OUTPATIENT)
Dept: PHYSICAL THERAPY | Facility: HOSPITAL | Age: 79
Setting detail: THERAPIES SERIES
Discharge: HOME | End: 2024-10-24
Payer: MEDICARE

## 2024-10-24 DIAGNOSIS — G20.A2 PARKINSON'S DISEASE WITH FLUCTUATING MANIFESTATIONS, UNSPECIFIED WHETHER DYSKINESIA PRESENT: Primary | ICD-10-CM

## 2024-10-24 PROCEDURE — 97116 GAIT TRAINING THERAPY: CPT

## 2024-10-24 PROCEDURE — 97530 THERAPEUTIC ACTIVITIES: CPT

## 2024-10-24 PROCEDURE — 97112 NEUROMUSCULAR REEDUCATION: CPT

## 2024-10-24 NOTE — PROGRESS NOTES
Physical Therapy Treatment Note and 30 Day Progress Note  [facility]  A department [facility]  [address], [place] [address]    Patient: Gary Tinoco                                                 Visit Date: 10/24/2024  :     1945    Referring practitioner:    Melanie Snyder MD  Date of Initial Visit:          Type: THERAPY  Episode: Parkinson's disease manifestations  Number of visits this episode: 6    Visit Diagnoses:    ICD-10-CM ICD-9-CM   1. Parkinson's disease with fluctuating manifestations, unspecified whether dyskinesia present  G20.A2 332.0         SUBJECTIVE     Subjective: He's okay, though has a little pain in his right hip and buttock that makes it harder to walk. Blames this on sciatic issues. He reports that he's not very coordinated.       PAIN: 5-6/10  PT G-Codes  Outcome Measure Options: Hebert Balance, FGA (Functional Gait Assessment), 6 Minute Walk Test  Hebert Total Score: 50  FGA Total Score: 19    OBJECTIVE     Objective     Therapeutic Activities    27604 Comments   FGA    6MWT                Timed Minutes 18     Neuromuscular Reeducation     90959 Comments   HEBERT                    Timed Minutes 20       Gait Training          07001   Task/Terrain Asst AD Comments   Arm swing facilitation    With metronome; poor coordination with this today                Timed Minutes 10       Therapy Education/Self Care 52486   Education offered today See below   Medbride Code    Ongoing HEP   High velocity, large amplitude dynamic UE/LE movements   Timed Minutes        Total Timed Treatment:     48   mins  Total Time of Visit:             48   mins         ASSESSMENT/PLAN      GOALS  Goals                                          Progress Note due by 24                                                      Recert due by 24   STG by: 6 weeks Comments Date Status   Improve gait mechanics to demonstrate increased arm swing and trunk  rotation  tendency to ambulate with BUE held stiff in a C3P0 pose, will swing RUE minimally   10/24  Ongoing   Able to ambulate during 6 MWT w/o increase in foot drag throughout test  Considerable increase in B foot drag L>R throughout 6MWT today likely d/t hip pain, requiring a seated rest break  10/24  Ongoing   LTG by: 12 weeks          Lott balance score of 53/56 or better IE: 39/56  010/24: 50/56  10/24  Ongoing   Improve FGA by >/=4 points 10/24: 19/30  10/24  Ongoing   Improve 6MTW by 270 ft to meet MDC for Parkinson's disease IE: 957 FT w/o break  10/24: 930ft with 30 sec seated rest break d/t LBP/hip pain  10/24  Ongoing      Anticipated CPT codes: Gait Training 48987, Therapeutic Exercise 65658, Manual Therapy 27978, Therapeutic Activity 55123, Neuromuscular ReEducation 09931, and Self Care/Home Management 90230      Assessment/Plan   Assessment  Impairments: abnormal coordination, abnormal gait, activity intolerance, impaired balance, impaired physical strength, lacks appropriate home exercise program and safety issue   Functional limitations: carrying objects, lifting, walking, sitting, stooping and reaching overhead   Assessment details: Prognosis: good     Plan  Therapy options: will be seen for skilled therapy services  Planned therapy interventions: abdominal trunk stabilization, balance/weight-bearing training, flexibility, functional ROM exercises, gait training, home exercise program, manual therapy, motor coordination training, neuromuscular re-education, strengthening, stretching and therapeutic activities  Frequency: 2x week  Duration in weeks: 12  Treatment plan discussed with: patient and family    ASSESSMENT: Goals assessed for Progress Note Today. No goals met at this time, though he has only had 4 treatments since IE. Much of his gait difficulties with L>R heel scuffing and asymmetries in gait seem to stem from contributions of R hip and glut pain leading to antalgic gait and compensating  for this with a shortened stride length on the left to more quickly offload the painful RLE. This then triggers a progressively worse shuffling pattern until he has a seated rest break. His HEBERT score increased from 39 to 50/56 today, though he did require a seated rest break during the 6MWT. The Pt would benefit from skilled PTx to decrease fall risk, improve functional mobility, progress toward goals, and increase overall quality of life.          PLAN: progress high velocity large amplitude movements    SIGNATURE: Ruthie Lilly PT T, [place] License #: [id number]    Electronically Signed on 10/24/2024        [address] [place] [address] [place] [address]  [phone]

## 2024-10-28 ENCOUNTER — HOSPITAL ENCOUNTER (OUTPATIENT)
Dept: GENERAL RADIOLOGY | Facility: HOSPITAL | Age: 79
Discharge: HOME | End: 2024-10-28
Admitting: INTERNAL MEDICINE
Payer: MEDICARE

## 2024-10-28 ENCOUNTER — HOSPITAL ENCOUNTER (OUTPATIENT)
Dept: PHYSICAL THERAPY | Facility: HOSPITAL | Age: 79
Setting detail: THERAPIES SERIES
Discharge: HOME | End: 2024-10-28
Payer: MEDICARE

## 2024-10-28 ENCOUNTER — OFFICE VISIT (OUTPATIENT)
Dept: INTERNAL MEDICINE | Facility: CLINIC | Age: 79
End: 2024-10-28
Payer: MEDICARE

## 2024-10-28 ENCOUNTER — HOSPITAL ENCOUNTER (OUTPATIENT)
Dept: OCCUPATIONAL THERAPY | Facility: HOSPITAL | Age: 79
Setting detail: THERAPIES SERIES
Discharge: HOME | End: 2024-10-28
Payer: MEDICARE

## 2024-10-28 VITALS
WEIGHT: 196 LBS | SYSTOLIC BLOOD PRESSURE: 140 MMHG | OXYGEN SATURATION: 96 % | HEIGHT: 71 IN | HEART RATE: 81 BPM | TEMPERATURE: 97.8 F | DIASTOLIC BLOOD PRESSURE: 76 MMHG | BODY MASS INDEX: 27.44 KG/M2

## 2024-10-28 DIAGNOSIS — H61.23 BILATERAL IMPACTED CERUMEN: ICD-10-CM

## 2024-10-28 DIAGNOSIS — M48.062 SPINAL STENOSIS OF LUMBAR REGION WITH NEUROGENIC CLAUDICATION: Primary | ICD-10-CM

## 2024-10-28 DIAGNOSIS — G20.A1 PARKINSON'S DISEASE, UNSPECIFIED WHETHER DYSKINESIA PRESENT, UNSPECIFIED WHETHER MANIFESTATIONS FLUCTUATE: ICD-10-CM

## 2024-10-28 DIAGNOSIS — G20.A2 PARKINSON'S DISEASE WITH FLUCTUATING MANIFESTATIONS, UNSPECIFIED WHETHER DYSKINESIA PRESENT: Primary | ICD-10-CM

## 2024-10-28 DIAGNOSIS — M48.062 SPINAL STENOSIS OF LUMBAR REGION WITH NEUROGENIC CLAUDICATION: ICD-10-CM

## 2024-10-28 DIAGNOSIS — G20.B2 PARKINSON'S DISEASE WITH DYSKINESIA AND FLUCTUATING MANIFESTATIONS: ICD-10-CM

## 2024-10-28 DIAGNOSIS — I10 ESSENTIAL HYPERTENSION: ICD-10-CM

## 2024-10-28 DIAGNOSIS — R29.818 FINE MOTOR IMPAIRMENT: Primary | ICD-10-CM

## 2024-10-28 DIAGNOSIS — R29.898 FINE MOTOR IMPAIRMENT: Primary | ICD-10-CM

## 2024-10-28 DIAGNOSIS — R80.9 MICROALBUMINURIA: ICD-10-CM

## 2024-10-28 DIAGNOSIS — Z78.9 DECREASED ACTIVITIES OF DAILY LIVING (ADL): ICD-10-CM

## 2024-10-28 DIAGNOSIS — M51.369 DDD (DEGENERATIVE DISC DISEASE), LUMBAR: ICD-10-CM

## 2024-10-28 DIAGNOSIS — Z23 FLU VACCINE NEED: ICD-10-CM

## 2024-10-28 DIAGNOSIS — E11.9 TYPE 2 DIABETES MELLITUS WITHOUT COMPLICATION, WITHOUT LONG-TERM CURRENT USE OF INSULIN: ICD-10-CM

## 2024-10-28 LAB
EXPIRATION DATE: ABNORMAL
HBA1C MFR BLD: 6.3 % (ref 4.5–5.7)
Lab: ABNORMAL

## 2024-10-28 PROCEDURE — 97530 THERAPEUTIC ACTIVITIES: CPT

## 2024-10-28 PROCEDURE — 1126F AMNT PAIN NOTED NONE PRSNT: CPT | Performed by: INTERNAL MEDICINE

## 2024-10-28 PROCEDURE — 90662 IIV NO PRSV INCREASED AG IM: CPT | Performed by: INTERNAL MEDICINE

## 2024-10-28 PROCEDURE — 83036 HEMOGLOBIN GLYCOSYLATED A1C: CPT | Performed by: INTERNAL MEDICINE

## 2024-10-28 PROCEDURE — 69210 REMOVE IMPACTED EAR WAX UNI: CPT | Performed by: INTERNAL MEDICINE

## 2024-10-28 PROCEDURE — 97535 SELF CARE MNGMENT TRAINING: CPT

## 2024-10-28 PROCEDURE — 3044F HG A1C LEVEL LT 7.0%: CPT | Performed by: INTERNAL MEDICINE

## 2024-10-28 PROCEDURE — 1159F MED LIST DOCD IN RCRD: CPT | Performed by: INTERNAL MEDICINE

## 2024-10-28 PROCEDURE — 99214 OFFICE O/P EST MOD 30 MIN: CPT | Performed by: INTERNAL MEDICINE

## 2024-10-28 PROCEDURE — 3077F SYST BP >= 140 MM HG: CPT | Performed by: INTERNAL MEDICINE

## 2024-10-28 PROCEDURE — G0008 ADMIN INFLUENZA VIRUS VAC: HCPCS | Performed by: INTERNAL MEDICINE

## 2024-10-28 PROCEDURE — 1160F RVW MEDS BY RX/DR IN RCRD: CPT | Performed by: INTERNAL MEDICINE

## 2024-10-28 PROCEDURE — 3078F DIAST BP <80 MM HG: CPT | Performed by: INTERNAL MEDICINE

## 2024-10-28 PROCEDURE — 97112 NEUROMUSCULAR REEDUCATION: CPT

## 2024-10-28 PROCEDURE — 72100 X-RAY EXAM L-S SPINE 2/3 VWS: CPT

## 2024-10-28 RX ORDER — TRAMADOL HYDROCHLORIDE 50 MG/1
50 TABLET ORAL EVERY 12 HOURS PRN
Qty: 60 TABLET | Refills: 0 | Status: SHIPPED | OUTPATIENT
Start: 2024-10-28

## 2024-10-28 NOTE — PROGRESS NOTES
" Subjective   The ABCs of the Annual Wellness Visit  Medicare Wellness Visit      Gary Tinoco is a 79 y.o. patient who presents for a Medicare Wellness Visit.    The following portions of the patient's history were reviewed and   updated as appropriate: {history reviewed:20406::\"allergies\",\"current medications\",\"past family history\",\"past medical history\",\"past social history\",\"past surgical history\",\"problem list\"}.    Compared to one year ago, the patient's physical   health is {better worse same:07811}.  Compared to one year ago, the patient's mental   health is {better worse same:11890}.    Recent Hospitalizations:  {Hospital Admission Status in the last 365 days:08806}    Current Medical Providers:  Patient Care Team:  CURT Gutierrez DO as PCP - General (Internal Medicine)  Braxton Messer MD as Consulting Physician (Urology)  Melanie Snyder MD as Consulting Physician (Neurology)    Outpatient Medications Prior to Visit   Medication Sig Dispense Refill    amantadine (SYMMETREL) 100 MG capsule Take 1 capsule by mouth 2 (Two) Times a Day for 180 days. 60 capsule 5    carbidopa-levodopa (SINEMET)  MG per tablet Take 1 tablet by mouth 3 (Three) Times a Day.      clotrimazole-betamethasone (LOTRISONE) 1-0.05 % cream Apply 1 Application topically to the appropriate area as directed 2 (Two) Times a Day. 15 g 1    Denta 5000 Plus 1.1 % cream APPLY A THIN RIBBON TO BRUSH BRUSH FOR 2 MINUTES PREFERABLY AT BEDTIME      glyburide (DIAbeta) 5 MG tablet Take 1 tablet by mouth Daily With Breakfast.      losartan-hydrochlorothiazide (HYZAAR) 100-25 MG per tablet TAKE 1 TABLET BY MOUTH EVERY DAY 90 tablet 1    lovastatin (MEVACOR) 40 MG tablet TAKE 1 TABLET BY MOUTH EVERY DAY 90 tablet 3    metFORMIN (GLUCOPHAGE) 850 MG tablet TAKE 1 TABLET BY MOUTH EVERY DAY 90 tablet 3    naloxone (NARCAN) 4 MG/0.1ML nasal spray       traMADol (ULTRAM) 50 MG tablet Take 1 tablet by mouth Every 12 (Twelve) Hours As " "Needed for Moderate Pain. 30 tablet 1     No facility-administered medications prior to visit.     Opioid medication/s are on active medication list.  and I have evaluated his active treatment plan and pain score trends (see table).  There were no vitals filed for this visit.  I have reviewed the chart for potential of high risk medication and harmful drug interactions in the elderly.        Aspirin is not on active medication list.  {ASPIRIN NOT ON MEDICATION LIST INDICATED/NOT INDICATED:01148}.    Patient Active Problem List   Diagnosis    Abnormal LFTs    Anemia of chronic disease    Hypertension, benign    Right thyroid nodule    Substernal thyroid    Screening PSA (prostate specific antigen)    Type 2 diabetes mellitus without complication, without long-term current use of insulin    History of adenomatous polyp of colon    Family hx colonic polyps    Mixed hyperlipidemia    Parkinson's disease with fluctuating manifestations     Advance Care Planning {Advance Care Planning Hyperlink:23}Advance Directive is not on file.  {ACP Discussion, Advance Directive not in EMR:52665}            Objective   Vitals:    10/28/24 1042   Weight: 88.9 kg (196 lb)   Height: 180.3 cm (71\")       Estimated body mass index is 27.34 kg/m² as calculated from the following:    Height as of this encounter: 180.3 cm (71\").    Weight as of this encounter: 88.9 kg (196 lb).            Does the patient have evidence of cognitive impairment? {Yes/No:66523}                                                                                                Health  Risk Assessment    Smoking Status:  Social History     Tobacco Use   Smoking Status Never    Passive exposure: Never   Smokeless Tobacco Never     Alcohol Consumption:  Social History     Substance and Sexual Activity   Alcohol Use Not Currently       Fall Risk Screen{Jump to [place] Fall Risk Flowsheet:23}  STEADI Fall Risk Assessment was completed, and patient is at HIGH risk for falls. " Assessment completed on:2024    Depression Screenin/25/2024    10:00 AM   PHQ-2/PHQ-9 Depression Screening   Retired Little Interest or Pleasure in Doing Things 0-->not at all   Retired Feeling Down, Depressed or Hopeless 0-->not at all   Retired PHQ-9: Brief Depression Severity Measure Score 0     Health Habits and Functional and Cognitive Screenin/25/2024     9:59 AM   Functional & Cognitive Status   Do you have difficulty preparing food and eating? No   Do you have difficulty bathing yourself, getting dressed or grooming yourself? No   Do you have difficulty using the toilet? No   Do you have difficulty moving around from place to place? No   Do you have trouble with steps or getting out of a bed or a chair? No   Current Diet Well Balanced Diet   Dental Exam Up to date   Eye Exam Up to date   Exercise (times per week) 0 times per week   Current Exercises Include No Regular Exercise   Do you need help using the phone?  No   Are you deaf or do you have serious difficulty hearing?  No   Do you need help to go to places out of walking distance? No   Do you need help shopping? No   Do you need help preparing meals?  No   Do you need help with housework?  No   Do you need help with laundry? No   Do you need help taking your medications? No   Do you need help managing money? No   Do you ever drive or ride in a car without wearing a seat belt? No   Have you felt unusual stress, anger or loneliness in the last month? No   Who do you live with? Spouse   If you need help, do you have trouble finding someone available to you? No   Have you been bothered in the last four weeks by sexual problems? No   Do you have difficulty concentrating, remembering or making decisions? Yes           Age-appropriate Screening Schedule:  Refer to the list below for future screening recommendations based on patient's age, sex and/or medical conditions. Orders for these recommended tests are listed in the plan section.  The patient has been provided with a written plan.    Health Maintenance List  Health Maintenance   Topic Date Due    ZOSTER VACCINE (1 of 2) Never done    DIABETIC EYE EXAM  02/22/2024    INFLUENZA VACCINE  08/01/2024    COVID-19 Vaccine (3 - 2023-24 season) 09/01/2024    HEMOGLOBIN A1C  10/24/2024    RSV Vaccine - Adults (1 - 1-dose 75+ series) 04/25/2025 (Originally 2/14/2020)    TDAP/TD VACCINES (2 - Tdap) 04/25/2025 (Originally 5/5/2014)    LIPID PANEL  04/24/2025    URINE MICROALBUMIN  04/24/2025    ANNUAL WELLNESS VISIT  04/25/2025    BMI FOLLOWUP  10/01/2025    COLORECTAL CANCER SCREENING  04/01/2026    HEPATITIS C SCREENING  Completed    Pneumococcal Vaccine 65+  Completed                                                                                                                                                CMS Preventative Services Quick Reference  Risk Factors Identified During Encounter  {Medicare Wellness Risk Factors:06196}    The above risks/problems have been discussed with the patient.  Pertinent information has been shared with the patient in the After Visit Summary.  An After Visit Summary and PPPS were made available to the patient.    Follow Up:{Wrapup  Review (Popup)  Advance Care Planning  Labs  CC  Problem List  Visit Diagnosis  Medications  Result Review  Imaging  Health Maintenance  Quality  BestPractice  SmartSets  SnapShot  Encounters  Notes  Media  Procedures :23}   Next Medicare Wellness visit to be scheduled in 1 year.         Additional E&M Note during same encounter follows:  Patient has additional, significant, and separately identifiable condition(s)/problem(s) that require work above and beyond the Medicare Wellness Visit     Chief Complaint  Follow-up (6 month follow up.  Patient complaining of sciatic nerve on both sides x 1 year. ) and Diabetes (A1C)    Subjective {Problem List  Visit Diagnosis   Encounters  Notes  Medications  Labs  Result Review  "Imaging  Media :23}{Help Text;  If performing a Preventative Medicine Visit (e.g. 18049) in addition to AWV, choose the 1st SmartList option below and document any ROS/PE performed.  If performing a separately identifiable E/M service (e.g. 20940), choose 2nd SmartLink option below. This information in red will not appear in the final note after note is signed:70020}  HPI  {AWV/Preventative Exam/EM Progress Note. Use this note if billing for additional Wellness Visit or EM exam in addition to AWV visit (Optional):7222810650}                Objective   Vital Signs:  Ht 180.3 cm (71\")   Wt 88.9 kg (196 lb)   BMI 27.34 kg/m²   Physical Exam    {The following data was reviewed by (Optional):06454}        Assessment and Plan {CC Problem List  Visit Diagnosis   ROS  Review (Popup)  [facility] Maintenance  Quality  BestPractice  Medications  SmartSets  SnapShot Encounters  Media :23}{Help Text; When performing an adult Preventative Medicine Visit (e.g. 08748) using the optional SmartList below can help when documenting any age-appropriate advice given.  When using the SmartList review and update the information based on the unique discussion or advice given to the patient.  As a reminder, diagnosis code Z00.00 (nl exam) or Z00.01 (abnl exam), should be added when this service is performed.  Text will disappear once the note is signed:10612}{Preventative Physical Additional Health Advice List (Optional):8513142044}              Flu vaccine need    Type 2 diabetes mellitus without complication, without long-term current use of insulin  {Diabetes (Optional):7162729131}  Orders Placed This Encounter   Procedures    Fluzone High-Dose 65+yrs (2660-7791)    POC Glycated Hemoglobin, Total     Order Specific Question:   Release to patient     Answer:   Routine Release [[id number]]           {Time Spent (Optional):70600}  Follow Up {Instructions Charge Capture  Follow-up Communications :23}  No follow-ups on " file.  Patient was given instructions and counseling regarding his condition or for health maintenance advice. Please see specific information pulled into the AVS if appropriate.

## 2024-10-28 NOTE — THERAPY TREATMENT NOTE
"Occupational Therapy Treatment Note  [facility]  [address], [place] [address]    Patient: Gary Tinoco                                                 Visit Date: 10/28/2024  :     1945    Referring practitioner:    Melanie Snyder MD  Date of Initial Visit:          Type: THERAPY  Episode: C impaired from Parkinson's Disease  Number of visits this episode: 3    Visit Diagnoses:    ICD-10-CM ICD-9-CM   1. Fine motor impairment  R29.818 V49.89    R29.898    2. Decreased activities of daily living (ADL)  Z78.9 V49.89   3. Parkinson's disease, unspecified whether dyskinesia present, unspecified whether manifestations fluctuate  G20.A1 332.0     SUBJECTIVE     Subjective:  Pt reports he has had better days. Pt reported no falls this week, and that his new PCP Dr. Gutierrez   Has now sent in PT orders for his Sciatic Nerve issue. Pt reports he has resumed going to coffee each   day with his friends.     PAIN: 10; B Hip \"Sciatic Nerve\"        OBJECTIVE     Objective     Therapeutic Activities    13148 Comments   Trail Making Part A completed with the R UE. 1 errors, 6 interruptions and a time of 1:33. Part B completed with 0 errors, 0 interruptions and a time of 1:45.  Bell Cancellation Test completed with the R UE in a time of 2:17 with 5 errors and 2 distractor hits.   Computerized Maze Assessment completed with the R UE in a time of 0:47 with 0 errors.   Visual Scanning and Motor Reaction Assessment completed with the R UE. Level 1 36/50 required targets.    Pt was able to pass 1/4 assessment levels.   Completed assessments in sitting.    Remainder of BITS activities focused on B UE AROM and coordination. Accuracy ranged from 92.86% to 100%. Reaction time was 1.51 to 1.20 seconds. All tx activities completed in standing with seated rest breaks between each activity.  Visual Scanning User Paced: 95.18%, 1.51s, 79 hits  Sequence " Numbers 1-30: 93.75%, 1:20, 2.67s  Sequence Letters: 92.86%, 1:48, 4.14s, required mod cues between T and U.   Memory: Words to Images 100%, 12 trials                Timed Minutes 30     Therapy Education/Self Care 78932   Education offered today Education provided on purpose of BITS assessments, evaluating visual scanning and motor reaction skills.    Medbride Code    Ongoing HEP   Thera-Putty  Strengthening Exercises    Timed Minutes 10       Total Timed Treatment:     45   mins  Total Time of Visit:             45   mins         ASSESSMENT/PLAN     GOALS  Goals                                          Progress Note due by 11/10/24                                                      Recert due by 1/7/25   STG by: 11/10/24 Comments Date Status   Pt will be independent with daily completion of a HEP to address deficits from Parkinson's Disease affecting ADL/IADL's. Educated pt on purpose of BITS assessments and relation of VS and motor reaction skills to day-to-day life.  10/11/24 NEW    Pt will display improved B UE FMC by completing the 9 hole peg test in 25 seconds or less.  10/11/24 NEW    Pt will complete clothing fasteners including buttons, zippers, and martine with Mod I to improve ADL performance.  10/11/24 NEW            LTG by: 1/7/25        Pt will increase B hand key pinch strength to 20# for improved performance with IADL tasks.  10/11/24 NEW   Pt will perform all aspects of self-feeding with Mod I to reduce caregiver burden and improve ADL performance.    10/11/24 NEW   Pt will increase core strength to improve performance during bed mobility for safety and independence with ADLs.    10/11/24 NEW    Pt will pass all 4 visual scanning assessments using the BITS to demonstrate appropriate use of compensation techniques for R visual field deficit.   Completed BITS assessments and txs.   Pt was able to pass 1/4 assessment levels.  10/28/24 NEW        Anticipated CPT codes: Therapeutic Exercise 36287,  Therapeutic Activity 52446, and Self Care/Home Management 51198    Assessment/Plan     ASSESSMENT:   Today pt completed BITS assessments and tx activities during session. Pt enjoyed using the BITS system   And did well with instructions requiring minimal demonstrations. Pt demonstrated deficits in visual scanning   Skills and motor reaction skills. Pt demonstrated minimal deficits in motor coordination, memory, and   Sequencing skills.     PLAN:   Will continue to address deficits in B FMC, B thumb strength, ADL//IADL performance, and bed mobility.     SIGNATURE: Carly Owen OT, [place] License #: [id number]  Electronically Signed on 10/28/2024        [address] [place] [address] [place] [address]  [phone]

## 2024-10-28 NOTE — PROGRESS NOTES
"    Chief Complaint  Follow-up (6 month follow up.  Patient complaining of sciatic nerve on both sides x 1 year. ) and Diabetes (A1C 6.3)    Subjective        Gary Tinoco presents to [facility]  Follow-up  Diabetes    See below.     Objective   Vital Signs:  /76 (BP Location: Left arm, Patient Position: Sitting, Cuff Size: Adult)   Pulse 81   Temp 97.8 °F (36.6 °C) (Temporal)   Ht 180.3 cm (71\")   Wt 88.9 kg (196 lb)   SpO2 96%   BMI 27.34 kg/m²   Estimated body mass index is 27.34 kg/m² as calculated from the following:    Height as of this encounter: 180.3 cm (71\").    Weight as of this encounter: 88.9 kg (196 lb).         Physical Exam  HENT:      Head: Normocephalic and atraumatic.      Right Ear: There is impacted cerumen.      Left Ear: There is impacted cerumen.   Eyes:      Conjunctiva/sclera: Conjunctivae normal.      Pupils: Pupils are equal, round, and reactive to light.      Comments: Wears glasses.    Cardiovascular:      Rate and Rhythm: Normal rate and regular rhythm.      Heart sounds: Normal heart sounds.   Pulmonary:      Effort: Pulmonary effort is normal. No respiratory distress.      Breath sounds: Normal breath sounds.   Musculoskeletal:         General: No swelling.   Skin:     General: Skin is warm and dry.      Findings: No rash.   Neurological:      Mental Status: He is alert and oriented to person, place, and time.      Comments: Shuffling, short based gait.  Rest tremor evident at times with the right hand.  Masked face and quiet voice.   Psychiatric:      Comments: Flat affect, but conversational.        Result Review :  Labs from 4/24/2024:  1.  CMP normal.  2.  Hemoglobin A1c 6.5 after being 6.8 in January.  Hemoglobin A1c today is 6.3.   3.  TSH normal.  4.  Total cholesterol 161, HDL 33, LDL 93, triglycerides 205.  5.  CBC normal.  6.  PSA normal.  7.  Urinalysis showed trace ketones, leukocytes, protein.  8.  Microalbumin/creatinine ratio " was 32.  Ear Cerumen Removal    Date/Time: 10/28/2024 11:23 AM    Performed by: CURT Gutierrez DO  Authorized by: CURT Gutierrez DO    Anesthesia:  Local Anesthetic: none  Ceruminolytics applied: Ceruminolytics applied prior to the procedure.  Location details: left ear and right ear  Patient tolerance: patient tolerated the procedure well with no immediate complications  Procedure type: instrumentation, curette   Sedation:  Patient sedated: no              Assessment and Plan   Diagnoses and all orders for this visit:    1. Spinal stenosis of lumbar region with neurogenic claudication (Primary)  -     XR Spine Lumbar 2 or 3 View; Future  -     Ambulatory Referral to Physical Therapy for Evaluation & Treatment    2. DDD (degenerative disc disease), lumbar  -     traMADol (ULTRAM) 50 MG tablet; Take 1 tablet by mouth Every 12 (Twelve) Hours As Needed for Moderate Pain.  Dispense: 60 tablet; Refill: 0    3. Parkinson's disease with dyskinesia and fluctuating manifestations    4. Type 2 diabetes mellitus without complication, without long-term current use of insulin  -     POC Glycated Hemoglobin, Total    5. Microalbuminuria    6. Essential hypertension    7. Bilateral impacted cerumen  -     Ear Cerumen Removal    8. Flu vaccine need  -     Fluzone High-Dose 65+yrs (7973-0943)       He has been a patient of Dr. Pavel Bond followed by DELVIS Coulter.  She has left our practice to pursue an opportunity in [place], [place].  He presents today to see me for the first time.    His main concern today is his chronic low back pain that seems to be worsening and now involves some discomfort with his legs at times.  He states that his right leg is more uncomfortable than his left leg.  He sometimes has minor numbness and tingling.  He feels like his discomfort is better with activity, but worse with rest or lying down.  He did see Dr. Ball previously, but this has been several years.  He also admits  "that he was following him more for other problems in his back.  He has not had any recent imaging.  We will obtain plain films today.  He is currently doing \"big and loud therapy.\"  I will ask them to also start working with him on other his complaints.  He will continue with Ultram.  He may ultimately need MRI imaging.    He sees Dr. Snyder for Parkinson's disease.  She is also somewhat concerned for Lewy body dementia according to her note from August.  He is supposed to be on amantadine (he stopped it due to N/V) and Sinemet.  He sees her again in approximately 2 weeks.    His hemoglobin A1c is 6.3.  It was 6.5 in April.  Continue metformin and glyburide.    He has had mild microalbuminuria.  He is on losartan.    Blood pressure is 140/76 today.  On losartan-HCTZ.    He has difficulties with ceruminosis.  He had his ears cleansed today with improvement.    Took the seasonal flu vaccine today.    Plan to see him back in 3 months for reevaluation.  His next Medicare wellness visit is in April.  He knows that he can reach out sooner if problems.      Follow Up   Return in about 3 months (around 1/28/2025) for Recheck.  Patient was given instructions and counseling regarding his condition or for health maintenance advice. Please see specific information pulled into the AVS if appropriate.      WALKER Gutierrez DO       Electronically signed by CURT Gutierrez DO, 10/28/24, 10:53 AM CDT.  "

## 2024-10-28 NOTE — PROGRESS NOTES
Physical Therapy Treatment Note  [facility]  [address], [place] [address]    Patient: Gary Tinoco                                                 Visit Date: 10/28/2024  :     1945    Referring practitioner:    Melanie Snyder MD  Date of Initial Visit:          Type: THERAPY  Episode: Parkinson's disease manifestations  Number of visits this episode: 7    Visit Diagnoses:    ICD-10-CM ICD-9-CM   1. Parkinson's disease with fluctuating manifestations, unspecified whether dyskinesia present  G20.A2 332.0           SUBJECTIVE     Subjective: He got an order to address his back.    PAIN: 6/10       OBJECTIVE     Objective     Neuromuscular Reeducation     15623 Comments   HVLA exercises    Timed Minutes 45     Therapy Education/Self Care 05976   Education offered today See below   Medbride Code    Ongoing HEP   High velocity, large amplitude dynamic UE/LE movements   Timed Minutes        Total Timed Treatment:     45   mins  Total Time of Visit:             45   mins         ASSESSMENT/PLAN      GOALS  Goals                                          Progress Note due by 24                                                      Recert due by 24   STG by: 6 weeks Comments Date Status   Improve gait mechanics to demonstrate increased arm swing and trunk rotation  tendency to ambulate with BUE held stiff in a C3P0 pose, will swing RUE minimally   10/24  Ongoing   Able to ambulate during 6 MWT w/o increase in foot drag throughout test  Considerable increase in B foot drag L>R throughout 6MWT today likely d/t hip pain, requiring a seated rest break  10/24  Ongoing   LTG by: 12 weeks          Lott balance score of 53/56 or better IE: 39/56  : 50/56  10/24  Ongoing   Improve FGA by >/=4 points 10/24: 19/30  10/24  Ongoing   Improve 6MTW by 270 ft to meet MDC for Parkinson's disease IE: 957 FT w/o break  10/24: 930ft with 30  sec seated rest break d/t LBP/hip pain  10/24  Ongoing      Anticipated CPT codes: Gait Training 04659, Therapeutic Exercise 47416, Manual Therapy 06260, Therapeutic Activity 55847, Neuromuscular ReEducation 19830, and Self Care/Home Management 13415      Assessment/Plan       ASSESSMENT: He exhibits decreased carryover of HVLA exs and admittedly reports not performing them at home.     PLAN: progress high velocity large amplitude movements    SIGNATURE: Niru Smith PT DPT, [place] License #: [id number]  Electronically Signed on 10/28/2024        [facility] [address]  [place] [address]  [phone]

## 2024-10-31 ENCOUNTER — HOSPITAL ENCOUNTER (OUTPATIENT)
Dept: PHYSICAL THERAPY | Facility: HOSPITAL | Age: 79
Setting detail: THERAPIES SERIES
Discharge: HOME | End: 2024-10-31
Payer: MEDICARE

## 2024-10-31 DIAGNOSIS — G20.A2 PARKINSON'S DISEASE WITH FLUCTUATING MANIFESTATIONS, UNSPECIFIED WHETHER DYSKINESIA PRESENT: Primary | ICD-10-CM

## 2024-10-31 PROCEDURE — 97112 NEUROMUSCULAR REEDUCATION: CPT

## 2024-10-31 PROCEDURE — 97530 THERAPEUTIC ACTIVITIES: CPT

## 2024-10-31 NOTE — PROGRESS NOTES
Physical Therapy Treatment Note  [facility]patient Therapy Services  A department [address], [place] [address]    Patient: Gary Tinoco                                                 Visit Date: 10/31/2024  :     1945    Referring practitioner:    Melanie Snyder MD  Date of Initial Visit:          Type: THERAPY  Episode: Parkinson's disease manifestations  Number of visits this episode: 8    Visit Diagnoses:    ICD-10-CM ICD-9-CM   1. Parkinson's disease with fluctuating manifestations, unspecified whether dyskinesia present  G20.A2 332.0       SUBJECTIVE     Subjective: He's been okay. Nothing new or different. His back pain is constant     PAIN: 5-6/10       OBJECTIVE     Objective     Neuromuscular Reeducation     25179 Comments   We will rock you stomp stomp ball smack, seated  0.5 speed; difficulty with coordinating    Standing we will rock you stomp stomp + clap with theradisks  0.5 speed    Fwd and side lunges onto BOSU + OH clap  X15 ea            Timed Minutes 30     Therapeutic Activities    48261 Comments   STS + double punch into ball                     Timed Minutes 10       Therapy Education/Self Care 76760   Education offered today See below   Medbride Code    Ongoing HEP   High velocity, large amplitude dynamic UE/LE movements   Timed Minutes        Total Timed Treatment:     40   mins  Total Time of Visit:             40   mins         ASSESSMENT/PLAN      GOALS  Goals                                          Progress Note due by 24                                                      Recert due by 24   STG by: 6 weeks Comments Date Status   Improve gait mechanics to demonstrate increased arm swing and trunk rotation  tendency to ambulate with BUE held stiff in a C3P0 pose, will swing RUE minimally   10/24  Ongoing   Able to ambulate during 6 MWT w/o increase in foot drag throughout test  Considerable increase in B foot drag L>R  throughout 6MWT today likely d/t hip pain, requiring a seated rest break  10/24  Ongoing   LTG by: 12 weeks          Lott balance score of 53/56 or better IE: 39/56  010/24: 50/56  10/24  Ongoing   Improve FGA by >/=4 points 10/24: 19/30  10/24  Ongoing   Improve 6MTW by 270 ft to meet MDC for Parkinson's disease IE: 957 FT w/o break  10/24: 930ft with 30 sec seated rest break d/t LBP/hip pain  10/24  Ongoing      Anticipated CPT codes: Gait Training 19140, Therapeutic Exercise 35648, Manual Therapy 40490, Therapeutic Activity 81336, Neuromuscular ReEducation 88931, and Self Care/Home Management 54145      Assessment/Plan       ASSESSMENT: Continued with HVLA movements and attempted to perform some in time with music, which proved a good challenge for him. He did better when PT did not perform in time with him. He struggled with confidence with large amplitude BOSU movements.       PLAN: progress high velocity large amplitude movements    SIGNATURE: Ruthie Lilly PT T, [place] License #: [id number]    Electronically Signed on 10/31/2024        [address] [place] [address] [place] [address]  [phone]

## 2024-11-04 ENCOUNTER — HOSPITAL ENCOUNTER (OUTPATIENT)
Dept: PHYSICAL THERAPY | Facility: HOSPITAL | Age: 79
Setting detail: THERAPIES SERIES
Discharge: HOME | End: 2024-11-04
Payer: MEDICARE

## 2024-11-04 DIAGNOSIS — G20.A2 PARKINSON'S DISEASE WITH FLUCTUATING MANIFESTATIONS, UNSPECIFIED WHETHER DYSKINESIA PRESENT: Primary | ICD-10-CM

## 2024-11-04 PROCEDURE — 97112 NEUROMUSCULAR REEDUCATION: CPT

## 2024-11-04 NOTE — PROGRESS NOTES
Physical Therapy Treatment Note  [facility] Therapy Services  A [address] [place], [place] [address]    Patient: Gary Tinoco                                                 Visit Date: 2024  :     1945    Referring practitioner:    Melanie Snyder MD  Date of Initial Visit:          Type: THERAPY  Episode: Parkinson's disease manifestations  Number of visits this episode: 9    Visit Diagnoses:    ICD-10-CM ICD-9-CM   1. Parkinson's disease with fluctuating manifestations, unspecified whether dyskinesia present  G20.A2 332.0         SUBJECTIVE     Subjective: Nothing new to report.     PAIN: 5-6/10 buttocks       OBJECTIVE     Objective     Neuromuscular Reeducation     [id number] Comments   HVLA STS    HVLA SL STS    HVLA mvmts    80 bpm metronome target taps    Backward walking w/ reciprocal arm swing    Timed Minutes 45     Therapy Education/Self Care 70454   Education offered today See below   Medbride Code    Ongoing HEP   High velocity, large amplitude dynamic UE/LE movements   Timed Minutes        Total Timed Treatment:    45   mins  Total Time of Visit:           45    mins         ASSESSMENT/PLAN      GOALS  Goals                                          Progress Note due by 24                                                      Recert due by 24   STG by: 6 weeks Comments Date Status   Improve gait mechanics to demonstrate increased arm swing and trunk rotation  tendency to ambulate with BUE held stiff in a C3P0 pose, will swing RUE minimally   10/24  Ongoing   Able to ambulate during 6 MWT w/o increase in foot drag throughout test  Considerable increase in B foot drag L>R throughout 6MWT today likely d/t hip pain, requiring a seated rest break  10/24  Ongoing   LTG by: 12 weeks          Lott balance score of 53/56 or better IE: 39/56  : 50/56  10/24  Ongoing   Improve FGA by >/=4 points 10/24: 19/30  10/24  Ongoing   Improve 6MTW  by 270 ft to meet MDC for Parkinson's disease IE: 957 FT w/o break  10/24: 930ft with 30 sec seated rest break d/t LBP/hip pain  10/24  Ongoing      Anticipated CPT codes: Gait Training 48717, Therapeutic Exercise 92889, Manual Therapy 56847, Therapeutic Activity 70402, Neuromuscular ReEducation 53774, and Self Care/Home Management 26961      Assessment/Plan       ASSESSMENT: He exhibited difficulty w/ coordinating reciprocal arm swing during rock and reach HVLA activity which translates to minimal arm swing LE > RUE during gait.     PLAN: progress high velocity large amplitude movements    SIGNATURE: Niru Smith PT DPT, [place] License #: [id number]  Electronically Signed on 11/4/2024        [place] [address]  [phone]

## 2024-11-06 ENCOUNTER — APPOINTMENT (OUTPATIENT)
Dept: OCCUPATIONAL THERAPY | Facility: HOSPITAL | Age: 79
End: 2024-11-06
Payer: MEDICARE

## 2024-11-06 ENCOUNTER — APPOINTMENT (OUTPATIENT)
Dept: PHYSICAL THERAPY | Facility: HOSPITAL | Age: 79
End: 2024-11-06
Payer: MEDICARE

## 2024-11-07 ENCOUNTER — HOSPITAL ENCOUNTER (OUTPATIENT)
Dept: GENERAL RADIOLOGY | Facility: HOSPITAL | Age: 79
Discharge: HOME | End: 2024-11-07
Admitting: PSYCHIATRY & NEUROLOGY
Payer: MEDICARE

## 2024-11-07 DIAGNOSIS — R13.10 DYSPHAGIA, UNSPECIFIED TYPE: ICD-10-CM

## 2024-11-07 DIAGNOSIS — K22.5 ZENKER DIVERTICULUM: Primary | ICD-10-CM

## 2024-11-07 DIAGNOSIS — G20.A2 PARKINSON'S DISEASE WITH FLUCTUATING MANIFESTATIONS, UNSPECIFIED WHETHER DYSKINESIA PRESENT: ICD-10-CM

## 2024-11-07 DIAGNOSIS — R13.10 DYSPHAGIA, UNSPECIFIED TYPE: Primary | ICD-10-CM

## 2024-11-07 PROCEDURE — A9270 NON-COVERED ITEM OR SERVICE: HCPCS | Performed by: RADIOLOGY

## 2024-11-07 PROCEDURE — 74230 X-RAY XM SWLNG FUNCJ C+: CPT

## 2024-11-07 PROCEDURE — 63710000001 BARIUM SULFATE 40 % SUSPENSION: Performed by: RADIOLOGY

## 2024-11-07 PROCEDURE — 92611 MOTION FLUOROSCOPY/SWALLOW: CPT

## 2024-11-07 PROCEDURE — 63710000001 BARIUM SULFATE 40 % RECONSTITUTED SUSPENSION: Performed by: RADIOLOGY

## 2024-11-07 PROCEDURE — 63710000001 BARIUM SULFATE 40 % PASTE: Performed by: RADIOLOGY

## 2024-11-07 RX ADMIN — BARIUM SULFATE 25 ML: 400 SUSPENSION ORAL at 09:24

## 2024-11-07 RX ADMIN — BARIUM SULFATE 55 ML: 400 SUSPENSION ORAL at 09:24

## 2024-11-07 RX ADMIN — BARIUM SULFATE 25 ML: 400 PASTE ORAL at 09:24

## 2024-11-07 RX ADMIN — BARIUM SULFATE 55 ML: 0.81 POWDER, FOR SUSPENSION ORAL at 09:24

## 2024-11-07 NOTE — MBS/VFSS/FEES
Speech Language Pathology   MBS  / Discharge Summary  [facility]       Patient Name: Gary Tinoco  : 1945  MRN: [id number]    Today's Date: 2024      Visit Date: 2024     SPEECH-LANGUAGE PATHOLOGY EVALUTION - VFSS  Subjective: The patient was seen on this date for a VFSS(Videofluoroscopic Swallowing Study).  Patient was alert and cooperative.  Patient wife was present after the procedure for education.    Significant history: Patient is seen for a video swallow study due to concerns with choking during meals. Patient medical history includes diabetes, Parkinson's disease with dyskinesia, spinal stenosis, degenerative disc disorder, and essential hypertension.     Objective: Risks/benefits were reviewed with the patient and family, and consent was obtained. The study was completed with SLP and Radiologist present. The patient was seen in lateral view(s). Textures given included  pudding thick liquid, thin liquid, nectar thick liquid, honey thick liquid, mechanical soft consistency, and regular consistency.    Assessment: Difficulties were noted with  all consistencies , characterized by decreased oral manipulation, decreased mastication, extended oral transit time, delayed swallow initiation, decreased Hyo-laryngeal elevation and excursion was slow epiglottic inversion allowing flash penetration with thin liquids, decreased lingual strength, decreased base of tongue, decreased pharyngeal stripping wave, and residue in the oral cavity and pharynx after the swallow.     The patient demonstrated penetration.  Noted with and thin liquid.    Residue was  trace to minimal, on the soft palate, lingual surface, base of tongue, vallecula, posterior pharyngeal wall, and within the upper esophageal area .    Esophageal screen was performed and demonstrated  retention in the upper esophageal area, consistent with a Zenker's diverticulum .    SLP Findings: Patient presents with mild to moderate oropharyngeal  dysphagia with esophageal dysphagia.      Comments: Extensive education provided to patient and wife after completion of the video swallow study.  Diet modifications and compensatory strategies were discussed and patient was able to demonstrate after teach back.  Patient was agreeable to outpatient speech therapy, which has been scheduled at [facility].    Recommendations: Diet Textures: thin liquid, regular consistency food. Medications should be taken whole with thin liquids.  Patient would benefit from outpatient speech therapy to address deficits mentioned above with treatments including, swallow exercises, expiratory muscle strength  use, neuromuscular electrical stimulation, and continued education.    Recommended Strategies:  Observe reflux precautions, such as elevated head of bed and no meals 2 hours prior to sleeping, Upright for PO, small bites and sips, may use straw, and alternate liquids and solids. Oral care before breakfast, after all meals and PRN.    Other Recommended Evaluations: Referral to ENT to evaluate the Zenker's diverticulum.  Patient reports difficulty managing secretions at home throughout the day.  Patient advised to discuss with MD, who may recommend a scopolamine patch for secretion management.      Dysphagia therapy is recommended. Rationale: Education, training, and instruction in dysphagia.     Lynda Low, SLP 11/7/2024 11:06 CST    Visit Dx:     ICD-10-CM ICD-9-CM   1. Parkinson's disease with fluctuating manifestations, unspecified whether dyskinesia present  G20.A2 332.0   2. Dysphagia, unspecified type  R13.10 787.20       Patient Active Problem List   Diagnosis    Abnormal LFTs    Anemia of chronic disease    Hypertension, benign    Right thyroid nodule    Substernal thyroid    Screening PSA (prostate specific antigen)    Type 2 diabetes mellitus without complication, without long-term current use of insulin    History of adenomatous polyp of colon     Family hx colonic polyps    Mixed hyperlipidemia    Parkinson's disease with fluctuating manifestations        Past Medical History:   Diagnosis Date    Arthritis     Cataract     had surgery in Feb 2023 both eyes    Colon polyp     Diabetes mellitus     Difficulty walking     Diverticulosis     History of adenomatous polyp of colon     Hyperlipidemia     Hypertension     Parkinson's disease     Shingles     Tremor         Past Surgical History:   Procedure Laterality Date    APPENDECTOMY      COLONOSCOPY  04/19/2016    polyp, tubular adenoma, diverticulosis    COLONOSCOPY N/A 03/19/2021    Procedure: COLONOSCOPY WITH ANESTHESIA;  Surgeon: Philip Ferrari MD;  Location:  [facility];  Service: Gastroenterology;  Laterality: N/A;  pre: hx polyps  post: poor prep  George Bond MD    COLONOSCOPY N/A 04/01/2021    Procedure: COLONOSCOPY WITH ANESTHESIA;  Surgeon: Philip Ferrari MD;  Location:  [facility];  Service: Gastroenterology;  Laterality: N/A;  preop; hx of polyps  postop; polyps   PCP George Bond     EYE SURGERY  feb 2023    HERNIA REPAIR      JOINT REPLACEMENT      KNEE SURGERY      VASECTOMY  1974                      SLP Adult Swallow Evaluation       Row Name 11/07/24 0911       Rehab Evaluation    Document Type evaluation  -MD    Subjective Information complains of  Difficulty swallowing  -MD    Patient Observations alert;cooperative;agree to therapy  -MD    Patient/Family/Caregiver Comments/Observations Wife present  -MD    Patient Effort good  -MD    Symptoms Noted During/After Treatment none  -MD       General Information    Patient Profile Reviewed yes  -MD    Pertinent History Of Current Problem Patient is seen for a video swallow study due to concerns with choking during meals.  Patient medical history includes diabetes, Parkinson's disease with dyskinesia, spinal stenosis, degenerative disc disorder, and essential hypertension.  -MD    Current Method of Nutrition regular  textures;thin liquids  -MD    Precautions/Limitations, Vision WFL;for purposes of eval  -MD    Precautions/Limitations, Hearing WFL;for purposes of eval  -MD    Prior Level of Function-Communication WFL  -MD    Prior Level of Function-Swallowing no diet consistency restrictions  -MD    Plans/Goals Discussed with patient;spouse/S.O.  -MD    Barriers to Rehab none identified  -MD       Pain    Pretreatment Pain Rating 0/10 - no pain  -MD    Posttreatment Pain Rating 0/10 - no pain  -MD       MBS/VFSS    Utensils Used spoon;straw  -MD    Consistencies Trialed regular textures;soft to chew textures;thin liquids;nectar/syrup-thick liquids;honey-thick liquids;pudding thick  -MD       MBS/VFSS Interpretation    Oral Prep Phase impaired oral phase of swallowing  -MD    Oral Transit Phase impaired  -MD    Oral Residue impaired  -MD    VFSS Summary see note  -MD       Oral Preparatory Phase    Oral Preparatory Phase poor rotary chew;inadequate manipulation  -MD    Inadequate Manipulation regular textures  -MD    Poor Rotary Chew regular textures  -MD       Oral Transit Phase    Impaired Oral Transit Phase delayed initiation of bolus transit;increased A-P transit time  -MD    Delayed Initiation of bolus transit all consistencies tested  -MD    Increased A-P Transit Time all consistencies tested  -MD       Oral Residue    Impaired Oral Residue lingual residue;palatal residue  -MD    Palatal Residue honey-thick liquids;thin liquids;pudding/puree  -MD    Lingual Residue all consistencies tested  -MD    Response to Oral Residue partial residue clearance;with spontaneous subsequent swallow  -MD       Initiation of Pharyngeal Swallow    Initiation of Pharyngeal Swallow bolus in pyriform sinuses  -MD    Pharyngeal Phase impaired pharyngeal phase of swallowing  -MD    Penetration During the Swallow thin liquids  -MD    Depth of Penetration shallow  -MD    Response to Penetration Yes  -MD    Responsed to penetration with spontaneous  swallow  -MD    Pharyngeal Residue base of tongue;valleculae;posterior pharyngeal wall  -MD    Response to Residue partial residue clearance;cleared residue with spontaneous subsequent swallow  -MD    Attempted Compensatory Maneuvers alternate liquids/solids;multiple swallows  -MD    Response to Attempted Compensatory Maneuvers reduced residue  -MD    Successful Compensatory Maneuver Competency patient able to;demonstrate compensations  -MD       Esophageal Phase    Esophageal Phase esophageal retention;see radiology report for further details;other (see comments)  Zenker's diverticulum  -MD       SLP Evaluation Clinical Impression    SLP Swallowing Diagnosis mild-moderate;oral dysphagia;pharyngeal dysphagia;esophageal dysphagia  -MD    Functional Impact risk of aspiration/pneumonia;risk of malnutrition;risk of dehydration  -MD    Swallow Criteria for Skilled Therapeutic Interventions Met demonstrates skilled criteria  -MD       Recommendations    Therapy Frequency (Swallow) evaluation only  -MD    SLP Diet Recommendation regular textures;thin liquids;other (see comments)  add gravy/sauce  -MD    Recommended Precautions and Strategies upright posture during/after eating;small bites of food and sips of liquid;alternate between small bites of food and sips of liquid;general aspiration precautions;fatigue precautions;reflux precautions  -MD    Oral Care Recommendations Oral Care before breakfast, after meals and PRN  -MD    SLP Rec. for Method of Medication Administration meds whole;with thin liquids;with puree  -MD    Anticipated Discharge Disposition (SLP) home with assist  -MD    Demonstrates Need for Referral to Another Service otolaryngology (ENT)  -MD    Swallowing Considerations per Physician Discretion other (see comments)  Medical management of difficulty managing secretions  -MD              User Key  (r) = Recorded By, (t) = Taken By, (c) = Cosigned By      Initials Name Provider Type    Lynda Tomlin,  SLP Speech and Language Pathologist                                    OP SLP Education       Row Name 11/07/24 1044       Education    Barriers to Learning No barriers identified  -MD    Education Provided Described results of evaluation;Patient expressed understanding of evaluation;Family/caregivers expressed understanding of evaluation;Patient participated in establishing goals and treatment plan;Family/caregivers participated in establishing goals and treatment plan;Patient demonstrated recommended strategies;Family/caregivers demonstrated recommended strategies  -MD    Assessed Learning needs;Learning motivation;Learning preferences;Learning readiness  -MD    Learning Motivation Strong  -MD    Learning Method Explanation;Demonstration;Teach back  -MD    Teaching Response Verbalized understanding;Demonstrated understanding  -MD              User Key  (r) = Recorded By, (t) = Taken By, (c) = Cosigned By      Initials Name Effective Dates    Lynda Tomlin, SLP 06/21/22 -                                          Time Calculation:   Untimed Charges  05615-KF Motion Fluoro Eval Swallow Minutes: 114  Total Minutes  Untimed Charges Total Minutes: 114   Total Minutes: 114    Therapy Charges for Today       Code Description Service Date Service Provider Modifiers Qty    [id number]  ST MOTION FLUORO EVAL SWALLOW 8 11/7/2024 Lynda Low, SLP GN 1                    Lynda Low, KARLEY  11/7/2024

## 2024-11-11 ENCOUNTER — HOSPITAL ENCOUNTER (OUTPATIENT)
Dept: PHYSICAL THERAPY | Facility: HOSPITAL | Age: 79
Setting detail: THERAPIES SERIES
Discharge: HOME | End: 2024-11-11
Payer: MEDICARE

## 2024-11-11 DIAGNOSIS — G20.A2 PARKINSON'S DISEASE WITH FLUCTUATING MANIFESTATIONS, UNSPECIFIED WHETHER DYSKINESIA PRESENT: Primary | ICD-10-CM

## 2024-11-11 PROCEDURE — 97112 NEUROMUSCULAR REEDUCATION: CPT

## 2024-11-11 PROCEDURE — 97116 GAIT TRAINING THERAPY: CPT

## 2024-11-11 NOTE — PROGRESS NOTES
Physical Therapy Treatment Note  [facility]patient Therapy [facility]  A department [facility]  [address], [place] [address]    Patient: Gary Tinoco                                                 Visit Date: 2024  :     1945    Referring practitioner:    Melanie Snyder MD  Date of Initial Visit:          Type: THERAPY  Episode: Parkinson's disease manifestations  Number of visits this episode: 10    Visit Diagnoses:    ICD-10-CM ICD-9-CM   1. Parkinson's disease with fluctuating manifestations, unspecified whether dyskinesia present  G20.A2 332.0       SUBJECTIVE     Subjective: Nothing new to report.    PAIN: 6/10 buttocks       OBJECTIVE     Objective     Gait Training          59607   Task/Terrain Asst AD Comments   Facilitated gait  Trekking poles    Timed Minutes 12       Neuromuscular Reeducation     58573 Comments   STS raheem step overs forward and lateral    SCIFIT L1 for reciprocal motion    Cross body punch into SB VC for HVLA and powerful mvmt   Hip F to SB VC for HVLA and powerful mvmt   Timed Minutes 33     Therapy Education/Self Care 35102   Education offered today See below   Medbride Code    Ongoing HEP   High velocity, large amplitude dynamic UE/LE movements   Timed Minutes        Total Timed Treatment:    45   mins  Total Time of Visit:           45    mins         ASSESSMENT/PLAN      GOALS  Goals                                          Progress Note due by 24                                                      Recert due by 24   STG by: 6 weeks Comments Date Status   Improve gait mechanics to demonstrate increased arm swing and trunk rotation  tendency to ambulate with BUE held stiff in a C3P0 pose, will swing RUE minimally   10/24  Ongoing   Able to ambulate during 6 MWT w/o increase in foot drag throughout test  Considerable increase in B foot drag L>R throughout 6MWT today likely d/t hip pain, requiring a seated rest break  10/24   Ongoing   LTG by: 12 weeks          Lott balance score of 53/56 or better IE: 39/56  010/24: 50/56  10/24  Ongoing   Improve FGA by >/=4 points 10/24: 19/30  10/24  Ongoing   Improve 6MTW by 270 ft to meet MDC for Parkinson's disease IE: 957 FT w/o break  10/24: 930ft with 30 sec seated rest break d/t LBP/hip pain  10/24  Ongoing      Anticipated CPT codes: Gait Training 53357, Therapeutic Exercise 46812, Manual Therapy 76549, Therapeutic Activity 68029, Neuromuscular ReEducation 71948, and Self Care/Home Management 49068      Assessment/Plan       ASSESSMENT: Little carryover noted between sessions w/ regard to ability to coordinate reciprocal UE/LE HVLA movement. He steps regress to a shuffling pattern w/ prolonged ambulation, however difficult to discern whether this is attributable in part d/t persisting sciatic pain.    PLAN: progress high velocity large amplitude movements    SIGNATURE: Niru Smith PT [place], [place] License #: [id number]  Electronically Signed on 11/11/2024        [place] [address]  [phone]

## 2024-11-12 ENCOUNTER — OFFICE VISIT (OUTPATIENT)
Dept: NEUROLOGY | Facility: CLINIC | Age: 79
End: 2024-11-12
Payer: MEDICARE

## 2024-11-12 ENCOUNTER — PATIENT ROUNDING (BHMG ONLY) (OUTPATIENT)
Dept: NEUROLOGY | Facility: CLINIC | Age: 79
End: 2024-11-12
Payer: MEDICARE

## 2024-11-12 VITALS
OXYGEN SATURATION: 97 % | DIASTOLIC BLOOD PRESSURE: 68 MMHG | SYSTOLIC BLOOD PRESSURE: 130 MMHG | HEART RATE: 79 BPM | BODY MASS INDEX: 27.3 KG/M2 | HEIGHT: 71 IN | WEIGHT: 195 LBS

## 2024-11-12 DIAGNOSIS — G20.A2 PARKINSON'S DISEASE WITH FLUCTUATING MANIFESTATIONS, UNSPECIFIED WHETHER DYSKINESIA PRESENT: ICD-10-CM

## 2024-11-12 DIAGNOSIS — K11.7 SIALORRHEA: Primary | ICD-10-CM

## 2024-11-12 PROCEDURE — 1159F MED LIST DOCD IN RCRD: CPT | Performed by: PSYCHIATRY & NEUROLOGY

## 2024-11-12 PROCEDURE — 3075F SYST BP GE 130 - 139MM HG: CPT | Performed by: PSYCHIATRY & NEUROLOGY

## 2024-11-12 PROCEDURE — 3078F DIAST BP <80 MM HG: CPT | Performed by: PSYCHIATRY & NEUROLOGY

## 2024-11-12 PROCEDURE — 99214 OFFICE O/P EST MOD 30 MIN: CPT | Performed by: PSYCHIATRY & NEUROLOGY

## 2024-11-12 PROCEDURE — 1160F RVW MEDS BY RX/DR IN RCRD: CPT | Performed by: PSYCHIATRY & NEUROLOGY

## 2024-11-12 RX ORDER — SCOLOPAMINE TRANSDERMAL SYSTEM 1 MG/1
1 PATCH, EXTENDED RELEASE TRANSDERMAL
Qty: 4 PATCH | Refills: 1 | Status: SHIPPED | OUTPATIENT
Start: 2024-11-12

## 2024-11-12 NOTE — PROGRESS NOTES
November 12, 2024    Hello, may I speak with Gary Tinoco?    My name is C      I am  with [facility]  [address]  [address]  [facility] [address] [phone].    Before we get started may I verify your date of birth? 1945    I am calling to ask about your recent visit. Is this a good time to talk? yes    Tell me about your visit with us. What things went well?  everything       We're always looking for ways to make our patients' experiences even better. Do you have recommendations on ways we may improve?  no    Overall were you satisfied with your follow up visit to our practice? yes       I appreciate you taking the time to speak with me today. Is there anything else I can do for you? no      Thank you, and have a great day.

## 2024-11-12 NOTE — PROGRESS NOTES
Chief Complaint    Subjective        Gary Tinoco presents to [facility] Neurology    History of Present Illness  79-year-old male here for follow-up.  He tried Sinemet again but still had nausea/vomiting so stopped.  Has been doing physical therapy.  Reports that his walking is mostly abnormal due to sciatic pain bother him currently.  He was referred to [facility] and it seems like they called him but he did not answer.  He also reports significant drooling and cannot cough because of that at night.  Had speech therapy/swallow study done.             Current Outpatient Medications:     amantadine (SYMMETREL) 100 MG capsule, Take 1 capsule by mouth 2 (Two) Times a Day for 180 days., Disp: 60 capsule, Rfl: 5    carbidopa-levodopa (SINEMET)  MG per tablet, Take 1 tablet by mouth 3 (Three) Times a Day., Disp: , Rfl:     clotrimazole-betamethasone (LOTRISONE) 1-0.05 % cream, Apply 1 Application topically to the appropriate area as directed 2 (Two) Times a Day., Disp: 15 g, Rfl: 1    Denta 5000 Plus 1.1 % cream, APPLY A THIN RIBBON TO BRUSH BRUSH FOR 2 MINUTES PREFERABLY AT BEDTIME, Disp: , Rfl:     glyburide (DIAbeta) 5 MG tablet, Take 1 tablet by mouth Daily With Breakfast., Disp: , Rfl:     losartan-hydrochlorothiazide (HYZAAR) 100-25 MG per tablet, TAKE 1 TABLET BY MOUTH EVERY DAY, Disp: 90 tablet, Rfl: 1    lovastatin (MEVACOR) 40 MG tablet, TAKE 1 TABLET BY MOUTH EVERY DAY, Disp: 90 tablet, Rfl: 3    metFORMIN (GLUCOPHAGE) 850 MG tablet, TAKE 1 TABLET BY MOUTH EVERY DAY, Disp: 90 tablet, Rfl: 3    naloxone (NARCAN) 4 MG/0.1ML nasal spray, , Disp: , Rfl:     traMADol (ULTRAM) 50 MG tablet, Take 1 tablet by mouth Every 12 (Twelve) Hours As Needed for Moderate Pain., Disp: 60 tablet, Rfl: 0       Objective   Vital Signs:   There were no vitals taken for this visit.    Physical Exam   Neurological Exam     Motor  Normal muscle bulk throughout. Strength is 5/5 throughout all four  extremities.  Hypomimia  Bradykinesia and rigidity on left more than right  No tremor  No glabellar reflex      Gait   Abnormal pull test.  Decreased arm swing left>right    Result Review :                     Assessment and Plan   79-year-old male with presumed Parkinson's disease.  He potentially had some improvement initially on Sinemet.  However he was unable to tolerate higher doses due to nausea.  Wife noted amantadine worked initially but no longer.  Due to her report of him having personality changes and potentially hallucinations, I am more concerned about something like Lewy body dementia.  However he has never been able to tolerate high enough dose of Sinemet to know whether it has helped him symptomatically.  He was still unable to tolerate Sinemet.  At this point he does not want to change medications.  He reports his sciatic issues are bothering him the most and he is seeing PCP for this.  He also reports drooling bothering him.  We discussed treatments for this including possible side effects of confusion.  He would like to try scopolamine patch even though I am concerned about it potentially causing side effects and him.  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     Plan:     1.  Continue amantadine 100 mg twice daily.  2.  An appointment with [facility] has been made.  3.  I have referred him to ENT as above.  4.  Trial of scopolamine patch for his drooling.  Again discussed risks with him.  We discussed trialing some benign Botox in the future as this would not cause systemic side effects and he would like to hold in research for now.  5.  Follow-up 6 months, after [facility].    Follow Up   No follow-ups on file.  Patient was given instructions and counseling regarding his condition or for health maintenance advice. Please see specific information pulled into the AVS if appropriate.

## 2024-11-13 ENCOUNTER — HOSPITAL ENCOUNTER (OUTPATIENT)
Dept: PHYSICAL THERAPY | Facility: HOSPITAL | Age: 79
Setting detail: THERAPIES SERIES
Discharge: HOME | End: 2024-11-13
Payer: MEDICARE

## 2024-11-13 DIAGNOSIS — G20.A2 PARKINSON'S DISEASE WITH FLUCTUATING MANIFESTATIONS, UNSPECIFIED WHETHER DYSKINESIA PRESENT: Primary | ICD-10-CM

## 2024-11-13 PROCEDURE — 97112 NEUROMUSCULAR REEDUCATION: CPT

## 2024-11-13 PROCEDURE — 97116 GAIT TRAINING THERAPY: CPT

## 2024-11-13 NOTE — PROGRESS NOTES
Physical Therapy Treatment Note  [facility]patient Therapy Services  A department [place] [address], [place] [address]    Patient: Gary Tinoco                                                 Visit Date: 2024  :     1945    Referring practitioner:    Melanie Snyder MD  Date of Initial Visit:          Type: THERAPY  Episode: Parkinson's disease manifestations  Number of visits this episode: 11    Visit Diagnoses:    ICD-10-CM ICD-9-CM   1. Parkinson's disease with fluctuating manifestations, unspecified whether dyskinesia present  G20.A2 332.0         SUBJECTIVE     Subjective: He has been working on the rock and reach.    PAIN: 6/10 buttocks       OBJECTIVE     Objective     Gait Training          34222   Task/Terrain Asst AD Comments   gait   VC for arm swing   Facilitated gait  Trekking poles    Timed Minutes 12       Neuromuscular Reeducation     14922 Comments   STS raheem step overs forward, lateral, backward to airex    Cross body punch on airex VC for HVLA and powerful mvmt   Super basil VC for HVLA and powerful mvmt   Timed Minutes 26     Therapy Education/Self Care 83862   Education offered today See below   Medbride Code    Ongoing HEP   High velocity, large amplitude dynamic UE/LE movements   Timed Minutes        Total Timed Treatment:    38   mins  Total Time of Visit:           38    mins         ASSESSMENT/PLAN      GOALS  Goals                                          Progress Note due by 24                                                      Recert due by 24   STG by: 6 weeks Comments Date Status   Improve gait mechanics to demonstrate increased arm swing and trunk rotation  tendency to ambulate with BUE held stiff in a C3P0 pose, will swing RUE minimally   10/24  Ongoing   Able to ambulate during 6 MWT w/o increase in foot drag throughout test  Considerable increase in B foot drag L>R throughout 6MWT today likely d/t hip pain, requiring a  seated rest break  10/24  Ongoing   LTG by: 12 weeks          Lott balance score of 53/56 or better IE: 39/56  010/24: 50/56  10/24  Ongoing   Improve FGA by >/=4 points 10/24: 19/30  10/24  Ongoing   Improve 6MTW by 270 ft to meet MDC for Parkinson's disease IE: 957 FT w/o break  10/24: 930ft with 30 sec seated rest break d/t LBP/hip pain  10/24  Ongoing      Anticipated CPT codes: Gait Training 41294, Therapeutic Exercise 44375, Manual Therapy 79286, Therapeutic Activity 87951, Neuromuscular ReEducation 03113, and Self Care/Home Management 31603      Assessment/Plan       ASSESSMENT: Slight carryover noted with arm swing after facilitated gait activities today. He also performed progressed stepping strategy activity w/ less difficulty today.    PLAN: progress high velocity large amplitude movements    SIGNATURE: Niru Smith PT DPT, [place] License #: [id number]  Electronically Signed on 11/13/2024        [place]. [id number]  [phone]

## 2024-11-15 ENCOUNTER — HOSPITAL ENCOUNTER (OUTPATIENT)
Dept: OCCUPATIONAL THERAPY | Facility: HOSPITAL | Age: 79
Setting detail: THERAPIES SERIES
Discharge: HOME | End: 2024-11-15
Payer: MEDICARE

## 2024-11-15 DIAGNOSIS — Z78.9 DECREASED ACTIVITIES OF DAILY LIVING (ADL): ICD-10-CM

## 2024-11-15 DIAGNOSIS — G20.A1 PARKINSON'S DISEASE, UNSPECIFIED WHETHER DYSKINESIA PRESENT, UNSPECIFIED WHETHER MANIFESTATIONS FLUCTUATE: ICD-10-CM

## 2024-11-15 DIAGNOSIS — R29.898 FINE MOTOR IMPAIRMENT: Primary | ICD-10-CM

## 2024-11-15 DIAGNOSIS — R29.818 FINE MOTOR IMPAIRMENT: Primary | ICD-10-CM

## 2024-11-15 PROCEDURE — 97535 SELF CARE MNGMENT TRAINING: CPT

## 2024-11-15 PROCEDURE — 97530 THERAPEUTIC ACTIVITIES: CPT

## 2024-11-15 NOTE — THERAPY TREATMENT NOTE
"Occupational Therapy 30 Day Progress Note  [facility] Outpatient Therapy Services  A [facility]  [address], [place] [address]    Patient: Gary Tinoco                                                 Visit Date: 11/15/2024  :     1945    Referring practitioner:    Melanie Snyder MD  Date of Initial Visit:          Type: THERAPY  Episode: FMC impaired from Parkinson's Disease  Number of visits this episode: 4    Visit Diagnoses:    ICD-10-CM ICD-9-CM   1. Fine motor impairment  R29.818 V49.89    R29.898    2. Decreased activities of daily living (ADL)  Z78.9 V49.89   3. Parkinson's disease, unspecified whether dyskinesia present, unspecified whether manifestations fluctuate  G20.A1 332.0       SUBJECTIVE     Subjective:  Pt reports no falls and the same old routine. Pt was sick a couple of weeks ago and missed our last appt d/t not feeling well. Pt reports he is trying a new supplement called \"Siatic\" for his nerve pain. Encouraged pt to make sure he has let his PCP and Neurologist know he is taking this, he reports he has already.      PAIN:  Pre-Treatment: 6/10; Sciatic Nerve   Post-Treatment: 6/10     Outcome Measures:   9 Hole Peg Test: Left: 1:03.35s Right: 38.12s    PT G-Codes  Quick DASH Score: 31.82    OBJECTIVE     Objective          Strength/Myotome Testing     Left Wrist/Hand      (2nd hand position)     Trial 1: 15 lbs    Trial 2: 15 lbs    Trial 3: 15 lbs    Average: 15 lbs    Right Wrist/Hand      (2nd hand position)     Trial 1: 16.5 lbs    Trial 2: 15 lbs    Trial 3: 16 lbs    Average: 15.83 lbs        Self-Care/IADL Training    49737 Comments   Completed buttoning/un-buttoning of large and small buttons. Large buttons min difficulty; small buttons mod difficulty. Keyon mod difficulty. Tying laces no difficulty.     Practiced cutting skills using fork and knife; cutting thera-putty. Completed with mod difficulty and required cues for cutting " appropriate sized bites.     Completed bed mobility tasks of supine <> sit x2 independently used edge of mat table for pulling assistance.             Timed Minutes 30     Therapeutic Activities    83552 Comments   Completed FM activity  using B hands to remove beads from green thera-putty focusing on pinch strength and FMC in B hands. Pt performed activity with mod-max difficulty and increased time.     All ADL, pinch strength and outcome measures assessed for PN. See details above.    Completed dynamic reaching activity of retrieving cones both in front of him on the floor and behind him on the mat, with minimal difficulty. Tasks completed from unsupported sitting on the edge of the mat table. Focusing on balance and core strength needed for dressing tasks and retrieving dropped items.             Timed Minutes 20     Therapy Education/Self Care 46157   Education offered today Discussed progress toward goals and ADL performance a home.    Medbride Code    Ongoing HEP   Thera-Putty  Strengthening Exercises    Timed Minutes        Total Timed Treatment:     50   mins  Total Time of Visit:             50   mins         ASSESSMENT/PLAN     GOALS  Goals                                          Progress Note due by 12/15/24                                                      Recert due by 1/7/25   STG by: 12/15/24 Comments Date Status   Pt will be independent with daily completion of a HEP to address deficits from Parkinson's Disease affecting ADL/IADL's. Pt reports daily compliance.  11/15/24 Ongoing    Pt will display improved B UE FMC by completing the 9 hole peg test in 25 seconds or less. Left: 1:03.35s   Right: 38.12s 11/15/24 Ongoing    Pt will complete clothing fasteners including buttons, zippers, and keyon with Mod I to improve ADL performance. Completed buttoning/un-buttoning of large and small buttons. Large buttons min difficulty; small buttons mod difficulty. Keyon mod difficulty. Tying laces no  difficulty.   Pt reports his wife is still helping with these task at home sometimes when he gets frustrated.  11/15/24 Progressing           LTG by: 1/7/25      Pt will increase B hand key pinch strength to 20# for improved performance with IADL tasks. R Hand: 15.83 lbs   L Hand: 15 lbs  11/15/24 Ongoing   Pt will perform all aspects of self-feeding with Mod I to reduce caregiver burden and improve ADL performance.  Practiced cutting skills using fork and knife; cutting thera-putty. Completed with mod difficulty and required cues for cutting appropriate sized bites.  11/15/24 Progressing    Pt will increase core strength to improve performance during bed mobility for safety and independence with ADLs.  Completed bed mobility tasks of supine <> sit x2 independently used edge of mat table for pulling assistance.  11/15/24 Met    Pt will pass all 4 visual scanning assessments using the BITS to demonstrate appropriate use of compensation techniques for R visual field deficit.   Trail Making Part A completed with the R UE. 1 errors, 6 interruptions and a time of 1:33. Part B completed with 0 errors, 0 interruptions and a time of 1:45.  Bell Cancellation Test completed with the R UE in a time of 2:17 with 5 errors and 2 distractor hits.   Computerized Maze Assessment completed with the R UE in a time of 0:47 with 0 errors.   Visual Scanning and Motor Reaction Assessment completed with the R UE. Level 1 36/50 required targets.     Pt was able to pass 1/4 assessment levels. 11/15/24 Progressing       Anticipated CPT codes: Therapeutic Exercise 47610, Therapeutic Activity 91069, and Self Care/Home Management 73334    Assessment/Plan     ASSESSMENT:   Pt continues to do well during OT tx sessions. In the past 4 weeks he has been seen for 3 tx sessions, d/t illness. Pt has made progress toward multiple of his goals, meeting one goal regarding bed mobility. Pt continues to demo difficulties and has shown minimal improvement  with B FMC and B thumb pinch strength over the past 4 weeks. Pt has made progress with hand strength to complete cutting skills necessary for independent self-feeding.     PLAN:   Will continue to address deficits in B FMC, B thumb strength, ADL//IADL performance, and bed mobility.     SIGNATURE: Carly Owen OT, [place] License #: [id number]  Electronically Signed on 11/15/2024        [address] [place] [address]. [address]  [phone]

## 2024-11-18 ENCOUNTER — HOSPITAL ENCOUNTER (OUTPATIENT)
Dept: OCCUPATIONAL THERAPY | Facility: HOSPITAL | Age: 79
Setting detail: THERAPIES SERIES
Discharge: HOME | End: 2024-11-18
Payer: MEDICARE

## 2024-11-18 ENCOUNTER — HOSPITAL ENCOUNTER (OUTPATIENT)
Dept: PHYSICAL THERAPY | Facility: HOSPITAL | Age: 79
Setting detail: THERAPIES SERIES
Discharge: HOME | End: 2024-11-18
Payer: MEDICARE

## 2024-11-18 DIAGNOSIS — Z78.9 DECREASED ACTIVITIES OF DAILY LIVING (ADL): ICD-10-CM

## 2024-11-18 DIAGNOSIS — R29.818 FINE MOTOR IMPAIRMENT: Primary | ICD-10-CM

## 2024-11-18 DIAGNOSIS — R29.898 FINE MOTOR IMPAIRMENT: Primary | ICD-10-CM

## 2024-11-18 DIAGNOSIS — G20.A1 PARKINSON'S DISEASE, UNSPECIFIED WHETHER DYSKINESIA PRESENT, UNSPECIFIED WHETHER MANIFESTATIONS FLUCTUATE: ICD-10-CM

## 2024-11-18 DIAGNOSIS — G20.A2 PARKINSON'S DISEASE WITH FLUCTUATING MANIFESTATIONS, UNSPECIFIED WHETHER DYSKINESIA PRESENT: Primary | ICD-10-CM

## 2024-11-18 PROCEDURE — 97116 GAIT TRAINING THERAPY: CPT

## 2024-11-18 PROCEDURE — 97110 THERAPEUTIC EXERCISES: CPT

## 2024-11-18 PROCEDURE — 97112 NEUROMUSCULAR REEDUCATION: CPT

## 2024-11-18 PROCEDURE — 97530 THERAPEUTIC ACTIVITIES: CPT

## 2024-11-18 NOTE — PROGRESS NOTES
"Physical Therapy Treatment Note  [facility]  A department [facility]  [address], [place] [address]    Patient: Gary Tinoco                                                 Visit Date: 2024  :     1945    Referring practitioner:    Melanie Snyder MD  Date of Initial Visit:          Type: THERAPY  Episode: Parkinson's disease manifestations  Number of visits this episode: 12    Visit Diagnoses:    ICD-10-CM ICD-9-CM   1. Parkinson's disease with fluctuating manifestations, unspecified whether dyskinesia present  G20.A2 332.0     SUBJECTIVE     Subjective: Nothing new to report.    PAIN: 7-8/10 buttocks       OBJECTIVE     Objective     Gait Training          54937   Task/Terrain Asst AD Comments   gait   VC for arm swing   Metronome 6\" step ups   60 bpm   Facilitated gait  Trekking poles    Timed Minutes 18       Neuromuscular Reeducation     34301 Comments   6\" alt LE taps to metronome 75 bpm   STS raheem step overs forward, lateral, backward to airex VC for HVLA UE mvmt   Rock and reach VC for HVLA and powerful mvmt   Super basil VC for HVLA and powerful mvmt, external cue yellow TB around // bars to drive knee to target   Timed Minutes 24     Therapy Education/Self Care 67905   Education offered today See below   Medbride Code    Ongoing HEP   High velocity, large amplitude dynamic UE/LE movements   Timed Minutes        Total Timed Treatment:    42   mins  Total Time of Visit:            42   mins         ASSESSMENT/PLAN      GOALS  Goals                                          Progress Note due by 24                                                      Recert due by 24   STG by: 6 weeks Comments Date Status   Improve gait mechanics to demonstrate increased arm swing and trunk rotation  tendency to ambulate with BUE held stiff in a C3P0 pose, will swing RUE minimally   10/24  Ongoing   Able to ambulate during 6 MWT w/o increase in foot " drag throughout test  Considerable increase in B foot drag L>R throughout 6MWT today likely d/t hip pain, requiring a seated rest break  10/24  Ongoing   LTG by: 12 weeks          Lott balance score of 53/56 or better IE: 39/56  010/24: 50/56  10/24  Ongoing   Improve FGA by >/=4 points 10/24: 19/30  10/24  Ongoing   Improve 6MTW by 270 ft to meet MDC for Parkinson's disease IE: 957 FT w/o break  10/24: 930ft with 30 sec seated rest break d/t LBP/hip pain  10/24  Ongoing      Anticipated CPT codes: Gait Training 94749, Therapeutic Exercise 45835, Manual Therapy 38680, Therapeutic Activity 99630, Neuromuscular ReEducation 68752, and Self Care/Home Management 06247      Assessment/Plan       ASSESSMENT: He still has difficulty coordinating reciprocal arm swing though this has improved. He exhibited minimal instability w/ dynamic tasks today.    PLAN: PN NEXT SESSION. progress high velocity large amplitude movements    SIGNATURE: Niru Smith PT DPT, [place] License #: [id number]  Electronically Signed on 11/18/2024        [place]. [id number]  [phone]

## 2024-11-18 NOTE — THERAPY TREATMENT NOTE
Occupational Therapy Treatment Note  [facility]  A department [place] [address], [place] [address]    Patient: Gary Tinoco                                                 Visit Date: 2024  :     1945    Referring practitioner:    Melanie Snyder MD  Date of Initial Visit:          Type: THERAPY  Episode: FMC impaired from Parkinson's Disease  Number of visits this episode: 5    Visit Diagnoses:    ICD-10-CM ICD-9-CM   1. Fine motor impairment  R29.818 V49.89    R29.898    2. Decreased activities of daily living (ADL)  Z78.9 V49.89   3. Parkinson's disease, unspecified whether dyskinesia present, unspecified whether manifestations fluctuate  G20.A1 332.0       SUBJECTIVE     Subjective:  Pt reports he is weak and has had a tough day today. He reports he had to take a second pain pill   This morning. Pt reports his daughter and grand-daughter will be in town next week for the holidays   And he is looking forward to them being in town.     PAIN:  Pre-Treatment: -8/10; Sciatic Nerve   Post-Treatment: 7/10        OBJECTIVE     Objective   Therapeutic Exercises    14619 Units Comments   Pt completed B hand/wrist strengthening in all planes using moderate resistance flex bar to simulate opening various jars and containers performing 1 set of 10 reps.     Pt completed B thumb strengthening in all planes using power web, performing 1 set of 20 reps each.                     Timed Minutes 15     Therapeutic Activities    15094 Comments   Pt completed B hand clip strengthening using min-max resistance clips while also focusing on endurance and functional reaching. Pt was able to remove all clips from horizontal bar and place them on vertical pole with min difficulty using R hand. Pt was then able to remove all clips and place them back on horizontal pole with min difficulty using L hand.     Pt completed Perfection game focusing on FMC in B hands,  performed with min difficulty, requiring increased time.     Pt completed FM activity using standard tweezers to  toothpicks in B hands. Pt performed task with min difficulty in B hands.             Timed Minutes 30     Therapy Education/Self Care 47168   Education offered today Discussed compliance with HEP and attending social events with friends.    Medbride Code    Ongoing HEP   Thera-Putty  Strengthening Exercises    Timed Minutes        Total Timed Treatment:     45   mins  Total Time of Visit:             45   mins         ASSESSMENT/PLAN     GOALS  Goals                                          Progress Note due by 12/15/24                                                      Recert due by 1/7/25   STG by: 12/15/24 Comments Date Status   Pt will be independent with daily completion of a HEP to address deficits from Parkinson's Disease affecting ADL/IADL's. Pt reports daily compliance.  11/15/24 Ongoing    Pt will display improved B UE FMC by completing the 9 hole peg test in 25 seconds or less. Completed FM activities with min difficulty.  11/15/24 Ongoing    Pt will complete clothing fasteners including buttons, zippers, and martine with Mod I to improve ADL performance.  11/15/24 Progressing           LTG by: 1/7/25      Pt will increase B hand key pinch strength to 20# for improved performance with IADL tasks. Completed B thumb strengthening in all planes using power web.   Completed resistance clips activity focusing on thumb pinch strength in B hands.  11/15/24 Ongoing   Pt will perform all aspects of self-feeding with Mod I to reduce caregiver burden and improve ADL performance.   11/15/24 Progressing    Pt will increase core strength to improve performance during bed mobility for safety and independence with ADLs.   11/15/24 Met    Pt will pass all 4 visual scanning assessments using the BITS to demonstrate appropriate use of compensation techniques for R visual field deficit.    11/15/24  Progressing       Anticipated CPT codes: Therapeutic Exercise 20109, Therapeutic Activity 76498, and Self Care/Home Management 62648    Assessment/Plan     ASSESSMENT:   Pt did well during today's session, regardless of feeling weak and having more pain than normal today. Pt tolerated B thumb strengthening and B FMC tasks well with no reports of increased pain and minimal difficulty with tasks. Pt continues to report compliance with HEP and social activities.     PLAN:   Will continue to address deficits in B FMC, B thumb strength, ADL//IADL performance, and bed mobility.     SIGNATURE: Carly Owen OT, [place] License #: [id number]  Electronically Signed on 11/18/2024        [address] [place] [address] [place]. [address]  [phone]

## 2024-11-19 ENCOUNTER — TELEPHONE (OUTPATIENT)
Dept: NEUROLOGY | Facility: CLINIC | Age: 79
End: 2024-11-19
Payer: MEDICARE

## 2024-11-19 NOTE — TELEPHONE ENCOUNTER
Provider: DR RUIZ    Caller: Katelyn Tinoco    Relationship to Patient: Emergency Contact    Phone Number: [phone]    Reason for Call: STATES PATIENT IS SUFFERING WITH SCIATIC PAIN. STATES HE HAS TO USE A WALKER AND CAN HARDLY GET AROUND OR FOCUS ON ANYTHING. WOULD LIKE TO SEE IF THEY CAN HOLD OFF ON THE PHYSICAL THERAPY FOR NOW UNTIL SCIATIC PAIN IS UNDER CONTROL. ALSO LOOKING FOR RECOMMENDATIONS FROM DR RUIZ TO GET CARE FOR THE SCIATIC PAIN. PLEASE REVIEW & ADVISE, THANK YOU.

## 2024-11-20 ENCOUNTER — HOSPITAL ENCOUNTER (OUTPATIENT)
Dept: PHYSICAL THERAPY | Facility: HOSPITAL | Age: 79
Setting detail: THERAPIES SERIES
Discharge: HOME | End: 2024-11-20
Payer: MEDICARE

## 2024-11-20 ENCOUNTER — HOSPITAL ENCOUNTER (OUTPATIENT)
Facility: HOSPITAL | Age: 79
Discharge: HOME | End: 2024-11-20
Payer: MEDICARE

## 2024-11-20 DIAGNOSIS — R13.12 OROPHARYNGEAL DYSPHAGIA: Primary | ICD-10-CM

## 2024-11-20 DIAGNOSIS — G20.A2 PARKINSON'S DISEASE WITH FLUCTUATING MANIFESTATIONS, UNSPECIFIED WHETHER DYSKINESIA PRESENT: Primary | ICD-10-CM

## 2024-11-20 PROCEDURE — 92610 EVALUATE SWALLOWING FUNCTION: CPT | Performed by: SPEECH-LANGUAGE PATHOLOGIST

## 2024-11-20 PROCEDURE — 97112 NEUROMUSCULAR REEDUCATION: CPT

## 2024-11-20 PROCEDURE — 97116 GAIT TRAINING THERAPY: CPT

## 2024-11-20 NOTE — TELEPHONE ENCOUNTER
Called pt's wife back to let her know that Dr. Snyder suggested to speak with PCP regarding sciatic issues & that therapy could actually be helpful for this. Wife states pt plans to see Dr. Chiang & voiced understanding.

## 2024-11-20 NOTE — PROGRESS NOTES
"                                                           Speech/Language Pathology Initial Evaluation and Plan of Care  [address] [place] [address]    Patient: Gary Tinoco               : 1945  Visit Date: 2024  Referring practitioner: Melanie Snyder MD  Date of Initial Visit: 2024      Visit Diagnoses:    ICD-10-CM ICD-9-CM   1. Oropharyngeal dysphagia  R13.12 787.22     Past Medical History:   Diagnosis Date    Arthritis     Cataract     had surgery in 2023 both eyes    Colon polyp     Diabetes mellitus     Difficulty walking     Diverticulosis     History of adenomatous polyp of colon     Hyperlipidemia     Hypertension     Parkinson's disease     Shingles     Tremor      Past Surgical History:   Procedure Laterality Date    APPENDECTOMY      COLONOSCOPY  2016    polyp, tubular adenoma, diverticulosis    COLONOSCOPY N/A 2021    Procedure: COLONOSCOPY WITH ANESTHESIA;  Surgeon: Philip Ferrari MD;  Location: [facility] ENDOSCOPY;  Service: Gastroenterology;  Laterality: N/A;  pre: hx polyps  post: poor prep  George Bond MD    COLONOSCOPY N/A 2021    Procedure: COLONOSCOPY WITH ANESTHESIA;  Surgeon: Philip Ferrari MD;  Location: [facility] ENDOSCOPY;  Service: Gastroenterology;  Laterality: N/A;  preop; hx of polyps  postop; polyps   PCP George Bond     EYE SURGERY  2023    HERNIA REPAIR      JOINT REPLACEMENT      KNEE SURGERY      VASECTOMY         SUBJECTIVE     Subjective: Patient seen today for clinical swallow evaluation. He is also receiving PT and OT services at this location.   Patient presents with the following symptoms: coughing, drooling, difficulty swallowing solids, difficulty chewing, and drooling with meals is troublesome for meal partners    Date of Onset: over a year  History of present problems: Patient feels that he has an \"acuumulation\" in his mouth that causes drooling. He does not note overt difficulty with swallowing.   The " functional impact on the patient: Risk for aspiration/pneumonia.   Prior level of function: No previous problems noted.   Pertinent Medical History Related to this Problem: Parkinson's Disease and Parkinsonism  Current Diet Level:   Solid: 7 -  Easy to Chew/Regular  Liquid: 0 - Thin  Non-oral Feeding: N/A    OBJECTIVE     CLINICAL OBSERVATIONS  Respiratory/Swallow Coordination: Mildly impaired  Position During Evaluation: Upright  Anatomy/Physiology: Patient presents with left lingual hypertrophy and noted involuntary lingual movement, reduced labialROM and speed, adequate cheek ROM, decreased lingual ROM and speed, adequate palatal elevation and maintenance.   Dentition: Patient has own teeth and Patient is partially endentulous  Oral Health: Oral cavity is clean  Awareness/Control of Secretions:  Patient recently started scopolamine patch for excess secretions. Discussed with patient excess secretions vs decreased swallow reflex and decreased swallowing of secretions.     Method of Food Administration: Cup  Oral Phase: Impaired lip closure  Pharyngeal Symptoms: multiple swallows with consistencies of thin water and throat clearing with consistencies of thin water.       INSTRUMENTAL ASSESSMENT  Instrumental Exam Completed?: yes [facility]   Severity Level of Dysphagia: moderate dysphagia and esophageal dysfunction  Consistencies Aspirated/Penetrated: Penetrated: thin  Comments: Extensive education provided to patient and wife after completion of the video swallow study.  Diet modifications and compensatory strategies were discussed and patient was able to demonstrate after teach back.  Patient was agreeable to outpatient speech therapy, which has been scheduled at [facility] outpatient.     Recommendations: Diet Textures: thin liquid, regular consistency food. Medications should be taken whole with thin liquids.  Patient would benefit from outpatient speech therapy to address deficits mentioned  above with treatments including, swallow exercises, expiratory muscle strength  use, neuromuscular electrical stimulation, and continued education.     Recommended Strategies:  Observe reflux precautions, such as elevated head of bed and no meals 2 hours prior to sleeping, Upright for PO, small bites and sips, may use straw, and alternate liquids and solids. Oral care before breakfast, after all meals and PRN.     Other Recommended Evaluations: Referral to ENT to evaluate the Zenker's diverticulum.  Patient reports difficulty managing secretions at home throughout the day.  Patient advised to discuss with MD, who may recommend a scopolamine patch for secretion management.      Dysphagia therapy is recommended. Rationale: Education, training, and instruction in dysphagia.     Lynda Low, SLP 11/7/2024 11:06 CST  Please see SLP report of [facility]    IMPRESSION/RECOMMENDATIONS  Factors Affecting Performance: no difficulty participating in study  Learning Motivation: strong  Education/Learning Comments: Patient demonstrated understanding of evaluation results and plan of care.     PATIENT SELF ASSESSMENT    EAT 10  0= No problem 4= Severe problem  My swallowing has caused me to lose weight 0   My Swallowing problem interferes with my ability to go out for meals  0   Swallowing liquids takes extra effort 0   Swallowing solids takes extra effort 2   Swallowing pills takes extra effort 0   Swallowing is painful 0   The pleasure of eating is affected by my swallowing 2   When I swallow, food sticks in my throat 2   I cough when I eat 3   Swallowing is stressful 3                                                                                                  Total Score 12     0-9 Minimal  10-19 Mild  20-29 Moderate 30-40 Severe      Clinical Impression:   Moderate: oropharyngeal phase dysphagia and esophageal dysphagia  Impact on Function: risk for inadequate nutrition, inadequate hydration, aspiration, pneumonia,  and social aspects of eating  Prognosis Comment: Fair pending diagnosis      Therapy Education/Self Care    Details: Evaluation results and POC   Given home strategies   Progress: New   Education provided to:  Patient   Level of understanding Verbalized           Total Time of Visit:            60   mins    ASSESSMENT/PLAN     Goals                                            STG  Comments Status   Patient will improve oral skills to enhance safety and increase eating efficiency and bolus control as measured by increased lip closure, decreased oral residue, improved bolus formation, improved posterior propulsion of the bolus, decreased drooling, and improved management of secretions.     Patient will improve hyolaryngeal elevation, improve epiglottic inversion, improve UES opening, increase base of the tongue strength and posterior pharyngeal wall excursion, and increase efficiency of cough to clear residue from the airway as measured by decreased overt signs and symptoms of aspiration and decreased penetration and aspiration on repeat instrumental swallow study, if applicable.      Patient will report decreased difficulty with swallowing and decreased coughing/choking with meals.     Patient will improve oral hygiene to enhance safety and decrease risk of aspiration pneumonia     LTG      Patient will safely consume full PO diet of regular consistency food and thin liquids without discomfort and difficulty or complications such as aspiration or pneumonia.                  ASSESSMENT:   Patient is indicated for skilled care by a Speech/Language Pathologist.     Summary of Assessment: Patient presents with moderate oropharyngeal dysphagia with esophageal component.     Recommendations for Diet/Nutrition: aggressive oral care, Drinks: 0 - Thin, and Solids: 6 - Soft & Bite-Sized  Swallowing Precautions: reduce distractions, upright position for at least 30 minutes after meals, alternative liquids and solids while  eating, small sips and bites when eating, and energy conservation to reduce fatigue with meals  Therapy Recommendations: dysphagia therapy to address swallowing deficits  Screening indicates the further need for speech therapy for voice and speech due to parkinson/ism    PLAN:  Details of Plan of Care: Initiate therapy and home exercises for dysphagia    Frequency: 1 time per week  Duration: 12 visits  Estimated Length of Session: 45 minutes    SPEECH/LANGUAGE PATHOLOGIST SIGNATURE: Dominique Pizano, CCC-SLP, [place] License #: [id number]  Electronically Signed on 11/20/2024        Initial Certification  Certification Period: 11/20/2024 through 2/17/2025  I certify that the therapy services are furnished while this patient is under my care.  The services outlined above are required by this patient, and will be reviewed every 90 days.     PHYSICIAN: Melanie Snyder MD (NPI: [id number])    Signature:____________________________________________DATE: _________     Please sign and return via fax to [phone].   Thank you so much for letting us work with Gary. I appreciate your letting us work with your patients. If you have any questions or concerns, please don't hesitate to contact me.          Outpatient Therapy Services  [address] [place] [address]  [phone]

## 2024-11-20 NOTE — PROGRESS NOTES
Physical Therapy Treatment Note and 30 Day Progress Note  [facility]  A department [facility]  [address], [place] [address]    Patient: Gary Tinoco                                                 Visit Date: 2024  :     1945    Referring practitioner:    Melanie Snyder MD  Date of Initial Visit:          Type: THERAPY  Episode: Parkinson's disease manifestations  Number of visits this episode: 13    Visit Diagnoses:    ICD-10-CM ICD-9-CM   1. Parkinson's disease with fluctuating manifestations, unspecified whether dyskinesia present  G20.A2 332.0       SUBJECTIVE     Subjective: Nothing new to report.    PAIN: 5/10 sciatic       OBJECTIVE     Objective     Gait Training          38545   Task/Terrain Asst AD Comments   PN      Timed Minutes 8       Neuromuscular Reeducation     [id number] Comments   PN    Timed Minutes 30     Therapy Education/Self Care 85778   Education offered today See below   Medbride Code    Ongoing HEP   High velocity, large amplitude dynamic UE/LE movements   Timed Minutes        Total Timed Treatment:    38   mins  Total Time of Visit:            38   mins         ASSESSMENT/PLAN      GOALS  Goals                                          Progress Note due by 24                                                      Recert due by 24   STG by: 6 weeks Comments Date Status   Improve gait mechanics to demonstrate increased arm swing and trunk rotation Improved reciprocal arm swing noted initially during 6 MWT though this deteriorated to absent arm swing B by end of test    Ongoing   Able to ambulate during 6 MWT w/o increase in foot drag throughout test Shuffling gait with decreased LLE noted especially with fatigue resulting in one near fall    Ongoing   LTG by: 12 weeks          Lott balance score of 53/56 or better IE: 39/56  10/24: 50/56  : 54    MET   Improve FGA by >/=4 points 10/24: :  18/30 11/20  Ongoing   Improve 6MTW by 270 ft to meet MDC for Parkinson's disease IE: 957 FT w/o break  10/24: 930ft with 30 sec seated rest break d/t LBP/hip pain  11/20: 928' 13/20 11/20  Ongoing      Anticipated CPT codes: Gait Training 65546, Therapeutic Exercise 89197, Manual Therapy 49022, Therapeutic Activity 55923, Neuromuscular ReEducation 54829, and Self Care/Home Management 78181      Assessment/Plan   Assessment  Impairments: abnormal coordination, abnormal gait, activity intolerance, impaired balance, impaired physical strength, lacks appropriate home exercise program and safety issue   Functional limitations: carrying objects, lifting, walking, sitting, stooping and reaching overhead   Prognosis: good     Plan  Therapy options: will be seen for skilled therapy services  Planned therapy interventions: abdominal trunk stabilization, balance/weight-bearing training, flexibility, functional ROM exercises, gait training, home exercise program, manual therapy, motor coordination training, neuromuscular re-education, strengthening, stretching and therapeutic activities  Frequency: 2x week  Duration in weeks: 12  Treatment plan discussed with: patient and family    ASSESSMENT: Progress note performed per POC. He has achieved 1/5 initial goals w/ barrier to achieving remaining goals being continued decreased dynamic balance and activity tolerance in part d/t lumbar and LLE pain. He does have an order to address these things, however these orders are from a different provider thus he would have to D/C from this POC to initiate a new one.     PLAN: progress high velocity large amplitude movements, functional strengthening per pt tolerance    SIGNATURE: Niru Smith PT DPT, [place] License #: [id number]  Electronically Signed on 11/20/2024        [address] [place] [address]  [phone]

## 2024-11-25 ENCOUNTER — OFFICE VISIT (OUTPATIENT)
Age: 79
End: 2024-11-25
Payer: MEDICARE

## 2024-11-25 ENCOUNTER — HOSPITAL ENCOUNTER (OUTPATIENT)
Dept: PHYSICAL THERAPY | Facility: HOSPITAL | Age: 79
Setting detail: THERAPIES SERIES
End: 2024-11-25
Payer: MEDICARE

## 2024-11-25 VITALS
OXYGEN SATURATION: 94 % | HEART RATE: 86 BPM | SYSTOLIC BLOOD PRESSURE: 126 MMHG | RESPIRATION RATE: 20 BRPM | WEIGHT: 201 LBS | TEMPERATURE: 97.8 F | BODY MASS INDEX: 28.03 KG/M2 | DIASTOLIC BLOOD PRESSURE: 70 MMHG

## 2024-11-25 DIAGNOSIS — J06.9 ACUTE UPPER RESPIRATORY INFECTION: Primary | ICD-10-CM

## 2024-11-25 DIAGNOSIS — R09.81 SINUS CONGESTION: ICD-10-CM

## 2024-11-25 DIAGNOSIS — Z75.8 DOES NOT HAVE PRIMARY CARE PROVIDER: ICD-10-CM

## 2024-11-25 DIAGNOSIS — R05.1 ACUTE COUGH: ICD-10-CM

## 2024-11-25 DIAGNOSIS — J02.9 SORE THROAT: ICD-10-CM

## 2024-11-25 LAB
Lab: NORMAL
QC PASS/FAIL: NORMAL
S PYO AG THROAT QL: NORMAL
SARS-COV-2, POC: NORMAL

## 2024-11-25 PROCEDURE — 1123F ACP DISCUSS/DSCN MKR DOCD: CPT

## 2024-11-25 PROCEDURE — G8484 FLU IMMUNIZE NO ADMIN: HCPCS

## 2024-11-25 PROCEDURE — G8427 DOCREV CUR MEDS BY ELIG CLIN: HCPCS

## 2024-11-25 PROCEDURE — 1159F MED LIST DOCD IN RCRD: CPT

## 2024-11-25 PROCEDURE — G8419 CALC BMI OUT NRM PARAM NOF/U: HCPCS

## 2024-11-25 PROCEDURE — 1036F TOBACCO NON-USER: CPT

## 2024-11-25 PROCEDURE — 99213 OFFICE O/P EST LOW 20 MIN: CPT

## 2024-11-25 PROCEDURE — 1160F RVW MEDS BY RX/DR IN RCRD: CPT

## 2024-11-25 RX ORDER — GABAPENTIN 100 MG/1
100 CAPSULE ORAL 3 TIMES DAILY
COMMUNITY

## 2024-11-25 ASSESSMENT — ENCOUNTER SYMPTOMS
CONSTIPATION: 0
SORE THROAT: 1
NAUSEA: 0
EYE REDNESS: 0
STRIDOR: 0
ABDOMINAL DISTENTION: 0
FACIAL SWELLING: 0
DIARRHEA: 0
CHOKING: 0
COUGH: 1
EYE PAIN: 0
SHORTNESS OF BREATH: 0
SINUS PAIN: 0
ABDOMINAL PAIN: 0
APNEA: 0
WHEEZING: 0
VOMITING: 0
CHEST TIGHTNESS: 0
EYE DISCHARGE: 0
TROUBLE SWALLOWING: 0
SINUS PRESSURE: 1
COLOR CHANGE: 0

## 2024-11-25 NOTE — PROGRESS NOTES
urinating, dysuria, frequency, hematuria and urgency.   Musculoskeletal:  Negative for arthralgias, joint swelling and myalgias.   Skin:  Negative for color change, pallor, rash and wound.   Allergic/Immunologic: Negative for immunocompromised state.   Neurological:  Negative for dizziness, seizures, syncope, numbness and headaches.   Hematological:  Negative for adenopathy. Does not bruise/bleed easily.   Psychiatric/Behavioral:  Negative for confusion and hallucinations.    All other systems reviewed and are negative.      Objective    Physical Exam  Vitals and nursing note reviewed.   Constitutional:       General: He is not in acute distress.     Appearance: Normal appearance. He is not ill-appearing, toxic-appearing or diaphoretic.   HENT:      Head: Normocephalic and atraumatic.      Right Ear: Tympanic membrane, ear canal and external ear normal.      Left Ear: Tympanic membrane, ear canal and external ear normal.      Nose: Congestion present. No rhinorrhea.      Mouth/Throat:      Mouth: Mucous membranes are moist.      Pharynx: Oropharynx is clear. Posterior oropharyngeal erythema present. No oropharyngeal exudate.      Tonsils: No tonsillar exudate. 1+ on the right. 1+ on the left.   Eyes:      Extraocular Movements: Extraocular movements intact.      Conjunctiva/sclera: Conjunctivae normal.      Pupils: Pupils are equal, round, and reactive to light.   Cardiovascular:      Rate and Rhythm: Normal rate and regular rhythm.      Pulses: Normal pulses.      Heart sounds: Normal heart sounds. No murmur heard.     No friction rub. No gallop.   Pulmonary:      Effort: Pulmonary effort is normal. No respiratory distress.      Breath sounds: Normal breath sounds. No stridor. No wheezing, rhonchi or rales.   Abdominal:      General: Bowel sounds are normal. There is no distension.      Palpations: Abdomen is soft.      Tenderness: There is no abdominal tenderness. There is no guarding.   Musculoskeletal:

## 2024-11-25 NOTE — PATIENT INSTRUCTIONS
Strep, flu, and COVID tests are negative    Etiology is likely viral and antibiotics are not currently indicated    Increase fluid intake    Recommended OTC coricidin    May use OTC claritin/zyrtec and nasal spray such as flonase to help symptoms    If patient is not improving or developing any new/worsening symptoms then return to clinic or f/u with PCP    Any condition can change, despite proper treatment. Therefore, if symptoms still persist or worsen after treatment plan intitated today, either go to the nearest ER, or call PCP, or return to UC for further evaluation.    Urgent Care evaluation today is not a substitute for PCP visit. Follow up care is your responsibility to discuss and review this UC visit.

## 2024-11-27 ENCOUNTER — HOSPITAL ENCOUNTER (OUTPATIENT)
Dept: OCCUPATIONAL THERAPY | Facility: HOSPITAL | Age: 79
Setting detail: THERAPIES SERIES
Discharge: HOME | End: 2024-11-27
Payer: MEDICARE

## 2024-11-27 ENCOUNTER — TRANSCRIBE ORDERS (OUTPATIENT)
Dept: PHYSICAL THERAPY | Facility: HOSPITAL | Age: 79
End: 2024-11-27
Payer: MEDICARE

## 2024-11-27 ENCOUNTER — HOSPITAL ENCOUNTER (OUTPATIENT)
Dept: PHYSICAL THERAPY | Facility: HOSPITAL | Age: 79
Setting detail: THERAPIES SERIES
Discharge: HOME | End: 2024-11-27
Payer: MEDICARE

## 2024-11-27 DIAGNOSIS — G20.A1 PARKINSON'S DISEASE, UNSPECIFIED WHETHER DYSKINESIA PRESENT, UNSPECIFIED WHETHER MANIFESTATIONS FLUCTUATE: ICD-10-CM

## 2024-11-27 DIAGNOSIS — M47.816 LUMBAR SPONDYLOSIS: ICD-10-CM

## 2024-11-27 DIAGNOSIS — R29.818 FINE MOTOR IMPAIRMENT: Primary | ICD-10-CM

## 2024-11-27 DIAGNOSIS — R29.898 FINE MOTOR IMPAIRMENT: Primary | ICD-10-CM

## 2024-11-27 DIAGNOSIS — M54.16 RADICULOPATHY, LUMBAR REGION: ICD-10-CM

## 2024-11-27 DIAGNOSIS — G20.A2 PARKINSON'S DISEASE WITH FLUCTUATING MANIFESTATIONS, UNSPECIFIED WHETHER DYSKINESIA PRESENT: Primary | ICD-10-CM

## 2024-11-27 DIAGNOSIS — M51.9 LUMBOSACRAL DISC DISEASE: ICD-10-CM

## 2024-11-27 DIAGNOSIS — M54.41 CHRONIC BILATERAL LOW BACK PAIN WITH RIGHT-SIDED SCIATICA: Primary | ICD-10-CM

## 2024-11-27 DIAGNOSIS — Z78.9 DECREASED ACTIVITIES OF DAILY LIVING (ADL): ICD-10-CM

## 2024-11-27 DIAGNOSIS — G89.29 CHRONIC BILATERAL LOW BACK PAIN WITH RIGHT-SIDED SCIATICA: Primary | ICD-10-CM

## 2024-11-27 PROCEDURE — 97530 THERAPEUTIC ACTIVITIES: CPT

## 2024-11-27 PROCEDURE — 97110 THERAPEUTIC EXERCISES: CPT

## 2024-11-27 PROCEDURE — 97112 NEUROMUSCULAR REEDUCATION: CPT

## 2024-11-27 NOTE — THERAPY TREATMENT NOTE
Occupational Therapy Treatment Note  [facility] Therapy [facility]  [address], [place] [address]    Patient: Gary Tinoco                                                 Visit Date: 2024  :     1945    Referring practitioner:    Melanie Snyder MD  Date of Initial Visit:          Type: THERAPY  Episode: C impaired from Parkinson's Disease  Number of visits this episode: 6    Visit Diagnoses:    ICD-10-CM ICD-9-CM   1. Fine motor impairment  R29.818 V49.89    R29.898    2. Decreased activities of daily living (ADL)  Z78.9 V49.89   3. Parkinson's disease, unspecified whether dyskinesia present, unspecified whether manifestations fluctuate  G20.A1 332.0       SUBJECTIVE     Subjective:  Pt reports he has better days. He is excited that his family is coming in for thanksgiving and he is happy to have all his children home again. Pt had an appt. with an ortho surgeon and reports he is not a surgical candidate for his back. Pt reports he is noticing increasing difficulty with showering.     PAIN:  Pre-Treatment: 7/10; Sciatic Nerve B buttocks     Post-Treatment: 7/10        OBJECTIVE     Objective     Therapeutic Activities    71574 Comments    BITS activities focused on visual scanning skills, reaction time, and coordination. Accuracy ranged from 85.9 to 100%. Reaction time was 1.78 to 2.53 seconds. VS: 85.9%, 1.78 s, 67 hits  Sequencing (A-Z): 100%, 1:06, 2.53s  Sequencing (!-30): 93.75%, 1:14, 2.48s    Trail Making Part A completed with the R UE. 0 errors, 2 interruptions and a time of 1:16. Part B completed with 0 errors, 0 interruptions and a time of 1:20. TM B required 3 attempts d/t confusion.   Bell Cancellation Test completed with the R UE in a time of 2:31 with 1 errors and 2 distractor hits.   Computerized Maze Assessment completed with the R UE in a time of 0:42 with 0 errors.   Visual Scanning and Motor Reaction Assessment completed  with the B  UE. Level 1 32/50 required targets.   Pt was able to pass 1/4 assessment levels.   Completed in standing with rest breaks between each assessment.                Timed Minutes 40     Therapy Education/Self Care 96722   Education offered today Discussed bathing performance and education offered on completing bathing tasks in seated positioning.    Medbride Code    Ongoing HEP   Thera-Putty  Strengthening Exercises    Timed Minutes 5       Total Timed Treatment:     40   mins  Total Time of Visit:             45   mins         ASSESSMENT/PLAN     GOALS  Goals                                          Progress Note due by 12/15/24                                                      Recert due by 1/7/25   STG by: 12/15/24 Comments Date Status   Pt will be independent with daily completion of a HEP to address deficits from Parkinson's Disease affecting ADL/IADL's. Pt reports daily compliance.  11/15/24 Ongoing    Pt will display improved B UE FMC by completing the 9 hole peg test in 25 seconds or less.  11/15/24 Ongoing    Pt will complete clothing fasteners including buttons, zippers, and martine with Mod I to improve ADL performance.  11/15/24 Progressing           LTG by: 1/7/25      Pt will increase B hand key pinch strength to 20# for improved performance with IADL tasks.  11/15/24 Ongoing   Pt will perform all aspects of self-feeding with Mod I to reduce caregiver burden and improve ADL performance.   11/15/24 Progressing    Pt will increase core strength to improve performance during bed mobility for safety and independence with ADLs.   11/15/24 MET    Pt will pass all 4 visual scanning assessments using the BITS to demonstrate appropriate use of compensation techniques for R visual field deficit.   Trail Making Part A completed with the R UE. 0 errors, 2 interruptions and a time of 1:16. Part B completed with 0 errors, 0 interruptions and a time of 1:20. TM B required 3 attempts d/t confusion.    Bell Cancellation Test completed with the R UE in a time of 2:31 with 1 errors and 2 distractor hits.   Computerized Maze Assessment completed with the R UE in a time of 0:42 with 0 errors.   Visual Scanning and Motor Reaction Assessment completed with the B  UE. Level 1 32/50 required targets.   Pt was able to pass 1/4 assessment levels. 11/15/24 Progressing       Anticipated CPT codes: Therapeutic Exercise 93400, Therapeutic Activity 06359, and Self Care/Home Management 15729    Assessment/Plan     ASSESSMENT:   Pt continues to struggle with pain d/t sciatic nerve. Pt reports increasing deficits related to parkinson's disease, including bathing difficulties d/t endurance and balance. Pt completed BITS activities today and reported struggles with peripheral vision deficits and standing tolerance. Pt took multiple seated rest breaks and required cues for unsafe stand-sit performance.      PLAN:   Will continue to address deficits in B FMC, B thumb strength, ADL//IADL performance, and bed mobility.     SIGNATURE: Carly Owen OT, [place] License #: [id number]  Electronically Signed on 11/27/2024        [address] [place] [address] [place]  [place] [address]  [phone]

## 2024-11-27 NOTE — PROGRESS NOTES
"Physical Therapy Treatment Note  [facility]  A department [address], [place] [address]    Patient: Gary Tinoco                                                 Visit Date: 2024  :     1945    Referring practitioner:    Melanie Snyder MD  Date of Initial Visit:          Type: THERAPY  Episode: Parkinson's disease manifestations  Number of visits this episode: 14    Visit Diagnoses:    ICD-10-CM ICD-9-CM   1. Parkinson's disease with fluctuating manifestations, unspecified whether dyskinesia present  G20.A2 332.0         SUBJECTIVE     Subjective: Nothing new to report. He was told he wasn't a surgical candidate for his back/sciatic. He was given a gabapentin which he reports has helped. He took his Tramadol and new medicine before therapy today. His wife reports he doesn't do HEP.    PAIN: 7-8/10 sciatic       OBJECTIVE     Objective     Therapeutic Exercises    80730 Units Comments   HL ABD 10*3 sec holds Green TB   SB bridge 3 sec holds x10    HL resisted march 10 B Green TB   Timed Minutes 15         Neuromuscular Reeducation     50913 Comments   Forward/lateral BOSU lunges    Resisted lateral 2\" block step overs    Timed Minutes 23     Therapy Education/Self Care 81313   Education offered today See below   Medbride Code    Ongoing HEP   High velocity, large amplitude dynamic UE/LE movements   Timed Minutes        Total Timed Treatment:    38   mins  Total Time of Visit:            38   mins         ASSESSMENT/PLAN      GOALS  Goals                                          Progress Note due by 24                                                      Recert due by 24   STG by: 6 weeks Comments Date Status   Improve gait mechanics to demonstrate increased arm swing and trunk rotation Improved reciprocal arm swing noted initially during 6 MWT though this deteriorated to absent arm swing B by end of test    Ongoing   Able " to ambulate during 6 MWT w/o increase in foot drag throughout test Shuffling gait with decreased LLE noted especially with fatigue resulting in one near fall  11/20  Ongoing   LTG by: 12 weeks          Lott balance score of 53/56 or better IE: 39/56  10/24: 50/56  11/20: 54  11/20  MET   Improve FGA by >/=4 points 10/24: 19/30 11/20: 18/30 11/20  Ongoing   Improve 6MTW by 270 ft to meet MDC for Parkinson's disease IE: 957 FT w/o break  10/24: 930ft with 30 sec seated rest break d/t LBP/hip pain  11/20: 928' 13/20 11/20  Ongoing      Anticipated CPT codes: Gait Training 34067, Therapeutic Exercise 11583, Manual Therapy 35965, Therapeutic Activity 97746, Neuromuscular ReEducation 24104, and Self Care/Home Management 65380      Assessment/Plan     ASSESSMENT: He continues to exhibit dynamic balance deficits exacerbated by functional hip weakness and RLE pain.    PLAN: progress high velocity large amplitude movements, functional strengthening per pt tolerance. Switch to low back pain referral after last scheduled visit.    SIGNATURE: Niru Smith PT DPT, [place] License #: [id number]  Electronically Signed on 11/27/2024        [address] [place] [address]  [phone]

## 2024-11-28 DIAGNOSIS — G20.A2 PARKINSON'S DISEASE WITH FLUCTUATING MANIFESTATIONS, UNSPECIFIED WHETHER DYSKINESIA PRESENT: ICD-10-CM

## 2024-12-03 ENCOUNTER — HOSPITAL ENCOUNTER (OUTPATIENT)
Dept: PHYSICAL THERAPY | Facility: HOSPITAL | Age: 79
Setting detail: THERAPIES SERIES
End: 2024-12-03
Payer: MEDICARE

## 2024-12-03 RX ORDER — AMANTADINE HYDROCHLORIDE 100 MG/1
100 CAPSULE, GELATIN COATED ORAL 2 TIMES DAILY
Qty: 180 CAPSULE | Refills: 0 | Status: SHIPPED | OUTPATIENT
Start: 2024-12-03

## 2024-12-06 ENCOUNTER — HOSPITAL ENCOUNTER (OUTPATIENT)
Dept: OCCUPATIONAL THERAPY | Facility: HOSPITAL | Age: 79
Setting detail: THERAPIES SERIES
Discharge: HOME | End: 2024-12-06
Payer: MEDICARE

## 2024-12-06 ENCOUNTER — HOSPITAL ENCOUNTER (OUTPATIENT)
Facility: HOSPITAL | Age: 79
Discharge: HOME | End: 2024-12-06
Admitting: PSYCHIATRY & NEUROLOGY
Payer: MEDICARE

## 2024-12-06 ENCOUNTER — HOSPITAL ENCOUNTER (OUTPATIENT)
Dept: PHYSICAL THERAPY | Facility: HOSPITAL | Age: 79
Setting detail: THERAPIES SERIES
Discharge: HOME | End: 2024-12-06
Payer: MEDICARE

## 2024-12-06 DIAGNOSIS — G20.A2 PARKINSON'S DISEASE WITH FLUCTUATING MANIFESTATIONS, UNSPECIFIED WHETHER DYSKINESIA PRESENT: Primary | ICD-10-CM

## 2024-12-06 DIAGNOSIS — Z78.9 DECREASED ACTIVITIES OF DAILY LIVING (ADL): ICD-10-CM

## 2024-12-06 DIAGNOSIS — R29.898 FINE MOTOR IMPAIRMENT: Primary | ICD-10-CM

## 2024-12-06 DIAGNOSIS — R29.818 FINE MOTOR IMPAIRMENT: Primary | ICD-10-CM

## 2024-12-06 DIAGNOSIS — G20.A1 PARKINSON'S DISEASE, UNSPECIFIED WHETHER DYSKINESIA PRESENT, UNSPECIFIED WHETHER MANIFESTATIONS FLUCTUATE: ICD-10-CM

## 2024-12-06 DIAGNOSIS — R13.12 OROPHARYNGEAL DYSPHAGIA: Primary | ICD-10-CM

## 2024-12-06 PROCEDURE — 97535 SELF CARE MNGMENT TRAINING: CPT

## 2024-12-06 PROCEDURE — 97530 THERAPEUTIC ACTIVITIES: CPT

## 2024-12-06 PROCEDURE — 92526 ORAL FUNCTION THERAPY: CPT | Performed by: SPEECH-LANGUAGE PATHOLOGIST

## 2024-12-06 PROCEDURE — 97112 NEUROMUSCULAR REEDUCATION: CPT

## 2024-12-06 PROCEDURE — 97116 GAIT TRAINING THERAPY: CPT

## 2024-12-06 NOTE — PROGRESS NOTES
Speech Language Pathology Treatment Note  [address] [place] [address]    Patient: Gary Tinoco                                                                                     Visit Date: 2024  :     1945    Referring practitioner:    Melanie Snyder MD  Date of Initial Visit:          Type: THERAPY  Episode: Swallowing Parkinsons      Visit Diagnoses:    ICD-10-CM ICD-9-CM   1. Oropharyngeal dysphagia  R13.12 787.22     SUBJECTIVE     First therapy session today.     Pain: Patient expresses sciatic pain in his right buttock at a level of 8.     OBJECTIVE   GOALS  Goals                                            STG  Comments Status   Patient will improve oral skills to enhance safety and increase eating efficiency and bolus control as measured by increased lip closure, decreased oral residue, improved bolus formation, improved posterior propulsion of the bolus, decreased drooling, and improved management of secretions.  Patient instructed on tongue press out, tongue press up, and lip press exercises. Able to demonstrate after instruction and cues.   New   Patient will improve hyolaryngeal elevation, improve epiglottic inversion, improve UES opening, increase base of the tongue strength and posterior pharyngeal wall excursion, and increase efficiency of cough to clear residue from the airway as measured by decreased overt signs and symptoms of aspiration and decreased penetration and aspiration on repeat instrumental swallow study, if applicable.   Patient instructed on effortful swallow exercise. Able to demonstrate 25 swallows after instruction and max cuing.  New   Patient will report decreased difficulty with swallowing and decreased coughing/choking with meals.  Goals and rationale introduced.  New   Patient will improve oral hygiene to enhance safety and decrease risk of aspiration pneumonia  Discussed oral hygiene.  Patient stated that he brushes one time per day but is not consistent. He was encouraged to brush at least two times per day, consistently. Patient agreed to try to do this. Emphasized importance of oral care.  New   LTG        Patient will safely consume full PO diet of regular consistency food and thin liquids without discomfort and difficulty or complications such as aspiration or pneumonia.   Goals and exercises introduced. Patient able to demonstrate with instruction and cues.  New       Therapy Education/Self Care    Details: POC   Given Home Exercise Program  Access Code: [email or web address]  URL: [email or web address]  Date: 12/06/2024  Prepared by: Dominique Pizano    Exercises  - Tongue Resistance with Top of Tongue   - 1 x daily - 7 x weekly - 3 sets - 10 reps  - Tongue Resistance with Front of Tongue   - 1 x daily - 7 x weekly - 3 sets - 10 reps  - Lip Press  - 2 x daily - 7 x weekly - 1 sets - 5 reps - 5 second hold  - Effortful Swallow  - 2-3 x daily - 7 x weekly - 3 sets - 10 reps   Progress: New   Education provided to:  Patient   Level of understanding Verbalized and Demonstrated           CPT Code:   59177 Swallow Treatment  Total Time of Visit:             40   mins         ASSESSMENT/PLAN     ASSESSMENT: Patient able to demonstrate exercises after instruction and cues. He was encouraged to complete oral care at least 2x per day including brushing, flossing, and mouthwash. He asked about therapy for his soft voice. Will address when swallow goals progressing.     PLAN: Continue therapy and home exercises.     Progress Note Due Date:12/17/24  Recertification Due Date:2/17/25    SIGNATURE: Dominique Pizano, CCC-SLP, [place] License #: [id number]  Electronically Signed on 12/6/2024          [place] [address]  [phone]

## 2024-12-06 NOTE — THERAPY TREATMENT NOTE
Occupational Therapy Treatment Note  [facility]patient Therapy Services  A department [facility]  [address], [place] [address]    Patient: Gary Tinoco                                                 Visit Date: 2024  :     1945    Referring practitioner:    Melanie Snyder MD  Date of Initial Visit:          Type: THERAPY  Episode: C impaired from Parkinson's Disease  Number of visits this episode: 7    Visit Diagnoses:    ICD-10-CM ICD-9-CM   1. Fine motor impairment  R29.818 V49.89    R29.898    2. Decreased activities of daily living (ADL)  Z78.9 V49.89   3. Parkinson's disease, unspecified whether dyskinesia present, unspecified whether manifestations fluctuate  G20.A1 332.0       SUBJECTIVE     Subjective:  Pt reports he had a wonderful thanksgiving and enjoyed seeing his children and grandchildren. Pt reports he is wore out from having speech and PT before this session.     PAIN:  Pre-Treatment: 7-8/10; Sciatic Nerve B buttocks     Post-Treatment: 4-5/10        OBJECTIVE     Objective   Self-Care/IADL Training    11439 Comments   Manipulated large buttons with mod-max difficulty requiring min A and demonstration.  Required increased time to complete 3 large buttons.                    Timed Minutes 10     Therapeutic Activities    61650 Comments   Pt completed FM and pinch strengthening activity removing beads from green thera-putty. Pt demo'd mod difficulty and min dropping with task.     Pt completed FM activity of duplicating patterns with small colored pegs onto small peg board using B hands, focusing on pincer grasp skills. Demo'd mod difficulty in R hand and max difficulty and was unable to complete with L.  Required increased time for completion                Timed Minutes 30     Therapy Education/Self Care 49267   Education offered today Discussed practicing buttons and other FM tasks daily.    Medbride Code    Ongoing HEP   Thera-Putty  Strengthening  Exercises    Timed Minutes        Total Timed Treatment:     45   mins  Total Time of Visit:             45   mins         ASSESSMENT/PLAN     GOALS  Goals                                          Progress Note due by 12/15/24                                                      Recert due by 1/7/25   STG by: 12/15/24 Comments Date Status   Pt will be independent with daily completion of a HEP to address deficits from Parkinson's Disease affecting ADL/IADL's.  11/15/24 Ongoing    Pt will display improved B UE FMC by completing the 9 hole peg test in 25 seconds or less. Completed FM activities today using B hands with min-mod difficulty and min dropping.  11/15/24 Ongoing    Pt will complete clothing fasteners including buttons, zippers, and martine with Mod I to improve ADL performance. Manipulated large buttons with mod-max difficulty requiring min A and demonstration.  11/15/24 Progressing           LTG by: 1/7/25      Pt will increase B hand key pinch strength to 20# for improved performance with IADL tasks.  11/15/24 Ongoing   Pt will perform all aspects of self-feeding with Mod I to reduce caregiver burden and improve ADL performance.   11/15/24 Progressing    Pt will increase core strength to improve performance during bed mobility for safety and independence with ADLs.   11/15/24 MET    Pt will pass all 4 visual scanning assessments using the BITS to demonstrate appropriate use of compensation techniques for R visual field deficit.    11/15/24 Progressing       Anticipated CPT codes: Therapeutic Exercise 98357, Therapeutic Activity 49731, and Self Care/Home Management 84794    Assessment/Plan     ASSESSMENT:   Pt did well during today's session despite being fatigued from having PT and speech first. Pt is beginning to demo improvement with FMC in B hands and tolerated FM activities well with minimal frustrations. Pt continues to struggle with manipulating buttons and other clothing fasteners during daily  dressing tasks.     PLAN:   Will continue to address deficits in B FMC, B thumb strength, ADL//IADL performance, and bed mobility.     SIGNATURE: Carly Owen OT, [place] License #: [id number]  Electronically Signed on 12/6/2024        [address] [place] [address]  [phone]

## 2024-12-06 NOTE — PROGRESS NOTES
"Physical Therapy Treatment Note  [facility] Outpatient Therapy Services  A department [place] [address], [place] [address]    Patient: Gary Tinoco                                                 Visit Date: 2024  :     1945    Referring practitioner:    Melanie Snyder MD  Date of Initial Visit:          Type: THERAPY  Episode: Parkinson's disease manifestations  Number of visits this episode: 15    Visit Diagnoses:    ICD-10-CM ICD-9-CM   1. Parkinson's disease with fluctuating manifestations, unspecified whether dyskinesia present  G20.A2 332.0         SUBJECTIVE     Subjective: His R sciatica is killing him today, making it hard for him to walk today. The gabapentin seems to help a bit, and when he walks, it seems to reduce the pressure a bit.       PAIN: 8/10 sciatic       OBJECTIVE     Objective     Gait Training          55471   Task/Terrain Asst AD Comments   Arm swing facilitation  Arm swing assist   Metronome at 70 BPM, though he went considerably faster than this the whole walk despite cues to slow down.    Ambulation with cues for arm swing    Greater arm swing on R than L          Timed Minutes 15       Neuromuscular Reeducation     74768 Comments   SLS + c/l around the [place] taps marked by racer cones  Greater difficulty moving LLE  to racer cones, required cones  L    Standing trunk rotation + BOSU smack with 2# TheraBar             Timed Minutes 25     Therapeutic Activities    07379 Comments   Staggered STS  21\" surface; Cues for eccentric control and to engage glutes, used BUE push off assist                   Timed Minutes 13       Therapy Education/Self Care 07539   Education offered today See below   Medbride Code    Ongoing HEP   High velocity, large amplitude dynamic UE/LE movements   Timed Minutes        Total Timed Treatment:    53   mins  Total Time of Visit:            53   mins         ASSESSMENT/PLAN      GOALS  Goals                     "                      Progress Note due by 12/20/24                                                      Recert due by 12/25/24   STG by: 6 weeks Comments Date Status   Improve gait mechanics to demonstrate increased arm swing and trunk rotation Improved reciprocal arm swing noted initially during 6 MWT though this deteriorated to absent arm swing B by end of test  Improved reciprocal arm swing R>L today   12/6  Ongoing   Able to ambulate during 6 MWT w/o increase in foot drag throughout test Shuffling gait with decreased LLE noted especially with fatigue resulting in one near fall  11/20  Ongoing   LTG by: 12 weeks          Lott balance score of 53/56 or better IE: 39/56  10/24: 50/56  11/20: 54  11/20  MET   Improve FGA by >/=4 points 10/24: 19/30 11/20: 18/30 11/20  Ongoing   Improve 6MTW by 270 ft to meet MDC for Parkinson's disease IE: 957 FT w/o break  10/24: 930ft with 30 sec seated rest break d/t LBP/hip pain  11/20: 928' 13/20 11/20  Ongoing      Anticipated CPT codes: Gait Training 50615, Therapeutic Exercise 84600, Manual Therapy 67201, Therapeutic Activity 62328, Neuromuscular ReEducation 84864, and Self Care/Home Management 67037      Assessment/Plan     ASSESSMENT: Clive has a tendency to rush during ambulation, which makes it difficult for him to keep up with an arm swing. Following arm swing facilitation training today, he exhibited increased reciprocal arm swing R>L. He continues to be limited by R sciatica, though had greater difficulty reaching with LLE during R SLS task today.     PLAN: progress high velocity large amplitude movements, functional strengthening per pt tolerance. Switch to low back pain referral after last scheduled visit.    SIGNATURE: Ruthie Lilly PT DPT, [place] License #: [id number]    Electronically Signed on 12/6/2024        [place] [address]  [phone]       no loss of consciousness, no gait abnormality, no headache, no sensory deficits, and no weakness.

## 2024-12-10 ENCOUNTER — HOSPITAL ENCOUNTER (OUTPATIENT)
Dept: OCCUPATIONAL THERAPY | Facility: HOSPITAL | Age: 79
Setting detail: THERAPIES SERIES
Discharge: HOME | End: 2024-12-10
Payer: MEDICARE

## 2024-12-10 ENCOUNTER — HOSPITAL ENCOUNTER (OUTPATIENT)
Dept: PHYSICAL THERAPY | Facility: HOSPITAL | Age: 79
Setting detail: THERAPIES SERIES
Discharge: HOME | End: 2024-12-10
Payer: MEDICARE

## 2024-12-10 DIAGNOSIS — Z78.9 DECREASED ACTIVITIES OF DAILY LIVING (ADL): ICD-10-CM

## 2024-12-10 DIAGNOSIS — G20.A1 PARKINSON'S DISEASE, UNSPECIFIED WHETHER DYSKINESIA PRESENT, UNSPECIFIED WHETHER MANIFESTATIONS FLUCTUATE: ICD-10-CM

## 2024-12-10 DIAGNOSIS — R29.818 FINE MOTOR IMPAIRMENT: Primary | ICD-10-CM

## 2024-12-10 DIAGNOSIS — M54.50 CHRONIC BILATERAL LOW BACK PAIN, UNSPECIFIED WHETHER SCIATICA PRESENT: Primary | ICD-10-CM

## 2024-12-10 DIAGNOSIS — G89.29 CHRONIC BILATERAL LOW BACK PAIN, UNSPECIFIED WHETHER SCIATICA PRESENT: Primary | ICD-10-CM

## 2024-12-10 DIAGNOSIS — R29.898 FINE MOTOR IMPAIRMENT: Primary | ICD-10-CM

## 2024-12-10 PROCEDURE — 97535 SELF CARE MNGMENT TRAINING: CPT

## 2024-12-10 PROCEDURE — 97530 THERAPEUTIC ACTIVITIES: CPT

## 2024-12-10 PROCEDURE — 97162 PT EVAL MOD COMPLEX 30 MIN: CPT | Performed by: PHYSICAL THERAPIST

## 2024-12-10 NOTE — THERAPY EVALUATION
Physical Therapy Initial Evaluation and Plan of Care  [facility] Outpatient Therapy Services  A department of [place] [address], [place] [address]    Patient: Gary Tinoco               : 1945  Visit Date: 12/10/2024  Referring practitioner: Melanie Snyder MD  Date of Initial Visit: 12/10/2024    Visit Diagnoses:    ICD-10-CM ICD-9-CM   1. Chronic bilateral low back pain, unspecified whether sciatica present  M54.50 724.2    G89.29 338.29     Past Medical History:   Diagnosis Date    Arthritis     Cataract     had surgery in 2023 both eyes    Colon polyp     Diabetes mellitus     Difficulty walking     Diverticulosis     History of adenomatous polyp of colon     Hyperlipidemia     Hypertension     Parkinson's disease     Shingles     Tremor      Past Surgical History:   Procedure Laterality Date    APPENDECTOMY      COLONOSCOPY  2016    polyp, tubular adenoma, diverticulosis    COLONOSCOPY N/A 2021    Procedure: COLONOSCOPY WITH ANESTHESIA;  Surgeon: Philip Ferrari MD;  Location: [facility] ENDOSCOPY;  Service: Gastroenterology;  Laterality: N/A;  pre: hx polyps  post: poor prep  George Bond MD    COLONOSCOPY N/A 2021    Procedure: COLONOSCOPY WITH ANESTHESIA;  Surgeon: Philip Ferrari MD;  Location: [facility] ENDOSCOPY;  Service: Gastroenterology;  Laterality: N/A;  preop; hx of polyps  postop; polyps   PCP George Bond     EYE SURGERY  2023    HERNIA REPAIR      JOINT REPLACEMENT      KNEE SURGERY      VASECTOMY  1974         SUBJECTIVE     Subjective Evaluation    History of Present Illness  Date of onset: 6/10/2024  Mechanism of injury: He has had back pain for a long time but about 6 months ago, his pain worsened. He also has Parkinsons and was recently assessed in PT for this but would prefer to hold on this for now and focus on his back pain. His worst pain is when he's been sitting for awhile and then tries to get up and walk. It's takes him  awhile to loosen up. He denies any radicular pain or numbness.     Pain  Current pain ratin  At best pain ratin  At worst pain rating: 10  Location: lumbar/glutes  Aggravating factors: prolonged positioning  Progression: worsening    Social Support  Lives in: one-story house (1 step to enter)  Lives with: spouse    Diagnostic Tests  X-ray: abnormal    Patient Goals  Patient goals for therapy: decreased pain, increased motion, increased strength and independence with ADLs/IADLs       Outcome Measure:   PT G-Codes  Outcome Measure Options: Modified Oswestry  Modified Oswestry Score/Comments:     OBJECTIVE     Objective          Static Posture     Thoracic Spine  Hyperkyphosis.    Palpation   Left   Hypertonic in the erector spinae.     Right   Hypertonic in the erector spinae.     Additional Palpation Details  Right piriformis tender    Tenderness     Lumbar Spine  No tenderness in the facet joint.     Additional Tenderness Details  No SIJ tenderness    Active Range of Motion     Lumbar   Flexion: WFL  Extension: Active lumbar extension: 15% hinging in lower lumbar.     Passive Range of Motion   Left Hip   Flexion: WFL  Extension: 0 degrees   External rotation (prone): 50 degrees   Internal rotation (90/90): 5 degrees     Right Hip   Flexion: WFL  Extension: 0 degrees   External rotation (prone): 50 degrees   Internal rotation (90/90): 5 degrees     Additional Passive Range of Motion Details  Hypomobile thoracic hyperkyphosis and LS flexion    Strength/Myotome Testing     Left Hip   Planes of Motion   Flexion: 5  Abduction: 5  Adduction: 5    Right Hip   Planes of Motion   Flexion: 5  Abduction: 5  Adduction: 5    Left Knee   Flexion: 5  Extension: 5    Right Knee   Flexion: 5  Extension: 5    Left Ankle/Foot   Dorsiflexion: 5  Plantar flexion: 5    Right Ankle/Foot   Dorsiflexion: 5  Plantar flexion: 5    Ambulation     Comments   Walks with shuffling gait and lack of upper body rotation and arm swing.          Therapy Education/Self Care [id number]   Education offered today HEP   Ashwini Code Access Code: [email or web address]  URL: [email or web address]   Ongoing HEP     Date: 12/10/2024  Prepared by: Hossein Henry    Exercises  - Supine Single Bent Knee Fallout  - 2 x daily - 7 x weekly - 2 sets - 10 reps  - Supine Piriformis Stretch with Foot on Ground  - 2 x daily - 7 x weekly - 2 sets - 30 hold  - Supine Double Knee to Chest  - 2 x daily - 7 x weekly - 2 sets - 10 reps  - Supine Posterior Pelvic Tilt  - 2 x daily - 7 x weekly - 2 sets - 10 reps   Timed Minutes        Total Timed Treatment:     0   mins  Total Time of Visit:            45   mins    ASSESSMENT/PLAN     GOALS:  Goals                                                  Progress Note due by 1/9/25                                                              Recert due by 3/9/25   LTG by: 8 weeks Comments Date Status   Improve cruz thoracolumbar mobility       Improve  bilateral hip ext passively to 15 deg        Improve cruz passive hip IR to 10 deg       Able to sit to stand without exacerbation of pain after sitting 10 min       SLS either leg x 5 secs       Reports pain no greater than 2-3/10 when it does occur.       Improve KAREN to 10/50      Independent with HEP for flexibility and core/hip stability.           Anticipated CPT codes: Therapeutic Exercise 10265, Manual Therapy 85415, Therapeutic Activity 31172, Neuromuscular ReEducation 89701, Self Care/Home Management 87720, Estim Attended 56679, and Estim Unattended 14836      Assessment & Plan       Assessment  Impairments: abnormal muscle firing, abnormal muscle tone, abnormal or restricted ROM, activity intolerance, impaired physical strength, lacks appropriate home exercise program and pain with function   Functional limitations: lifting, walking, uncomfortable because of pain, sitting and standing   Assessment details: The symptoms are consistent with a lumbar facet impingement. The pain is  radiating into his glutes in the facet referred pain pattern. He denies any leg radicular symptoms at this time. The lumbar spine tends to rest in hyperlordosis and the tightness of the lumbar lordosis and hip flexors exacerbate the compression of the facets. His Parkinsons has him tending to lean into a hyperkyphotic posture as well.   This patient would benefit from skilled PT.  Prognosis: good    Plan  Therapy options: will be seen for skilled therapy services  Planned modality interventions: dry needling, low level laser therapy, TENS and traction  Planned therapy interventions: abdominal trunk stabilization, flexibility, functional ROM exercises, home exercise program, joint mobilization, manual therapy, motor coordination training, neuromuscular re-education, postural training, soft tissue mobilization, spinal/joint mobilization, strengthening, stretching and therapeutic activities  Frequency: 2x week  Duration in weeks: 8  Treatment plan discussed with: patient  Plan details: Focus early on pain relief with soft tissue work and modalities as needed. Work on  mobility and flexibility through the spine and hips. Progress with postural/core/hip stability to improve postural alignment and mechanics.Progress the HEP for the same.      SIGNATURE: Hossein Henry, PT, [place] License #: [id number]  Electronically Signed on 12/10/2024      Initial Certification  Certification Period: 12/10/2024 through 3/9/2025  I certify that the therapy services are furnished while this patient is under my care.  The services outlined above are required by this patient, and will be reviewed every 90 days.     PHYSICIAN: Melanie Snyder MD (NPI: [id number])    Signature____________________________________________DATE: _________     Please sign and return via fax to [phone].   Thank you so much for letting us work with Gary. I appreciate your letting us work with your patients. If you have any questions or concerns, please don't  hesitate to contact me.

## 2024-12-10 NOTE — THERAPY TREATMENT NOTE
"Occupational Therapy 30 Day Progress Note  [facility] Outpatient Therapy Services  [facility]  [address], [place] [address]    Patient: Gary Tinoco                                                 Visit Date: 12/10/2024  :     1945    Referring practitioner:    Melanie Snyder MD  Date of Initial Visit:          Type: THERAPY  Episode: FMC impaired from Parkinson's Disease  Number of visits this episode: 8    Visit Diagnoses:    ICD-10-CM ICD-9-CM   1. Fine motor impairment  R29.818 V49.89    R29.898    2. Decreased activities of daily living (ADL)  Z78.9 V49.89   3. Parkinson's disease, unspecified whether dyskinesia present, unspecified whether manifestations fluctuate  G20.A1 332.0       SUBJECTIVE     Subjective:  Pt reports he is \"disappointed that I ended up this way in life\" when asked how he is doing today. Pt reports he continues to notice difficulty with self-feeding skills and FM dressing skills, mainly taking increased time to complete tasks.     PAIN:  Pre-Treatment: 3-4/10; Sciatic Nerve B buttocks     Post-Treatment: 3/10     Outcome Measure:   9 Hole Peg Test: Left: 1:40.38s Right: 48.39s    PT G-Codes  Quick DASH Score: 22.73    OBJECTIVE     Objective          Strength/Myotome Testing     Left Wrist/Hand      (2nd hand position)     Trial 1: 35 lbs    Trial 2: 37 lbs    Trial 3: 36 lbs    Average: 36 lbs    Thumb Strength  Key/Lateral Pinch     Trial 1: 14 lbs    Trial 2: 12.5 lbs    Trial 3: 14 lbs    Average: 13.5 lbs    Right Wrist/Hand      (2nd hand position)     Trial 1: 75 lbs    Trial 2: 72 lbs    Trial 3: 70 lbs    Average: 72.33 lbs    Thumb Strength   Key/Lateral Pinch     Trial 1: 13 lbs    Trial 2: 14.5 lbs    Trial 3: 14 lbs    Average: 13.83 lbs      Self-Care/IADL Training    54337 Comments   Practiced cutting and self-feeding skills using fork and knife. Pt was able to independently cut red thera-putty with min difficulty but " demo'd slowed performance.     Pt was able to independently manipulate martine using B hands.     Pt was able to unbutton and button one large button requiring multiple attempts and increased time.             Timed Minutes 35     Therapeutic Activities    75798 Comments   All  strength and outcome measures assessed for PN. See details above.                    Timed Minutes 15     Therapy Education/Self Care 12714   Education offered today Discussed HEP compliance.    Medbride Code    Ongoing HEP   Thera-Putty  Strengthening Exercises    Timed Minutes        Total Timed Treatment:     40   mins  Total Time of Visit:             40   mins         ASSESSMENT/PLAN     GOALS  Goals                                          Progress Note due by 1/7/25                                                      Recert due by 1/7/25   STG by: 1/7/25 Comments Date  Status   Pt will be independent with daily completion of a HEP to address deficits from Parkinson's Disease affecting ADL/IADL's. Pt reports daily compliance.  12/10/24 Ongoing    Pt will display improved B UE FMC by completing the 9 hole peg test in 25 seconds or less. Left: 1:40.38s   Right: 48.39s 12/10/24 Ongoing    Pt will complete clothing fasteners including buttons, zippers, and martine with Mod I to improve ADL performance. Pt was able to unbutton and button one large button requiring multiple attempts and increased time.   Pt was able to independently manipulate martine using B hands.  12/10/24 Progressing           LTG by: 1/7/25      Pt will increase B hand key pinch strength to 20# for improved performance with IADL tasks. R hand: 13.83 lbs   L hand: 13.5 lbs  12/10/24 Ongoing   Pt will perform all aspects of self-feeding with Mod I to reduce caregiver burden and improve ADL performance.  Practiced self-feeding skills demo'd independence and use of compensative positioning for times his R hand is functioning worse.  12/10/24 MET     Pt will increase  core strength to improve performance during bed mobility for safety and independence with ADLs.   11/15/24 MET    Pt will pass all 4 visual scanning assessments using the BITS to demonstrate appropriate use of compensation techniques for R visual field deficit.   11/27/24:Palo Making Part A completed with the R UE. 0 errors, 2 interruptions and a time of 1:16. Part B completed with 0 errors, 0 interruptions and a time of 1:20. TM B required 3 attempts d/t confusion.   Bell Cancellation Test completed with the R UE in a time of 2:31 with 1 errors and 2 distractor hits.   Computerized Maze Assessment completed with the R UE in a time of 0:42 with 0 errors.   Visual Scanning and Motor Reaction Assessment completed with the B  UE. Level 1 32/50 required targets.   Pt was able to pass 1/4 assessment levels. 12/10/24 Progressing       Anticipated CPT codes: Therapeutic Exercise 29079, Therapeutic Activity 11669, and Self Care/Home Management 51816    Assessment/Plan     ASSESSMENT:   Pt  did well during today's session and has demonstrated progress toward some of his goals over the past 4 weeks. Pt met one goal today regarding independence with self-feeding skills. Pt continues to demo impaired FMC and pinch strength in B hands. Pt continues to demo and report difficulties with FM related ADL tasks such as dressing skills. He will continue to benefit from skilled OT services to address these remaining concerns.     PLAN:   Will continue to address deficits in B FMC, B thumb strength, ADL//IADL performance and visual scanning/reaction time.     SIGNATURE: Carly Owen OT, [place] License #: [id number]  Electronically Signed on 12/10/2024        [address] [place] [address] [place] [address]  [phone]

## 2024-12-20 ENCOUNTER — HOSPITAL ENCOUNTER (OUTPATIENT)
Dept: OCCUPATIONAL THERAPY | Facility: HOSPITAL | Age: 79
Setting detail: THERAPIES SERIES
Discharge: HOME | End: 2024-12-20
Payer: MEDICARE

## 2024-12-20 ENCOUNTER — HOSPITAL ENCOUNTER (OUTPATIENT)
Dept: PHYSICAL THERAPY | Facility: HOSPITAL | Age: 79
Setting detail: THERAPIES SERIES
Discharge: HOME | End: 2024-12-20
Payer: MEDICARE

## 2024-12-20 ENCOUNTER — HOSPITAL ENCOUNTER (OUTPATIENT)
Facility: HOSPITAL | Age: 79
Setting detail: THERAPIES SERIES
Discharge: HOME | End: 2024-12-20
Payer: MEDICARE

## 2024-12-20 DIAGNOSIS — Z78.9 DECREASED ACTIVITIES OF DAILY LIVING (ADL): ICD-10-CM

## 2024-12-20 DIAGNOSIS — G20.A1 PARKINSON'S DISEASE, UNSPECIFIED WHETHER DYSKINESIA PRESENT, UNSPECIFIED WHETHER MANIFESTATIONS FLUCTUATE: ICD-10-CM

## 2024-12-20 DIAGNOSIS — G89.29 CHRONIC BILATERAL LOW BACK PAIN, UNSPECIFIED WHETHER SCIATICA PRESENT: Primary | ICD-10-CM

## 2024-12-20 DIAGNOSIS — G20.A2 PARKINSON'S DISEASE WITH FLUCTUATING MANIFESTATIONS, UNSPECIFIED WHETHER DYSKINESIA PRESENT: ICD-10-CM

## 2024-12-20 DIAGNOSIS — M54.50 CHRONIC BILATERAL LOW BACK PAIN, UNSPECIFIED WHETHER SCIATICA PRESENT: Primary | ICD-10-CM

## 2024-12-20 DIAGNOSIS — R29.818 FINE MOTOR IMPAIRMENT: Primary | ICD-10-CM

## 2024-12-20 DIAGNOSIS — R13.12 OROPHARYNGEAL DYSPHAGIA: Primary | ICD-10-CM

## 2024-12-20 DIAGNOSIS — R29.898 FINE MOTOR IMPAIRMENT: Primary | ICD-10-CM

## 2024-12-20 PROCEDURE — 97530 THERAPEUTIC ACTIVITIES: CPT

## 2024-12-20 PROCEDURE — 97535 SELF CARE MNGMENT TRAINING: CPT

## 2024-12-20 PROCEDURE — 92526 ORAL FUNCTION THERAPY: CPT | Performed by: SPEECH-LANGUAGE PATHOLOGIST

## 2024-12-20 PROCEDURE — 97140 MANUAL THERAPY 1/> REGIONS: CPT

## 2024-12-20 PROCEDURE — 97110 THERAPEUTIC EXERCISES: CPT

## 2024-12-20 NOTE — THERAPY TREATMENT NOTE
Physical Therapy Treatment Note  [facility] Outpatient Therapy Services  A department [address], [place] [address]    Patient: Gary Tinoco                                                 Visit Date: 2024  :     1945    Referring practitioner:    Melanie Snyder MD  Date of Initial Visit:          Type: THERAPY  Episode: Parkinson's disease manifestations  Number of visits this episode: 16    Visit Diagnoses:    ICD-10-CM ICD-9-CM   1. Chronic bilateral low back pain, unspecified whether sciatica present  M54.50 724.2    G89.29 338.29   2. Parkinson's disease with fluctuating manifestations, unspecified whether dyskinesia present  G20.A2 332.0     SUBJECTIVE     Subjective: His sciatic nerve pain is almost gone now that he has switched chairs       PAIN: 3/10         OBJECTIVE     Objective       Manual Therapy     05011  Comments   R and L side piriformis release    R & L side hip flexor stretch    Prone IR/ER stretch with pressure to piriformis            Timed Minutes 43        Therapy Education/Self Care 25087   Education offered today [facility]   Evaporcool Code Access Code: [email or web address]  URL: [email or web address]   Ongoing HEP      Date: 12/10/2024  Prepared by: Hossein Henry     Exercises  - Supine Single Bent Knee Fallout  - 2 x daily - 7 x weekly - 2 sets - 10 reps  - Supine Piriformis Stretch with Foot on Ground  - 2 x daily - 7 x weekly - 2 sets - 30 hold  - Supine Double Knee to Chest  - 2 x daily - 7 x weekly - 2 sets - 10 reps  - Supine Posterior Pelvic Tilt  - 2 x daily - 7 x weekly - 2 sets - 10 reps   Timed Minutes            Total Timed Treatment:     43   mins  Total Time of Visit:             43   mins         ASSESSMENT/PLAN     GOALS          Goals                                                  Progress Note due by 25                                                              Recert due by 3/9/25   LTG by: 8 weeks Comments Date Status    Improve cruz thoracolumbar mobility         Improve  bilateral hip ext passively to 15 deg  Addressed with hip extension stretching and manual OP  12/20  ongoing    Improve cruz passive hip IR to 10 deg         Able to sit to stand without exacerbation of pain after sitting 10 min         SLS either leg x 5 secs         Reports pain no greater than 2-3/10 when it does occur.         Improve KAREN to 10/50         Independent with HEP for flexibility and core/hip stability.               Anticipated CPT codes: Therapeutic Exercise 96732, Manual Therapy 03774, Therapeutic Activity 39882, Neuromuscular ReEducation 41403, Self Care/Home Management 80464, Estim Attended [id number], and Estim Unattended 67818        Assessment/Plan     ASSESSMENT:   Pt arrives with complaints of pain pinpointed in the piriformis muscles, so we started the session with piriformis release. Due to his postural deficits related to his Parkinson's, he lacks mobility in his back and his hips. Change in his sitting surfaces has seemed to help resolve his acute sciatic pain; however, deficits noted above need to be addressed to prevent further pain. Bed mobility and transfers are difficult for him today, though he managed with min A    PLAN:   Focus early on pain relief with soft tissue work and modalities as needed. Work on mobility and flexibility through the spine and hips. Progress with postural/core/hip stability to improve postural alignment and mechanics.Progress the HEP for the same.     SIGNATURE: Meaghan Varela PT DPT, [place] License #: [id number]  Electronically Signed on 12/20/2024        [place] [address]  [phone]

## 2024-12-20 NOTE — THERAPY TREATMENT NOTE
"Occupational Therapy Treatment Note  [facility] Outpatient Therapy Services  A department [address], [place] [address]    Patient: Gary Tinoco                                                 Visit Date: 2024  :     1945    Referring practitioner:    Melanie Snyder MD  Date of Initial Visit:          Type: THERAPY  Episode: C impaired from Parkinson's Disease  Number of visits this episode: 9    Visit Diagnoses:    ICD-10-CM ICD-9-CM   1. Fine motor impairment  R29.818 V49.89    R29.898    2. Decreased activities of daily living (ADL)  Z78.9 V49.89   3. Parkinson's disease, unspecified whether dyskinesia present, unspecified whether manifestations fluctuate  G20.A1 332.0       SUBJECTIVE     Subjective:  Pt reports he had a new great-granddaughter born today and got to go see her. He said he did not hold her because he didn't think he should be in his \"condition\". Pt reports he got a new lift chair and grab bar on his bed.     PAIN:  Pre-Treatment: 3/10; Back and R buttocks   Post-Treatment: 3/10        OBJECTIVE     Objective   Self-Care/IADL Training    33558 Comments   Pt was able to unbutton and button 3 large button requiring multiple attempts and increased time.     Pt was able to independently manipulate martine and snaps using B hands.                 Timed Minutes 15     Therapeutic Exercises    27429 Units Comments   Pt completed B hand power grasp using 7# digi-flex completing 1 set of 20 reps each. Pt then completed B hand isolated finger flexion using 5# digi-flex performing 1 set of 10 reps each digit (2-4).      Pt completed B hand strengthening exercises using mod resistance power web to focusing on  strengthening performing 1 set of 10 reps of each.       Pt completed B key pinch strengthening using green resistance ball, completing 1 set of 20 reps each thumb.                Timed Minutes 15     Therapeutic Activities    58604 Comments   Pt " completed B hand clip strengthening using min-max resistance clips while also focusing on endurance and functional reaching. Pt was able to remove all clips from horizontal bar and place them on vertical pole using R UE with min difficulty. Pt was then able to remove all clips and place them back on horizontal pole using L UE with mod difficulty.     Pt practiced in-hand manipulation skills using coins to perform finger to palm and palm to finger translation skills, with mod dropping and max difficulty.     Pt completed FM activity using standard tweezers to  toothpicks using R hand with mod difficulty.             Timed Minutes 15     Therapy Education/Self Care 30001   Education offered today Education provided on use of grab bar on bedside for fall prevention.    Medbride Code    Ongoing HEP   Thera-Putty  Strengthening Exercises    Timed Minutes      Total Timed Treatment:     45   mins  Total Time of Visit:             45   mins         ASSESSMENT/PLAN     GOALS  Goals                                          Progress Note due by 1/7/25                                                      Recert due by 1/7/25   STG by: 1/7/25 Comments Date  Status   Pt will be independent with daily completion of a HEP to address deficits from Parkinson's Disease affecting ADL/IADL's. Fall prevention  12/10/24 Ongoing    Pt will display improved B UE FMC by completing the 9 hole peg test in 25 seconds or less. FM activities with moderate dropping in R hand.  12/10/24 Ongoing    Pt will complete clothing fasteners including buttons, zippers, and martine with Mod I to improve ADL performance. Pt was able to independently manipulate martine and snaps using B hands.     Pt was able to unbutton and button 3 large button requiring multiple attempts and increased time.  12/10/24 Progressing           LTG by: 1/7/25      Pt will increase B hand key pinch strength to 20# for improved performance with IADL tasks. Digi-flex,  power web, resistance ball /key pinch strengthening  12/10/24 Ongoing   Pt will perform all aspects of self-feeding with Mod I to reduce caregiver burden and improve ADL performance.   12/10/24 MET     Pt will increase core strength to improve performance during bed mobility for safety and independence with ADLs.   11/15/24 MET    Pt will pass all 4 visual scanning assessments using the BITS to demonstrate appropriate use of compensation techniques for R visual field deficit.    12/10/24 Progressing       Anticipated CPT codes: Therapeutic Exercise 30769, Therapeutic Activity 72534, and Self Care/Home Management 01728    Assessment/Plan     ASSESSMENT:   Pt did well during today's session. He continues to tolerate B hand strengthening and FMC activities well with minimal reports of pain or discomfort. Pt continues to struggle with manipulation of buttons and martine during dressing at home and during simulation in sessions. Education on fall prevention strategies for using grab bar on bed and lift chair.     PLAN:   Will continue to address deficits in B FMC, B thumb strength, ADL//IADL performance and visual scanning/reaction time.     SIGNATURE: Carly Owen OT, [place] License #: [id number]  Electronically Signed on 12/20/2024        [address] [place] [address] [place]  [place] [address]  [phone]

## 2024-12-20 NOTE — PROGRESS NOTES
Speech Language Pathology Treatment Note and 30 Day Progress Note  [address] [place] [address]    Patient: Gary Tinoco                                                                                     Visit Date: 2024  :     1945    Referring practitioner:    Melanie Snyder MD  Date of Initial Visit:          Type: THERAPY  Episode: Swallowing Parkinsons      Visit Diagnoses:    ICD-10-CM ICD-9-CM   1. Oropharyngeal dysphagia  R13.12 787.22     SUBJECTIVE     Patient was alert and ready for therapy. He attended alone.     Pain: Patient expresses sciatic pain in his right buttock at a level of 3. He feels his new chair has made him feel better.     OBJECTIVE   GOALS  Goals                                            STG  Comments Status   Patient will improve oral skills to enhance safety and increase eating efficiency and bolus control as measured by increased lip closure, decreased oral residue, improved bolus formation, improved posterior propulsion of the bolus, decreased drooling, and improved management of secretions.  Reviewed instructed on tongue press out, tongue press up, and lip press exercises. Able to demonstrate with cues.   Ongoing   Patient will improve hyolaryngeal elevation, improve epiglottic inversion, improve UES opening, increase base of the tongue strength and posterior pharyngeal wall excursion, and increase efficiency of cough to clear residue from the airway as measured by decreased overt signs and symptoms of aspiration and decreased penetration and aspiration on repeat instrumental swallow study, if applicable.   Reviewed instruction on effortful swallow exercise. Able to demonstrate 25 swallows after instruction and moderate cuing. Added supraglottic swallow exercise. Patient able to demonstrate after instruction and cues.  Ongoing   Patient will report decreased difficulty with swallowing and  decreased coughing/choking with meals.  Goals and rationale reviewed. Patient expressed no changes in his swallow.   Ongoing   Patient will improve oral hygiene to enhance safety and decrease risk of aspiration pneumonia Previous: Discussed oral hygiene. Patient stated that he brushes one time per day but is not consistent. He was encouraged to brush at least two times per day, consistently. Patient agreed to try to do this. Emphasized importance of oral care.  Ongoing   LTG        Patient will safely consume full PO diet of regular consistency food and thin liquids without discomfort and difficulty or complications such as aspiration or pneumonia.   Goals and exercises reviewed. Patient able to demonstrate with instruction and cues. Patient stated that EMST arrived, but he forgot to bring it.  Ongoing       Therapy Education/Self Care    Details: POC   Given Home Exercise Program  Access Code: [email or web address]  URL: [email or web address]  Date: 12/06/2024  Prepared by: Dominique Pizano    Exercises  - Tongue Resistance with Top of Tongue   - 1 x daily - 7 x weekly - 3 sets - 10 reps  - Tongue Resistance with Front of Tongue   - 1 x daily - 7 x weekly - 3 sets - 10 reps  - Lip Press  - 2 x daily - 7 x weekly - 1 sets - 5 reps - 5 second hold  - Effortful Swallow  - 2-3 x daily - 7 x weekly - 3 sets - 10 reps  - Supraglottic swallow  - 1 x daily - 7 x weekly - 3 sets - 10 reps   Progress: New and Reinforced   Education provided to:  Patient   Level of understanding Verbalized and Demonstrated           CPT Code:   85840 Swallow Treatment  Total Time of Visit:             40   mins         ASSESSMENT/PLAN     ASSESSMENT: Patient able to demonstrate exercises with cues. Encouraged to do them regularly. No changes in swallow noted.     PLAN: Continue therapy and home exercises.     Progress Note Due Date:1/17/25  Recertification Due Date:2/17/25    SIGNATURE: Dominique Pizano, CCC-SLP, [place] License #: [id number]  Electronically  Signed on 12/20/2024          [address] [place] [address]  [phone]

## 2024-12-23 ENCOUNTER — OFFICE VISIT (OUTPATIENT)
Dept: OTOLARYNGOLOGY | Facility: CLINIC | Age: 79
End: 2024-12-23
Payer: MEDICARE

## 2024-12-23 ENCOUNTER — HOSPITAL ENCOUNTER (OUTPATIENT)
Dept: OCCUPATIONAL THERAPY | Facility: HOSPITAL | Age: 79
Setting detail: THERAPIES SERIES
Discharge: HOME | End: 2024-12-23
Payer: MEDICARE

## 2024-12-23 VITALS
SYSTOLIC BLOOD PRESSURE: 130 MMHG | BODY MASS INDEX: 26.88 KG/M2 | HEIGHT: 71 IN | RESPIRATION RATE: 16 BRPM | TEMPERATURE: 98.4 F | WEIGHT: 192 LBS | DIASTOLIC BLOOD PRESSURE: 78 MMHG | HEART RATE: 84 BPM

## 2024-12-23 DIAGNOSIS — K22.5 ZENKER DIVERTICULA: Primary | ICD-10-CM

## 2024-12-23 DIAGNOSIS — R29.818 FINE MOTOR IMPAIRMENT: Primary | ICD-10-CM

## 2024-12-23 DIAGNOSIS — R29.898 FINE MOTOR IMPAIRMENT: Primary | ICD-10-CM

## 2024-12-23 DIAGNOSIS — G20.A1 PARKINSON'S DISEASE, UNSPECIFIED WHETHER DYSKINESIA PRESENT, UNSPECIFIED WHETHER MANIFESTATIONS FLUCTUATE: ICD-10-CM

## 2024-12-23 DIAGNOSIS — Z78.9 DECREASED ACTIVITIES OF DAILY LIVING (ADL): ICD-10-CM

## 2024-12-23 PROCEDURE — 97530 THERAPEUTIC ACTIVITIES: CPT

## 2024-12-23 RX ORDER — GABAPENTIN 100 MG/1
100 CAPSULE ORAL 3 TIMES DAILY
COMMUNITY

## 2024-12-23 NOTE — PROGRESS NOTES
DELVIS Dumont  [facility]AR [facility] TH[facility]  [address], [address]  [facility] [address]  Fax [phone]  Phone [phone]      Visit Type: NEW PATIENT   Chief Complaint   Patient presents with    zenker's           HPI      History of Present Illness  The patient is a 79-year-old male referred for an incidental finding of a Zenker's diverticulum.    He reports no issues with swallowing and expresses no interest in surgical intervention. He does not experience any difficulty in swallowing or regurgitation of previously ingested food.    Supplemental Information  He experiences a constant buzzing noise in his ears but is uncertain about the presence of hearing loss.    Results  Imaging  Swallow study showed a very small Zenker's diverticulum.    Past Medical History:   Diagnosis Date    Arthritis     Cataract     had surgery in Feb 2023 both eyes    Colon polyp     Diabetes mellitus     Difficulty walking     Diverticulosis     History of adenomatous polyp of colon     Hyperlipidemia     Hypertension     Parkinson's disease     Shingles     Tremor        Past Surgical History:   Procedure Laterality Date    APPENDECTOMY      COLONOSCOPY  04/19/2016    polyp, tubular adenoma, diverticulosis    COLONOSCOPY N/A 03/19/2021    Procedure: COLONOSCOPY WITH ANESTHESIA;  Surgeon: Philip Ferrari MD;  Location: [facility] ENDOSCOPY;  Service: Gastroenterology;  Laterality: N/A;  pre: hx polyps  post: poor prep  George Bond MD    COLONOSCOPY N/A 04/01/2021    Procedure: COLONOSCOPY WITH ANESTHESIA;  Surgeon: Philip Ferrari MD;  Location: [facility] ENDOSCOPY;  Service: Gastroenterology;  Laterality: N/A;  preop; hx of polyps  postop; polyps   PCP George Bond     EYE SURGERY  feb 2023    HERNIA REPAIR      JOINT REPLACEMENT      KNEE SURGERY      VASECTOMY  1974       Family History: His family history includes Dementia in his mother;  Parkinsonism in his father.     Social History: He  reports that he has never smoked. He has never been exposed to tobacco smoke. He has never used smokeless tobacco. He reports that he does not currently use alcohol. He reports that he does not use drugs.    Home Medications:  Sodium Fluoride, amantadine, carbidopa-levodopa, clotrimazole-betamethasone, gabapentin, glyburide, losartan-hydrochlorothiazide, lovastatin, metFORMIN, naloxone, and traMADol    Allergies:  He has No Known Allergies.       Vital Signs:   Temp:  [98.4 °F (36.9 °C)] 98.4 °F (36.9 °C)  Heart Rate:  [84] 84  Resp:  [16] 16  BP: (130)/(78) 130/78  ENT Physical Exam  Ear  Hearing: intact;  Auricles: right auricle normal; left auricle normal;  External Mastoids: right external mastoid normal; left external mastoid normal;  Ear Canals: right ear canal normal; left ear canal normal;  Tympanic Membranes: right tympanic membrane normal; left tympanic membrane normal;  Oral Cavity/Oropharynx  Lips: normal;  Teeth: normal;  Gums: gingiva normal;  Tongue: normal;  Oral mucosa: normal;  Hard palate: normal;       Physical Exam  Oral exam was performed.        Result Review       RESULTS REVIEW    I have reviewed the patients old records in the chart.   The following results/records were reviewed:   FL Video Swallow With Speech Single Contrast (11/07/2024 09:25)        Assessment & Plan  Zenker diverticula       Assessment & Plan  1. Zenker's diverticulum.  A swallow study conducted by Dr. Russell, neurologist, revealed a potential Zenker's diverticulum, which is essentially a small pouch in the esophagus. The patient's diverticulum is notably small, and he is asymptomatic, reporting no issues with swallowing or regurgitation of food. Given the absence of symptoms and the small size of the diverticulum, no surgical intervention is required at this time.              No follow-ups on file.        Electronically signed by Na Cantu, DELVIS 12/23/24  11:38 AM CST.     Patient or patient representative verbalized consent for the use of Ambient Listening during the visit with  DELVIS Dumont for chart documentation. 12/23/2024  11:38 CST

## 2024-12-23 NOTE — THERAPY TREATMENT NOTE
Occupational Therapy Treatment Note  [facility]  [address], [place] [address]    Patient: Gary Tinoco                                                 Visit Date: 2024  :     1945    Referring practitioner:    Melanie Snyder MD  Date of Initial Visit:          Type: THERAPY  Episode: FMC impaired from Parkinson's Disease  Number of visits this episode: 10    Visit Diagnoses:    ICD-10-CM ICD-9-CM   1. Fine motor impairment  R29.818 V49.89    R29.898    2. Decreased activities of daily living (ADL)  Z78.9 V49.89   3. Parkinson's disease, unspecified whether dyskinesia present, unspecified whether manifestations fluctuate  G20.A1 332.0       SUBJECTIVE     Subjective:  Pt reports he had an appt with ENT today and they said he was cleared per pt. Pt reported multiple times during the session that he had a lot of pain in his R hip from his last PT visit. 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      PAIN:  Pre-Treatment: 3/10; R Buttock/Hip  Post-Treatment: 4/10        OBJECTIVE     Objective   Therapeutic Activities    08375 Comments   Pt completed B FM activity and pincer strengthening removing small beads from green thera-putty. Pt was able to complete with min difficulty/dropping.     Pt completed FM activity using standard tweezers to  toothpicks using R hand with min difficulty, using L hand with mod difficulty.     BITS activities focused on B UE AROM and coordination. Accuracy ranged from 87.18 to 100%. Reaction time was 1.75 to 3.02 seconds.   VS: 87.18%, 1.75S, 34 hits   Sequencing (A-Z): 96.3%, 1:03, 2.44s  Sequencing (1-30): 100%, 1:31, 3.02s   Trail Making Part A completed with the R UE. 0 errors, 0 interruptions and a time of 1:14. Part B completed with 2 errors, 12 interruptions and a time of 1:41.  Bell Cancellation Test completed  with the B  UE in a time of 3:01 with 0 errors and 2 distractor hits.   Computerized Maze Assessment completed with the R UE in a time of 0:39 with 1 errors.   Visual Scanning and Motor Reaction Assessment completed with the B UE. Level 1 31/50 required targets. Pt was able to pass 1/4 assessment levels.   TM B pt required 5 cues for accurate sequencing     Pt tolerated standing during each assessment with seated rest breaks between.     Passed Maze & TM Part A       Timed Minutes 40     Therapy Education/Self Care 77172   Education offered today Education provided on LSVT Loud and introduced pt to SLP certified in LSVT Loud.    Medbride Code    Ongoing HEP   Thera-Putty  Strengthening Exercises    Timed Minutes 5     Total Timed Treatment:     45   mins  Total Time of Visit:             45   mins         ASSESSMENT/PLAN     GOALS  Goals                                          Progress Note due by 1/7/25                                                      Recert due by 1/7/25   STG by: 1/7/25 Comments Date  Status   Pt will be independent with daily completion of a HEP to address deficits from Parkinson's Disease affecting ADL/IADL's.  12/10/24 Ongoing    Pt will display improved B UE FMC by completing the 9 hole peg test in 25 seconds or less. Completed FM activities with min-mod difficulty in B hands.  12/10/24 Ongoing    Pt will complete clothing fasteners including buttons, zippers, and martine with Mod I to improve ADL performance.  12/10/24 Progressing           LTG by: 1/7/25      Pt will increase B hand key pinch strength to 20# for improved performance with IADL tasks.  12/10/24 Ongoing   Pt will perform all aspects of self-feeding with Mod I to reduce caregiver burden and improve ADL performance.   12/10/24 MET     Pt will increase core strength to improve performance during bed mobility for safety and independence with ADLs.   11/15/24 MET    Pt will pass all 4 visual scanning assessments using the  BITS to demonstrate appropriate use of compensation techniques for R visual field deficit.   Trail Making Part A completed with the R UE. 0 errors, 0 interruptions and a time of 1:14. Part B completed with 2 errors, 12 interruptions and a time of 1:41.  Bell Cancellation Test completed with the B  UE in a time of 3:01 with 0 errors and 2 distractor hits.   Computerized Maze Assessment completed with the R UE in a time of 0:39 with 1 errors.   Visual Scanning and Motor Reaction Assessment completed with the B UE. Level 1 31/50 required targets. Pt was able to pass 1/4 assessment levels. 12/10/24 Progressing       Anticipated CPT codes: Therapeutic Exercise 73647, Therapeutic Activity 33349, and Self Care/Home Management 57189    Assessment/Plan     ASSESSMENT:   Pt did well during today's session. He was able to complete FM activities with less dropping and difficulty than previous sessions. Pt continues to demo deficits in reaction time and motor coordination skills based on performance on BITS activities and assessments. Further education provided to the pt on LSVT Loud for voice reduction.     PLAN:   Will continue to address deficits in B FMC, B thumb strength, ADL//IADL performance and visual scanning/reaction time.     SIGNATURE: Carly Owen OT, [place] License #: [id number]  Electronically Signed on 12/23/2024        [place]o. [id number]  [phone]

## 2024-12-31 ENCOUNTER — HOSPITAL ENCOUNTER (OUTPATIENT)
Dept: OCCUPATIONAL THERAPY | Facility: HOSPITAL | Age: 79
Setting detail: THERAPIES SERIES
Discharge: HOME | End: 2024-12-31
Payer: MEDICARE

## 2024-12-31 ENCOUNTER — HOSPITAL ENCOUNTER (OUTPATIENT)
Dept: PHYSICAL THERAPY | Facility: HOSPITAL | Age: 79
Setting detail: THERAPIES SERIES
Discharge: HOME | End: 2024-12-31
Payer: MEDICARE

## 2024-12-31 DIAGNOSIS — G20.A1 PARKINSON'S DISEASE, UNSPECIFIED WHETHER DYSKINESIA PRESENT, UNSPECIFIED WHETHER MANIFESTATIONS FLUCTUATE: ICD-10-CM

## 2024-12-31 DIAGNOSIS — G89.29 CHRONIC BILATERAL LOW BACK PAIN, UNSPECIFIED WHETHER SCIATICA PRESENT: Primary | ICD-10-CM

## 2024-12-31 DIAGNOSIS — R29.818 FINE MOTOR IMPAIRMENT: Primary | ICD-10-CM

## 2024-12-31 DIAGNOSIS — M54.50 CHRONIC BILATERAL LOW BACK PAIN, UNSPECIFIED WHETHER SCIATICA PRESENT: Primary | ICD-10-CM

## 2024-12-31 DIAGNOSIS — Z78.9 DECREASED ACTIVITIES OF DAILY LIVING (ADL): ICD-10-CM

## 2024-12-31 DIAGNOSIS — R29.898 FINE MOTOR IMPAIRMENT: Primary | ICD-10-CM

## 2024-12-31 PROCEDURE — 97530 THERAPEUTIC ACTIVITIES: CPT

## 2024-12-31 PROCEDURE — 97110 THERAPEUTIC EXERCISES: CPT

## 2024-12-31 PROCEDURE — 97535 SELF CARE MNGMENT TRAINING: CPT

## 2024-12-31 PROCEDURE — 97140 MANUAL THERAPY 1/> REGIONS: CPT

## 2024-12-31 NOTE — THERAPY TREATMENT NOTE
Physical Therapy Treatment Note  [facility]  [address], [place] [address]    Patient: Gary Tinoco                                                 Visit Date: 2024  :     1945    Referring practitioner:    Melanie Snyder MD  Date of Initial Visit:          Type: THERAPY  Episode: LBP cruz  Number of visits this episode: 2    Visit Diagnoses:    ICD-10-CM ICD-9-CM   1. Chronic bilateral low back pain, unspecified whether sciatica present  M54.50 724.2    G89.29 338.29     SUBJECTIVE     Subjective: His back is okay, though his sciatic nerve is hurting quite a bit. He's been having to take tramadol and gabapentin to help. Usually, it is painful B, though R>L today.      PAIN: 4-5/10 R>L         OBJECTIVE     Objective     Therapeutic Exercises    63922 Units Comments   DKTC with orange PB  3 min     LTR with orange PB  3 min     Piriformis stretch  2x45 sec ea              Timed Minutes 20     Manual Therapy     26228  Comments   B HS extensibility measurement L: 130 deg  R:128 deg    B HS TMR with movement Very slow release, more painful on L      B HS autogenic inhibition stretching with Percussive IASTM using PowerBoost lvl 1 using curve attachment to HS while on stretch    L: 150 deg  R: 146 deg           Timed Minutes 23        Therapy Education/Self Care 41044   Education offered today HEP   Zencoder Code Access Code: [email or web address]  URL: [email or web address]   Ongoing HEP      Date: 12/10/2024  Prepared by: Hossein Henry     Exercises  - Supine Single Bent Knee Fallout  - 2 x daily - 7 x weekly - 2 sets - 10 reps  - Supine Piriformis Stretch with Foot on Ground  - 2 x daily - 7 x weekly - 2 sets - 30 hold  - Supine Double Knee to Chest  - 2 x daily - 7 x weekly - 2 sets - 10 reps  - Supine Posterior Pelvic Tilt  - 2 x daily - 7 x weekly - 2 sets - 10 reps   Timed Minutes            Total Timed Treatment:     43    mins  Total Time of Visit:             43   mins         ASSESSMENT/PLAN     GOALS          Goals                                                  Progress Note due by 1/9/25                                                              Recert due by 3/9/25   LTG by: 8 weeks Comments Date Status   Improve cruz thoracolumbar mobility  Introduced inverse flexion and rotational activities today  12/31 Ongoing    Improve  bilateral hip ext passively to 15 deg  Addressed with hip extension stretching and manual OP  12/20  ongoing    Improve crzu passive hip IR to 10 deg         Able to sit to stand without exacerbation of pain after sitting 10 min         SLS either leg x 5 secs         Reports pain no greater than 2-3/10 when it does occur.         Improve KAREN to 10/50         Independent with HEP for flexibility and core/hip stability.               Anticipated CPT codes: Therapeutic Exercise 78428, Manual Therapy 53389, Therapeutic Activity 03317, Neuromuscular ReEducation 36724, Self Care/Home Management 30172, Estim Attended 23441, and Estim Unattended 90522        Assessment/Plan     ASSESSMENT: He was rather sore for a few days following his previous visit, though has since recovered. He presented today with considerable tightness through B HS, which was addressed with B TMR techniques, resulting in a ~20 deg improvement in hamstring extensibility B. Both were considerably hypertonic and overdominant, and he had a difficult time relaxing. Bed mobility tasks of rolling also proved to be a challenge for him, requiring Maksim for rolling.         PLAN:   Assess all goals for PN! Continue working on B hamstring and piriformis relaxation and extensibility, and progress with B hip strengthening mobility, as well as strategies to improve functional transfers.     SIGNATURE: Ruthie Lilly PT DPT, [place] License #: [id number]    Electronically Signed on 12/31/2024        [address] [place] [address]  [place] [address]  [phone]

## 2024-12-31 NOTE — THERAPY TREATMENT NOTE
Occupational Therapy 90 Day Recertification Note  [facility]  A department [facility]  [address], [place] [address]    Patient: Gary Tinoco                                                 Visit Date: 2024  :     1945    Referring practitioner:    Melanie Snyder MD  Date of Initial Visit:          Type: THERAPY  Episode: FMC impaired from Parkinson's Disease  Number of visits this episode: 11    Visit Diagnoses:    ICD-10-CM ICD-9-CM   1. Fine motor impairment  R29.818 V49.89    R29.898    2. Decreased activities of daily living (ADL)  Z78.9 V49.89   3. Parkinson's disease, unspecified whether dyskinesia present, unspecified whether manifestations fluctuate  G20.A1 332.0       SUBJECTIVE     Subjective:  Pt reports PT was painful today. He states he had a good holiday and was happy to see family. Pt reports he believes he has a trigger finger in his L hand middle finger. He states he has been watching YouTube videos on pressure relief methods to release trigger fingers.     PAIN:  Pre-Treatment: 5-6/10; R hip   Post-Treatment: 7-8/10; L middle finger     Outcome Measure:    9 Hole Peg Test: Left: 46.47s  Right: 36.28s    PT G-Codes  Quick DASH Score: 27.27    OBJECTIVE     Objective          Strength/Myotome Testing     Left Wrist/Hand      (2nd hand position)     Trial 1: 30 lbs    Trial 2: 30 lbs    Trial 3: 32 lbs    Average: 30.67 lbs    Thumb Strength  Key/Lateral Pinch     Trial 1: 13 lbs    Trial 2: 13 lbs    Trial 3: 13 lbs    Average: 13 lbs    Right Wrist/Hand      (2nd hand position)     Trial 1: 74 lbs    Trial 2: 70 lbs    Trial 3: 75 lbs    Average: 73 lbs    Thumb Strength   Key/Lateral Pinch     Trial 1: 15 lbs    Trial 2: 15 lbs    Trial 3: 15 lbs    Average: 15 lbs      Therapeutic Activities    94161 Comments   All  strength and outcome measures assessed for PN. See details above.     Pt completed B hand clip  strengthening using min-max resistance clips while also focusing on endurance and functional reaching. Pt was able to remove all clips from horizontal bar and place them on vertical pole with min difficulty using L UE. Pt was then able to remove all clips and place them back on horizontal pole using R UE.                 Timed Minutes 30     Self-Care/IADL Training    28090 Comments   Large Buttons: min difficulty and increased time   Small Buttons: min difficulty and increased time     Metal Martine: min difficulty and increased time    Pt demo'd independent manipulation of zipper on his vest.             Timed Minutes 10     Therapy Education/Self Care 68490   Education offered today Education on trigger finger management and edema massage for swelling in L hand.    Medbride Code    Ongoing HEP   Thera-Putty  Strengthening Exercises   Edema Massage for L hand    Timed Minutes 5     Total Timed Treatment:     45   mins  Total Time of Visit:             45   mins         ASSESSMENT/PLAN     GOALS  Goals                                          Progress Note due by 1/31/25                                                      Recert due by 3/30/25   STG by: 1/31/25 Comments Date  Status   Pt will be independent with daily completion of a HEP to address deficits from Parkinson's Disease affecting ADL/IADL's. Pt reports compliance. Edema massage added to HEP for management of swelling in L hand.  12/31/24 Ongoing    Pt will display improved B UE FMC by completing the 9 hole peg test in 25 seconds or less. Left: 46.47s    Right: 36.28s  Improved  12/31/24 Progressing    Pt will complete clothing fasteners including buttons, zippers, and martine with Mod I to improve ADL performance. Large Buttons: min difficulty and increased time   Small Buttons: min difficulty and increased time  Metal Martine: min difficulty and increased time  Zipper (on vest ): Independent  12/31/24 Progressing    Pt will increase L hand   strength to 50# for improved performance with IADL tasks.   L Hand: 30.67#    Significant decrease since initial eval.  12/31/24 New    LTG by: 3/30/25      Pt will increase B hand key pinch strength to 20# for improved performance with IADL tasks. R Thumb:15#   L Thumb: 13#  Maintained since last PN  12/31/24 Ongoing    Pt will perform all aspects of self-feeding with Mod I to reduce caregiver burden and improve ADL performance.   12/10/24 MET     Pt will increase core strength to improve performance during bed mobility for safety and independence with ADLs.   11/15/24 MET    Pt will pass all 4 visual scanning assessments using the BITS to demonstrate appropriate use of compensation techniques for R visual field deficit.   12/23/24: Pt was able to pass 1/4 assessment levels.    No change since last PN.  12/31/24 Progressing       Anticipated CPT codes: Therapeutic Exercise 82912, Therapeutic Activity 17175, and Self Care/Home Management 80748    Assessment & Plan       Assessment  Assessment details: Pt continues to demonstrate progress toward OT goals. Over the past 12 weeks pt has demonstrated some progress in B thumb strength, B FMC and ADL dressing tasks. Pt reports his wife continues to assist him in dressing tasks for the sake of time but he can independently dress with increased time. Pt demonstrates significant decrease in strength of his L hand and is now experiencing edema and pain in his L middle finger. Pt continues to struggle with visual scanning and sequencing skills measured through BITS assessments. Pt will continue to benefit from skilled OT services to address residual deficits and maintain function in B UE.     Plan  Therapy options: will be seen for skilled therapy services  Frequency: 1x week  Duration in weeks: 12  Treatment plan discussed with: patient  Plan details: Will continue to address deficits in B FMC, B thumb strength, ADL//IADL performance and visual scanning/reaction time.          90 Day Recertification  Certification Period: 12/31/2024 through 3/30/2025  I certify that the therapy services are furnished while this patient is under my care.  The services outlined above are required by this patient, and will be reviewed every 90 days.     PHYSICIAN: Melanie Snyder MD (NPI: [id number])    Signature:___________________________________________DATE: _________    Please sign and return via fax to [phone].   Thank you so much for letting us work with Gary. I appreciate your letting us work with your patients. If you have any questions or concerns, please don't hesitate to contact me.      SIGNATURE: Carly Owen OT, [place] License #: [id number]  Electronically Signed on 12/31/2024        [place]o. [address]  [phone]

## 2025-01-09 ENCOUNTER — HOSPITAL ENCOUNTER (OUTPATIENT)
Dept: PHYSICAL THERAPY | Facility: HOSPITAL | Age: 80
Setting detail: THERAPIES SERIES
Discharge: HOME | End: 2025-01-09
Payer: MEDICARE

## 2025-01-09 ENCOUNTER — HOSPITAL ENCOUNTER (OUTPATIENT)
Facility: HOSPITAL | Age: 80
Setting detail: THERAPIES SERIES
Discharge: HOME | End: 2025-01-09
Payer: MEDICARE

## 2025-01-09 ENCOUNTER — HOSPITAL ENCOUNTER (OUTPATIENT)
Dept: OCCUPATIONAL THERAPY | Facility: HOSPITAL | Age: 80
Setting detail: THERAPIES SERIES
Discharge: HOME | End: 2025-01-09
Payer: MEDICARE

## 2025-01-09 DIAGNOSIS — Z78.9 DECREASED ACTIVITIES OF DAILY LIVING (ADL): ICD-10-CM

## 2025-01-09 DIAGNOSIS — R29.898 FINE MOTOR IMPAIRMENT: Primary | ICD-10-CM

## 2025-01-09 DIAGNOSIS — R13.12 OROPHARYNGEAL DYSPHAGIA: Primary | ICD-10-CM

## 2025-01-09 DIAGNOSIS — M54.50 CHRONIC BILATERAL LOW BACK PAIN, UNSPECIFIED WHETHER SCIATICA PRESENT: Primary | ICD-10-CM

## 2025-01-09 DIAGNOSIS — G20.A2 PARKINSON'S DISEASE WITH FLUCTUATING MANIFESTATIONS, UNSPECIFIED WHETHER DYSKINESIA PRESENT: ICD-10-CM

## 2025-01-09 DIAGNOSIS — G20.A1 PARKINSON'S DISEASE, UNSPECIFIED WHETHER DYSKINESIA PRESENT, UNSPECIFIED WHETHER MANIFESTATIONS FLUCTUATE: ICD-10-CM

## 2025-01-09 DIAGNOSIS — R29.818 FINE MOTOR IMPAIRMENT: Primary | ICD-10-CM

## 2025-01-09 DIAGNOSIS — G89.29 CHRONIC BILATERAL LOW BACK PAIN, UNSPECIFIED WHETHER SCIATICA PRESENT: Primary | ICD-10-CM

## 2025-01-09 PROCEDURE — 97110 THERAPEUTIC EXERCISES: CPT

## 2025-01-09 PROCEDURE — 92526 ORAL FUNCTION THERAPY: CPT | Performed by: SPEECH-LANGUAGE PATHOLOGIST

## 2025-01-09 PROCEDURE — 97140 MANUAL THERAPY 1/> REGIONS: CPT

## 2025-01-09 PROCEDURE — 97530 THERAPEUTIC ACTIVITIES: CPT

## 2025-01-09 NOTE — THERAPY TREATMENT NOTE
Occupational Therapy Treatment Note  [facility]  A department [facility]  [address], [place] [address]    Patient: Gary Tinoco                                                 Visit Date: 2025  :     1945    Referring practitioner:    Melanie Snyder MD  Date of Initial Visit:          Type: THERAPY  Episode: FMC impaired from Parkinson's Disease  Number of visits this episode: 12    Visit Diagnoses:    ICD-10-CM ICD-9-CM   1. Fine motor impairment  R29.818 V49.89    R29.898    2. Decreased activities of daily living (ADL)  Z78.9 V49.89   3. Parkinson's disease, unspecified whether dyskinesia present, unspecified whether manifestations fluctuate  G20.A1 332.0       SUBJECTIVE     Subjective:  Pt reports his trigger finger has improved since last session. Pt reports no recent falls.     PAIN:  Pre-Treatment: 3-4/10;  R buttock   Post-Treatment: 3/10     OBJECTIVE     Objective   Therapeutic Activities    57061 Comments   Pt completed FM activity of duplicating patterns with small colored pegs onto small peg board using B hands, focusing on pincer grasp skills. Demo'd min difficulty in R hand and mod difficulty in L hand.  Required increased time for completion                    Timed Minutes 15     Therapeutic Exercises    60323 Comments   Pt completed B hand power grasp using 9# digi-flex in R hand and 5# digi-flex in L hand completing 2 sets of 20 reps each. Pt then completed B hand isolated finger flexion using 5# digi-flex performing 1 set of 10 reps each digit.     Pt completed B hand strengthening exercises using mod resistance power web to focusing on  strengthening performing 1 set of 10 reps of each.      Pt completed B hand/wrist strengthening in all planes using moderate resistance flex bar to simulate opening various jars and containers performing 1 set of 10 reps.            Timed Minutes 30     Therapy Education/Self Care 94630    Education offered today    Medbride Code    Ongoing HEP   Thera-Putty  Strengthening Exercises   Edema Massage for L hand    Timed Minutes      Total Timed Treatment:     45   mins  Total Time of Visit:             45   mins         ASSESSMENT/PLAN     GOALS  Goals                                          Progress Note due by 1/31/25                                                      Recert due by 3/30/25   STG by: 1/31/25 Comments Date  Status   Pt will be independent with daily completion of a HEP to address deficits from Parkinson's Disease affecting ADL/IADL's. Reports compliance with edema massage on L middle finger  12/31/24 Ongoing    Pt will display improved B UE FMC by completing the 9 hole peg test in 25 seconds or less. FM activity demo'd min difficulty in R hand and mod difficulty in L hand.  12/31/24 Progressing    Pt will complete clothing fasteners including buttons, zippers, and martine with Mod I to improve ADL performance.  12/31/24 Progressing    Pt will increase L hand  strength to 50# for improved performance with IADL tasks.   Digi-flex  strengthening exercises   Flex bar strengthening   Power Web Strengthening    12/31/24 New    LTG by: 3/30/25      Pt will increase B hand key pinch strength to 20# for improved performance with IADL tasks.  12/31/24 Ongoing    Pt will perform all aspects of self-feeding with Mod I to reduce caregiver burden and improve ADL performance.   12/10/24 MET     Pt will increase core strength to improve performance during bed mobility for safety and independence with ADLs.   11/15/24 MET    Pt will pass all 4 visual scanning assessments using the BITS to demonstrate appropriate use of compensation techniques for R visual field deficit.    12/31/24 Progressing       Anticipated CPT codes: Therapeutic Exercise 00204, Therapeutic Activity 49406, and Self Care/Home Management 81480    Assessment/Plan   Assessment:   Pt did well during tx session today. Pt  tolerated B  strengthening exercises with minimal complaints of pain. Pt continues to report increased weakness in L UE vs. R UE. Pt demo'd increased difficulty with FM tasks today requiring extended time for completion.     Plan:   Will continue to address deficits in B FMC, B thumb strength, ADL//IADL performance and visual scanning/reaction time.      SIGNATURE: Carly Owen OT, [place] License #: [id number]  Electronically Signed on 1/9/2025        [address] [place] [address] [place] [address]  [phone]

## 2025-01-09 NOTE — THERAPY TREATMENT NOTE
Physical Therapy Treatment Note  [facility]  A department [address], [place] [address]    Patient: Gary Tinoco                                                 Visit Date: 2025  :     1945    Referring practitioner:    Melanie Snyder MD  Date of Initial Visit:          Type: THERAPY  Episode: LBP cruz  Number of visits this episode: 3    Visit Diagnoses:    ICD-10-CM ICD-9-CM   1. Chronic bilateral low back pain, unspecified whether sciatica present  M54.50 724.2    G89.29 338.29   2. Parkinson's disease with fluctuating manifestations, unspecified whether dyskinesia present  G20.A2 332.0     SUBJECTIVE     Subjective: His back is okay, though his sciatic nerve is hurting quite a bit. He's been having to take tramadol and gabapentin to help. Usually, it is painful B, though R>L today.      PAIN: 4-5/10 R>L         OBJECTIVE     Objective     Therapeutic Exercises    74072 Units Comments   LTR     DK2C                    Timed Minutes 10         Manual Therapy     24250  Comments   B HS stretch with manual overpressure    B HS TMR with movement      B HS IASTM Percussive IASTM using PowerBoost lvl 1 using curve attachment to HS while on stretch               Timed Minutes 29        Therapy Education/Self Care [id number]   Education offered today HEP   [employer] Code Access Code: [email or web address]  URL: [email or web address]   Ongoing HEP      Date: 12/10/2024  Prepared by: Hossein Henry     Exercises  - Supine Single Bent Knee Fallout  - 2 x daily - 7 x weekly - 2 sets - 10 reps  - Supine Piriformis Stretch with Foot on Ground  - 2 x daily - 7 x weekly - 2 sets - 30 hold  - Supine Double Knee to Chest  - 2 x daily - 7 x weekly - 2 sets - 10 reps  - Supine Posterior Pelvic Tilt  - 2 x daily - 7 x weekly - 2 sets - 10 reps   Timed Minutes            Total Timed Treatment:     39   mins  Total Time of Visit:             39   mins          ASSESSMENT/PLAN     GOALS          Goals                                                  Progress Note due by 2/8/25                                                              Recert due by 3/9/25   LTG by: 8 weeks Comments Date Status   Improve cruz thoracolumbar mobility  Introduced inverse flexion and rotational activities today  12/31 Ongoing    Improve  bilateral hip ext passively to 15 deg  Addressed with hip extension stretching and manual OP  12/20  ongoing    Improve cruz passive hip IR to 10 deg  Deferred d/t PT error. Will assess at upcoming session  01/09  ongoing   Able to sit to stand without exacerbation of pain after sitting 10 min  Deferred d/t PT error. Will assess at upcoming session  01/09  ongoing   SLS either leg x 5 secs  Deferred d/t PT error. Will assess at upcoming session  01/09  ongoing   Reports pain no greater than 2-3/10 when it does occur.  Deferred d/t PT error. Will assess at upcoming session  01/09  ongoing   Improve KAREN to 10/50  Deferred d/t PT error. Will assess at upcoming session  01/09  ongoing   Independent with HEP for flexibility and core/hip stability.  Deferred d/t PT error. Will assess at upcoming session  01/09  ongoing         Anticipated CPT codes: Therapeutic Exercise 62184, Manual Therapy 69953, Therapeutic Activity 67379, Neuromuscular ReEducation 11839, Self Care/Home Management 25407, Estim Attended 65348, and Estim Unattended [id number]        Assessment/Plan   Assessment  Impairments: abnormal muscle firing, abnormal muscle tone, abnormal or restricted ROM, activity intolerance, impaired physical strength, lacks appropriate home exercise program and pain with function   Functional limitations: lifting, walking, uncomfortable because of pain, sitting and standing  Prognosis: good     Plan  Therapy options: will be seen for skilled therapy services  Planned modality interventions: dry needling, low level laser therapy, TENS and traction  Planned therapy  interventions: abdominal trunk stabilization, flexibility, functional ROM exercises, home exercise program, joint mobilization, manual therapy, motor coordination training, neuromuscular re-education, postural training, soft tissue mobilization, spinal/joint mobilization, strengthening, stretching and therapeutic activities  Frequency: 2x week  Duration in weeks: 8  Treatment plan discussed with: patient    ASSESSMENT:  Pt found much relief with previous session treatment and mentioned he was able to walk better following session. D/T his positive response, we continued to treat in this style, focusing on hamstring release as he has significant tightness here that negatively impacts the back. Remaining goals to be addressed at upcoming session to adequately portray progress patient has made at this time. Skilled therapy will continue to benefit patient to reduce back pain, improve mobility in his LE, and improve his overall quality of life as it will allow him to be more independent and confident in his ADLs.         PLAN:   Assess all goals for PN! Continue working on B hamstring and piriformis relaxation and extensibility, and progress with B hip strengthening mobility, as well as strategies to improve functional transfers.     SIGNATURE: Meaghan Varela PT [facility], [place] License #: [id number]    Electronically Signed on 1/9/2025        [place] [address]  [phone]

## 2025-01-09 NOTE — PROGRESS NOTES
Speech Language Pathology Treatment Note and 30 Day Progress Note  [address] [place] [address]    Patient: Gary Tinoco                                                                                     Visit Date: 2025  :     1945    Referring practitioner:    Melanie Snyder MD  Date of Initial Visit:          Type: THERAPY  Episode: Swallowing Parkinsons      Visit Diagnoses:    ICD-10-CM ICD-9-CM   1. Oropharyngeal dysphagia  R13.12 787.22     SUBJECTIVE     Patient was alert and ready for therapy. He brought his EMST for instruction. He stated that he is doing his exercises inconsistently.     Pain: Patient expresses pain in his hip of 3-4, hurts more on standing.     OBJECTIVE   GOALS  Goals                                            STG  Comments Status   Patient will improve oral skills to enhance safety and increase eating efficiency and bolus control as measured by increased lip closure, decreased oral residue, improved bolus formation, improved posterior propulsion of the bolus, decreased drooling, and improved management of secretions.  Reviewed instructed on tongue press out, tongue press up, and lip press exercises. Able to demonstrate with minimal cues.   Ongoing   Patient will improve hyolaryngeal elevation, improve epiglottic inversion, improve UES opening, increase base of the tongue strength and posterior pharyngeal wall excursion, and increase efficiency of cough to clear residue from the airway as measured by decreased overt signs and symptoms of aspiration and decreased penetration and aspiration on repeat instrumental swallow study, if applicable.  Patient able to demonstrate 30 effortful swallows in 5 min. He was given instruction on use of EMST-75 and instruction/tracking sheet. He was able to complete 5 sets of 5 blows with moderate instruction.  Ongoing   Patient will report decreased difficulty with  swallowing and decreased coughing/choking with meals.  Goals and rationale reviewed. Patient expressed no changes in his swallow.  He states that he doesn't really have a problem.  Ongoing   Patient will improve oral hygiene to enhance safety and decrease risk of aspiration pneumonia Patient is completing oral care  Ongoing   LTG        Patient will safely consume full PO diet of regular consistency food and thin liquids without discomfort and difficulty or complications such as aspiration or pneumonia.   Goals and exercises reviewed. Patient able to demonstrate with instruction and cues.  Ongoing       Therapy Education/Self Care    Details: POC   Given Home Exercise Program  Access Code: [email or web address]  URL: [email or web address]  Date: 12/06/2024  Prepared by: Dominique Pizano    Exercises  - Tongue Resistance with Top of Tongue   - 1 x daily - 7 x weekly - 3 sets - 10 reps  - Tongue Resistance with Front of Tongue   - 1 x daily - 7 x weekly - 3 sets - 10 reps  - Lip Press  - 2 x daily - 7 x weekly - 1 sets - 5 reps - 5 second hold  - Effortful Swallow  - 2-3 x daily - 7 x weekly - 3 sets - 10 reps  - Supraglottic swallow  - 1 x daily - 7 x weekly - 3 sets - 10 reps   Progress: New and Reinforced   Education provided to:  Patient   Level of understanding Verbalized and Demonstrated           CPT Code:   62620 Swallow Treatment  Total Time of Visit:             40   mins         ASSESSMENT/PLAN     ASSESSMENT: Patient able to demonstrate exercises with cues. Encouraged to do them regularly. No changes in swallow noted.     PLAN: Continue therapy and home exercises.     Progress Note Due Date:2/17/25  Recertification Due Date:2/17/25    SIGNATURE: Dominique Pizano, CCC-SLP, [place] License #: [id number]  Electronically Signed on 1/9/2025          [facility] [place] [address]  [phone]

## 2025-01-14 DIAGNOSIS — M51.369 DDD (DEGENERATIVE DISC DISEASE), LUMBAR: ICD-10-CM

## 2025-01-14 RX ORDER — TRAMADOL HYDROCHLORIDE 50 MG/1
50 TABLET ORAL EVERY 12 HOURS PRN
Qty: 60 TABLET | Refills: 0 | Status: SHIPPED | OUTPATIENT
Start: 2025-01-14

## 2025-01-15 ENCOUNTER — APPOINTMENT (OUTPATIENT)
Dept: PHYSICAL THERAPY | Facility: HOSPITAL | Age: 80
End: 2025-01-15
Payer: MEDICARE

## 2025-01-15 ENCOUNTER — APPOINTMENT (OUTPATIENT)
Dept: OCCUPATIONAL THERAPY | Facility: HOSPITAL | Age: 80
End: 2025-01-15
Payer: MEDICARE

## 2025-01-15 ENCOUNTER — APPOINTMENT (OUTPATIENT)
Facility: HOSPITAL | Age: 80
End: 2025-01-15
Payer: MEDICARE

## 2025-01-22 ENCOUNTER — APPOINTMENT (OUTPATIENT)
Dept: OCCUPATIONAL THERAPY | Facility: HOSPITAL | Age: 80
End: 2025-01-22
Payer: MEDICARE

## 2025-01-22 ENCOUNTER — HOSPITAL ENCOUNTER (OUTPATIENT)
Dept: PHYSICAL THERAPY | Facility: HOSPITAL | Age: 80
Setting detail: THERAPIES SERIES
Discharge: HOME | End: 2025-01-22
Payer: MEDICARE

## 2025-01-22 ENCOUNTER — HOSPITAL ENCOUNTER (OUTPATIENT)
Facility: HOSPITAL | Age: 80
Setting detail: THERAPIES SERIES
Discharge: HOME | End: 2025-01-22
Payer: MEDICARE

## 2025-01-22 DIAGNOSIS — G89.29 CHRONIC BILATERAL LOW BACK PAIN, UNSPECIFIED WHETHER SCIATICA PRESENT: Primary | ICD-10-CM

## 2025-01-22 DIAGNOSIS — R13.12 OROPHARYNGEAL DYSPHAGIA: Primary | ICD-10-CM

## 2025-01-22 DIAGNOSIS — M54.50 CHRONIC BILATERAL LOW BACK PAIN, UNSPECIFIED WHETHER SCIATICA PRESENT: Primary | ICD-10-CM

## 2025-01-22 DIAGNOSIS — G20.A2 PARKINSON'S DISEASE WITH FLUCTUATING MANIFESTATIONS, UNSPECIFIED WHETHER DYSKINESIA PRESENT: ICD-10-CM

## 2025-01-22 PROCEDURE — 92526 ORAL FUNCTION THERAPY: CPT | Performed by: SPEECH-LANGUAGE PATHOLOGIST

## 2025-01-22 PROCEDURE — 97530 THERAPEUTIC ACTIVITIES: CPT

## 2025-01-22 PROCEDURE — 97140 MANUAL THERAPY 1/> REGIONS: CPT

## 2025-01-22 NOTE — PROGRESS NOTES
Speech Language Pathology Treatment Note  [address] [place] [address]    Patient: Gary Tinoco                                                                                     Visit Date: 2025  :     1945    Referring practitioner:    Melanie Snyder MD  Date of Initial Visit:          Type: THERAPY  Episode: Swallowing Parkinsons      Visit Diagnoses:    ICD-10-CM ICD-9-CM   1. Oropharyngeal dysphagia  R13.12 787.22     SUBJECTIVE     Patient was alert and ready for therapy. He brought his EMST. He stated that he had lost it and just found it in his car yesterday, so he has not used it for exercises. He continues with his other exercises inconsistently.     Pain: Patient expresses pain in his hip of 8 today.     OBJECTIVE   GOALS  Goals                                            STG  Comments Status   Patient will improve oral skills to enhance safety and increase eating efficiency and bolus control as measured by increased lip closure, decreased oral residue, improved bolus formation, improved posterior propulsion of the bolus, decreased drooling, and improved management of secretions.  Reviewed instructed on tongue press out, tongue press up, and lip press exercises. Able to demonstrate with minimal cues.   Ongoing   Patient will improve hyolaryngeal elevation, improve epiglottic inversion, improve UES opening, increase base of the tongue strength and posterior pharyngeal wall excursion, and increase efficiency of cough to clear residue from the airway as measured by decreased overt signs and symptoms of aspiration and decreased penetration and aspiration on repeat instrumental swallow study, if applicable.  Patient able to demonstrate 50 effortful swallows in 10 min. He was re-instructed on use of EMST-75, he has not used it since last visit as he lost it in his car & just found it yesterday. He was able to complete 5 sets  "of 5 blows with moderate prompts and cues.  Ongoing   Patient will report decreased difficulty with swallowing and decreased coughing/choking with meals.  Goals and rationale reviewed. Patient expressed no changes in his swallow.  He states that he doesn't really have a problem.  Ongoing   Patient will improve oral hygiene to enhance safety and decrease risk of aspiration pneumonia Patient is completing oral care  Ongoing   LTG        Patient will safely consume full PO diet of regular consistency food and thin liquids without discomfort and difficulty or complications such as aspiration or pneumonia.   Goals and exercises reviewed. Patient able to demonstrate with instruction and cues. He has not been consistent with his exercises. He maintains that his swallow is \"not that bad\".  Ongoing       Therapy Education/Self Care    Details: POC   Given Home Exercise Program  Access Code: [email or web address]  URL: [email or web address]  Date: 12/06/2024  Prepared by: Dominique Pizano    Exercises  - Tongue Resistance with Top of Tongue   - 1 x daily - 7 x weekly - 3 sets - 10 reps  - Tongue Resistance with Front of Tongue   - 1 x daily - 7 x weekly - 3 sets - 10 reps  - Lip Press  - 2 x daily - 7 x weekly - 1 sets - 5 reps - 5 second hold  - Effortful Swallow  - 2-3 x daily - 7 x weekly - 3 sets - 10 reps  - Supraglottic swallow  - 1 x daily - 7 x weekly - 3 sets - 10 reps   Progress: New and Reinforced   Education provided to:  Patient   Level of understanding Verbalized and Demonstrated           CPT Code:   68343 Swallow Treatment  Total Time of Visit:             40   mins         ASSESSMENT/PLAN     ASSESSMENT: Patient able to demonstrate exercises with cues. Encouraged to do them regularly. No changes in swallow noted. He feels his swallow is \"not that bad\".     PLAN: Continue therapy and home exercises. One more visit scheduled.     Progress Note Due Date:2/17/25  Recertification Due Date:2/17/25    SIGNATURE: Dominique NICOLAS" DEDRICK Pizano-SLP, [place] License #: [id number]  Electronically Signed on 1/22/2025          [address] [place] [address]  [phone]

## 2025-01-22 NOTE — THERAPY TREATMENT NOTE
Physical Therapy Treatment Note  [facility]  A department [address], [place] [address]    Patient: Gary Tinoco                                                 Visit Date: 2025  :     1945    Referring practitioner:    Melanie Snyder MD  Date of Initial Visit:          Type: THERAPY  Episode: LBP cruz  Number of visits this episode: 4    Visit Diagnoses:    ICD-10-CM ICD-9-CM   1. Chronic bilateral low back pain, unspecified whether sciatica present  M54.50 724.2    G89.29 338.29   2. Parkinson's disease with fluctuating manifestations, unspecified whether dyskinesia present  G20.A2 332.0     SUBJECTIVE     Subjective: His sciatic nerve is hurting pretty bad today. Reports that he's got a bed sore on his right glute from watching too much TV     PAIN: 7/10 B gluteal region          OBJECTIVE     Objective     Therapeutic Activities    76535 Comments   All goals assessed for PN        Timed Minutes 25     Manual Therapy     [id number]  Comments       B HS autogenic inhibition stretching with Percussive IASTM using PowerBoost lvl 1 using curve attachment to HS while on stretch         B him inferolateral glides with int ER/IR stretching  severe guarding, ti with IR    B hip flexor stretches off edge of bed      Timed Minutes 20      Therapy Education/Self Care 93232   Education offered today HEP   [email or web address] Code Access Code: [email or web address]  URL: [email or web address]   Ongoing HEP      Date: 12/10/2024  Prepared by: Hossein Henry     Exercises  - Supine Single Bent Knee Fallout  - 2 x daily - 7 x weekly - 2 sets - 10 reps  - Supine Piriformis Stretch with Foot on Ground  - 2 x daily - 7 x weekly - 2 sets - 30 hold  - Supine Double Knee to Chest  - 2 x daily - 7 x weekly - 2 sets - 10 reps  - Supine Posterior Pelvic Tilt  - 2 x daily - 7 x weekly - 2 sets - 10 reps   Timed Minutes            Total Timed Treatment:     45   mins  Total Time  of Visit:             45   mins         ASSESSMENT/PLAN     GOALS          Goals                                                  Progress Note due by 2/8/25                                                              Recert due by 3/9/25   LTG by: 8 weeks Comments Date Status   Improve cruz thoracolumbar mobility Flexion: 75%, comp with knee bend   Ext: 10%   L: WFL  R SB: 50%*  B Rot: 50% 1/22 Ongoing    Improve  bilateral hip ext passively to 15 deg R: 4 deg  L: 5 deg   1/22  ongoing    Improve cruz passive hip IR to 10 deg R: 17 deg  L: 18 deg  1/22  ongoing   Able to sit to stand without exacerbation of pain after sitting 10 min Standing helps decrease pain, though walking tends to make it worse   1/22  ongoing   SLS either leg x 5 secs R: 0 sec  L: 1 sec   1/22  ongoing   Reports pain no greater than 2-3/10 when it does occur. 7/10 today   1/22  ongoing   Improve KAREN to 10/50 1/22: 11/50 1/22  ongoing   Independent with HEP for flexibility and core/hip stability. Performs about 1x/wk   1/22  ongoing         Anticipated CPT codes: Therapeutic Exercise 43548, Manual Therapy 10534, Therapeutic Activity 96816, Neuromuscular ReEducation 68839, Self Care/Home Management 54462, Estim Attended 38777, and Estim Unattended 55502        Assessment/Plan  ASSESSMENT: Clive arrived with increased B gluteal pain today, though also reported that he has given himself a bedsore on R gluteals from watching too much TV. He reports improvement following last couple sessions, and therefore opted to continue with this approach at this visit. Remaining goals assessed from PN, and no goals met at this time. He had severe guarding through hips today.       PLAN:   Continue working on B hamstring and piriformis relaxation and extensibility, and progress with B hip strengthening mobility, as well as strategies to improve functional transfers.     SIGNATURE: Ruthie Lilly PT DPT, [place] License #: [id number]      Electronically Signed on  1/22/2025        [address] [place] [address]. [address]  [phone]

## 2025-01-28 ENCOUNTER — OFFICE VISIT (OUTPATIENT)
Dept: INTERNAL MEDICINE | Facility: CLINIC | Age: 80
End: 2025-01-28
Payer: MEDICARE

## 2025-01-28 VITALS
HEART RATE: 84 BPM | SYSTOLIC BLOOD PRESSURE: 130 MMHG | DIASTOLIC BLOOD PRESSURE: 68 MMHG | OXYGEN SATURATION: 97 % | TEMPERATURE: 96.9 F | HEIGHT: 71 IN | BODY MASS INDEX: 27.86 KG/M2 | WEIGHT: 199 LBS

## 2025-01-28 DIAGNOSIS — I10 ESSENTIAL HYPERTENSION: ICD-10-CM

## 2025-01-28 DIAGNOSIS — H61.23 BILATERAL IMPACTED CERUMEN: ICD-10-CM

## 2025-01-28 DIAGNOSIS — S31.819A WOUND OF RIGHT BUTTOCK, INITIAL ENCOUNTER: ICD-10-CM

## 2025-01-28 DIAGNOSIS — M65.332 TRIGGER FINGER, LEFT MIDDLE FINGER: ICD-10-CM

## 2025-01-28 DIAGNOSIS — S99.929A INJURY OF GREAT TOENAIL: ICD-10-CM

## 2025-01-28 DIAGNOSIS — M51.369 DDD (DEGENERATIVE DISC DISEASE), LUMBAR: ICD-10-CM

## 2025-01-28 DIAGNOSIS — G20.B2 PARKINSON'S DISEASE WITH DYSKINESIA AND FLUCTUATING MANIFESTATIONS: Primary | ICD-10-CM

## 2025-01-28 DIAGNOSIS — E11.9 TYPE 2 DIABETES MELLITUS WITHOUT COMPLICATION, WITHOUT LONG-TERM CURRENT USE OF INSULIN: ICD-10-CM

## 2025-01-28 DIAGNOSIS — R60.0 PEDAL EDEMA: ICD-10-CM

## 2025-01-28 LAB
EXPIRATION DATE: ABNORMAL
HBA1C MFR BLD: 6.5 % (ref 4.5–5.7)
Lab: ABNORMAL

## 2025-01-28 PROCEDURE — 3078F DIAST BP <80 MM HG: CPT | Performed by: INTERNAL MEDICINE

## 2025-01-28 PROCEDURE — 1159F MED LIST DOCD IN RCRD: CPT | Performed by: INTERNAL MEDICINE

## 2025-01-28 PROCEDURE — 1126F AMNT PAIN NOTED NONE PRSNT: CPT | Performed by: INTERNAL MEDICINE

## 2025-01-28 PROCEDURE — 83036 HEMOGLOBIN GLYCOSYLATED A1C: CPT | Performed by: INTERNAL MEDICINE

## 2025-01-28 PROCEDURE — 69210 REMOVE IMPACTED EAR WAX UNI: CPT | Performed by: INTERNAL MEDICINE

## 2025-01-28 PROCEDURE — 1160F RVW MEDS BY RX/DR IN RCRD: CPT | Performed by: INTERNAL MEDICINE

## 2025-01-28 PROCEDURE — 99214 OFFICE O/P EST MOD 30 MIN: CPT | Performed by: INTERNAL MEDICINE

## 2025-01-28 PROCEDURE — 3075F SYST BP GE 130 - 139MM HG: CPT | Performed by: INTERNAL MEDICINE

## 2025-01-28 PROCEDURE — 3044F HG A1C LEVEL LT 7.0%: CPT | Performed by: INTERNAL MEDICINE

## 2025-01-28 RX ORDER — TRAMADOL HYDROCHLORIDE 50 MG/1
50 TABLET ORAL EVERY 6 HOURS PRN
Qty: 120 TABLET | Refills: 0 | Status: SHIPPED | OUTPATIENT
Start: 2025-01-28

## 2025-01-28 RX ORDER — CEPHALEXIN 500 MG/1
500 CAPSULE ORAL 2 TIMES DAILY
Qty: 20 CAPSULE | Refills: 0 | Status: SHIPPED | OUTPATIENT
Start: 2025-01-28

## 2025-01-28 NOTE — PROGRESS NOTES
"    Chief Complaint  Follow-up (3 month follow up.  Medicare wellness due 4/25/2025 Requesting to increase Tramadol. Patient is requesting his ears to be cleaned. ), Hypertension, Diabetes (A1C 6.5), Bedsore (Bedsore on buttocks x 3 months ), Toe Injury (Stumped his left big toe on left foot x 3 months.  Scheduled to see Dr. Farida Roberts 2/25), and Foot Swelling (Bilateral feet swelling x 6 months )    Subjective        Gary Tinoco presents to [facility]  Hypertension    Diabetes    Toe Injury  See below.     Objective   Vital Signs:  /68 (BP Location: Left arm, Patient Position: Sitting, Cuff Size: Adult)   Pulse 84   Temp 96.9 °F (36.1 °C) (Temporal)   Ht 180.3 cm (71\")   Wt 90.3 kg (199 lb)   SpO2 97%   BMI 27.75 kg/m²   Estimated body mass index is 27.75 kg/m² as calculated from the following:    Height as of this encounter: 180.3 cm (71\").    Weight as of this encounter: 90.3 kg (199 lb).         Physical Exam  HENT:      Head: Normocephalic and atraumatic.      Right Ear: There is impacted cerumen.      Left Ear: There is impacted cerumen.   Eyes:      Conjunctiva/sclera: Conjunctivae normal.      Pupils: Pupils are equal, round, and reactive to light.   Cardiovascular:      Rate and Rhythm: Normal rate and regular rhythm.      Heart sounds: Normal heart sounds.   Pulmonary:      Effort: Pulmonary effort is normal. No respiratory distress.      Breath sounds: Normal breath sounds.   Musculoskeletal:         General: Swelling (shoes making indentations on the dorsum of the foot, no LE edema) present.   Skin:     General: Skin is warm and dry.      Findings: No rash.      Comments: Buttock and toenail described below.   Neurological:      General: No focal deficit present.      Mental Status: He is alert and oriented to person, place, and time.      Comments: Masked face.  Shuffling gait.   Psychiatric:      Comments: Flat affect, but conversational.        Result " Review :  Nothing new to review.    Ear Cerumen Removal    Date/Time: 1/28/2025 11:32 AM    Performed by: CURT Gutierrez DO  Authorized by: CURT Gutierrez DO    Anesthesia:  Local Anesthetic: none  Location details: left ear and right ear  Patient tolerance: patient tolerated the procedure well with no immediate complications  Procedure type: instrumentation, curette   Sedation:  Patient sedated: no              Assessment and Plan   Diagnoses and all orders for this visit:    1. Parkinson's disease with dyskinesia and fluctuating manifestations (Primary)    2. Type 2 diabetes mellitus without complication, without long-term current use of insulin  -     POC Glycated Hemoglobin, Total    3. Essential hypertension    4. Injury of great toenail    5. Pedal edema    6. Wound of right buttock, initial encounter  -     mupirocin (BACTROBAN) 2 % nasal ointment; Administer 1 Application into the nostril(s) as directed by provider 2 (Two) Times a Day.  Dispense: 30 g; Refill: 1  -     cephalexin (Keflex) 500 MG capsule; Take 1 capsule by mouth 2 (Two) Times a Day.  Dispense: 20 capsule; Refill: 0    7. DDD (degenerative disc disease), lumbar  -     traMADol (ULTRAM) 50 MG tablet; Take 1 tablet by mouth Every 6 (Six) Hours As Needed for Severe Pain.  Dispense: 120 tablet; Refill: 0    8. Trigger finger, left middle finger  -     Ambulatory Referral to Orthopedic Surgery    9. Bilateral impacted cerumen  -     Ear Cerumen Removal       Presents today for follow-up.    He is set to see Dr. Snyder on 3/11. He continues speech and occupational therapy.  He continues on Sinemet and Symmetrel.    Hemoglobin A1c is 6.5 after being 6.3 in October.  I do not feel that we need to make any changes.  He tolerates glimepiride and metformin.    Blood pressure well-controlled with losartan-HCTZ.  Continue the same.    He traumatized his left great toenail a while ago.  This has become bruised and turned black.  It is not  "yet loose.  I think that he should just monitor this.  He also has a podiatry appointment on 2/25 with Dr. Roberts in [place].  He also brought up some concern about minor pedal edema.  He states that it gets better with recumbency.  His legs do not swell.  He also frequently wears pull on Skechers with no socks.  We talked about the possibility of diabetic shoes, but he seems uninterested.    He points out a small area around the size of a dime to the mid right gluteal cleft.  The scabs frequently.  He continues to unroofed the scab and it has been slow to heal.  He describes this as a \"bedsore.  I do not find this to be in a pressure type area.  It is not fluctuant or indurated.  There is some slight erythema.  We are going to have him use Bactroban ointment.  He was very concerned about whether or not to be on an oral antibiotic.  A course of Keflex would be reasonable.    He finds the Ultram effective that we prescribed previously.  His current prescription is for a dose every 12 hours as needed.  I will allow him to go up to every 6 hours as needed.  He states that the medication simply wears off too soon.  It does not cause him any sedation or other problems.    He complains of a trigger finger like process of the left middle finger.  He wants a referral to Dr. Curtis with orthopedics.    He has a bilateral cerumen impaction that we cleansed in the office today with benefit.    He is due for his wellness visit in 3 months.  Plan to repeat fasting labs at that point in time.      Follow Up   Return in about 3 months (around 4/28/2025) for Annual Medicare Wellness Visit.  Patient was given instructions and counseling regarding his condition or for health maintenance advice. Please see specific information pulled into the AVS if appropriate.      WALKER Gutierrez DO       Electronically signed by CURT Gutierrez DO, 01/28/25, 11:12 AM CST.  "

## 2025-01-29 ENCOUNTER — HOSPITAL ENCOUNTER (OUTPATIENT)
Facility: HOSPITAL | Age: 80
Setting detail: THERAPIES SERIES
Discharge: HOME | End: 2025-01-29
Payer: MEDICARE

## 2025-01-29 ENCOUNTER — HOSPITAL ENCOUNTER (OUTPATIENT)
Dept: PHYSICAL THERAPY | Facility: HOSPITAL | Age: 80
Setting detail: THERAPIES SERIES
Discharge: HOME | End: 2025-01-29
Payer: MEDICARE

## 2025-01-29 ENCOUNTER — HOSPITAL ENCOUNTER (OUTPATIENT)
Dept: OCCUPATIONAL THERAPY | Facility: HOSPITAL | Age: 80
Setting detail: THERAPIES SERIES
Discharge: HOME | End: 2025-01-29
Payer: MEDICARE

## 2025-01-29 DIAGNOSIS — R29.898 FINE MOTOR IMPAIRMENT: Primary | ICD-10-CM

## 2025-01-29 DIAGNOSIS — G20.A1 PARKINSON'S DISEASE, UNSPECIFIED WHETHER DYSKINESIA PRESENT, UNSPECIFIED WHETHER MANIFESTATIONS FLUCTUATE: ICD-10-CM

## 2025-01-29 DIAGNOSIS — R13.12 OROPHARYNGEAL DYSPHAGIA: Primary | ICD-10-CM

## 2025-01-29 DIAGNOSIS — R29.818 FINE MOTOR IMPAIRMENT: Primary | ICD-10-CM

## 2025-01-29 DIAGNOSIS — Z78.9 DECREASED ACTIVITIES OF DAILY LIVING (ADL): ICD-10-CM

## 2025-01-29 DIAGNOSIS — G89.29 CHRONIC BILATERAL LOW BACK PAIN, UNSPECIFIED WHETHER SCIATICA PRESENT: Primary | ICD-10-CM

## 2025-01-29 DIAGNOSIS — M54.50 CHRONIC BILATERAL LOW BACK PAIN, UNSPECIFIED WHETHER SCIATICA PRESENT: Primary | ICD-10-CM

## 2025-01-29 PROCEDURE — 97530 THERAPEUTIC ACTIVITIES: CPT

## 2025-01-29 PROCEDURE — 97110 THERAPEUTIC EXERCISES: CPT

## 2025-01-29 PROCEDURE — 92526 ORAL FUNCTION THERAPY: CPT | Performed by: SPEECH-LANGUAGE PATHOLOGIST

## 2025-01-29 NOTE — THERAPY TREATMENT NOTE
Physical Therapy Treatment Note  [facility]  A department [facility]  [address], [place] [address]    Patient: Gary Tinoco                                                 Visit Date: 2025  :     1945    Referring practitioner:    Melanie Snyder MD  Date of Initial Visit:          Type: THERAPY  Episode: LBP cruz  Number of visits this episode: 5    Visit Diagnoses:    ICD-10-CM ICD-9-CM   1. Chronic bilateral low back pain, unspecified whether sciatica present  M54.50 724.2    G89.29 338.29     SUBJECTIVE     Subjective: Nothing new to report.     PAIN: 8/10 L sciatic         OBJECTIVE     Objective     Therapeutic Exercises    78522 Units Comments   B hip ER/IR + iso hip ADD 10 B    Figure 4 stretch, RLE 1 min    HL hip IR stretch 1 min    Stair prop HS stretch 1 min    Forward lunge hold 10 sec x5    Lateral lunge hold 10 sec x5    SL hip ABD 10    Mod side plank at elevated mat table + hip ABD 10    Hip adductor stretch stair prop 1 min    Timed Minutes 38       Therapy Education/Self Care 50870   Education offered today HEP   [employer] Code [email or web address]   Ongoing HEP      Date: 12/10/2024  Prepared by: Hossein Henry     Exercises  - Supine Single Bent Knee Fallout  - 2 x daily - 7 x weekly - 2 sets - 10 reps  - Supine Piriformis Stretch with Foot on Ground  - 2 x daily - 7 x weekly - 2 sets - 30 hold  - Supine Double Knee to Chest  - 2 x daily - 7 x weekly - 2 sets - 10 reps  - Supine Posterior Pelvic Tilt  - 2 x daily - 7 x weekly - 2 sets - 10 reps   Timed Minutes            Total Timed Treatment:     38   mins  Total Time of Visit:             38   mins         ASSESSMENT/PLAN     GOALS    Goals                                                  Progress Note due by 25                                                              Recert due by 3/9/25   LTG by: 8 weeks Comments Date Status   Improve cruz thoracolumbar mobility Flexion: 75%, comp  with knee bend   Ext: 10%   L: WFL  R SB: 50%*  B Rot: 50% 1/22 Ongoing    Improve  bilateral hip ext passively to 15 deg R: 4 deg  L: 5 deg   1/22  ongoing    Improve cruz passive hip IR to 10 deg R: 17 deg  L: 18 deg  1/22  ongoing   Able to sit to stand without exacerbation of pain after sitting 10 min Standing helps decrease pain, though walking tends to make it worse   1/22  ongoing   SLS either leg x 5 secs R: 0 sec  L: 1 sec   1/22  ongoing   Reports pain no greater than 2-3/10 when it does occur. 7/10 today   1/22  ongoing   Improve KAREN to 10/50 1/22: 11/50 1/22  ongoing   Independent with HEP for flexibility and core/hip stability. Performs about 1x/wk   1/22  ongoing         Anticipated CPT codes: Therapeutic Exercise 81787, Manual Therapy 17808, Therapeutic Activity 07391, Neuromuscular ReEducation 50364, Self Care/Home Management 57803, Estim Attended [id number], and Estim Unattended [id number]        Assessment/Plan    ASSESSMENT: He performed all exs for strengthening and flexibility without exacerbation of RLE s/s.    PLAN: Continue working on B hamstring and piriformis relaxation and extensibility, and progress with B hip strengthening mobility, as well as strategies to improve functional transfers.     SIGNATURE: Niru Smith PT DPT, [place] License #: [id number]  Electronically Signed on 1/29/2025        [place]. [id number]  [phone]

## 2025-01-29 NOTE — THERAPY TREATMENT NOTE
Occupational Therapy 30 Day Progress Note  [facility]  A [facility]  [address], [place] [address]    Patient: Gary Tinoco                                                 Visit Date: 2025  :     1945    Referring practitioner:    Melanie Snyder MD  Date of Initial Visit:          Type: THERAPY  Episode: FMC impaired from Parkinson's Disease  Number of visits this episode: 13    Visit Diagnoses:    ICD-10-CM ICD-9-CM   1. Fine motor impairment  R29.818 V49.89    R29.898    2. Decreased activities of daily living (ADL)  Z78.9 V49.89   3. Parkinson's disease, unspecified whether dyskinesia present, unspecified whether manifestations fluctuate  G20.A1 332.0       SUBJECTIVE     Subjective:  Pt reports he is having a rough day pain wise and is struggling to get comfortable d/t B LE.     PAIN:  Pre-Treatment: 8/10; B LE   Post-Treatment: 8/10    Outcome Measure:   9 Hole Peg Test: Left: 1 min 21.82 s Right: 1 min 14.12 s     PT G-Codes  Outcome Measure Options: Quick DASH  Quick DASH Score: 25  OBJECTIVE     Objective          Strength/Myotome Testing     Left Wrist/Hand      (2nd hand position)     Trial 1: 32 lbs    Trial 2: 34 lbs    Trial 3: 37 lbs    Average: 34.33 lbs    Thumb Strength  Key/Lateral Pinch     Trial 1: 12 lbs    Trial 2: 14.5 lbs    Trial 3: 15 lbs    Average: 13.83 lbs    Right Wrist/Hand      (2nd hand position)     Trial 1: 70 lbs    Trial 2: 70 lbs    Trial 3: 70 lbs    Average: 70 lbs    Thumb Strength   Key/Lateral Pinch     Trial 1: 14 lbs    Trial 2: 14 lbs    Trial 3: 14 lbs    Average: 14 lbs      Therapeutic Activities    22346 Comments   Remainder of BITS activities focused on visual scanning and coordination. Accuracy 98.46%. Reaction time 1.87s seconds.   Visual Scannin.46%, 1.87s, 64    Trail Making Part A completed with the R UE. 0 errors, 4 interruptions and a time of 1:52. Part B completed with  5 errors, 11 interruptions and a time of 2:30.  Bell Cancellation Test completed with the R UE in a time of 2:55 with 0 errors and 0 distractor hits.   Computerized Maze Assessment completed with the R UE in a time of 0:52 with 0 errors.   Visual Scanning and Motor Reaction Assessment completed with the R UE. Level 1 34/50 required targets.     Pt was able to pass 2/4 assessment levels.   Completed in standing demonstrating some impaired standing balance, required single hand support.    All  strength and outcome measures assessed for PN. See details above.            Timed Minutes 40     Therapy Education/Self Care 65389   Education offered today Discussed progress toward goals and importance of rest/energy conservation throughout the day to reduce risk of falls.     Education on purpose of BITS assessments.    [employer] Code    Ongoing HEP   Thera-Putty  Strengthening Exercises   Edema Massage for L hand    Timed Minutes 5     Total Timed Treatment:     40   mins  Total Time of Visit:             45   mins         ASSESSMENT/PLAN     GOALS  Goals                                          Progress Note due by 3/1/25                                                      Recert due by 3/30/25   STG by: 3/1/25 Comments Date  Status   Pt will be independent with daily completion of a HEP to address deficits from Parkinson's Disease affecting ADL/IADL's. Reports compliance  1/29/25 Ongoing    Pt will display improved B UE FMC by completing the 9 hole peg test in 25 seconds or less. Left: 1 min 21.82 s   Right: 1 min 14.12 s     Worsened since last PN  1/29/25 Ongoing    Pt will complete clothing fasteners including buttons, zippers, and martine with Mod I to improve ADL performance. Reports no improvement in performance. States his wife helps with his buttons on a daily basis.  1/29/25 Ongoing    Pt will increase L hand  strength to 50# for improved performance with IADL tasks.   L hand: 34.33 lbs     Improved  since last PN  1/29/25 Progressing   LTG by: 3/30/25      Pt will increase B hand key pinch strength to 20# for improved performance with IADL tasks. L thumb: 13.83 lbs   R thumb: 14 lbs  1/29/25 Ongoing    Pt will perform all aspects of self-feeding with Mod I to reduce caregiver burden and improve ADL performance.   12/10/24 MET     Pt will increase core strength to improve performance during bed mobility for safety and independence with ADLs.   11/15/24 MET    Pt will pass all 4 visual scanning assessments using the BITS to demonstrate appropriate use of compensation techniques for R visual field deficit.   Pt was able to pass 2/4 assessment levels.  Improvement since last PN  1/29/25 Progressing       Anticipated CPT codes: Therapeutic Exercise 06240, Therapeutic Activity 38403, and Self Care/Home Management 57442    Assessment/Plan   Assessment:   Pt continues to demo progress toward some OT goals. Pt did well on BITS assessments today, and inquired about deficits indicated during assessments. Pt has demonstrated some improvement in L hand  strength, but no improvement in B key pinch strength. He reports minimal progress with clothing fastener manipulation and hand strength for daily tasks. OT will continue with weekly session to target remaining deficits.     Plan:   Will continue to address deficits in B FMC, B thumb strength, ADL//IADL performance and visual scanning/reaction time.    SIGNATURE: Carly Owen OT, [place] License #: [id number]  Electronically Signed on 1/29/2025        [place] [address]  [phone]

## 2025-01-29 NOTE — PROGRESS NOTES
Speech Language Pathology Treatment Note  [address] [place] [address]    Patient: Gary Tinoco                                                                                     Visit Date: 2025  :     1945    Referring practitioner:    Melanie Snyder MD  Date of Initial Visit:          Type: THERAPY  Episode: Swallowing Parkinsons      Visit Diagnoses:    ICD-10-CM ICD-9-CM   1. Oropharyngeal dysphagia  R13.12 787.22     SUBJECTIVE     Patient was alert and ready for therapy.     Pain: Patient expresses pain in his hip of 8 today.     OBJECTIVE   GOALS  Goals                                            STG  Comments Status   Patient will improve oral skills to enhance safety and increase eating efficiency and bolus control as measured by increased lip closure, decreased oral residue, improved bolus formation, improved posterior propulsion of the bolus, decreased drooling, and improved management of secretions.  Reviewed instructed on tongue press out, tongue press up, and lip press exercises. Able to demonstrate with minimal cues.   Ongoing   Patient will improve hyolaryngeal elevation, improve epiglottic inversion, improve UES opening, increase base of the tongue strength and posterior pharyngeal wall excursion, and increase efficiency of cough to clear residue from the airway as measured by decreased overt signs and symptoms of aspiration and decreased penetration and aspiration on repeat instrumental swallow study, if applicable.  Patient able to demonstrate 65 effortful swallows in 10 min. He reports doing these daily.     He was able to demonstrate EMST 5x5 blows, he stated he has only done this exercise one time this week. He was encouraged to complete this exercise daily.  Ongoing   Patient will report decreased difficulty with swallowing and decreased coughing/choking with meals.  Goals and rationale reviewed. Patient  "expressed no changes in his swallow.  He states that he doesn't really have a problem.  Ongoing   Patient will improve oral hygiene to enhance safety and decrease risk of aspiration pneumonia Patient is completing oral care  Ongoing   LTG        Patient will safely consume full PO diet of regular consistency food and thin liquids without discomfort and difficulty or complications such as aspiration or pneumonia.   Goals and exercises reviewed. Patient able to demonstrate with instruction and cues. He has not been consistent with his exercises. He maintains that his swallow is \"not that bad\".  Ongoing       Therapy Education/Self Care    Details: POC   Given Home Exercise Program  Access Code: [email or web address]  URL: [email or web address]  Date: 12/06/2024  Prepared by: Dominique Pizano    Exercises  - Tongue Resistance with Top of Tongue   - 1 x daily - 7 x weekly - 3 sets - 10 reps  - Tongue Resistance with Front of Tongue   - 1 x daily - 7 x weekly - 3 sets - 10 reps  - Lip Press  - 2 x daily - 7 x weekly - 1 sets - 5 reps - 5 second hold  - Effortful Swallow  - 2-3 x daily - 7 x weekly - 3 sets - 10 reps  - Supraglottic swallow  - 1 x daily - 7 x weekly - 3 sets - 10 reps   Progress: Reinforced   Education provided to:  Patient   Level of understanding Verbalized and Demonstrated           CPT Code:   03695 Swallow Treatment  Total Time of Visit:             40   mins         ASSESSMENT/PLAN     ASSESSMENT: Patient able to demonstrate exercises with cues. Encouraged to do them regularly. No changes in swallow noted, he continues to report no significant difficulty with swallowing.     PLAN: Continue therapy and home exercises. One more visit scheduled.     Progress Note Due Date:2/17/25  Recertification Due Date:2/17/25    SIGNATURE: Dominique Pizano, CCC-SLP, [place] License #: [id number]  Electronically Signed on 1/29/2025          [place] [address]  [phone]    "

## 2025-01-31 ENCOUNTER — APPOINTMENT (OUTPATIENT)
Dept: PHYSICAL THERAPY | Facility: HOSPITAL | Age: 80
End: 2025-01-31
Payer: MEDICARE

## 2025-02-05 ENCOUNTER — APPOINTMENT (OUTPATIENT)
Dept: PHYSICAL THERAPY | Facility: HOSPITAL | Age: 80
End: 2025-02-05
Payer: MEDICARE

## 2025-02-05 ENCOUNTER — HOSPITAL ENCOUNTER (OUTPATIENT)
Dept: OCCUPATIONAL THERAPY | Facility: HOSPITAL | Age: 80
Setting detail: THERAPIES SERIES
End: 2025-02-05
Payer: MEDICARE

## 2025-02-05 ENCOUNTER — HOSPITAL ENCOUNTER (OUTPATIENT)
Facility: HOSPITAL | Age: 80
Setting detail: THERAPIES SERIES
End: 2025-02-05
Payer: MEDICARE

## 2025-02-07 ENCOUNTER — APPOINTMENT (OUTPATIENT)
Dept: PHYSICAL THERAPY | Facility: HOSPITAL | Age: 80
End: 2025-02-07
Payer: MEDICARE

## 2025-02-10 RX ORDER — LOSARTAN POTASSIUM AND HYDROCHLOROTHIAZIDE 25; 100 MG/1; MG/1
TABLET ORAL
Qty: 90 TABLET | Refills: 3 | Status: SHIPPED | OUTPATIENT
Start: 2025-02-10

## 2025-02-10 RX ORDER — LOVASTATIN 40 MG/1
TABLET ORAL
Qty: 90 TABLET | Refills: 3 | Status: SHIPPED | OUTPATIENT
Start: 2025-02-10

## 2025-02-12 ENCOUNTER — APPOINTMENT (OUTPATIENT)
Dept: OCCUPATIONAL THERAPY | Facility: HOSPITAL | Age: 80
End: 2025-02-12
Payer: MEDICARE

## 2025-02-12 ENCOUNTER — APPOINTMENT (OUTPATIENT)
Dept: PHYSICAL THERAPY | Facility: HOSPITAL | Age: 80
End: 2025-02-12
Payer: MEDICARE

## 2025-02-14 ENCOUNTER — HOSPITAL ENCOUNTER (OUTPATIENT)
Dept: PHYSICAL THERAPY | Facility: HOSPITAL | Age: 80
Setting detail: THERAPIES SERIES
Discharge: HOME | End: 2025-02-14
Payer: MEDICARE

## 2025-02-14 DIAGNOSIS — M54.50 CHRONIC BILATERAL LOW BACK PAIN, UNSPECIFIED WHETHER SCIATICA PRESENT: Primary | ICD-10-CM

## 2025-02-14 DIAGNOSIS — G89.29 CHRONIC BILATERAL LOW BACK PAIN, UNSPECIFIED WHETHER SCIATICA PRESENT: Primary | ICD-10-CM

## 2025-02-14 PROCEDURE — 97110 THERAPEUTIC EXERCISES: CPT

## 2025-02-14 NOTE — THERAPY TREATMENT NOTE
Physical Therapy Treatment Note and 30 Day Progress Note  [facility]  A department [facility]  [address], [place] [address]    Patient: Gary Tinoco                                                 Visit Date: 2025  :     1945    Referring practitioner:    Melanie Snyder MD  Date of Initial Visit:          Type: THERAPY  Episode: LBP cruz  Number of visits this episode: 6    Visit Diagnoses:    ICD-10-CM ICD-9-CM   1. Chronic bilateral low back pain, unspecified whether sciatica present  M54.50 724.2    G89.29 338.29       SUBJECTIVE     Subjective: Nothing new to report. He is feeling better compared to the previous 2 weeks    PAIN: 4-5/10 L sciatic         OBJECTIVE     Objective     Therapeutic Exercises    28188 Units Comments   B hip ER/IR + iso hip ADD 10 B    Figure 4 stretch, RLE 1 min    HL hip IR stretch 1 min    Stair prop HS stretch 1 min    Forward lunge hold 10 sec x5    SL hip ABD 10  Difficulty maintaining SL position and appropriate leg placement   Bridges  2 x 10    Timed Minutes 43       Therapy Education/Self Care 14066   Education offered today HEP   [employer] Code [email or web address]   Ongoing HEP      Date: 12/10/2024  Prepared by: Hossein Henry     Exercises  - Supine Single Bent Knee Fallout  - 2 x daily - 7 x weekly - 2 sets - 10 reps  - Supine Piriformis Stretch with Foot on Ground  - 2 x daily - 7 x weekly - 2 sets - 30 hold  - Supine Double Knee to Chest  - 2 x daily - 7 x weekly - 2 sets - 10 reps  - Supine Posterior Pelvic Tilt  - 2 x daily - 7 x weekly - 2 sets - 10 reps   Timed Minutes            Total Timed Treatment:     43   mins  Total Time of Visit:             43   mins         ASSESSMENT/PLAN     GOALS    Goals                                                  Progress Note due by 25                                                              Recert due by 3/9/25   LTG by: 8 weeks Comments Date Status   Improve cruz  thoracolumbar mobility Flexion: 75%, comp with knee bend   Ext: 10%   L: WFL  R SB: 50%*  B Rot: 50% 1/22 Ongoing    Improve  bilateral hip ext passively to 15 deg R: 4 deg  L: 5 deg   1/22  ongoing    Improve cruz passive hip IR to 10 deg R: 17 deg  L: 18 deg  1/22  ongoing   Able to sit to stand without exacerbation of pain after sitting 10 min Standing helps decrease pain, though walking tends to make it worse   1/22  ongoing   SLS either leg x 5 secs R: 0 sec  L: 1 sec   1/22  ongoing   Reports pain no greater than 2-3/10 when it does occur. 3-4/10 today   2/14  ongoing   Improve KAREN to 10/50 1/22: 11/50 1/22  ongoing   Independent with HEP for flexibility and core/hip stability. Performs about 1x/wk   2/14  ongoing         Anticipated CPT codes: Therapeutic Exercise 14863, Manual Therapy 16341, Therapeutic Activity 37779, Neuromuscular ReEducation 37087, Self Care/Home Management 82516, Estim Attended [id number], and Estim Unattended 91036        Assessment/Plan  Assessment  Impairments: abnormal muscle firing, abnormal muscle tone, abnormal or restricted ROM, activity intolerance, impaired physical strength, lacks appropriate home exercise program and pain with function   Functional limitations: lifting, walking, uncomfortable because of pain, sitting and standing  Prognosis: good     Plan  Therapy options: will be seen for skilled therapy services  Planned modality interventions: dry needling, low level laser therapy, TENS and traction  Planned therapy interventions: abdominal trunk stabilization, flexibility, functional ROM exercises, home exercise program, joint mobilization, manual therapy, motor coordination training, neuromuscular re-education, postural training, soft tissue mobilization, spinal/joint mobilization, strengthening, stretching and therapeutic activities  Frequency: 2x week  Duration in weeks: 8  Treatment plan discussed with: patient  ASSESSMENT: Pt has had a lapse of care d/t sickness the past  "two weeks. Because of this, goals for progress note not assessed today as we wanted to get back into the exercises and modify as needed. Per his subjective report, he is continuing to have \"sciatic\" nerve pain. He is limited in mobility with contributing factors arising form his Parkinson's condition. Since the start of his new medication, his pain is lessened though it is not fully gone. Skilled therapy would continue to benefit pt in order to improve his mobility, meet his goals, reduce back and hip pain, and thus improve overall quality of life and allow for greater independence with functional mobility and gait without limiting factors of pain.     PLAN: Continue working on B hamstring and piriformis relaxation and extensibility, and progress with B hip strengthening mobility, as well as strategies to improve functional transfers.     SIGNATURE: Meaghan Varela PT DPT, [place] License #: [id number]  Electronically Signed on 2/14/2025        [place]. [address]  [phone]     "

## 2025-02-19 ENCOUNTER — APPOINTMENT (OUTPATIENT)
Dept: PHYSICAL THERAPY | Facility: HOSPITAL | Age: 80
End: 2025-02-19
Payer: MEDICARE

## 2025-02-19 ENCOUNTER — APPOINTMENT (OUTPATIENT)
Dept: OCCUPATIONAL THERAPY | Facility: HOSPITAL | Age: 80
End: 2025-02-19
Payer: MEDICARE

## 2025-02-20 NOTE — PROGRESS NOTES
[facility]  [facility]  [address]  [facility] [id number] [phone]     Patient: Bong Mack   YOB: 1945   Date: 2/24/2025     No chief complaint on file.       History of Present Illness  Bnog is a right hand dominant 80 y.o. male who presents today with reports of pain and decreased range of motion of the left middle finger that has gradually worsened over the course of several months.  Previous note from outside facility was reviewed today.  Of note, patient does have Parkinson's disease.  Patient is also a diabetic.  Last hemoglobin A1c was reviewed and was shown to be 6.5.      Past Medical History:   Diagnosis Date    Diabetes     Hypertension     Tinnitus       Past Surgical History:   Procedure Laterality Date    HERNIA REPAIR      2    KNEE SURGERY        Social History     Socioeconomic History    Marital status:    Tobacco Use    Smoking status: Never    Smokeless tobacco: Never   Vaping Use    Vaping status: Never Used   Substance and Sexual Activity    Alcohol use: Yes     Alcohol/week: 0.0 standard drinks of alcohol    Drug use: No      Social History     Occupational History    Not on file   Tobacco Use    Smoking status: Never    Smokeless tobacco: Never   Vaping Use    Vaping status: Never Used   Substance and Sexual Activity    Alcohol use: Yes     Alcohol/week: 0.0 standard drinks of alcohol    Drug use: No    Sexual activity: Not on file        Tobacco Use      Smoking status: Never      Smokeless tobacco: Never     Family History   Problem Relation Age of Onset    High Cholesterol Mother     Cancer Father     Cancer Brother         Medications  Current Outpatient Medications   Medication Sig Dispense Refill    gabapentin (NEURONTIN) 100 MG capsule Take 1 capsule by mouth 3 times daily. Max Daily Amount: 300 mg      losartan-hydroCHLOROthiazide (HYZAAR) 100-25 MG per tablet       lovastatin

## 2025-02-21 ENCOUNTER — TELEPHONE (OUTPATIENT)
Dept: NEUROLOGY | Facility: CLINIC | Age: 80
End: 2025-02-21
Payer: MEDICARE

## 2025-02-21 ENCOUNTER — APPOINTMENT (OUTPATIENT)
Dept: PHYSICAL THERAPY | Facility: HOSPITAL | Age: 80
End: 2025-02-21
Payer: MEDICARE

## 2025-02-21 NOTE — TELEPHONE ENCOUNTER
Patient was scheduled to see Dr. Snyder on 3/11/25 Dr. Snyder has had to cancel her clinic that day so we have cancelled that appt.     new appt will be on 4/3/2025 at 11:30 am.    CC    Ok for hub to relay message.

## 2025-02-24 ENCOUNTER — OFFICE VISIT (OUTPATIENT)
Age: 80
End: 2025-02-24
Payer: MEDICARE

## 2025-02-24 VITALS — WEIGHT: 198 LBS | BODY MASS INDEX: 27.72 KG/M2 | HEIGHT: 71 IN

## 2025-02-24 DIAGNOSIS — M79.645 FINGER PAIN, LEFT: ICD-10-CM

## 2025-02-24 DIAGNOSIS — M65.332 TRIGGER FINGER, LEFT MIDDLE FINGER: Primary | ICD-10-CM

## 2025-02-24 PROCEDURE — 1123F ACP DISCUSS/DSCN MKR DOCD: CPT | Performed by: NURSE PRACTITIONER

## 2025-02-24 PROCEDURE — 99203 OFFICE O/P NEW LOW 30 MIN: CPT | Performed by: NURSE PRACTITIONER

## 2025-02-24 PROCEDURE — G8419 CALC BMI OUT NRM PARAM NOF/U: HCPCS | Performed by: NURSE PRACTITIONER

## 2025-02-24 PROCEDURE — 1159F MED LIST DOCD IN RCRD: CPT | Performed by: NURSE PRACTITIONER

## 2025-02-24 PROCEDURE — 1036F TOBACCO NON-USER: CPT | Performed by: NURSE PRACTITIONER

## 2025-02-24 PROCEDURE — G8427 DOCREV CUR MEDS BY ELIG CLIN: HCPCS | Performed by: NURSE PRACTITIONER

## 2025-02-24 RX ORDER — AMANTADINE HYDROCHLORIDE 100 MG/1
100 CAPSULE, GELATIN COATED ORAL 2 TIMES DAILY
COMMUNITY
Start: 2024-12-03

## 2025-02-24 RX ORDER — CEPHALEXIN 500 MG/1
500 CAPSULE ORAL 2 TIMES DAILY
COMMUNITY
Start: 2025-01-28

## 2025-02-24 RX ORDER — CLOTRIMAZOLE AND BETAMETHASONE DIPROPIONATE 10; .64 MG/G; MG/G
1 CREAM TOPICAL 2 TIMES DAILY
COMMUNITY
Start: 2024-09-04

## 2025-02-24 RX ORDER — CARBIDOPA AND LEVODOPA 25; 100 MG/1; MG/1
1 TABLET ORAL
COMMUNITY

## 2025-02-24 RX ORDER — MUPIROCIN 20 MG/G
1 OINTMENT TOPICAL 2 TIMES DAILY
COMMUNITY
Start: 2025-01-28

## 2025-02-24 RX ORDER — TRAMADOL HYDROCHLORIDE 50 MG/1
50 TABLET ORAL EVERY 6 HOURS PRN
COMMUNITY
Start: 2025-01-28

## 2025-02-26 ENCOUNTER — HOSPITAL ENCOUNTER (OUTPATIENT)
Dept: OCCUPATIONAL THERAPY | Facility: HOSPITAL | Age: 80
Setting detail: THERAPIES SERIES
Discharge: HOME | End: 2025-02-26
Payer: MEDICARE

## 2025-02-26 ENCOUNTER — HOSPITAL ENCOUNTER (OUTPATIENT)
Dept: PHYSICAL THERAPY | Facility: HOSPITAL | Age: 80
Setting detail: THERAPIES SERIES
Discharge: HOME | End: 2025-02-26
Payer: MEDICARE

## 2025-02-26 DIAGNOSIS — R29.818 FINE MOTOR IMPAIRMENT: Primary | ICD-10-CM

## 2025-02-26 DIAGNOSIS — R29.898 FINE MOTOR IMPAIRMENT: Primary | ICD-10-CM

## 2025-02-26 DIAGNOSIS — G20.A2 PARKINSON'S DISEASE WITH FLUCTUATING MANIFESTATIONS, UNSPECIFIED WHETHER DYSKINESIA PRESENT: ICD-10-CM

## 2025-02-26 DIAGNOSIS — Z78.9 DECREASED ACTIVITIES OF DAILY LIVING (ADL): ICD-10-CM

## 2025-02-26 DIAGNOSIS — G89.29 CHRONIC BILATERAL LOW BACK PAIN, UNSPECIFIED WHETHER SCIATICA PRESENT: Primary | ICD-10-CM

## 2025-02-26 DIAGNOSIS — G20.A1 PARKINSON'S DISEASE, UNSPECIFIED WHETHER DYSKINESIA PRESENT, UNSPECIFIED WHETHER MANIFESTATIONS FLUCTUATE: ICD-10-CM

## 2025-02-26 DIAGNOSIS — M54.50 CHRONIC BILATERAL LOW BACK PAIN, UNSPECIFIED WHETHER SCIATICA PRESENT: Primary | ICD-10-CM

## 2025-02-26 PROCEDURE — 97110 THERAPEUTIC EXERCISES: CPT

## 2025-02-26 PROCEDURE — 97530 THERAPEUTIC ACTIVITIES: CPT

## 2025-02-26 PROCEDURE — 97535 SELF CARE MNGMENT TRAINING: CPT

## 2025-02-26 NOTE — THERAPY TREATMENT NOTE
Physical Therapy Treatment Note and 30 Day Progress Note  [facility]  A department [place] [address], [place] [address]    Patient: Gary Tinoco                                                 Visit Date: 2025  :     1945    Referring practitioner:    Melanie Snyder MD  Date of Initial Visit:          Type: THERAPY  Episode: LBP cruz  Number of visits this episode: 7    Visit Diagnoses:    ICD-10-CM ICD-9-CM   1. Chronic bilateral low back pain, unspecified whether sciatica present  M54.50 724.2    G89.29 338.29   2. Parkinson's disease with fluctuating manifestations, unspecified whether dyskinesia present  G20.A2 332.0       SUBJECTIVE     Subjective: Back is always hurting. Sciatic nerve typically hurts. This morning it really hurt. For the session, his nerve is not giving him much trouble.     PAIN: 3/10 L sciatic         OBJECTIVE     Objective     Therapeutic Exercises    82970 Units Comments   Assessed all goals for previous progress note     Seated Piriformis stretch cruz LE                               Timed Minutes 43       Therapy Education/Self Care 09797   Education offered today HEP   [employer] Code [email or web address]   Ongoing HEP      Date: 12/10/2024  Prepared by: Hossein Henry     Exercises  - Supine Single Bent Knee Fallout  - 2 x daily - 7 x weekly - 2 sets - 10 reps  - Supine Piriformis Stretch with Foot on Ground  - 2 x daily - 7 x weekly - 2 sets - 30 hold  - Supine Double Knee to Chest  - 2 x daily - 7 x weekly - 2 sets - 10 reps  - Supine Posterior Pelvic Tilt  - 2 x daily - 7 x weekly - 2 sets - 10 reps   Timed Minutes            Total Timed Treatment:     43   mins  Total Time of Visit:             43   mins         ASSESSMENT/PLAN     GOALS    Goals                                                  Progress Note due by 3/16/25                                                              Recert due by 3/9/25   LTG by: 8 weeks  Comments Date Status   Improve cruz thoracolumbar mobility Flexion: 75%, comp with knee bend   Ext: 50%   L: compensation; 75%  R SB: 100%  B Rot: 75% (R>L)  2/26 Ongoing    Improve  bilateral hip ext passively to 15 deg R: -4 deg  L: 2 deg   2/26  ongoing    Improve cruz passive hip IR to 10 deg R: 21 deg  L: 29 deg 2/26  MET   Able to sit to stand without exacerbation of pain after sitting 10 min Reports that he was able able to sit in the lobby for about 15 minutes without pain; however, had he sat for 5 more minutes,he  would be in a lot of pain 2/26  ongoing   SLS either leg x 5 secs R: 2 sec heavy lean towards the R; multiple trials  L: 5 sec with multiple trials  2/26  Progressing   Reports pain no greater than 2-3/10 when it does occur. In the mornings, it is a 10 but it subsides throughout the day   2/26  ongoing   Improve KAREN to 10/50 1/22: 11/50 2/26: 8/50 2/26  MET   Independent with HEP for flexibility and core/hip stability. Does them every once in a while  2/26  ongoing         Anticipated CPT codes: Therapeutic Exercise 98811, Manual Therapy 99593, Therapeutic Activity 56642, Neuromuscular ReEducation 23472, Self Care/Home Management 33724, Estim Attended 57219, and Estim Unattended 78853        Assessment/Plan      ASSESSMENT:   All goals assessed today. Pt has improved in his KAREN, meeting his goal. He has also met his cruz hip IR goal and improved his range of motion in his thoracolumbar region. Therapy has benefit this pt; however he continues to have sciatic type pain that occurs shortly after standing form sitting for a long timeframe. It resolves once he ambulates. He was ambulating well thoruhgout the clinic today.       PLAN: Continue working on B hamstring and piriformis relaxation and extensibility, and progress with B hip strengthening mobility, as well as strategies to improve functional transfers.     SIGNATURE: Meaghan Varela, PT DPT, [place] License #: [id number]  Electronically Signed on  2/26/2025        [address] [place] [address]. [address]  [phone]

## 2025-02-26 NOTE — THERAPY TREATMENT NOTE
Occupational Therapy 30 Day Progress Note and Discharge Note  [facility]patient Therapy [facility]  [address], [place] [address]    Patient: Gary Tinoco                                                 Visit Date: 2025  :     1945    Referring practitioner:    Melanie Snyder MD  Date of Initial Visit:          Type: THERAPY  Episode: FMC impaired from Parkinson's Disease  Number of visits this episode: 14    Visit Diagnoses:    ICD-10-CM ICD-9-CM   1. Fine motor impairment  R29.818 V49.89    R29.898    2. Decreased activities of daily living (ADL)  Z78.9 V49.89   3. Parkinson's disease, unspecified whether dyskinesia present, unspecified whether manifestations fluctuate  G20.A1 332.0     SUBJECTIVE     Subjective:  Pt reports he is doing better today, yesterday he had a lot of pain, but today has almost no pain. He states he has solved is button issue and now wears magnetic shirts.     PAIN:  Pre-Treatment: 3/10; B hips   Post-Treatment: 3/10    Outcome Measure:   9 Hole Peg Test: Left: 44.76s  Right: 33.73s       OBJECTIVE     Objective          Strength/Myotome Testing     Left Wrist/Hand      (2nd hand position)     Trial 1: 45 lbs    Trial 2: 47 lbs    Trial 3: 50 lbs    Average: 47.33 lbs    Thumb Strength  Key/Lateral Pinch     Trial 1: 14 lbs    Trial 2: 15 lbs    Trial 3: 13 lbs    Average: 14 lbs    Right Wrist/Hand      (2nd hand position)     Trial 1: 75 lbs    Trial 2: 72 lbs    Trial 3: 70 lbs    Average: 72.33 lbs    Thumb Strength   Key/Lateral Pinch     Trial 1: 17 lbs    Trial 2: 15 lbs    Trial 3: 15 lbs    Average: 15.67 lbs      Therapeutic Activities    06411 Comments   BITS activities focused on B UE AROM and coordination. Accuracy ranged from 100%. Reaction time was 1.22 seconds.   VS: 100%, 2:00, 1.22s, 98 hits    All  strength and outcome measures assessed for PN. See details above.            Timed Minutes 30      Therapy Education/Self Care 04553   Education offered today Education on ADL/IADL adaptations and importance of continuing HEP/use of compensatory strategies as disease progresses.    Medbride Code    Ongoing HEP   Thera-Putty  Strengthening Exercises   Edema Massage for L hand    Timed Minutes 10     Total Timed Treatment:     40  mins  Total Time of Visit:             40   mins         ASSESSMENT/PLAN     GOALS  Goals                                             STG by:  Comments Date  Status   Pt will be independent with daily completion of a HEP to address deficits from Parkinson's Disease affecting ADL/IADL's. Reports compliance.  2/26/2025   MET    Pt will display improved B UE FMC by completing the 9 hole peg test in 25 seconds or less. Left: 44.76s    Right: 33.73s 2/26/2025   Progressing    Pt will complete clothing fasteners including buttons, zippers, and martine with Mod I to improve ADL performance. Changed to magnetic button shirts.  2/26/2025   DISCONTINUED    Pt will increase L hand  strength to 50# for improved performance with IADL tasks.   L Hand: 47.33 lbs  2/26/2025   MET    LTG by:       Pt will increase B hand key pinch strength to 20# for improved performance with IADL tasks. L Thumb: 14 lbs  2/26/2025   Partially Met    Pt will perform all aspects of self-feeding with Mod I to reduce caregiver burden and improve ADL performance.   12/10/24 MET     Pt will increase core strength to improve performance during bed mobility for safety and independence with ADLs.   11/15/24 MET    Pt will pass all 4 visual scanning assessments using the BITS to demonstrate appropriate use of compensation techniques for R visual field deficit.   1/29/25: Pt was able to pass 2/4 assessment levels.   2/26/2025   Partially Met      Anticipated CPT codes: Therapeutic Exercise 09885, Therapeutic Activity 76806, and Self Care/Home Management 85130    Assessment & Plan       Assessment  Assessment details: Pt  has not been seen since last 30 day progress note d/t weather and illness. Pt has maintained strength in his L hand and demo's minimal to no improvements over the past 2 progress notes. Pt is compliant with HEP and reports use of compensatory strategies for ADL/IADL tasks. For example pt has transitioned to wearing magnetic button shirts d/t FM deficits impacting his ability to consistently button his own dress shirts on the daily basis. Pt has made maximal functional potential and will be d/c from OP OT after today's session.     Plan  Therapy options: will not be seen for skilled therapy services  Plan details: D/c from OP OT services.       DISCHARGE SUMMARY   Discharge date 2/26/2025   Dates of this episode 10/11/24 through 2/26/25   Number of visits on this episode 14   Reason for discharge maximum functional potential achieved  lack of progress   Outcomes achieved Refer to the goals table for specifics on goals   Discharge plan Continue with current home exercise program as instructed   Summary of care Pt seen for ADL/IADL deficits resulting from impairments caused by Parkinson's Disease. Pt has improved or maintained all impaired functions with exercises and use of compensatory strategies.    Discharge instruction See Education Table      SIGNATURE: Carly Owen OT, [place] License #: [id number]  Electronically Signed on 2/26/2025        [place] [address]  [phone]

## 2025-02-28 ENCOUNTER — APPOINTMENT (OUTPATIENT)
Dept: PHYSICAL THERAPY | Facility: HOSPITAL | Age: 80
End: 2025-02-28
Payer: MEDICARE

## 2025-03-03 DIAGNOSIS — M51.369 DDD (DEGENERATIVE DISC DISEASE), LUMBAR: ICD-10-CM

## 2025-03-03 RX ORDER — TRAMADOL HYDROCHLORIDE 50 MG/1
50 TABLET ORAL EVERY 12 HOURS PRN
Qty: 60 TABLET | Refills: 0 | Status: SHIPPED | OUTPATIENT
Start: 2025-03-03

## 2025-03-05 ENCOUNTER — HOSPITAL ENCOUNTER (OUTPATIENT)
Dept: PHYSICAL THERAPY | Facility: HOSPITAL | Age: 80
Setting detail: THERAPIES SERIES
Discharge: HOME | End: 2025-03-05
Payer: MEDICARE

## 2025-03-05 DIAGNOSIS — M54.50 CHRONIC BILATERAL LOW BACK PAIN, UNSPECIFIED WHETHER SCIATICA PRESENT: Primary | ICD-10-CM

## 2025-03-05 DIAGNOSIS — G20.A2 PARKINSON'S DISEASE WITH FLUCTUATING MANIFESTATIONS, UNSPECIFIED WHETHER DYSKINESIA PRESENT: ICD-10-CM

## 2025-03-05 DIAGNOSIS — G89.29 CHRONIC BILATERAL LOW BACK PAIN, UNSPECIFIED WHETHER SCIATICA PRESENT: Primary | ICD-10-CM

## 2025-03-05 PROCEDURE — 97110 THERAPEUTIC EXERCISES: CPT

## 2025-03-05 RX ORDER — AMANTADINE HYDROCHLORIDE 100 MG/1
100 CAPSULE, GELATIN COATED ORAL 2 TIMES DAILY
Qty: 180 CAPSULE | Refills: 0 | Status: SHIPPED | OUTPATIENT
Start: 2025-03-05

## 2025-03-05 NOTE — THERAPY TREATMENT NOTE
Physical Therapy Treatment Note  [facility]patient Therapy Services  [address], [place] [address]    Patient: Gary Tinoco                                                 Visit Date: 3/5/2025  :     1945    Referring practitioner:    Melanie Snyder MD  Date of Initial Visit:          Type: THERAPY  Episode: LBP cruz  Number of visits this episode: 8    Visit Diagnoses:    ICD-10-CM ICD-9-CM   1. Chronic bilateral low back pain, unspecified whether sciatica present  M54.50 724.2    G89.29 338.29   2. Parkinson's disease with fluctuating manifestations, unspecified whether dyskinesia present  G20.A2 332.0         SUBJECTIVE     Subjective: Reports sciatic nerve is really hurting today     PAIN: 7-8/10 L sciatic         OBJECTIVE     Objective     Therapeutic Exercises    13685 Units Comments   Supine single leg hip extension with therapy ball placed under gluteal fold  10 x 5 sec hold ea    HL piriformis stretch      Clamshells with YTB RLE X 10 with 2-3 sec hold    Reverse clamshells RLE     BKFO with YTB LLE     Seated hip IR with foam ball between knees LLE          Timed Minutes 43       Therapy Education/Self Care 36220   Education offered today HEP   Medbride Code [email or web address]   Ongoing HEP      Date: 12/10/2024  Prepared by: Hossein Henry     Exercises  - Supine Single Bent Knee Fallout  - 2 x daily - 7 x weekly - 2 sets - 10 reps  - Supine Piriformis Stretch with Foot on Ground  - 2 x daily - 7 x weekly - 2 sets - 30 hold  - Supine Double Knee to Chest  - 2 x daily - 7 x weekly - 2 sets - 10 reps  - Supine Posterior Pelvic Tilt  - 2 x daily - 7 x weekly - 2 sets - 10 reps   Timed Minutes            Total Timed Treatment:     43   mins  Total Time of Visit:             43   mins         ASSESSMENT/PLAN     GOALS    Goals                                                  Progress Note due by 3/16/25                                                               Recert due by 3/9/25   LTG by: 8 weeks Comments Date Status   Improve cruz thoracolumbar mobility Flexion: 75%, comp with knee bend   Ext: 50%   L: compensation; 75%  R SB: 100%  B Rot: 75% (R>L)  2/26 Ongoing    Improve  bilateral hip ext passively to 15 deg R: -4 deg  L: 2 deg   2/26  ongoing    Improve cruz passive hip IR to 10 deg R: 21 deg  L: 29 deg 2/26  MET   Able to sit to stand without exacerbation of pain after sitting 10 min Reports that he was able able to sit in the lobby for about 15 minutes without pain; however, had he sat for 5 more minutes,he  would be in a lot of pain 2/26  ongoing   SLS either leg x 5 secs R: 2 sec heavy lean towards the R; multiple trials  L: 5 sec with multiple trials  2/26  Progressing   Reports pain no greater than 2-3/10 when it does occur. In the mornings, it is a 10 but it subsides throughout the day   2/26  ongoing   Improve KAREN to 10/50 1/22: 11/50 2/26: 8/50 2/26  MET   Independent with HEP for flexibility and core/hip stability. Does them every once in a while  2/26  ongoing         Anticipated CPT codes: Therapeutic Exercise 72184, Manual Therapy 88434, Therapeutic Activity 82256, Neuromuscular ReEducation 29902, Self Care/Home Management 29711, Estim Attended 41412, and Estim Unattended 22290        Assessment/Plan      ASSESSMENT:   Clive is in a greater deal of pain in his L sciatic nerve. Addressed this by performing stretches and exercises to mobilize rotation of hips. Alt exercises had to be performed on LLE vs RLE d/t pain with L side exercises in original position.       PLAN: Continue working on B hamstring and piriformis relaxation and extensibility, and progress with B hip strengthening mobility, as well as strategies to improve functional transfers.     SIGNATURE: Meaghan Varela PT DPT, [place] License #: [id number]  Electronically Signed on 3/5/2025        [place]. [id number]  [phone]

## 2025-03-07 ENCOUNTER — APPOINTMENT (OUTPATIENT)
Dept: PHYSICAL THERAPY | Facility: HOSPITAL | Age: 80
End: 2025-03-07
Payer: MEDICARE

## 2025-03-12 ENCOUNTER — APPOINTMENT (OUTPATIENT)
Dept: PHYSICAL THERAPY | Facility: HOSPITAL | Age: 80
End: 2025-03-12
Payer: MEDICARE

## 2025-03-14 ENCOUNTER — APPOINTMENT (OUTPATIENT)
Dept: PHYSICAL THERAPY | Facility: HOSPITAL | Age: 80
End: 2025-03-14
Payer: MEDICARE

## 2025-03-19 ENCOUNTER — APPOINTMENT (OUTPATIENT)
Dept: PHYSICAL THERAPY | Facility: HOSPITAL | Age: 80
End: 2025-03-19
Payer: MEDICARE

## 2025-03-21 ENCOUNTER — APPOINTMENT (OUTPATIENT)
Dept: PHYSICAL THERAPY | Facility: HOSPITAL | Age: 80
End: 2025-03-21
Payer: MEDICARE

## 2025-03-26 ENCOUNTER — APPOINTMENT (OUTPATIENT)
Dept: PHYSICAL THERAPY | Facility: HOSPITAL | Age: 80
End: 2025-03-26
Payer: MEDICARE

## 2025-03-28 ENCOUNTER — APPOINTMENT (OUTPATIENT)
Dept: PHYSICAL THERAPY | Facility: HOSPITAL | Age: 80
End: 2025-03-28
Payer: MEDICARE

## 2025-04-02 ENCOUNTER — APPOINTMENT (OUTPATIENT)
Dept: GENERAL RADIOLOGY | Age: 80
DRG: 321 | End: 2025-04-02
Payer: MEDICARE

## 2025-04-02 ENCOUNTER — HOSPITAL ENCOUNTER (INPATIENT)
Age: 80
LOS: 5 days | Discharge: NURSING, REHAB OR LONG-TERM CARE | DRG: 321 | End: 2025-04-07
Attending: STUDENT IN AN ORGANIZED HEALTH CARE EDUCATION/TRAINING PROGRAM | Admitting: STUDENT IN AN ORGANIZED HEALTH CARE EDUCATION/TRAINING PROGRAM
Payer: MEDICARE

## 2025-04-02 ENCOUNTER — APPOINTMENT (OUTPATIENT)
Age: 80
DRG: 321 | End: 2025-04-02
Payer: MEDICARE

## 2025-04-02 DIAGNOSIS — R52 GENERALIZED PAIN: ICD-10-CM

## 2025-04-02 DIAGNOSIS — R07.9 CHEST PAIN: ICD-10-CM

## 2025-04-02 DIAGNOSIS — I48.91 NEW ONSET ATRIAL FIBRILLATION (HCC): Primary | ICD-10-CM

## 2025-04-02 DIAGNOSIS — I50.9 ACUTE CONGESTIVE HEART FAILURE, UNSPECIFIED HEART FAILURE TYPE (HCC): ICD-10-CM

## 2025-04-02 DIAGNOSIS — I48.91 ATRIAL FIBRILLATION, UNSPECIFIED TYPE (HCC): ICD-10-CM

## 2025-04-02 PROBLEM — G20.B2: Status: ACTIVE | Noted: 2023-10-23

## 2025-04-02 PROBLEM — E11.9 TYPE 2 DIABETES MELLITUS WITHOUT COMPLICATION, WITHOUT LONG-TERM CURRENT USE OF INSULIN: Status: ACTIVE | Noted: 2020-07-22

## 2025-04-02 LAB
ALBUMIN SERPL-MCNC: 4 G/DL (ref 3.5–5.2)
ALP SERPL-CCNC: 58 U/L (ref 40–129)
ALT SERPL-CCNC: 10 U/L (ref 10–50)
ANION GAP SERPL CALCULATED.3IONS-SCNC: 9 MMOL/L (ref 8–16)
APTT PPP: 33 SEC (ref 26–36.2)
AST SERPL-CCNC: 19 U/L (ref 10–50)
B PARAP IS1001 DNA NPH QL NAA+NON-PROBE: NOT DETECTED
B PERT.PT PRMT NPH QL NAA+NON-PROBE: NOT DETECTED
BASOPHILS # BLD: 0 K/UL (ref 0–0.2)
BASOPHILS NFR BLD: 0.6 % (ref 0–1)
BILIRUB SERPL-MCNC: 0.4 MG/DL (ref 0.2–1.2)
BNP BLD-MCNC: 1896 PG/ML (ref 0–449)
BUN SERPL-MCNC: 20 MG/DL (ref 8–23)
C PNEUM DNA NPH QL NAA+NON-PROBE: NOT DETECTED
CALCIUM SERPL-MCNC: 8.9 MG/DL (ref 8.8–10.2)
CHLORIDE SERPL-SCNC: 101 MMOL/L (ref 98–107)
CO2 SERPL-SCNC: 25 MMOL/L (ref 22–29)
CREAT SERPL-MCNC: 0.9 MG/DL (ref 0.7–1.2)
ECHO AO ASC DIAM: 2.7 CM
ECHO AO ASCENDING AORTA INDEX: 1.29 CM/M2
ECHO AO ROOT DIAM: 1.9 CM
ECHO AO ROOT INDEX: 0.91 CM/M2
ECHO AO SINUS VALSALVA DIAM: 3 CM
ECHO AO SINUS VALSALVA INDEX: 1.44 CM/M2
ECHO AO ST JNCT DIAM: 2.4 CM
ECHO AV AREA PEAK VELOCITY: 2.9 CM2
ECHO AV AREA VTI: 2.9 CM2
ECHO AV AREA/BSA PEAK VELOCITY: 1.4 CM2/M2
ECHO AV AREA/BSA VTI: 1.4 CM2/M2
ECHO AV MEAN GRADIENT: 3 MMHG
ECHO AV MEAN VELOCITY: 0.8 M/S
ECHO AV PEAK GRADIENT: 4 MMHG
ECHO AV PEAK VELOCITY: 0.9 M/S
ECHO AV VELOCITY RATIO: 0.78
ECHO AV VTI: 17.3 CM
ECHO EST RA PRESSURE: 3 MMHG
ECHO IVC PROX: 1.6 CM
ECHO LA AREA 2C: 10.4 CM2
ECHO LA AREA 4C: 11.9 CM2
ECHO LA DIAMETER INDEX: 1.77 CM/M2
ECHO LA DIAMETER: 3.7 CM
ECHO LA MAJOR AXIS: 4 CM
ECHO LA MINOR AXIS: 4.2 CM
ECHO LA TO AORTIC ROOT RATIO: 1.95
ECHO LA VOL BP: 25 ML (ref 18–58)
ECHO LA VOL MOD A2C: 21 ML (ref 18–58)
ECHO LA VOL MOD A4C: 28 ML (ref 18–58)
ECHO LA VOL/BSA BIPLANE: 12 ML/M2 (ref 16–34)
ECHO LA VOLUME INDEX MOD A2C: 10 ML/M2 (ref 16–34)
ECHO LA VOLUME INDEX MOD A4C: 13 ML/M2 (ref 16–34)
ECHO LV E' LATERAL VELOCITY: 10.2 CM/S
ECHO LV E' SEPTAL VELOCITY: 6.74 CM/S
ECHO LV EDV A2C: 99 ML
ECHO LV EDV A4C: 126 ML
ECHO LV EDV INDEX A4C: 60 ML/M2
ECHO LV EDV NDEX A2C: 47 ML/M2
ECHO LV EJECTION FRACTION A2C: 36 %
ECHO LV EJECTION FRACTION A4C: 42 %
ECHO LV EJECTION FRACTION BIPLANE: 40 % (ref 55–100)
ECHO LV ESV A2C: 63 ML
ECHO LV ESV A4C: 73 ML
ECHO LV ESV INDEX A2C: 30 ML/M2
ECHO LV ESV INDEX A4C: 35 ML/M2
ECHO LV FRACTIONAL SHORTENING: 29 % (ref 28–44)
ECHO LV INTERNAL DIMENSION DIASTOLE INDEX: 2.68 CM/M2
ECHO LV INTERNAL DIMENSION DIASTOLIC: 5.6 CM (ref 4.2–5.9)
ECHO LV INTERNAL DIMENSION SYSTOLIC INDEX: 1.91 CM/M2
ECHO LV INTERNAL DIMENSION SYSTOLIC: 4 CM
ECHO LV IVSD: 1.3 CM (ref 0.6–1)
ECHO LV MASS 2D: 330.2 G (ref 88–224)
ECHO LV MASS INDEX 2D: 158 G/M2 (ref 49–115)
ECHO LV POSTERIOR WALL DIASTOLIC: 1.4 CM (ref 0.6–1)
ECHO LV RELATIVE WALL THICKNESS RATIO: 0.5
ECHO LVOT AREA: 3.8 CM2
ECHO LVOT AV VTI INDEX: 0.76
ECHO LVOT DIAM: 2.2 CM
ECHO LVOT MEAN GRADIENT: 1 MMHG
ECHO LVOT MEAN GRADIENT: 1 MMHG
ECHO LVOT PEAK GRADIENT: 2 MMHG
ECHO LVOT PEAK VELOCITY: 0.7 M/S
ECHO LVOT STROKE VOLUME INDEX: 23.8 ML/M2
ECHO LVOT SV: 49.8 ML
ECHO LVOT VTI: 13.1 CM
ECHO MV A VELOCITY: 0.54 M/S
ECHO MV AREA VTI: 3.2 CM2
ECHO MV E DECELERATION TIME (DT): 129 MS
ECHO MV E VELOCITY: 0.78 M/S
ECHO MV E/A RATIO: 1.44
ECHO MV E/E' LATERAL: 7.65
ECHO MV E/E' RATIO (AVERAGED): 9.61
ECHO MV E/E' SEPTAL: 11.57
ECHO MV LVOT VTI INDEX: 1.19
ECHO MV MAX VELOCITY: 0.9 M/S
ECHO MV MEAN GRADIENT: 1 MMHG
ECHO MV MEAN VELOCITY: 0.5 M/S
ECHO MV PEAK GRADIENT: 4 MMHG
ECHO MV REGURGITANT PEAK GRADIENT: 100 MMHG
ECHO MV REGURGITANT PEAK VELOCITY: 5 M/S
ECHO MV REGURGITANT VTIA: 145 CM
ECHO MV VTI: 15.6 CM
ECHO RA AREA 4C: 14 CM2
ECHO RA END SYSTOLIC VOLUME APICAL 4 CHAMBER INDEX BSA: 14 ML/M2
ECHO RA VOLUME: 30 ML
ECHO RV BASAL DIMENSION: 2.8 CM
ECHO RV INTERNAL DIMENSION: 2.1 CM
ECHO RV LONGITUDINAL DIMENSION: 6.9 CM
ECHO RV MID DIMENSION: 2.1 CM
ECHO RV TAPSE: 1.6 CM (ref 1.7–?)
EOSINOPHIL # BLD: 0.1 K/UL (ref 0–0.6)
EOSINOPHIL NFR BLD: 1.2 % (ref 0–5)
ERYTHROCYTE [DISTWIDTH] IN BLOOD BY AUTOMATED COUNT: 13.1 % (ref 11.5–14.5)
FLUAV RNA NPH QL NAA+NON-PROBE: NOT DETECTED
FLUBV RNA NPH QL NAA+NON-PROBE: NOT DETECTED
GLUCOSE BLD-MCNC: 116 MG/DL (ref 70–99)
GLUCOSE SERPL-MCNC: 192 MG/DL (ref 70–99)
HADV DNA NPH QL NAA+NON-PROBE: NOT DETECTED
HCOV 229E RNA NPH QL NAA+NON-PROBE: NOT DETECTED
HCOV HKU1 RNA NPH QL NAA+NON-PROBE: NOT DETECTED
HCOV NL63 RNA NPH QL NAA+NON-PROBE: NOT DETECTED
HCOV OC43 RNA NPH QL NAA+NON-PROBE: NOT DETECTED
HCT VFR BLD AUTO: 40.8 % (ref 42–52)
HGB BLD-MCNC: 13.7 G/DL (ref 14–18)
HMPV RNA NPH QL NAA+NON-PROBE: NOT DETECTED
HPIV1 RNA NPH QL NAA+NON-PROBE: NOT DETECTED
HPIV2 RNA NPH QL NAA+NON-PROBE: NOT DETECTED
HPIV3 RNA NPH QL NAA+NON-PROBE: NOT DETECTED
HPIV4 RNA NPH QL NAA+NON-PROBE: NOT DETECTED
IMM GRANULOCYTES # BLD: 0 K/UL
INR PPP: 1.11 (ref 0.88–1.18)
LYMPHOCYTES # BLD: 1.7 K/UL (ref 1.1–4.5)
LYMPHOCYTES NFR BLD: 25.2 % (ref 20–40)
M PNEUMO DNA NPH QL NAA+NON-PROBE: NOT DETECTED
MAGNESIUM SERPL-MCNC: 2.1 MG/DL (ref 1.6–2.4)
MCH RBC QN AUTO: 28.4 PG (ref 27–31)
MCHC RBC AUTO-ENTMCNC: 33.6 G/DL (ref 33–37)
MCV RBC AUTO: 84.5 FL (ref 80–94)
MONOCYTES # BLD: 0.7 K/UL (ref 0–0.9)
MONOCYTES NFR BLD: 10.2 % (ref 0–10)
NEUTROPHILS # BLD: 4.3 K/UL (ref 1.5–7.5)
NEUTS SEG NFR BLD: 62.4 % (ref 50–65)
PERFORMED ON: ABNORMAL
PLATELET # BLD AUTO: 193 K/UL (ref 130–400)
PMV BLD AUTO: 9.7 FL (ref 9.4–12.4)
POTASSIUM SERPL-SCNC: 4.5 MMOL/L (ref 3.5–5.1)
PROT SERPL-MCNC: 7 G/DL (ref 6.4–8.3)
PROTHROMBIN TIME: 14.2 SEC (ref 12–14.6)
RBC # BLD AUTO: 4.83 M/UL (ref 4.7–6.1)
RSV RNA NPH QL NAA+NON-PROBE: NOT DETECTED
RV+EV RNA NPH QL NAA+NON-PROBE: NOT DETECTED
SARS-COV-2 RNA NPH QL NAA+NON-PROBE: NOT DETECTED
SODIUM SERPL-SCNC: 135 MMOL/L (ref 136–145)
T4 FREE SERPL-MCNC: 1.35 NG/DL (ref 0.93–1.7)
TROPONIN, HIGH SENSITIVITY: 21 NG/L (ref 0–22)
TROPONIN, HIGH SENSITIVITY: 22 NG/L (ref 0–22)
TROPONIN, HIGH SENSITIVITY: 23 NG/L (ref 0–22)
TSH SERPL DL<=0.005 MIU/L-ACNC: 1.05 UIU/ML (ref 0.27–4.2)
WBC # BLD AUTO: 6.9 K/UL (ref 4.8–10.8)

## 2025-04-02 PROCEDURE — 84439 ASSAY OF FREE THYROXINE: CPT

## 2025-04-02 PROCEDURE — 6360000004 HC RX CONTRAST MEDICATION: Performed by: NURSE PRACTITIONER

## 2025-04-02 PROCEDURE — 83735 ASSAY OF MAGNESIUM: CPT

## 2025-04-02 PROCEDURE — 93005 ELECTROCARDIOGRAM TRACING: CPT | Performed by: NURSE PRACTITIONER

## 2025-04-02 PROCEDURE — 83880 ASSAY OF NATRIURETIC PEPTIDE: CPT

## 2025-04-02 PROCEDURE — 1200000000 HC SEMI PRIVATE

## 2025-04-02 PROCEDURE — 93306 TTE W/DOPPLER COMPLETE: CPT | Performed by: INTERNAL MEDICINE

## 2025-04-02 PROCEDURE — 85610 PROTHROMBIN TIME: CPT

## 2025-04-02 PROCEDURE — 85025 COMPLETE CBC W/AUTO DIFF WBC: CPT

## 2025-04-02 PROCEDURE — 6360000002 HC RX W HCPCS: Performed by: NURSE PRACTITIONER

## 2025-04-02 PROCEDURE — 84484 ASSAY OF TROPONIN QUANT: CPT

## 2025-04-02 PROCEDURE — 80053 COMPREHEN METABOLIC PANEL: CPT

## 2025-04-02 PROCEDURE — 36415 COLL VENOUS BLD VENIPUNCTURE: CPT

## 2025-04-02 PROCEDURE — 71045 X-RAY EXAM CHEST 1 VIEW: CPT

## 2025-04-02 PROCEDURE — 84443 ASSAY THYROID STIM HORMONE: CPT

## 2025-04-02 PROCEDURE — 2500000003 HC RX 250 WO HCPCS: Performed by: NURSE PRACTITIONER

## 2025-04-02 PROCEDURE — C8929 TTE W OR WO FOL WCON,DOPPLER: HCPCS

## 2025-04-02 PROCEDURE — 2580000003 HC RX 258: Performed by: NURSE PRACTITIONER

## 2025-04-02 PROCEDURE — 6370000000 HC RX 637 (ALT 250 FOR IP): Performed by: NURSE PRACTITIONER

## 2025-04-02 PROCEDURE — 0202U NFCT DS 22 TRGT SARS-COV-2: CPT

## 2025-04-02 PROCEDURE — 99285 EMERGENCY DEPT VISIT HI MDM: CPT

## 2025-04-02 PROCEDURE — 82962 GLUCOSE BLOOD TEST: CPT

## 2025-04-02 PROCEDURE — 85730 THROMBOPLASTIN TIME PARTIAL: CPT

## 2025-04-02 RX ORDER — ATORVASTATIN CALCIUM 10 MG/1
10 TABLET, FILM COATED ORAL NIGHTLY
Status: DISCONTINUED | OUTPATIENT
Start: 2025-04-02 | End: 2025-04-07 | Stop reason: HOSPADM

## 2025-04-02 RX ORDER — DILTIAZEM HCL 60 MG
30 TABLET ORAL EVERY 6 HOURS SCHEDULED
Status: DISCONTINUED | OUTPATIENT
Start: 2025-04-02 | End: 2025-04-04

## 2025-04-02 RX ORDER — AMANTADINE HYDROCHLORIDE 100 MG/1
100 CAPSULE, GELATIN COATED ORAL 2 TIMES DAILY
Status: DISCONTINUED | OUTPATIENT
Start: 2025-04-02 | End: 2025-04-07 | Stop reason: HOSPADM

## 2025-04-02 RX ORDER — GLUCAGON 1 MG/ML
1 KIT INJECTION PRN
Status: DISCONTINUED | OUTPATIENT
Start: 2025-04-02 | End: 2025-04-07 | Stop reason: HOSPADM

## 2025-04-02 RX ORDER — TRAMADOL HYDROCHLORIDE 50 MG/1
50 TABLET ORAL EVERY 6 HOURS PRN
Status: DISCONTINUED | OUTPATIENT
Start: 2025-04-02 | End: 2025-04-07 | Stop reason: HOSPADM

## 2025-04-02 RX ORDER — SODIUM CHLORIDE 0.9 % (FLUSH) 0.9 %
5-40 SYRINGE (ML) INJECTION EVERY 12 HOURS SCHEDULED
Status: DISCONTINUED | OUTPATIENT
Start: 2025-04-02 | End: 2025-04-07 | Stop reason: HOSPADM

## 2025-04-02 RX ORDER — CARBIDOPA AND LEVODOPA 25; 100 MG/1; MG/1
1 TABLET ORAL 2 TIMES DAILY
Status: DISCONTINUED | OUTPATIENT
Start: 2025-04-02 | End: 2025-04-07 | Stop reason: HOSPADM

## 2025-04-02 RX ORDER — DILTIAZEM HYDROCHLORIDE 5 MG/ML
10 INJECTION INTRAVENOUS ONCE
Status: COMPLETED | OUTPATIENT
Start: 2025-04-02 | End: 2025-04-02

## 2025-04-02 RX ORDER — ONDANSETRON 4 MG/1
4 TABLET, ORALLY DISINTEGRATING ORAL EVERY 8 HOURS PRN
Status: DISCONTINUED | OUTPATIENT
Start: 2025-04-02 | End: 2025-04-07 | Stop reason: HOSPADM

## 2025-04-02 RX ORDER — INSULIN LISPRO 100 [IU]/ML
0-4 INJECTION, SOLUTION INTRAVENOUS; SUBCUTANEOUS
Status: DISCONTINUED | OUTPATIENT
Start: 2025-04-02 | End: 2025-04-07 | Stop reason: HOSPADM

## 2025-04-02 RX ORDER — DEXTROSE MONOHYDRATE 100 MG/ML
INJECTION, SOLUTION INTRAVENOUS CONTINUOUS PRN
Status: DISCONTINUED | OUTPATIENT
Start: 2025-04-02 | End: 2025-04-07 | Stop reason: HOSPADM

## 2025-04-02 RX ORDER — SODIUM CHLORIDE 0.9 % (FLUSH) 0.9 %
5-40 SYRINGE (ML) INJECTION PRN
Status: DISCONTINUED | OUTPATIENT
Start: 2025-04-02 | End: 2025-04-07 | Stop reason: HOSPADM

## 2025-04-02 RX ORDER — LOSARTAN POTASSIUM AND HYDROCHLOROTHIAZIDE 25; 100 MG/1; MG/1
1 TABLET ORAL DAILY
Status: DISCONTINUED | OUTPATIENT
Start: 2025-04-02 | End: 2025-04-03

## 2025-04-02 RX ORDER — GABAPENTIN 100 MG/1
100 CAPSULE ORAL 3 TIMES DAILY
Status: DISCONTINUED | OUTPATIENT
Start: 2025-04-02 | End: 2025-04-07 | Stop reason: HOSPADM

## 2025-04-02 RX ORDER — ENOXAPARIN SODIUM 100 MG/ML
1 INJECTION SUBCUTANEOUS EVERY 12 HOURS
Status: DISCONTINUED | OUTPATIENT
Start: 2025-04-02 | End: 2025-04-03

## 2025-04-02 RX ORDER — MAGNESIUM SULFATE IN WATER 40 MG/ML
2000 INJECTION, SOLUTION INTRAVENOUS PRN
Status: DISCONTINUED | OUTPATIENT
Start: 2025-04-02 | End: 2025-04-07 | Stop reason: HOSPADM

## 2025-04-02 RX ORDER — POTASSIUM CHLORIDE 1500 MG/1
40 TABLET, EXTENDED RELEASE ORAL PRN
Status: ACTIVE | OUTPATIENT
Start: 2025-04-02 | End: 2025-04-07

## 2025-04-02 RX ORDER — ACETAMINOPHEN 325 MG/1
650 TABLET ORAL EVERY 6 HOURS PRN
Status: DISCONTINUED | OUTPATIENT
Start: 2025-04-02 | End: 2025-04-07 | Stop reason: HOSPADM

## 2025-04-02 RX ORDER — SODIUM CHLORIDE 9 MG/ML
INJECTION, SOLUTION INTRAVENOUS PRN
Status: DISCONTINUED | OUTPATIENT
Start: 2025-04-02 | End: 2025-04-07 | Stop reason: HOSPADM

## 2025-04-02 RX ORDER — POLYETHYLENE GLYCOL 3350 17 G/17G
17 POWDER, FOR SOLUTION ORAL DAILY PRN
Status: DISCONTINUED | OUTPATIENT
Start: 2025-04-02 | End: 2025-04-07 | Stop reason: HOSPADM

## 2025-04-02 RX ORDER — ONDANSETRON 2 MG/ML
4 INJECTION INTRAMUSCULAR; INTRAVENOUS EVERY 6 HOURS PRN
Status: DISCONTINUED | OUTPATIENT
Start: 2025-04-02 | End: 2025-04-07 | Stop reason: HOSPADM

## 2025-04-02 RX ORDER — POTASSIUM CHLORIDE 7.45 MG/ML
10 INJECTION INTRAVENOUS PRN
Status: ACTIVE | OUTPATIENT
Start: 2025-04-02 | End: 2025-04-07

## 2025-04-02 RX ADMIN — CARBIDOPA AND LEVODOPA 1 TABLET: 25; 100 TABLET ORAL at 23:25

## 2025-04-02 RX ADMIN — DILTIAZEM HYDROCHLORIDE 30 MG: 60 TABLET, FILM COATED ORAL at 17:30

## 2025-04-02 RX ADMIN — DILTIAZEM HYDROCHLORIDE 10 MG: 5 INJECTION, SOLUTION INTRAVENOUS at 13:21

## 2025-04-02 RX ADMIN — DILTIAZEM HYDROCHLORIDE 30 MG: 60 TABLET, FILM COATED ORAL at 23:29

## 2025-04-02 RX ADMIN — AMANTADINE HYDROCHLORIDE 100 MG: 100 CAPSULE, LIQUID FILLED ORAL at 23:21

## 2025-04-02 RX ADMIN — GABAPENTIN 100 MG: 100 CAPSULE ORAL at 16:04

## 2025-04-02 RX ADMIN — SULFUR HEXAFLUORIDE 2 ML: 60.7; .19; .19 INJECTION, POWDER, LYOPHILIZED, FOR SUSPENSION INTRAVENOUS; INTRAVESICAL at 15:51

## 2025-04-02 RX ADMIN — DILTIAZEM HYDROCHLORIDE 5 MG/HR: 5 INJECTION, SOLUTION INTRAVENOUS at 16:26

## 2025-04-02 RX ADMIN — GABAPENTIN 100 MG: 100 CAPSULE ORAL at 23:22

## 2025-04-02 RX ADMIN — ATORVASTATIN CALCIUM 10 MG: 10 TABLET, FILM COATED ORAL at 23:21

## 2025-04-02 RX ADMIN — TRAMADOL HYDROCHLORIDE 50 MG: 50 TABLET, COATED ORAL at 23:23

## 2025-04-02 RX ADMIN — ENOXAPARIN SODIUM 90 MG: 100 INJECTION SUBCUTANEOUS at 17:30

## 2025-04-02 ASSESSMENT — PAIN - FUNCTIONAL ASSESSMENT: PAIN_FUNCTIONAL_ASSESSMENT: 0-10

## 2025-04-02 ASSESSMENT — PAIN DESCRIPTION - LOCATION: LOCATION: SACRUM

## 2025-04-02 ASSESSMENT — PAIN DESCRIPTION - ORIENTATION: ORIENTATION: LEFT;RIGHT

## 2025-04-02 ASSESSMENT — PAIN SCALES - GENERAL
PAINLEVEL_OUTOF10: 6
PAINLEVEL_OUTOF10: 8

## 2025-04-02 NOTE — ED PROVIDER NOTES
[facility] 4 ONCOLOGY UNIT  EMERGENCY DEPARTMENT ENCOUNTER      Pt Name: Bong Mack  MRN: [id number]  Birthdate 1945  Date of evaluation: 4/2/2025  Provider: VIKTORIA Hayes NP    CHIEF COMPLAINT       Chief Complaint   Patient presents with    Cough     Day 6 of symptoms, thinks it is the flu         HISTORY OF PRESENT ILLNESS   (Location/Symptom, Timing/Onset,Context/Setting, Quality, Duration, Modifying Factors, Severity)  Note limiting factors.     Bong Mack is a 80 y.o. male who presents to the emergency department with 6-day history of cough, shortness of breath and decreased activity.  Patient believes he has the flu.  He is accompanied by his spouse he denies any known fever.  Medical history includes Parkinson's disease, diabetes, hypertension.      The history is provided by the patient.       NursingNotes were reviewed.    REVIEW OF SYSTEMS    (2-9 systems for level 4, 10 or more for level 5)     Review of Systems   Constitutional:  Positive for activity change and fatigue. Negative for chills and fever.   HENT: Negative.     Respiratory:  Positive for cough and shortness of breath. Negative for chest tightness and wheezing.    Cardiovascular:  Negative for chest pain, palpitations and leg swelling.   Genitourinary: Negative.    Musculoskeletal: Negative.    All other systems reviewed and are negative.      A complete review of systems was performed and is negative except as noted above in the HPI.       PAST MEDICAL HISTORY     Past Medical History:   Diagnosis Date    Diabetes     Hypertension     New onset atrial fibrillation (HCC) 4/2/2025    Tinnitus          SURGICAL HISTORY       Past Surgical History:   Procedure Laterality Date    HERNIA REPAIR      2    KNEE SURGERY           CURRENT MEDICATIONS       Current Discharge Medication List        CONTINUE these medications which have NOT CHANGED    Details   amantadine (SYMMETREL) 100 MG capsule Take 1 capsule by mouth 2 times daily

## 2025-04-02 NOTE — H&P
[facility]      Hospitalist - History & Physical      PCP: Chadd Blake DO    Date of Admission: 4/2/2025    Date of Service: 4/2/2025    Chief Complaint:  Cough/shortness of breath    History Of Present Illness:   The patient is a 80 y.o. male with a PMH of Parkinson's disease, type 2 diabetes, and HTN, who presented to [facility] ED on 4/2/2025 complaining of cough. He states that he has not felt well in at least 3 weeks and has had an increase in shortness of breath and cough for the last week. His wife at bedside states that he has been increasingly weak and has not been taking his medications for the last 3 weeks. He reports an occasional productive cough of clear drainage. Denies fever or chills. Denies N/V/D, palpitations or chest pain. He passively states he has a remote history of atrial fibrillation and CHF, however, denies taking any medications. No recent 2D echo.     Further ED workup revealed-, K4.5, BUN 20 and creatinine 0.9.  Glucose 192.  Initial troponin 23 repeat pending.  proBNP 1896.  WBC 6.9, H&H 13.7/40.8 with a platelet count of 193.  RPP negative for viral illness.  INR 1.11.  EKG atrial fibrillation with RVR rate 104.  X-ray low lung volumes with mild cardiomegaly and mild central pulmonary vascular congestion with probable early perihilar edema raising question of component of CHF or fluid overload.  The patient will be admitted to hospital medicine for atrial fibrillation.     Past Medical History:        Diagnosis Date    Diabetes     Hypertension     New onset atrial fibrillation (HCC) 4/2/2025    Tinnitus        Past Surgical History:        Procedure Laterality Date    HERNIA REPAIR      2    KNEE SURGERY         Home Medications:  Prior to Admission medications    Medication Sig Start Date End Date Taking? Authorizing Provider   amantadine (SYMMETREL) 100 MG capsule Take 1 capsule by mouth 2 times daily 12/3/24   Provider, MD Manuel   carbidopa-levodopa

## 2025-04-02 NOTE — ED NOTES
ED TO INPATIENT SBAR HANDOFF    Patient Name: Bong Mack   : 1945  80 y.o.   Family/Caregiver Present: Yes  Code Status Order: No Order    C-SSRS: Risk of Suicide: No Risk  Sitter No  Restraints:         Situation  Chief Complaint:   Chief Complaint   Patient presents with    Cough     Day 6 of symptoms, thinks it is the flu     Patient Diagnosis: New onset atrial fibrillation (HCC) [I48.91]     Brief Description of Patient's Condition: pt came from home with complaints of a cough for the past week that just hasn't gotten better, his HR was initially around 110-120 after a bolus of cardizem he HR is around , he is able to get up with a walker, he struggles to get around after sitting for awhile  Mental Status: oriented, alert, coherent, logical, thought processes intact, and able to concentrate and follow conversation  Arrived from: home    Imaging:   XR CHEST PORTABLE   Final Result   1. Low lung volumes with mild cardiomegaly and mild central pulmonary vascular congestion with probable very early perihilar edema raising the question of a component of congestive failure or fluid overload.   2. Stable remote granulomatous changes without additional acute process.               ______________________________________    Electronically signed by: YVETTE HILL M.D.   Date:     2025   Time:    10:54         COVID-19 Results:   Internal Administration   First Dose COVID-19, MODERNA BLUE border, Primary or Immunocompromised, (age 12y+), IM, 100 mcg/0.5mL  2021   Second Dose COVID-19, MODERNA BLUE border, Primary or Immunocompromised, (age 12y+), IM, 100 mcg/0.5mL   2021       Last COVID Lab SARS-CoV-2, PCR (no units)   Date Value   2025 Not Detected     SARS-COV-2, POC (no units)   Date Value   2024 Not-Detected           Abnormal labs:   Abnormal Labs Reviewed   CBC WITH AUTO DIFFERENTIAL - Abnormal; Notable for the following components:       Result Value    Hemoglobin

## 2025-04-03 PROBLEM — I50.21 ACUTE SYSTOLIC HEART FAILURE (HCC): Status: ACTIVE | Noted: 2025-04-03

## 2025-04-03 PROBLEM — I50.20 HFREF (HEART FAILURE WITH REDUCED EJECTION FRACTION) (HCC): Status: ACTIVE | Noted: 2025-04-03

## 2025-04-03 PROBLEM — R07.9 CHEST PAIN: Status: ACTIVE | Noted: 2025-04-02

## 2025-04-03 LAB
ANION GAP SERPL CALCULATED.3IONS-SCNC: 8 MMOL/L (ref 8–16)
BUN SERPL-MCNC: 16 MG/DL (ref 8–23)
CALCIUM SERPL-MCNC: 9.3 MG/DL (ref 8.8–10.2)
CHLORIDE SERPL-SCNC: 102 MMOL/L (ref 98–107)
CO2 SERPL-SCNC: 26 MMOL/L (ref 22–29)
CREAT SERPL-MCNC: 0.9 MG/DL (ref 0.7–1.2)
EKG P AXIS: NORMAL DEGREES
EKG P-R INTERVAL: NORMAL MS
EKG Q-T INTERVAL: 342 MS
EKG QRS DURATION: 118 MS
EKG QTC CALCULATION (BAZETT): 419 MS
EKG T AXIS: 95 DEGREES
GLUCOSE BLD-MCNC: 130 MG/DL (ref 70–99)
GLUCOSE BLD-MCNC: 144 MG/DL (ref 70–99)
GLUCOSE BLD-MCNC: 150 MG/DL (ref 70–99)
GLUCOSE BLD-MCNC: 152 MG/DL (ref 70–99)
GLUCOSE SERPL-MCNC: 160 MG/DL (ref 70–99)
HBA1C MFR BLD: 6.5 % (ref 4–5.6)
PERFORMED ON: ABNORMAL
POTASSIUM SERPL-SCNC: 3.9 MMOL/L (ref 3.5–5.1)
SODIUM SERPL-SCNC: 136 MMOL/L (ref 136–145)

## 2025-04-03 PROCEDURE — 92928 PRQ TCAT PLMT NTRAC ST 1 LES: CPT | Performed by: INTERNAL MEDICINE

## 2025-04-03 PROCEDURE — 2580000003 HC RX 258: Performed by: INTERNAL MEDICINE

## 2025-04-03 PROCEDURE — 4A023N7 MEASUREMENT OF CARDIAC SAMPLING AND PRESSURE, LEFT HEART, PERCUTANEOUS APPROACH: ICD-10-PCS | Performed by: INTERNAL MEDICINE

## 2025-04-03 PROCEDURE — C1725 CATH, TRANSLUMIN NON-LASER: HCPCS | Performed by: INTERNAL MEDICINE

## 2025-04-03 PROCEDURE — 51798 US URINE CAPACITY MEASURE: CPT

## 2025-04-03 PROCEDURE — 83036 HEMOGLOBIN GLYCOSYLATED A1C: CPT

## 2025-04-03 PROCEDURE — 6360000002 HC RX W HCPCS: Performed by: INTERNAL MEDICINE

## 2025-04-03 PROCEDURE — 6360000002 HC RX W HCPCS: Performed by: NURSE PRACTITIONER

## 2025-04-03 PROCEDURE — C1874 STENT, COATED/COV W/DEL SYS: HCPCS | Performed by: INTERNAL MEDICINE

## 2025-04-03 PROCEDURE — 2709999900 HC NON-CHARGEABLE SUPPLY: Performed by: INTERNAL MEDICINE

## 2025-04-03 PROCEDURE — B2151ZZ FLUOROSCOPY OF LEFT HEART USING LOW OSMOLAR CONTRAST: ICD-10-PCS | Performed by: INTERNAL MEDICINE

## 2025-04-03 PROCEDURE — C1887 CATHETER, GUIDING: HCPCS | Performed by: INTERNAL MEDICINE

## 2025-04-03 PROCEDURE — 51701 INSERT BLADDER CATHETER: CPT

## 2025-04-03 PROCEDURE — 5A2204Z RESTORATION OF CARDIAC RHYTHM, SINGLE: ICD-10-PCS | Performed by: INTERNAL MEDICINE

## 2025-04-03 PROCEDURE — 2500000003 HC RX 250 WO HCPCS: Performed by: NURSE PRACTITIONER

## 2025-04-03 PROCEDURE — 80048 BASIC METABOLIC PNL TOTAL CA: CPT

## 2025-04-03 PROCEDURE — 6370000000 HC RX 637 (ALT 250 FOR IP): Performed by: INTERNAL MEDICINE

## 2025-04-03 PROCEDURE — 99152 MOD SED SAME PHYS/QHP 5/>YRS: CPT | Performed by: INTERNAL MEDICINE

## 2025-04-03 PROCEDURE — 93458 L HRT ARTERY/VENTRICLE ANGIO: CPT | Performed by: INTERNAL MEDICINE

## 2025-04-03 PROCEDURE — 82962 GLUCOSE BLOOD TEST: CPT

## 2025-04-03 PROCEDURE — 6360000004 HC RX CONTRAST MEDICATION: Performed by: INTERNAL MEDICINE

## 2025-04-03 PROCEDURE — 1200000000 HC SEMI PRIVATE

## 2025-04-03 PROCEDURE — C1894 INTRO/SHEATH, NON-LASER: HCPCS | Performed by: INTERNAL MEDICINE

## 2025-04-03 PROCEDURE — 36415 COLL VENOUS BLD VENIPUNCTURE: CPT

## 2025-04-03 PROCEDURE — 99153 MOD SED SAME PHYS/QHP EA: CPT | Performed by: INTERNAL MEDICINE

## 2025-04-03 PROCEDURE — C1769 GUIDE WIRE: HCPCS | Performed by: INTERNAL MEDICINE

## 2025-04-03 PROCEDURE — 027034Z DILATION OF CORONARY ARTERY, ONE ARTERY WITH DRUG-ELUTING INTRALUMINAL DEVICE, PERCUTANEOUS APPROACH: ICD-10-PCS | Performed by: INTERNAL MEDICINE

## 2025-04-03 PROCEDURE — 6370000000 HC RX 637 (ALT 250 FOR IP): Performed by: NURSE PRACTITIONER

## 2025-04-03 DEVICE — STENT ONYXNG27526UX ONYX 2.75X26RX
Type: IMPLANTABLE DEVICE | Site: CORONARY - LEFT ANTERIOR DESCENDING | Status: FUNCTIONAL
Brand: ONYX FRONTIER™

## 2025-04-03 RX ORDER — IODIXANOL 320 MG/ML
INJECTION, SOLUTION INTRAVASCULAR PRN
Status: DISCONTINUED | OUTPATIENT
Start: 2025-04-03 | End: 2025-04-03 | Stop reason: HOSPADM

## 2025-04-03 RX ORDER — NITROGLYCERIN 20 MG/100ML
INJECTION INTRAVENOUS PRN
Status: DISCONTINUED | OUTPATIENT
Start: 2025-04-03 | End: 2025-04-03 | Stop reason: HOSPADM

## 2025-04-03 RX ORDER — ASPIRIN 325 MG
325 TABLET ORAL ONCE
Status: COMPLETED | OUTPATIENT
Start: 2025-04-03 | End: 2025-04-03

## 2025-04-03 RX ORDER — NITROGLYCERIN 0.4 MG/1
0.4 TABLET SUBLINGUAL EVERY 5 MIN PRN
Status: DISCONTINUED | OUTPATIENT
Start: 2025-04-03 | End: 2025-04-03 | Stop reason: HOSPADM

## 2025-04-03 RX ORDER — BIVALIRUDIN 250 MG/5ML
INJECTION, POWDER, LYOPHILIZED, FOR SOLUTION INTRAVENOUS PRN
Status: DISCONTINUED | OUTPATIENT
Start: 2025-04-03 | End: 2025-04-03 | Stop reason: HOSPADM

## 2025-04-03 RX ORDER — CLOPIDOGREL BISULFATE 75 MG/1
TABLET ORAL PRN
Status: DISCONTINUED | OUTPATIENT
Start: 2025-04-03 | End: 2025-04-03 | Stop reason: HOSPADM

## 2025-04-03 RX ORDER — HEPARIN SODIUM 1000 [USP'U]/ML
INJECTION, SOLUTION INTRAVENOUS; SUBCUTANEOUS PRN
Status: DISCONTINUED | OUTPATIENT
Start: 2025-04-03 | End: 2025-04-03 | Stop reason: HOSPADM

## 2025-04-03 RX ORDER — SODIUM CHLORIDE 9 MG/ML
INJECTION, SOLUTION INTRAVENOUS CONTINUOUS
Status: DISCONTINUED | OUTPATIENT
Start: 2025-04-03 | End: 2025-04-03 | Stop reason: HOSPADM

## 2025-04-03 RX ORDER — SPIRONOLACTONE 25 MG/1
25 TABLET ORAL DAILY
Status: DISCONTINUED | OUTPATIENT
Start: 2025-04-03 | End: 2025-04-07 | Stop reason: HOSPADM

## 2025-04-03 RX ORDER — FUROSEMIDE 40 MG/1
40 TABLET ORAL DAILY
Status: DISCONTINUED | OUTPATIENT
Start: 2025-04-03 | End: 2025-04-07 | Stop reason: HOSPADM

## 2025-04-03 RX ORDER — CARVEDILOL 6.25 MG/1
6.25 TABLET ORAL 2 TIMES DAILY WITH MEALS
Status: DISCONTINUED | OUTPATIENT
Start: 2025-04-03 | End: 2025-04-04

## 2025-04-03 RX ADMIN — DILTIAZEM HYDROCHLORIDE 30 MG: 60 TABLET, FILM COATED ORAL at 07:10

## 2025-04-03 RX ADMIN — CARVEDILOL 6.25 MG: 6.25 TABLET, FILM COATED ORAL at 16:46

## 2025-04-03 RX ADMIN — GABAPENTIN 100 MG: 100 CAPSULE ORAL at 21:46

## 2025-04-03 RX ADMIN — SACUBITRIL AND VALSARTAN 1 TABLET: 24; 26 TABLET, FILM COATED ORAL at 16:46

## 2025-04-03 RX ADMIN — ASPIRIN 325 MG: 325 TABLET ORAL at 13:09

## 2025-04-03 RX ADMIN — ATORVASTATIN CALCIUM 10 MG: 10 TABLET, FILM COATED ORAL at 21:45

## 2025-04-03 RX ADMIN — SODIUM CHLORIDE, PRESERVATIVE FREE 10 ML: 5 INJECTION INTRAVENOUS at 21:00

## 2025-04-03 RX ADMIN — AMANTADINE HYDROCHLORIDE 100 MG: 100 CAPSULE, LIQUID FILLED ORAL at 21:46

## 2025-04-03 RX ADMIN — FUROSEMIDE 40 MG: 40 TABLET ORAL at 16:46

## 2025-04-03 RX ADMIN — DILTIAZEM HYDROCHLORIDE 30 MG: 60 TABLET, FILM COATED ORAL at 23:25

## 2025-04-03 RX ADMIN — DILTIAZEM HYDROCHLORIDE 30 MG: 60 TABLET, FILM COATED ORAL at 16:46

## 2025-04-03 RX ADMIN — APIXABAN 5 MG: 5 TABLET, FILM COATED ORAL at 21:46

## 2025-04-03 RX ADMIN — AMANTADINE HYDROCHLORIDE 100 MG: 100 CAPSULE, LIQUID FILLED ORAL at 08:32

## 2025-04-03 RX ADMIN — ENOXAPARIN SODIUM 90 MG: 100 INJECTION SUBCUTANEOUS at 07:11

## 2025-04-03 RX ADMIN — GABAPENTIN 100 MG: 100 CAPSULE ORAL at 08:32

## 2025-04-03 RX ADMIN — CARBIDOPA AND LEVODOPA 1 TABLET: 25; 100 TABLET ORAL at 08:32

## 2025-04-03 RX ADMIN — SPIRONOLACTONE 25 MG: 25 TABLET ORAL at 16:46

## 2025-04-03 RX ADMIN — CARBIDOPA AND LEVODOPA 1 TABLET: 25; 100 TABLET ORAL at 21:46

## 2025-04-03 RX ADMIN — GABAPENTIN 100 MG: 100 CAPSULE ORAL at 12:26

## 2025-04-03 RX ADMIN — SODIUM CHLORIDE: 0.9 INJECTION, SOLUTION INTRAVENOUS at 13:09

## 2025-04-03 RX ADMIN — DILTIAZEM HYDROCHLORIDE 30 MG: 60 TABLET, FILM COATED ORAL at 12:26

## 2025-04-03 ASSESSMENT — PAIN SCALES - GENERAL: PAINLEVEL_OUTOF10: 0

## 2025-04-03 NOTE — PROCEDURES
PROCEDURE NOTE  Date: 4/3/2025   Name: Bong Mack  YOB: 1945    Procedures      Cardiac catheterization preliminary note:    Single-vessel disease involving a large OM1 that covers the lateral wall with 80% stenosis.  RCA proximal 30% stenosis with patent LAD.  Moderate LV systolic dysfunction.  LVEDP 20 mmHg.    Plan: OM lesion alone might not explain LV dysfunction but this is a large vessel that covers the entire lateral wall.  Will proceed with PCI and continue management of heart failure and atrial fibrillation.  Successful PCI of OM1 (2.75 x 26 mm Doug frontier) utilizing drug-eluting stent x 1.  Plavix uninterrupted for 6 months.  Can switch Lovenox to Eliquis 5 mg twice daily.  Heart failure management with Entresto carvedilol and Aldactone to be added.  Can consider SGLT 1/2 agent as well.

## 2025-04-03 NOTE — CONSULTS
[facility]  Cardiology Consult      Requesting MD:  Jass Henderson MD   Admit Status:         History obtained from:   [] Patient  [] Other (specify):     PROBLEM LIST:    Patient Active Problem List    Diagnosis Date Noted    HFrEF (heart failure with reduced ejection fraction) ([facility]) 04/03/2025     Priority: Low    Atrial fibrillation ([facility]) 04/02/2025     Priority: Low    Chest pain 04/02/2025     Priority: Low    Parkinson disease with dyskinesia and fluctuating manifestations ([facility]) 10/23/2023     Priority: Low    Type 2 diabetes mellitus without complication, without long-term current use of insulin 07/22/2020     Priority: Low     1.  New onset atrial fibrillation with RVR with new severe LV dysfunction, EF 30-35%, not on anticoagulation.  2.  Non-insulin-dependent diabetes mellitus.  3.  Hypertension.  4.  Parkinson's disease.    PRESENTATION: Bong Mack is a 80 y.o. year old male who presents with complaints of mild shortness of breath with cough that has been ongoing for the last 10 days associate with fatigue.  Has not been feeling well and has stopped taking his medicines in the last 2 weeks.  Reports he has significant sciatica for the last year that is significant limited his mobility.  Prior to this he was mowing the lawn.  No history of tobacco use.  EKG shows atrial fibrillation with IVCD and anterolateral and inferior Q waves with poor R wave progression.  Troponins marginal at 23-22-21.  proBNP 1896.  TSH 1.05.      REVIEW OF SYSTEMS:  Review of Systems   Constitutional:  Positive for fatigue. Negative for activity change and diaphoresis.   HENT:  Negative for hearing loss, nosebleeds and tinnitus.    Eyes:  Negative for visual disturbance.   Respiratory:  Positive for cough and shortness of breath. Negative for wheezing.    Cardiovascular:  Negative for chest pain, palpitations and leg swelling.   Gastrointestinal:  Negative for abdominal distention, abdominal pain,

## 2025-04-03 NOTE — PLAN OF CARE
Problem: Chronic Conditions and Co-morbidities  Goal: Patient's chronic conditions and co-morbidity symptoms are monitored and maintained or improved  4/3/2025 1011 by Archana Wright RN  Outcome: Progressing  4/3/2025 0535 by Gaby Solorio RN  Outcome: Progressing     Problem: Pain  Goal: Verbalizes/displays adequate comfort level or baseline comfort level  4/3/2025 1011 by Archana Wright RN  Outcome: Progressing  4/3/2025 0535 by Gaby Solorio RN  Outcome: Progressing     Problem: ABCDS Injury Assessment  Goal: Absence of physical injury  4/3/2025 1011 by Archana Wright RN  Outcome: Progressing  4/3/2025 0535 by Gaby Solorio RN  Outcome: Progressing     Problem: Safety - Adult  Goal: Free from fall injury  4/3/2025 1011 by Archana Wright RN  Outcome: Progressing  4/3/2025 0535 by Gaby Solorio RN  Outcome: Progressing

## 2025-04-04 PROBLEM — I25.5 ISCHEMIC CARDIOMYOPATHY: Status: ACTIVE | Noted: 2025-04-04

## 2025-04-04 PROBLEM — I25.10 CORONARY ARTERY DISEASE STATUS POST CORONARY STENT INSERTION: Status: ACTIVE | Noted: 2025-04-04

## 2025-04-04 PROBLEM — Z95.5 CORONARY ARTERY DISEASE STATUS POST CORONARY STENT INSERTION: Status: ACTIVE | Noted: 2025-04-04

## 2025-04-04 LAB
ANION GAP SERPL CALCULATED.3IONS-SCNC: 9 MMOL/L (ref 8–16)
BASOPHILS # BLD: 0.1 K/UL (ref 0–0.2)
BASOPHILS NFR BLD: 0.7 % (ref 0–1)
BUN SERPL-MCNC: 15 MG/DL (ref 8–23)
CALCIUM SERPL-MCNC: 9.1 MG/DL (ref 8.8–10.2)
CHLORIDE SERPL-SCNC: 100 MMOL/L (ref 98–107)
CO2 SERPL-SCNC: 27 MMOL/L (ref 22–29)
CREAT SERPL-MCNC: 0.9 MG/DL (ref 0.7–1.2)
EKG P AXIS: NORMAL DEGREES
EKG P AXIS: NORMAL DEGREES
EKG P-R INTERVAL: NORMAL MS
EKG P-R INTERVAL: NORMAL MS
EKG Q-T INTERVAL: 386 MS
EKG Q-T INTERVAL: 412 MS
EKG QRS DURATION: 118 MS
EKG QRS DURATION: 120 MS
EKG QTC CALCULATION (BAZETT): 436 MS
EKG QTC CALCULATION (BAZETT): 441 MS
EKG T AXIS: 72 DEGREES
EKG T AXIS: 78 DEGREES
EOSINOPHIL # BLD: 0.3 K/UL (ref 0–0.6)
EOSINOPHIL NFR BLD: 3.7 % (ref 0–5)
ERYTHROCYTE [DISTWIDTH] IN BLOOD BY AUTOMATED COUNT: 13.2 % (ref 11.5–14.5)
GLUCOSE BLD-MCNC: 158 MG/DL (ref 70–99)
GLUCOSE BLD-MCNC: 159 MG/DL (ref 70–99)
GLUCOSE BLD-MCNC: 194 MG/DL (ref 70–99)
GLUCOSE SERPL-MCNC: 153 MG/DL (ref 70–99)
HCT VFR BLD AUTO: 42.6 % (ref 42–52)
HGB BLD-MCNC: 13.8 G/DL (ref 14–18)
IMM GRANULOCYTES # BLD: 0 K/UL
LYMPHOCYTES # BLD: 1.5 K/UL (ref 1.1–4.5)
LYMPHOCYTES NFR BLD: 18.1 % (ref 20–40)
MCH RBC QN AUTO: 27.8 PG (ref 27–31)
MCHC RBC AUTO-ENTMCNC: 32.4 G/DL (ref 33–37)
MCV RBC AUTO: 85.9 FL (ref 80–94)
MONOCYTES # BLD: 0.9 K/UL (ref 0–0.9)
MONOCYTES NFR BLD: 10.5 % (ref 0–10)
NEUTROPHILS # BLD: 5.6 K/UL (ref 1.5–7.5)
NEUTS SEG NFR BLD: 66.5 % (ref 50–65)
PERFORMED ON: ABNORMAL
PLATELET # BLD AUTO: 199 K/UL (ref 130–400)
PMV BLD AUTO: 10.6 FL (ref 9.4–12.4)
POTASSIUM SERPL-SCNC: 4.3 MMOL/L (ref 3.5–5.1)
RBC # BLD AUTO: 4.96 M/UL (ref 4.7–6.1)
SODIUM SERPL-SCNC: 136 MMOL/L (ref 136–145)
WBC # BLD AUTO: 8.4 K/UL (ref 4.8–10.8)

## 2025-04-04 PROCEDURE — 6370000000 HC RX 637 (ALT 250 FOR IP): Performed by: INTERNAL MEDICINE

## 2025-04-04 PROCEDURE — 6360000002 HC RX W HCPCS: Performed by: NURSE PRACTITIONER

## 2025-04-04 PROCEDURE — 97116 GAIT TRAINING THERAPY: CPT

## 2025-04-04 PROCEDURE — 2500000003 HC RX 250 WO HCPCS: Performed by: NURSE PRACTITIONER

## 2025-04-04 PROCEDURE — 94760 N-INVAS EAR/PLS OXIMETRY 1: CPT

## 2025-04-04 PROCEDURE — 82962 GLUCOSE BLOOD TEST: CPT

## 2025-04-04 PROCEDURE — 97530 THERAPEUTIC ACTIVITIES: CPT

## 2025-04-04 PROCEDURE — 1200000000 HC SEMI PRIVATE

## 2025-04-04 PROCEDURE — 85025 COMPLETE CBC W/AUTO DIFF WBC: CPT

## 2025-04-04 PROCEDURE — 97166 OT EVAL MOD COMPLEX 45 MIN: CPT

## 2025-04-04 PROCEDURE — 97162 PT EVAL MOD COMPLEX 30 MIN: CPT

## 2025-04-04 PROCEDURE — 80048 BASIC METABOLIC PNL TOTAL CA: CPT

## 2025-04-04 PROCEDURE — 6370000000 HC RX 637 (ALT 250 FOR IP): Performed by: NURSE PRACTITIONER

## 2025-04-04 PROCEDURE — 36415 COLL VENOUS BLD VENIPUNCTURE: CPT

## 2025-04-04 PROCEDURE — 94640 AIRWAY INHALATION TREATMENT: CPT

## 2025-04-04 RX ORDER — ATROPINE SULFATE 0.4 MG/ML
0.5 INJECTION, SOLUTION ENDOTRACHEAL; INTRAMEDULLARY; INTRAMUSCULAR; INTRAVENOUS; SUBCUTANEOUS
Status: DISCONTINUED | OUTPATIENT
Start: 2025-04-04 | End: 2025-04-04 | Stop reason: SDUPTHER

## 2025-04-04 RX ORDER — CARVEDILOL 12.5 MG/1
12.5 TABLET ORAL 2 TIMES DAILY WITH MEALS
Status: DISCONTINUED | OUTPATIENT
Start: 2025-04-04 | End: 2025-04-07 | Stop reason: HOSPADM

## 2025-04-04 RX ORDER — CLOPIDOGREL BISULFATE 75 MG/1
75 TABLET ORAL DAILY
Status: DISCONTINUED | OUTPATIENT
Start: 2025-04-04 | End: 2025-04-07 | Stop reason: HOSPADM

## 2025-04-04 RX ORDER — DILTIAZEM HYDROCHLORIDE 120 MG/1
120 CAPSULE, COATED, EXTENDED RELEASE ORAL DAILY
Status: DISCONTINUED | OUTPATIENT
Start: 2025-04-04 | End: 2025-04-07 | Stop reason: HOSPADM

## 2025-04-04 RX ORDER — CLOPIDOGREL BISULFATE 75 MG/1
75 TABLET ORAL DAILY
Status: DISCONTINUED | OUTPATIENT
Start: 2025-04-05 | End: 2025-04-04 | Stop reason: SDUPTHER

## 2025-04-04 RX ORDER — SENNA AND DOCUSATE SODIUM 50; 8.6 MG/1; MG/1
1 TABLET, FILM COATED ORAL 2 TIMES DAILY
Status: DISCONTINUED | OUTPATIENT
Start: 2025-04-04 | End: 2025-04-07 | Stop reason: HOSPADM

## 2025-04-04 RX ORDER — ATROPINE SULFATE 1 MG/ML
0.5 INJECTION, SOLUTION INTRAVENOUS
Status: DISCONTINUED | OUTPATIENT
Start: 2025-04-04 | End: 2025-04-07 | Stop reason: HOSPADM

## 2025-04-04 RX ORDER — CARVEDILOL 3.12 MG/1
6.25 TABLET ORAL 2 TIMES DAILY WITH MEALS
Status: DISCONTINUED | OUTPATIENT
Start: 2025-04-04 | End: 2025-04-04

## 2025-04-04 RX ADMIN — GABAPENTIN 100 MG: 100 CAPSULE ORAL at 09:11

## 2025-04-04 RX ADMIN — SODIUM CHLORIDE, PRESERVATIVE FREE 10 ML: 5 INJECTION INTRAVENOUS at 09:15

## 2025-04-04 RX ADMIN — IPRATROPIUM BROMIDE 0.5 MG: 0.5 SOLUTION RESPIRATORY (INHALATION) at 18:59

## 2025-04-04 RX ADMIN — DILTIAZEM HYDROCHLORIDE 120 MG: 120 CAPSULE, COATED, EXTENDED RELEASE ORAL at 09:11

## 2025-04-04 RX ADMIN — AMANTADINE HYDROCHLORIDE 100 MG: 100 CAPSULE, LIQUID FILLED ORAL at 21:29

## 2025-04-04 RX ADMIN — CLOPIDOGREL BISULFATE 75 MG: 75 TABLET, FILM COATED ORAL at 09:21

## 2025-04-04 RX ADMIN — APIXABAN 5 MG: 5 TABLET, FILM COATED ORAL at 21:29

## 2025-04-04 RX ADMIN — SACUBITRIL AND VALSARTAN 1 TABLET: 24; 26 TABLET, FILM COATED ORAL at 21:28

## 2025-04-04 RX ADMIN — ATORVASTATIN CALCIUM 10 MG: 10 TABLET, FILM COATED ORAL at 21:29

## 2025-04-04 RX ADMIN — SACUBITRIL AND VALSARTAN 1 TABLET: 24; 26 TABLET, FILM COATED ORAL at 09:11

## 2025-04-04 RX ADMIN — CARVEDILOL 12.5 MG: 12.5 TABLET, FILM COATED ORAL at 09:11

## 2025-04-04 RX ADMIN — FUROSEMIDE 40 MG: 40 TABLET ORAL at 09:10

## 2025-04-04 RX ADMIN — AMANTADINE HYDROCHLORIDE 100 MG: 100 CAPSULE, LIQUID FILLED ORAL at 09:11

## 2025-04-04 RX ADMIN — APIXABAN 5 MG: 5 TABLET, FILM COATED ORAL at 09:11

## 2025-04-04 RX ADMIN — SPIRONOLACTONE 25 MG: 25 TABLET ORAL at 09:14

## 2025-04-04 RX ADMIN — GABAPENTIN 100 MG: 100 CAPSULE ORAL at 15:15

## 2025-04-04 RX ADMIN — CARVEDILOL 12.5 MG: 12.5 TABLET, FILM COATED ORAL at 18:45

## 2025-04-04 RX ADMIN — SODIUM CHLORIDE, PRESERVATIVE FREE 10 ML: 5 INJECTION INTRAVENOUS at 21:29

## 2025-04-04 RX ADMIN — CARBIDOPA AND LEVODOPA 1 TABLET: 25; 100 TABLET ORAL at 09:10

## 2025-04-04 RX ADMIN — DILTIAZEM HYDROCHLORIDE 30 MG: 60 TABLET, FILM COATED ORAL at 06:36

## 2025-04-04 RX ADMIN — IPRATROPIUM BROMIDE 0.5 MG: 0.5 SOLUTION RESPIRATORY (INHALATION) at 15:50

## 2025-04-04 NOTE — DISCHARGE INSTRUCTIONS
Your Cardiologist has referred you for participation in Cardiac Rehabilitation and a consult has been sent to the [facility] Cardiac Rehab program.     You are to contact [facility] Cardiac & Pulmonary Rehab WITHIN ONE WEEK AFTER HOSPITAL DISCHARGE to schedule your \"orientation & assessment\" appointment by calling direct at [phone].

## 2025-04-05 LAB
ANION GAP SERPL CALCULATED.3IONS-SCNC: 10 MMOL/L (ref 8–16)
BUN SERPL-MCNC: 23 MG/DL (ref 8–23)
CALCIUM SERPL-MCNC: 8.9 MG/DL (ref 8.8–10.2)
CHLORIDE SERPL-SCNC: 98 MMOL/L (ref 98–107)
CO2 SERPL-SCNC: 25 MMOL/L (ref 22–29)
CREAT SERPL-MCNC: 1.1 MG/DL (ref 0.7–1.2)
ERYTHROCYTE [DISTWIDTH] IN BLOOD BY AUTOMATED COUNT: 13 % (ref 11.5–14.5)
GLUCOSE BLD-MCNC: 185 MG/DL (ref 70–99)
GLUCOSE BLD-MCNC: 185 MG/DL (ref 70–99)
GLUCOSE BLD-MCNC: 207 MG/DL (ref 70–99)
GLUCOSE BLD-MCNC: 247 MG/DL (ref 70–99)
GLUCOSE SERPL-MCNC: 143 MG/DL (ref 70–99)
HCT VFR BLD AUTO: 41.7 % (ref 42–52)
HGB BLD-MCNC: 13.7 G/DL (ref 14–18)
MCH RBC QN AUTO: 27.5 PG (ref 27–31)
MCHC RBC AUTO-ENTMCNC: 32.9 G/DL (ref 33–37)
MCV RBC AUTO: 83.7 FL (ref 80–94)
PERFORMED ON: ABNORMAL
PLATELET # BLD AUTO: 186 K/UL (ref 130–400)
PMV BLD AUTO: 9.8 FL (ref 9.4–12.4)
POTASSIUM SERPL-SCNC: 4 MMOL/L (ref 3.5–5.1)
RBC # BLD AUTO: 4.98 M/UL (ref 4.7–6.1)
SODIUM SERPL-SCNC: 133 MMOL/L (ref 136–145)
WBC # BLD AUTO: 7.3 K/UL (ref 4.8–10.8)

## 2025-04-05 PROCEDURE — 82962 GLUCOSE BLOOD TEST: CPT

## 2025-04-05 PROCEDURE — 1200000000 HC SEMI PRIVATE

## 2025-04-05 PROCEDURE — 36415 COLL VENOUS BLD VENIPUNCTURE: CPT

## 2025-04-05 PROCEDURE — 85027 COMPLETE CBC AUTOMATED: CPT

## 2025-04-05 PROCEDURE — 94760 N-INVAS EAR/PLS OXIMETRY 1: CPT

## 2025-04-05 PROCEDURE — 6370000000 HC RX 637 (ALT 250 FOR IP): Performed by: INTERNAL MEDICINE

## 2025-04-05 PROCEDURE — 80048 BASIC METABOLIC PNL TOTAL CA: CPT

## 2025-04-05 PROCEDURE — 6360000002 HC RX W HCPCS: Performed by: NURSE PRACTITIONER

## 2025-04-05 PROCEDURE — 94640 AIRWAY INHALATION TREATMENT: CPT

## 2025-04-05 PROCEDURE — 6370000000 HC RX 637 (ALT 250 FOR IP): Performed by: NURSE PRACTITIONER

## 2025-04-05 RX ADMIN — CARBIDOPA AND LEVODOPA 1 TABLET: 25; 100 TABLET ORAL at 20:53

## 2025-04-05 RX ADMIN — AMANTADINE HYDROCHLORIDE 100 MG: 100 CAPSULE, LIQUID FILLED ORAL at 09:23

## 2025-04-05 RX ADMIN — APIXABAN 5 MG: 5 TABLET, FILM COATED ORAL at 20:54

## 2025-04-05 RX ADMIN — DILTIAZEM HYDROCHLORIDE 120 MG: 120 CAPSULE, COATED, EXTENDED RELEASE ORAL at 09:22

## 2025-04-05 RX ADMIN — ACETAMINOPHEN 650 MG: 325 TABLET ORAL at 20:54

## 2025-04-05 RX ADMIN — IPRATROPIUM BROMIDE 0.5 MG: 0.5 SOLUTION RESPIRATORY (INHALATION) at 18:51

## 2025-04-05 RX ADMIN — TRAMADOL HYDROCHLORIDE 50 MG: 50 TABLET, COATED ORAL at 20:53

## 2025-04-05 RX ADMIN — IPRATROPIUM BROMIDE 0.5 MG: 0.5 SOLUTION RESPIRATORY (INHALATION) at 14:39

## 2025-04-05 RX ADMIN — IPRATROPIUM BROMIDE 0.5 MG: 0.5 SOLUTION RESPIRATORY (INHALATION) at 10:00

## 2025-04-05 RX ADMIN — CARVEDILOL 12.5 MG: 12.5 TABLET, FILM COATED ORAL at 09:22

## 2025-04-05 RX ADMIN — CLOPIDOGREL BISULFATE 75 MG: 75 TABLET, FILM COATED ORAL at 09:23

## 2025-04-05 RX ADMIN — APIXABAN 5 MG: 5 TABLET, FILM COATED ORAL at 09:22

## 2025-04-05 RX ADMIN — CARVEDILOL 12.5 MG: 12.5 TABLET, FILM COATED ORAL at 17:07

## 2025-04-05 RX ADMIN — GABAPENTIN 100 MG: 100 CAPSULE ORAL at 20:53

## 2025-04-05 RX ADMIN — SACUBITRIL AND VALSARTAN 1 TABLET: 24; 26 TABLET, FILM COATED ORAL at 20:53

## 2025-04-05 RX ADMIN — FUROSEMIDE 40 MG: 40 TABLET ORAL at 09:22

## 2025-04-05 RX ADMIN — SENNOSIDES AND DOCUSATE SODIUM 1 TABLET: 50; 8.6 TABLET ORAL at 20:53

## 2025-04-05 RX ADMIN — INSULIN LISPRO 1 UNITS: 100 INJECTION, SOLUTION INTRAVENOUS; SUBCUTANEOUS at 11:54

## 2025-04-05 RX ADMIN — SACUBITRIL AND VALSARTAN 1 TABLET: 24; 26 TABLET, FILM COATED ORAL at 11:54

## 2025-04-05 RX ADMIN — SENNOSIDES AND DOCUSATE SODIUM 1 TABLET: 50; 8.6 TABLET ORAL at 09:23

## 2025-04-05 RX ADMIN — AMANTADINE HYDROCHLORIDE 100 MG: 100 CAPSULE, LIQUID FILLED ORAL at 20:53

## 2025-04-05 RX ADMIN — IPRATROPIUM BROMIDE 0.5 MG: 0.5 SOLUTION RESPIRATORY (INHALATION) at 06:30

## 2025-04-05 RX ADMIN — CARBIDOPA AND LEVODOPA 1 TABLET: 25; 100 TABLET ORAL at 11:54

## 2025-04-05 RX ADMIN — SPIRONOLACTONE 25 MG: 25 TABLET ORAL at 09:23

## 2025-04-05 RX ADMIN — ATORVASTATIN CALCIUM 10 MG: 10 TABLET, FILM COATED ORAL at 20:53

## 2025-04-05 ASSESSMENT — PAIN SCALES - GENERAL
PAINLEVEL_OUTOF10: 0
PAINLEVEL_OUTOF10: 4
PAINLEVEL_OUTOF10: 0

## 2025-04-05 ASSESSMENT — PAIN DESCRIPTION - DESCRIPTORS: DESCRIPTORS: ACHING

## 2025-04-05 ASSESSMENT — PAIN SCALES - WONG BAKER
WONGBAKER_NUMERICALRESPONSE: NO HURT
WONGBAKER_NUMERICALRESPONSE: NO HURT

## 2025-04-05 ASSESSMENT — PAIN DESCRIPTION - LOCATION: LOCATION: BACK;SACRUM

## 2025-04-06 LAB
GLUCOSE BLD-MCNC: 155 MG/DL (ref 70–99)
GLUCOSE BLD-MCNC: 178 MG/DL (ref 70–99)
GLUCOSE BLD-MCNC: 187 MG/DL (ref 70–99)
GLUCOSE BLD-MCNC: 204 MG/DL (ref 70–99)
PERFORMED ON: ABNORMAL

## 2025-04-06 PROCEDURE — 6360000002 HC RX W HCPCS: Performed by: NURSE PRACTITIONER

## 2025-04-06 PROCEDURE — 1200000000 HC SEMI PRIVATE

## 2025-04-06 PROCEDURE — 94760 N-INVAS EAR/PLS OXIMETRY 1: CPT

## 2025-04-06 PROCEDURE — 6370000000 HC RX 637 (ALT 250 FOR IP): Performed by: NURSE PRACTITIONER

## 2025-04-06 PROCEDURE — 94640 AIRWAY INHALATION TREATMENT: CPT

## 2025-04-06 PROCEDURE — 82962 GLUCOSE BLOOD TEST: CPT

## 2025-04-06 PROCEDURE — 6370000000 HC RX 637 (ALT 250 FOR IP): Performed by: INTERNAL MEDICINE

## 2025-04-06 RX ADMIN — CARVEDILOL 12.5 MG: 12.5 TABLET, FILM COATED ORAL at 16:46

## 2025-04-06 RX ADMIN — SENNOSIDES AND DOCUSATE SODIUM 1 TABLET: 50; 8.6 TABLET ORAL at 21:08

## 2025-04-06 RX ADMIN — ATORVASTATIN CALCIUM 10 MG: 10 TABLET, FILM COATED ORAL at 21:10

## 2025-04-06 RX ADMIN — IPRATROPIUM BROMIDE 0.5 MG: 0.5 SOLUTION RESPIRATORY (INHALATION) at 14:15

## 2025-04-06 RX ADMIN — AMANTADINE HYDROCHLORIDE 100 MG: 100 CAPSULE, LIQUID FILLED ORAL at 21:14

## 2025-04-06 RX ADMIN — CARBIDOPA AND LEVODOPA 1 TABLET: 25; 100 TABLET ORAL at 08:51

## 2025-04-06 RX ADMIN — CARVEDILOL 12.5 MG: 12.5 TABLET, FILM COATED ORAL at 08:51

## 2025-04-06 RX ADMIN — IPRATROPIUM BROMIDE 0.5 MG: 0.5 SOLUTION RESPIRATORY (INHALATION) at 18:16

## 2025-04-06 RX ADMIN — SPIRONOLACTONE 25 MG: 25 TABLET ORAL at 08:51

## 2025-04-06 RX ADMIN — IPRATROPIUM BROMIDE 0.5 MG: 0.5 SOLUTION RESPIRATORY (INHALATION) at 07:08

## 2025-04-06 RX ADMIN — CARBIDOPA AND LEVODOPA 1 TABLET: 25; 100 TABLET ORAL at 21:08

## 2025-04-06 RX ADMIN — DILTIAZEM HYDROCHLORIDE 120 MG: 120 CAPSULE, COATED, EXTENDED RELEASE ORAL at 08:50

## 2025-04-06 RX ADMIN — SENNOSIDES AND DOCUSATE SODIUM 1 TABLET: 50; 8.6 TABLET ORAL at 08:51

## 2025-04-06 RX ADMIN — AMANTADINE HYDROCHLORIDE 100 MG: 100 CAPSULE, LIQUID FILLED ORAL at 08:51

## 2025-04-06 RX ADMIN — SACUBITRIL AND VALSARTAN 1 TABLET: 24; 26 TABLET, FILM COATED ORAL at 08:51

## 2025-04-06 RX ADMIN — INSULIN LISPRO 1 UNITS: 100 INJECTION, SOLUTION INTRAVENOUS; SUBCUTANEOUS at 16:46

## 2025-04-06 RX ADMIN — GABAPENTIN 100 MG: 100 CAPSULE ORAL at 21:10

## 2025-04-06 RX ADMIN — IPRATROPIUM BROMIDE 0.5 MG: 0.5 SOLUTION RESPIRATORY (INHALATION) at 10:29

## 2025-04-06 RX ADMIN — CLOPIDOGREL BISULFATE 75 MG: 75 TABLET, FILM COATED ORAL at 08:51

## 2025-04-06 RX ADMIN — ONDANSETRON 4 MG: 4 TABLET, ORALLY DISINTEGRATING ORAL at 23:53

## 2025-04-06 RX ADMIN — APIXABAN 5 MG: 5 TABLET, FILM COATED ORAL at 08:51

## 2025-04-06 RX ADMIN — SACUBITRIL AND VALSARTAN 1 TABLET: 24; 26 TABLET, FILM COATED ORAL at 21:08

## 2025-04-06 RX ADMIN — APIXABAN 5 MG: 5 TABLET, FILM COATED ORAL at 21:11

## 2025-04-06 RX ADMIN — FUROSEMIDE 40 MG: 40 TABLET ORAL at 08:51

## 2025-04-06 NOTE — PLAN OF CARE
Care plan reviewed      Problem: Chronic Conditions and Co-morbidities  Goal: Patient's chronic conditions and co-morbidity symptoms are monitored and maintained or improved  Outcome: Progressing  Flowsheets (Taken 4/5/2025 2021)  Care Plan - Patient's Chronic Conditions and Co-Morbidity Symptoms are Monitored and Maintained or Improved: Monitor and assess patient's chronic conditions and comorbid symptoms for stability, deterioration, or improvement     Problem: Pain  Goal: Verbalizes/displays adequate comfort level or baseline comfort level  Outcome: Progressing     Problem: ABCDS Injury Assessment  Goal: Absence of physical injury  Outcome: Progressing     Problem: Safety - Adult  Goal: Free from fall injury  Outcome: Progressing

## 2025-04-07 VITALS
OXYGEN SATURATION: 92 % | SYSTOLIC BLOOD PRESSURE: 107 MMHG | TEMPERATURE: 97 F | HEART RATE: 77 BPM | BODY MASS INDEX: 27.44 KG/M2 | HEIGHT: 71 IN | DIASTOLIC BLOOD PRESSURE: 66 MMHG | RESPIRATION RATE: 18 BRPM | WEIGHT: 196 LBS

## 2025-04-07 PROBLEM — I48.91 ATRIAL FIBRILLATION WITH RVR (HCC): Status: RESOLVED | Noted: 2025-04-02 | Resolved: 2025-04-07

## 2025-04-07 LAB
ANION GAP SERPL CALCULATED.3IONS-SCNC: 10 MMOL/L (ref 8–16)
BASOPHILS # BLD: 0.1 K/UL (ref 0–0.2)
BASOPHILS NFR BLD: 0.6 % (ref 0–1)
BUN SERPL-MCNC: 30 MG/DL (ref 8–23)
CALCIUM SERPL-MCNC: 9.2 MG/DL (ref 8.8–10.2)
CHLORIDE SERPL-SCNC: 96 MMOL/L (ref 98–107)
CO2 SERPL-SCNC: 27 MMOL/L (ref 22–29)
CREAT SERPL-MCNC: 1.2 MG/DL (ref 0.7–1.2)
EOSINOPHIL # BLD: 0.2 K/UL (ref 0–0.6)
EOSINOPHIL NFR BLD: 1.7 % (ref 0–5)
ERYTHROCYTE [DISTWIDTH] IN BLOOD BY AUTOMATED COUNT: 13 % (ref 11.5–14.5)
GLUCOSE BLD-MCNC: 175 MG/DL (ref 70–99)
GLUCOSE BLD-MCNC: 235 MG/DL (ref 70–99)
GLUCOSE SERPL-MCNC: 182 MG/DL (ref 70–99)
HCT VFR BLD AUTO: 42.6 % (ref 42–52)
HGB BLD-MCNC: 14.3 G/DL (ref 14–18)
IMM GRANULOCYTES # BLD: 0.1 K/UL
LYMPHOCYTES # BLD: 1.5 K/UL (ref 1.1–4.5)
LYMPHOCYTES NFR BLD: 14.3 % (ref 20–40)
MCH RBC QN AUTO: 28 PG (ref 27–31)
MCHC RBC AUTO-ENTMCNC: 33.6 G/DL (ref 33–37)
MCV RBC AUTO: 83.4 FL (ref 80–94)
MONOCYTES # BLD: 0.9 K/UL (ref 0–0.9)
MONOCYTES NFR BLD: 8.7 % (ref 0–10)
NEUTROPHILS # BLD: 7.5 K/UL (ref 1.5–7.5)
NEUTS SEG NFR BLD: 74 % (ref 50–65)
PERFORMED ON: ABNORMAL
PERFORMED ON: ABNORMAL
PLATELET # BLD AUTO: 190 K/UL (ref 130–400)
PMV BLD AUTO: 10.1 FL (ref 9.4–12.4)
POTASSIUM SERPL-SCNC: 4.1 MMOL/L (ref 3.5–5)
RBC # BLD AUTO: 5.11 M/UL (ref 4.7–6.1)
SODIUM SERPL-SCNC: 133 MMOL/L (ref 136–145)
WBC # BLD AUTO: 10.2 K/UL (ref 4.8–10.8)

## 2025-04-07 PROCEDURE — 36415 COLL VENOUS BLD VENIPUNCTURE: CPT

## 2025-04-07 PROCEDURE — 94760 N-INVAS EAR/PLS OXIMETRY 1: CPT

## 2025-04-07 PROCEDURE — 82962 GLUCOSE BLOOD TEST: CPT

## 2025-04-07 PROCEDURE — 6370000000 HC RX 637 (ALT 250 FOR IP): Performed by: NURSE PRACTITIONER

## 2025-04-07 PROCEDURE — 94640 AIRWAY INHALATION TREATMENT: CPT

## 2025-04-07 PROCEDURE — 85025 COMPLETE CBC W/AUTO DIFF WBC: CPT

## 2025-04-07 PROCEDURE — 80048 BASIC METABOLIC PNL TOTAL CA: CPT

## 2025-04-07 PROCEDURE — 97116 GAIT TRAINING THERAPY: CPT

## 2025-04-07 PROCEDURE — 6360000002 HC RX W HCPCS: Performed by: NURSE PRACTITIONER

## 2025-04-07 PROCEDURE — 6370000000 HC RX 637 (ALT 250 FOR IP): Performed by: INTERNAL MEDICINE

## 2025-04-07 RX ORDER — FUROSEMIDE 40 MG/1
40 TABLET ORAL DAILY
Qty: 60 TABLET | Refills: 3 | Status: SHIPPED | OUTPATIENT
Start: 2025-04-08

## 2025-04-07 RX ORDER — CARBIDOPA AND LEVODOPA 25; 100 MG/1; MG/1
1 TABLET ORAL 2 TIMES DAILY
Qty: 90 TABLET | Refills: 3 | Status: SHIPPED | OUTPATIENT
Start: 2025-04-07

## 2025-04-07 RX ORDER — SPIRONOLACTONE 25 MG/1
25 TABLET ORAL DAILY
Qty: 30 TABLET | Refills: 3 | Status: SHIPPED | OUTPATIENT
Start: 2025-04-08

## 2025-04-07 RX ORDER — GABAPENTIN 100 MG/1
100 CAPSULE ORAL 3 TIMES DAILY
Qty: 9 CAPSULE | Refills: 0 | Status: SHIPPED | OUTPATIENT
Start: 2025-04-07 | End: 2025-04-10

## 2025-04-07 RX ORDER — SENNA AND DOCUSATE SODIUM 50; 8.6 MG/1; MG/1
1 TABLET, FILM COATED ORAL 2 TIMES DAILY
Qty: 60 TABLET | Refills: 0 | Status: SHIPPED | OUTPATIENT
Start: 2025-04-07 | End: 2025-05-07

## 2025-04-07 RX ORDER — CLOPIDOGREL BISULFATE 75 MG/1
75 TABLET ORAL DAILY
Qty: 30 TABLET | Refills: 3 | Status: SHIPPED | OUTPATIENT
Start: 2025-04-08

## 2025-04-07 RX ORDER — CARVEDILOL 12.5 MG/1
12.5 TABLET ORAL 2 TIMES DAILY WITH MEALS
Qty: 60 TABLET | Refills: 3 | Status: SHIPPED | OUTPATIENT
Start: 2025-04-07

## 2025-04-07 RX ORDER — TRAMADOL HYDROCHLORIDE 50 MG/1
50 TABLET ORAL EVERY 6 HOURS PRN
Qty: 12 TABLET | Refills: 0 | Status: SHIPPED | OUTPATIENT
Start: 2025-04-07 | End: 2025-04-10

## 2025-04-07 RX ORDER — DILTIAZEM HYDROCHLORIDE 120 MG/1
120 CAPSULE, COATED, EXTENDED RELEASE ORAL DAILY
Qty: 30 CAPSULE | Refills: 3 | Status: SHIPPED | OUTPATIENT
Start: 2025-04-08

## 2025-04-07 RX ADMIN — CARBIDOPA AND LEVODOPA 1 TABLET: 25; 100 TABLET ORAL at 09:03

## 2025-04-07 RX ADMIN — DILTIAZEM HYDROCHLORIDE 120 MG: 120 CAPSULE, COATED, EXTENDED RELEASE ORAL at 09:03

## 2025-04-07 RX ADMIN — APIXABAN 5 MG: 5 TABLET, FILM COATED ORAL at 09:02

## 2025-04-07 RX ADMIN — FUROSEMIDE 40 MG: 40 TABLET ORAL at 09:04

## 2025-04-07 RX ADMIN — CLOPIDOGREL BISULFATE 75 MG: 75 TABLET, FILM COATED ORAL at 09:03

## 2025-04-07 RX ADMIN — GABAPENTIN 100 MG: 100 CAPSULE ORAL at 14:30

## 2025-04-07 RX ADMIN — INSULIN LISPRO 1 UNITS: 100 INJECTION, SOLUTION INTRAVENOUS; SUBCUTANEOUS at 11:56

## 2025-04-07 RX ADMIN — SENNOSIDES AND DOCUSATE SODIUM 1 TABLET: 50; 8.6 TABLET ORAL at 09:02

## 2025-04-07 RX ADMIN — IPRATROPIUM BROMIDE 0.5 MG: 0.5 SOLUTION RESPIRATORY (INHALATION) at 06:38

## 2025-04-07 RX ADMIN — SPIRONOLACTONE 25 MG: 25 TABLET ORAL at 09:03

## 2025-04-07 RX ADMIN — GABAPENTIN 100 MG: 100 CAPSULE ORAL at 09:02

## 2025-04-07 RX ADMIN — IPRATROPIUM BROMIDE 0.5 MG: 0.5 SOLUTION RESPIRATORY (INHALATION) at 15:09

## 2025-04-07 RX ADMIN — IPRATROPIUM BROMIDE 0.5 MG: 0.5 SOLUTION RESPIRATORY (INHALATION) at 10:38

## 2025-04-07 RX ADMIN — AMANTADINE HYDROCHLORIDE 100 MG: 100 CAPSULE, LIQUID FILLED ORAL at 09:02

## 2025-04-07 RX ADMIN — SACUBITRIL AND VALSARTAN 1 TABLET: 24; 26 TABLET, FILM COATED ORAL at 09:03

## 2025-04-07 RX ADMIN — CARVEDILOL 12.5 MG: 12.5 TABLET, FILM COATED ORAL at 09:03

## 2025-04-07 NOTE — DISCHARGE SUMMARY
Bong Mack  :  1945  MRN:  [id number]    Admit date:  2025 Discharge date:  2025    Discharging Physician:  Dr Henderson    Advance Directive: Full Code    Consults: IP CONSULT TO CARDIOLOGY  IP CONSULT TO CARDIAC REHAB  IP CONSULT TO CARDIAC REHAB  IP CONSULT TO SOCIAL WORK     Primary Care Physician:  Chadd Blake, DO    Discharge Diagnoses:  Principal Problem (Resolved):    Atrial fibrillation with RVR (HCC)  Active Problems:    Acute systolic heart failure (HCC)    Parkinson disease with dyskinesia and fluctuating manifestations (HCC)    Type 2 diabetes mellitus without complication, without long-term current use of insulin    HFrEF (heart failure with reduced ejection fraction) (HCC)    Ischemic cardiomyopathy    Coronary artery disease status post coronary stent insertion      Portions of this note have been copied forward, however, changed to reflect the most current clinical status of this patient.  Hospital Course:   Patient is a 80-year-old male with a past medical history of Parkinson's disease, type 2 diabetes, and hypertension, who presented to [facility] ED on complaining of cough.  He stated that he has not felt well for the last 3 weeks and increasing shortness of breath cough for the last week his wife was at his bedside stated that he had increased leg weakness and not taking his medication for approximately 3 weeks.  He reports an occasional productive cough with clear drainage.  Denied fever or chills.  Denied nausea vomiting or diarrhea, palpitations or chest pain.  He passively stated he had a remote history of atrial fibrillation and CHF, however, he denies any medication.  No recent 2D echo noted on admission.  Patient was evaluated in ED and was found to have a negative RPP for viral illness.  EKG noted atrial fibrillation with RVR at a rate of 104.  X-ray low lung volumes and mild cardiomegaly and mild central pulmonary vascular congestion with probable early

## 2025-04-07 NOTE — CARE COORDINATION
04/04/25 1240   IMM Letter   IMM Letter given to Patient/Family/Significant other/Guardian/POA/by: vinayak vanegas sw   IMM Letter date given: 04/04/25   IMM Letter time given: 1225     Second IMM given to patient and explained with patient verbalizing understanding.  All questions and concerns addressed     Signed letter placed in pt soft chart   Patient declined waiting 4 hr period prior to discharge.   Electronically signed by Vinayak Vanegas on 4/4/2025 at 12:41 PM   
   04/07/25 1117   IMM Letter   IMM Letter given to Patient/Family/Significant other/Guardian/POA/by: FINA Martinez   IMM Letter date given: 04/07/25   IMM Letter time given: 1110     Second IMM given to patient and explained with patient verbalizing understanding.  All questions and concerns addressed   Patient declined waiting 4 hr period prior to discharge.   Signed letter placed in pt soft chart   Electronically signed by FINA Martinez on 4/7/2025 at 11:18 AM   
EKG noted to have Inferior Infarct age undetermined.           EKG 12 Lead  Study Date: 04/03/2025  Patient: Bong Mack 80 y.o.   Reading provider: Provider Unknown, ANP   Ordering provider: VIKTORIA Hayes NP     Result Information    Status: Final result (Resulted: 4/3/2025 17:29) Provider Status: Reviewed     All Reviewers List    Gordy Quigley APRN - NP on 4/3/2025 17:29   Gordy Quigley APRN - NP on 4/3/2025 17:29       EKG 12 Lead: Patient Communication     Released  Not seen     Order Questions    Question Answer   Reason for Exam? Shortness of Breath            PACS Images     Show images for EKG 12 Lead  EKG 12 Lead  Order: [id number]   Status: Final result       Next appt: None    Test Result Released: Yes (not seen)    0 Result Notes      Component  Ref Range & Units (hover) 4/2/25 1149   P-R Interval    QRS Duration 118   Q-T Interval 342   QTc Calculation (Bazett) 419   P Axis    T [place] 95   Resulting Agency [facility]              Narrative & Impression    104 BPM  Probable Atrial fibrillation with rapid ventricular response  Left axis deviation  Inferior infarct - age undetermined  Anteroseptal infarct - age undetermined  Comparison Summary: No serial comparison made  Summary: Abnormal ECG  [email or web address]  uIv2mgTfXlXZQIhhLtm9cSICbTUcoIQxh3zWGhfYXfhd1bkaaDEdDOHWQOz8lD5F5OGRvlc1ostX5HHbAnHPHPXZysrAKhR  fUs%7tYzYiIHSPwRPtbjQd0I9TI%3d%3d   Specimen Collected: 04/02/25 11:49 CDT Last Resulted: 04/03/25 17:29 CDT            Result Care Coordination      Patient Communication     Released  Not seen Back to Top       
Life Vest has been approved and pt will need to be fit either at home or tomorrow.  [employer] [facility]   P: [phone]  F: [phone]   [place] Rep: Guillermina cell [phone]  Electronically signed by Janice Mckeon RN on 4/4/2025 at 2:03 PM    
Pt requested a rollator and no preference on DME provider.   Referral and order for rollator sent to:    [facility]  [phone] P  [phone] F    Requesting to be delivered to pt's room.    Electronically signed by Alex Armendariz on 4/3/2025 at 11:25 AM    
Referral for Life Vest sent to [employer].  Awaiting insurance approval/denial.   [employer] [facility]   P: [phone]  F: [phone]   Territory Rep: Guillermina cell [phone]  Electronically signed by Janice Mckeon RN on 4/4/2025 at 8:25 AM    
SW attempted to meet with PT, however he was out of the room.    
SW met with Pt/spouse in the room to go over DC planning options. Pt/spouse have requested a referral to [facility] as the first and only option at this time. SW asked for back up option and Pt/spouse stated this was the only place they want referrals sent to. SW will send the requested referral.     [facility]   P   F ()   F (Ref to Keli Smith)  Electronically signed by Santana Clark on 4/6/2025 at 11:14 AM  
SW spoke to Keli with [facility], they currently do not have any male beds available, pt was put on the waiting list, will inform pt and spouse and get more choices  Electronically signed by FINA Martinez on 4/7/2025 at 8:54 AM     UPDATE: Spoke with pt and his spouse and they requested a referral to [facility], sent referral waiting on response  [facility] can offer, pt accepted    P  [phone] F  Electronically signed by FINA Martinez on 4/7/2025 at 9:21 AM   
Physiologically normal regurgitation. No stenosis noted.   Aorta Normal sized aortic root and ascending aorta.   IVC/Hepatic Veins IVC diameter is less than or equal to 21 mm and decreases greater than 50% during inspiration; therefore the estimated right atrial pressure is normal (~3 mmHg). IVC size is normal.   Pericardium Trivial pericardial effusion present. No indication of cardiac tamponade.   Extracardiac Left pleural effusion.     Study Details    Image quality: good. The view(s) performed were parasternal, apical and subcostal. The underlying ECG rhythm was atrial fibrillation. Lumason contrast was given to enhance imaging.     Volumes and Function (Range)     EDV EDV Index ESV ESV Index EF   LV Biplane                 40 %  (55 - 100) Abnormal          LV A4C 126 mL       60 mL/m2       73 mL       35 mL/m2       42 %         LV A2C 99 mL       47 mL/m2       63 mL       30 mL/m2       36 %         LA Biplane   25 mL  (18 - 58)       12 ml/m2  (16 - 34) Abnormal           LA A4C MOD   28 mL  (18 - 58)       13 ml/m2  (16 - 34) Abnormal           LA A2C MOD   21 mL  (18 - 58)       10 ml/m2  (16 - 34) Abnormal           RA A4C   30 ml       14 mL/m2            Left Ventricle Measurements    Dimensions   Fractional Shortening 2D 29 %  (Range: 28 - 44)         LVIDd 5.6 cm  (Range: 4.2 - 5.9)         LVIDd Index 2.68 cm/m2         LVIDs 4 cm         LVIDs Index 1.91 cm/m2         IVSd 1.3 cm  (Range: 0.6 - 1.0) Abnormal          LVPWd 1.4 cm  (Range: 0.6 - 1.0) Abnormal          LV RWT Ratio 0.5         LV Mass 2D 330.2 g  (Range: 88 - 224) Abnormal          LV Mass 2D Index 158 g/m2  (Range: 49 - 115) Abnormal                 Right Ventricle Measurements    Dimensions   RV Basal Dimension 2.8 cm         RV Mid Dimension 2.1 cm         RV Longitudinal Dimension 6.9 cm         RVIDd 2.1 cm         TAPSE 1.6 cm  (Range: >=1.7) Abnormal                Atrial Measurements    Left Atrium   LA Area 2C 10.4 cm2

## 2025-04-08 NOTE — CONSULTS
Cardiac Rehab MI/PTCA/Stent education packet was sent to the patient's address on record.  Handouts titled; \"Home Instructions Following a Cardiac Event\", \"A Healthy Heart: Care Instructions\",  \"Cardiac Diet/Low Cholesterol\", \"Cardiac Rehabilitation: An Individualized Supervised Program For You\" and a [facility] Cardiac [facility] Pulmonary Rehabilitation brochure were included.  Patient was instructed to contact [facility] or the hospital nearest their residence for the opportunity to enroll in Phase II Outpatient Cardiac Rehab.

## 2025-04-08 NOTE — PROGRESS NOTES
[facility]      Patient:  Bong Mack  YOB: 1945  Date of Service: 4/3/2025  MRN: [id number]   Acct: [id number]   Primary Care Physician: Chadd Blake DO  Advance Directive: Full Code  Admit Date: 4/2/2025       Hospital Day: 1  Portions of this note have been copied forward, however, changed to reflect the most current clinical status of this patient.    CHIEF COMPLAINT Cough/Shortness of breath    SUBJECTIVE:  Rate controlled improved. He is currently on 5mg of cardizem gtt in addition to oral cardizem. On RA. No issues or events overnight.     CUMULATIVE HOSPITAL STAY:  The patient is a 80 y.o. male with a PMH of Parkinson's disease, type 2 diabetes, and HTN, who presented to [facility] ED on 4/2/2025 complaining of cough. He stated that he has not felt well in at least 3 weeks and had an increase in shortness of breath and cough for the last week. His wife was bedside stated that he has been increasingly weak and has not been taking his medications for ~ 3 weeks. He reported an occasional productive cough of clear drainage. Denied fever or chills. Denied N/V/D, palpitations or chest pain. He passively stated he had a remote history of atrial fibrillation and CHF, however, denies taking any medications. No recent 2D echo.      Further ED workup revealed-, K4.5, BUN 20 and creatinine 0.9.  Glucose 192.  Initial troponin 23 repeat 21.  proBNP 1896.  WBC 6.9, H&H 13.7/40.8 with a platelet count of 193.  RPP negative for viral illness.  INR 1.11.  EKG atrial fibrillation with RVR rate 104.  X-ray low lung volumes with mild cardiomegaly and mild central pulmonary vascular congestion with probable early perihilar edema raising question of component of CHF or fluid overload.  The patient was admitted to hospital medicine for atrial fibrillation.     He was placed oral cardizem 30 mg Q6 hours in addition to Cardizem gtt. He is currently on 5mg/hr of IV cardizem with good rate 
  Physician Progress Note      PATIENT:               DARCI RAMESH  CSN #:                  [id number]  :                       1945  ADMIT DATE:       2025 10:28 AM  DISCH DATE:        2025 4:24 PM  RESPONDING  PROVIDER #:        NORBERTO BLEDSOE          QUERY TEXT:    Congestive Heart Failure is documented in the medical record .  Please   document the type and acuity:    The clinical indicators include:  SOB and decreased activity in ED .   CXR pulmonary vascular congestion. probable very early perihilar edema   raising the question of component of congestive failure or fluid overload.   Labs BNP . \"Suspect patient has mild congestive heart failure related to   afib. \" ED note Zeus Senior APRN  4/3 Heart cath - plan. \"Heart failure management with Entresto, carvedilol and   Aldactone to be added. \"Dr Galeano note .     HFrEF EF 30% to 35%. Latha Shelton APRN  Options provided:  -- Acute on Chronic Systolic CHF/HFrEF  -- Acute on Chronic Systolic and Diastolic CHF  -- Chronic Systolic CHF/HFrEF  -- Other - I will add my own diagnosis  -- Disagree - Not applicable / Not valid  -- Disagree - Clinically unable to determine / Unknown  -- Refer to Clinical Documentation Reviewer    PROVIDER RESPONSE TEXT:    This patient is in acute on chronic systolic CHF/HFrEF.    Query created by: Svetlana Cooley on 2025 11:09 AM      Electronically signed by:  NORBERTO BLEDSOE 2025 6:24 PM          
  Physician Progress Note      PATIENT:               DARCI RAMESH  CSN #:                  [id number]  :                       1945  ADMIT DATE:       2025 10:28 AM  DISCH DATE:  RESPONDING  PROVIDER #:        Jass Henderson MD          QUERY TEXT:    Based on your medical judgment, please clarify these findings and document if   any of the following are being evaluated and/or treated:  DMII CHF, Age,    The clinical indicators include:   note [id number] 2 score of 4.  2D echo obtained on 2025 revealed   EF-30-35% mild LVH.  Options provided:  -- Secondary hypercoagulable state in a patient with atrial fibrillation  -- Other - I will add my own diagnosis  -- Disagree - Not applicable / Not valid  -- Disagree - Clinically unable to determine / Unknown  -- Refer to Clinical Documentation Reviewer    PROVIDER RESPONSE TEXT:    This patient has secondary hypercoagulable state in a patient with atrial   fibrillation.    Query created by: Svetlana Cooley on 2025 10:55 AM      Electronically signed by:  Jass Henderson MD 2025 11:50 AM          
  [facility]      Patient:  Bong Mack  YOB: 1945  Date of Service: 4/6/2025  MRN: [id number]   Acct: [id number]   Primary Care Physician: Chadd Blake DO  Advance Directive: Full Code  Admit Date: 4/2/2025       Hospital Day: 4  Portions of this note have been copied forward, however, changed to reflect the most current clinical status of this patient.    CHIEF COMPLAINT Cough/Shortness of breath    SUBJECTIVE:  His vital signs are stable. He is on RA. VSS. Rate well controlled.    CUMULATIVE HOSPITAL STAY:  The patient is a 80 y.o. male with a PMH of Parkinson's disease, type 2 diabetes, and HTN, who presented to [facility] ED on 4/2/2025 complaining of cough. He stated that he has not felt well in at least 3 weeks and had an increase in shortness of breath and cough for the last week. His wife was bedside stated that he has been increasingly weak and has not been taking his medications for ~ 3 weeks. He reported an occasional productive cough of clear drainage. Denied fever or chills. Denied N/V/D, palpitations or chest pain. He passively stated he had a remote history of atrial fibrillation and CHF, however, denies taking any medications. No recent 2D echo.      Further ED workup revealed-, K4.5, BUN 20 and creatinine 0.9.  Glucose 192.  Initial troponin 23 repeat 21.  proBNP 1896.  WBC 6.9, H&H 13.7/40.8 with a platelet count of 193.  RPP negative for viral illness.  INR 1.11.  EKG atrial fibrillation with RVR rate 104.  X-ray low lung volumes with mild cardiomegaly and mild central pulmonary vascular congestion with probable early perihilar edema raising question of component of CHF or fluid overload.  The patient was admitted to hospital medicine for atrial fibrillation.      He was placed on Lovenox 1 mg/kg twice daily due to [id number] 2 score of 4.  2D echo obtained on 4/2/2025 revealed EF-30-35% mild LVH.  Global hypokinesis with relative sparing of basal inferior 
  [facility]      Patient:  Bong Mack  YOB: 1945  Date of Service: 4/5/2025  MRN: [id number]   Acct: [id number]   Primary Care Physician: Chadd Blake DO  Advance Directive: Full Code  Admit Date: 4/2/2025       Hospital Day: 3  Portions of this note have been copied forward, however, changed to reflect the most current clinical status of this patient.    CHIEF COMPLAINT Cough/Shortness of breath    SUBJECTIVE:  He is anxious to be discharged. His vital signs are stable. His wife is tearful and states he is too weak to return home. He is on RA. VSS. Rate well controlled.    CUMULATIVE HOSPITAL STAY:  The patient is a 80 y.o. male with a PMH of Parkinson's disease, type 2 diabetes, and HTN, who presented to [facility] ED on 4/2/2025 complaining of cough. He stated that he has not felt well in at least 3 weeks and had an increase in shortness of breath and cough for the last week. His wife was bedside stated that he has been increasingly weak and has not been taking his medications for ~ 3 weeks. He reported an occasional productive cough of clear drainage. Denied fever or chills. Denied N/V/D, palpitations or chest pain. He passively stated he had a remote history of atrial fibrillation and CHF, however, denies taking any medications. No recent 2D echo.      Further ED workup revealed-, K4.5, BUN 20 and creatinine 0.9.  Glucose 192.  Initial troponin 23 repeat 21.  proBNP 1896.  WBC 6.9, H&H 13.7/40.8 with a platelet count of 193.  RPP negative for viral illness.  INR 1.11.  EKG atrial fibrillation with RVR rate 104.  X-ray low lung volumes with mild cardiomegaly and mild central pulmonary vascular congestion with probable early perihilar edema raising question of component of CHF or fluid overload.  The patient was admitted to hospital medicine for atrial fibrillation.      He was placed on Lovenox 1 mg/kg twice daily due to [id number] 2 score of 4.  2D echo obtained on 
 Occupational Therapy Initial Assessment  Date: 2025   Patient Name: Bong Mack  MRN: [id number]     : 1945    Date of Service: 2025    Discharge Recommendations:  Continue to assess pending progress, 24 hour supervision or assist, Patient would benefit from continued therapy after discharge       Assessment   Performance deficits / Impairments: Decreased functional mobility ;Decreased ADL status;Decreased safe awareness;Decreased cognition;Decreased balance;Decreased posture;Decreased coordination;Decreased endurance  Assessment: Evaluation completed and tx initiated.  The patient would benefit from further skilled therapy to upgrade safety and functional independence. Pt required min A for getting to EOB. Pt displayed impaired cognition and required cues for safety during t/fers and mobility. Pt utilized RW with min Ax2 to ensure safety. Pt required consistent cues for picking up feet and taking \"big steps\" when using RW. Pt required cues for slower pace due to forward lean. Pt would benefit from skilled OT services to improve overall strength, ADL performance, and safety.  Treatment Diagnosis: a-fib with RVR, Parkinsons  Prognosis: Fair  Decision Making: Medium Complexity  REQUIRES OT FOLLOW-UP: Yes  Activity Tolerance  Activity Tolerance: Patient Tolerated treatment well              Patient Diagnosis(es): The primary encounter diagnosis was New onset atrial fibrillation (HCC). Diagnoses of Acute congestive heart failure, unspecified heart failure type (HCC), Atrial fibrillation, unspecified type (HCC), and Chest pain were also pertinent to this visit.    Past Medical History:   Past Medical History:   Diagnosis Date    Coronary artery disease status post coronary stent insertion 2025    Diabetes     Hypertension     New onset atrial fibrillation (HCC) 2025    Tinnitus         Past Surgical History:   Past Surgical History:   Procedure Laterality Date    CARDIAC PROCEDURE N/A 4/3/2025    
2ml of air removed 4 remain in wrist  
4 Eyes Skin Assessment     NAME:  Bong Mack  YOB: 1945  MEDICAL RECORD NUMBER:  [id number]    The patient is being assessed for  Admission    I agree that at least one RN has performed a thorough Head to Toe Skin Assessment on the patient. ALL assessment sites listed below have been assessed.      Areas assessed by both nurses:    Head, Face, Ears, Shoulders, Back, Chest, Arms, Elbows, Hands, Sacrum. Buttock, Coccyx, Ischium, Legs. Feet and Heels, and Under Medical Devices         Does the Patient have a Wound? No noted wound(s)       Faustino Prevention initiated by RN: No  Wound Care Orders initiated by RN: No    Pressure Injury (Stage 3,4, Unstageable, DTI, NWPT, and Complex wounds) if present, place Wound referral order by RN under : No    New Ostomies, if present place, Ostomy referral order under : No     Nurse 1 eSignature: Electronically signed by Carla Chu RN on 4/2/25 at 4:51 PM CDT    **SHARE this note so that the co-signing nurse can place an eSignature**    Nurse 2 eSignature: {Esignature:[id number]}   
Cardiology Progress Note Abdifatah Galeano MD      Patient:  Bong Mack  [id number]    Patient Active Problem List    Diagnosis Date Noted    Acute systolic heart failure (HCC) 04/03/2025     Priority: High    Ischemic cardiomyopathy 04/04/2025     Priority: Low    Coronary artery disease status post coronary stent insertion 04/04/2025     Priority: Low    HFrEF (heart failure with reduced ejection fraction) (HCC) 04/03/2025     Priority: Low    Chest pain 04/02/2025     Priority: Low    Parkinson disease with dyskinesia and fluctuating manifestations (HCC) 10/23/2023     Priority: Low    Type 2 diabetes mellitus without complication, without long-term current use of insulin 07/22/2020     Priority: Low       Admit Date:  4/2/2025    Admission Problem List: Present on Admission:   Parkinson disease with dyskinesia and fluctuating manifestations (HCC)   Type 2 diabetes mellitus without complication, without long-term current use of insulin   (Resolved) Atrial fibrillation with RVR (HCC)   HFrEF (heart failure with reduced ejection fraction) (HCC)   Acute systolic heart failure (HCC)   Ischemic cardiomyopathy   Coronary artery disease status post coronary stent insertion      Cardiac Specific Data:  Specialty Problems          Cardiology Problems    Acute systolic heart failure (HCC)        Chest pain        HFrEF (heart failure with reduced ejection fraction) (HCC)        Coronary artery disease status post coronary stent insertion        Ischemic cardiomyopathy         1.  New onset atrial fibrillation with RVR with new severe LV dysfunction, EF 30-35%, not on anticoagulation, initiated on Eliquis.  2.  Coronary artery disease, catheterization 4/3/2025 with PCI to large obtuse marginal (2.75 x 26 mm [place]), residual nonobstructive disease, EF 40%.  2.  Non-insulin-dependent diabetes mellitus.  3.  Hypertension.  4.  Parkinson's disease.    Subjective:  Mr. Mack is doing well this morning.  Sitting in recliner.  Is being 
Medical [employer] company brought rollator to patients bedside.   
Occupational Therapy  Name: Bong Mcak  MRN:  [id number]  Date of service:  4/3/2025    Pt is currently off the floor for procedure. Will follow up at a later time.    Electronically signed by Mima Matthew OT on 4/3/2025 at 1:35 PM   
PHYSICAL THERAPY    Pt has an active bedrest order. Please discontinue when appropriate to begin mobility assessment.    Electronically signed by Mima Grady PT on 4/4/2025 at 7:12 AM      
Physical Therapy  Name: Bong Mack  MRN:  [id number]  Date of service:  4/3/2025    Pt. off the floor for procedure. Will f/u at a later time.    Electronically signed by Nicole Rivas PT on 4/3/2025 at 1:22 PM      
Physical Therapy  Name: Bong Mack  MRN:  [id number]  Date of service:  4/7/2025 04/07/25 1130   Restrictions/Precautions   Restrictions/Precautions Fall Risk   Activity Level Up as Tolerated   General   Chart Reviewed Yes   Family/Caregiver Present No   Referring Practitioner Loyda Shelton APRN   Subjective   Subjective Pt. wiling to walk. States he may have to have a BM soon. Was just on BSC.   Oxygen Therapy   O2 Device None (Room air)   Bed Mobility   Supine to Sit Moderate assistance   Scooting Partial/Moderate assistance   Transfers   Sit to Stand Minimal Assistance   Stand to Sit Minimal Assistance   Comment v. cues to lean forward, nose over toes   Ambulation   Surface Level tile   Device Rolling Walker   Assistance Minimal assistance   Quality of Gait fast pace, v. cues to slow down and take larger steps, decreased push off and heel strike   Gait Deviations Decreased step length;Decreased step height;Staggers;Shuffles   Patient Goals    Patient Goals  go home   Short Term Goals   Time Frame for Short Term Goals 2 wks   Short Term Goal 1 supine to sit indep   Short Term Goal 2 sit to stand indep   Short Term Goal 3 amb. 300' with RW SBA   Short Term Goal 4 bed to chair   Conditions Requiring Skilled Therapeutic Intervention   Body Structures, Functions, Activity Limitations Requiring Skilled Therapeutic Intervention Decreased functional mobility ;Decreased ADL status;Decreased safe awareness;Decreased strength;Decreased endurance;Decreased balance;Decreased posture;Decreased coordination   Treatment Diagnosis impaired gait and mobility   Therapy Prognosis Good   Barriers to Learning none noted   Treatment Initiated  gait, transfers, bed mobility   Discharge Recommendations Continue to assess pending progress;24 hour supervision or assist;Therapy recommended at discharge   Activity Tolerance   Activity Tolerance Patient limited by fatigue;Treatment limited secondary to decreased cognition 
Pt became confused and pulled out his own IV. Wife at bedside reports that pt stated \"oh, look! They left a needle in me.\"    Addendum-  Notified nurse practitioner, verified that no further IV medications are indicated at this time. May start new IV if necessary at a later time.  
Pt does not exhibit understanding of importance to avoid using right wrist following cardiac cath yesterday. Hematoma noted, bloody drainage from site. TR band remains in place, 4ml of air in cuff at time of assessment. Pt was using right arm to drink coffee, pull himself up in bed, and cut his breakfast. Wife is at bedside, has been educated on her role in assisting in the recovery of her partner. Nurse educated both wife and patient on gravity of potential complications, as neither party seemed particularly concerned.   2ml of air removed, 2ml of air remaining. Charge nurse notified of lack of understanding.  
This nurse and PCA transferred patient to the chair from the bed. Patient tolerated well. Call light within reach. Electronically signed by Archana Wright RN on 4/7/2025 at 10:05 AM   
push off and heel strike  Gait Deviations: Decreased step length;Decreased step height;Staggers;Shuffles  Distance: 170'     Balance  Posture: Good  Sitting - Static: Good;-  Sitting - Dynamic: Fair;+  Standing - Static: Fair;-  Standing - Dynamic: Poor;+          OutComes Score                                                  AM-PAC - Mobility              Tinneti Score       Goals  Short Term Goals  Time Frame for Short Term Goals: 2 wks  Short Term Goal 1: supine to sit indep  Short Term Goal 2: sit to stand indep  Short Term Goal 3: amb. 300' with RW SBA  Short Term Goal 4: bed to chair  Patient Goals   Patient Goals : go home       Education  Patient Education  Education Given To: Patient  Education Provided: Role of Therapy;Plan of Care;Transfer Training;Fall Prevention Strategies;Mobility Training  Education Provided Comments: use of call light, staff A  Education Method: Demonstration;Verbal  Barriers to Learning: Cognition  Education Outcome: Continued education needed;Unable to verbalize;Unable to demonstrate understanding      Therapy Time   Individual Concurrent Group Co-treatment   Time In           Time Out           Minutes                   Nicole Rivas, PT     Electronically signed by Nicole Rivas PT on 4/4/2025 at 1:33 PM      
    XR CHEST PORTABLE  Result Date: 4/2/2025  EXAM: CHEST RADIOGRAPH  TECHNIQUE: Single frontal chest radiograph.  HISTORY: Shortness of breath.  COMPARISON: 12/6/21.  FINDINGS: Decreased lung expansion. Mild cardiomegaly with normal mediastinal contours. Prominence of central pulmonary vessels with increased perihilar interstitial markings. Stable elevation of the right hemidiaphragm an calcified left upper lobe pulmonary granuloma. No significant pleural effusion or pneumothorax.       1. Low lung volumes with mild cardiomegaly and mild central pulmonary vascular congestion with probable very early perihilar edema raising the question of a component of congestive failure or fluid overload. 2. Stable remote granulomatous changes without additional acute process.    ______________________________________ Electronically signed by: YVETTE HILL M.D. Date:     04/02/2025 Time:    10:54         Assessment and Plan:    This is a 80 y.o. year old male with past medical history of hypertension, NIDDM, Parkinson's disease with mobility issues, new onset atrial fibrillation with RVR with significant new cardiomyopathy with a EF 30-35%, can do catheterization 4/3/2025 with single-vessel disease involving large obtuse marginal treated with QIAN, rate controlled and initiated on Eliquis, EF 40%.     1.  Have discussed issues with patient in detail.  Compliance with medications stressed.  He will need to be on Plavix for 6 months uninterrupted.  Can discontinue aspirin and maintain on Eliquis 5 mg twice daily.  Risk of stroke with atrial fibrillation discussed with patient.  Can increase carvedilol to 12.5 mg twice daily and switch to oral Cardizem  mg once daily.  Continue heart failure management with Entresto and Aldactone.  Will plan for DCCV in 6 weeks on anticoagulation.  2.  Would have patient mobilize.  Can be discharged when feasible from medical perspective and follow-up with cardiology as an outpatient in 2 
aorta.    Pericardium: Trivial pericardial effusion present. No indication of cardiac tamponade.       Assessment/Plan   Principal Problem:    Atrial fibrillation (HCC)  -Rate control improved              -cardizem gtt-titrate to keep HR<100. Hold for systolic less than 90-wean as able              -Cardizem 120 mg CD daily              -[id number] 2 score-4              -Transitioned to Eliquis 5 mg BID              -2D echo-EF-30-35% mild LVH.  Global hypokinesis with relative sparing of basal inferior lateral wall.  Indeterminate diastolic function mild MR.  Trivial pericardial effusion              -Vitals per unit routine              -Trop trends-23->21                   Active Problems:    Parkinson disease with dyskinesia and fluctuating manifestations (HCC)              -Home sinemet resumed              -Home Amantadine resumed              -PT/OT-consulted              -Fall precautions     Type 2 diabetes mellitus without complication, without long-term current use of insulin              -A1C-6.5  -low dose correctional protocol              -Carb conscious diet              -Hypoglycemic protocol              -Accuchecks    -Consider Jardiance at discharge    HFrEF   -EF-30-35% mild LVH.  Global hypokinesis with relative sparing of basal inferior lateral wall.  Indeterminate diastolic function mild MR.  Trivial pericardial effusion   -No evidence of overt volume overload   -Cardiology consulted to r/o ischemic work-up   -GDMT ordered Coreg, Entresto, Aldactone and Lasix   -Consider lifevest at discharge    CAD   -Single vessel disease OM 80%-s/p PCI with QIAN x1   -Plavix and statin   -Follow-up outpatient     DVT Prophylaxis: Eliquis    Disposition: VIKTORIA Melton, 4/4/2025 2:33 PM

## 2025-04-11 ENCOUNTER — NURSE TRIAGE (OUTPATIENT)
Dept: CALL CENTER | Facility: HOSPITAL | Age: 80
End: 2025-04-11
Payer: MEDICARE

## 2025-04-11 ENCOUNTER — TELEPHONE (OUTPATIENT)
Dept: INTERNAL MEDICINE | Facility: CLINIC | Age: 80
End: 2025-04-11

## 2025-04-11 NOTE — TELEPHONE ENCOUNTER
"Patient's wife, Katelyn called to report last night patient's blood sugar dropped to 62. She called an ambulance and ems told her to \"give him the moon pie he wanted\"   EMS checked patient's blood sugar and it rema to 88. Patient was not transported to hospital. Patient had breakfast this morning and wife checked his blood sugar after breakfast at 7:30am and it was 192.  She wants to know what she needs to do. Wife's phone number is [phone]. Patient has a Hospital follow up 4/14/2025 with Dr. Gutierrez. Wife checked patient out of [facility] AMA first of this week.   "

## 2025-04-11 NOTE — TELEPHONE ENCOUNTER
Urinating frequently. Blood sugar has been high and low. Last reading was 200. Patient remains in atrial fibrillation. PCP office instructed patient to keep a log of blood glucose readings over the weekend to bring to appointment on Monday, reiterated this to patient's wife. Reviewed protocol with patient's wife. Advised on home care.    Reason for Disposition   Blood glucose  mg/dL (3.9 -13.3 mmol/L)    Additional Information   Negative: Unconscious or difficult to awaken   Negative: Acting confused (e.g., disoriented, slurred speech)   Negative: Very weak (e.g., can't stand)   Negative: Sounds like a life-threatening emergency to the triager   Negative: [1] Vomiting AND [2] signs of dehydration (e.g., very dry mouth, lightheaded, dark urine)   Negative: [1] Blood glucose > 240 mg/dL (13.3 mmol/L) AND [2] rapid breathing   Negative: Blood glucose > 500 mg/dL (27.8 mmol/L)   Negative: [1] Blood glucose > 240 mg/dL (13.3 mmol/L) AND [2] urine ketones moderate-large (or more than 1+)   Negative: [1] Blood glucose > 240 mg/dL (13.3 mmol/L) AND [2] blood ketones > 1.4 mmol/L   Negative: [1] Blood glucose > 240 mg/dL (13.3 mmol/L) AND [2] vomiting AND [3] unable to check for ketones (in blood or urine)   Negative: [1] New-onset diabetes suspected (e.g., frequent urination, weak, weight loss) AND [2] vomiting or rapid breathing   Negative: Vomiting lasts > 4 hours   Negative: Patient sounds very sick or weak to the triager   Negative: Fever > 100.4 F (38.0 C)   Negative: Blood glucose > 400 mg/dL (22.2 mmol/L)   Negative: [1] Blood glucose > 300 mg/dL (16.7 mmol/L) AND [2] two or more times in a row   Negative: Urine ketones moderate - large (or blood ketones > 1.4 mmol/L)   Negative: [1] Symptoms of high blood sugar (e.g., abnormally thirsty, frequent urination, weight loss) AND [2] not able to test blood glucose AND [3] pregnant   Negative: [1] Caller has URGENT medication or insulin pump question AND [2] triager  "unable to answer question   Negative: [1] Blood glucose > 240 mg/dL (13.3 mmol/L) AND [2] pregnant   Negative: [1] Symptoms of high blood sugar (e.g., abnormally thirsty, frequent urination, weight loss) AND [2] not able to test blood glucose   Negative: New-onset diabetes suspected (e.g., abnormally thirsty, frequent urination, weight loss)   Negative: [1] Caller has NON-URGENT medication or insulin pump question AND [2] triager unable to answer question   Negative: [1] Blood glucose > 300 mg/dL (16.7 mmol/L) AND [2] uses insulin (e.g., insulin-dependent, all people with type 1 diabetes)   Negative: [1] Blood glucose 240 - 300 mg/dL (13.3 - 16.7 mmol/L) AND [2] uses insulin (e.g., insulin-dependent, all people with type 1 diabetes)   Negative: [1] Blood glucose > 300 mg/dL (16.7 mmol/L) AND [2] does not  use insulin (e.g., not insulin-dependent; most people with type 2 diabetes)   Negative: [1] Blood glucose 240 - 300 mg/dL (13.3 - 16.7 mmol/L) AND [2] does not  use insulin (e.g., not insulin-dependent; most people with type 2 diabetes)    Answer Assessment - Initial Assessment Questions  1. BLOOD GLUCOSE: \"What is your blood glucose level?\"       Last reading 200  2. ONSET: \"When did you check the blood glucose?\"      This afternoon  3. USUAL RANGE: \"What is your glucose level usually?\" (e.g., usual fasting morning value, usual evening value)      150s-200s  4. KETONES: \"Do you check for ketones (urine or blood test strips)?\" If Yes, ask: \"What does the test show now?\"       N/A  5. TYPE 1 or 2:  \"Do you know what type of diabetes you have?\"  (e.g., Type 1, Type 2, Gestational; doesn't know)       Type 2  6. INSULIN: \"Do you take insulin?\" \"What type of insulin(s) do you use? What is the mode of delivery? (syringe, pen; injection or pump)?\"       No   7. DIABETES PILLS: \"Do you take any pills for your diabetes?\" If Yes, ask: \"Have you missed taking any pills recently?\"      Metformin   8. OTHER SYMPTOMS: \"Do you " "have any symptoms?\" (e.g., fever, frequent urination, difficulty breathing, dizziness, weakness, vomiting)      Frequent urination, atrial fibrillation, dizziness this morning   9. PREGNANCY: \"Is there any chance you are pregnant?\" \"When was your last menstrual period?\"      N/A    Protocols used: Diabetes - High Blood Sugar-ADULT-AH    "

## 2025-04-11 NOTE — TELEPHONE ENCOUNTER
Monitor blood glucose over the weekend. Keep a log to bring to his appointment with Dr. Gutierrez next week.  Recommend checking blood glucose fasting in the morning and prior to meals.

## 2025-04-14 ENCOUNTER — OFFICE VISIT (OUTPATIENT)
Dept: INTERNAL MEDICINE | Facility: CLINIC | Age: 80
End: 2025-04-14
Payer: MEDICARE

## 2025-04-14 VITALS
BODY MASS INDEX: 26.88 KG/M2 | DIASTOLIC BLOOD PRESSURE: 68 MMHG | HEIGHT: 71 IN | SYSTOLIC BLOOD PRESSURE: 138 MMHG | WEIGHT: 192 LBS | TEMPERATURE: 96.3 F | OXYGEN SATURATION: 98 % | HEART RATE: 83 BPM

## 2025-04-14 DIAGNOSIS — E78.2 MIXED HYPERLIPIDEMIA: ICD-10-CM

## 2025-04-14 DIAGNOSIS — N13.8 BPH WITH OBSTRUCTION/LOWER URINARY TRACT SYMPTOMS: ICD-10-CM

## 2025-04-14 DIAGNOSIS — M48.062 SPINAL STENOSIS OF LUMBAR REGION WITH NEUROGENIC CLAUDICATION: ICD-10-CM

## 2025-04-14 DIAGNOSIS — Z09 HOSPITAL DISCHARGE FOLLOW-UP: Primary | ICD-10-CM

## 2025-04-14 DIAGNOSIS — I48.19 PERSISTENT ATRIAL FIBRILLATION: ICD-10-CM

## 2025-04-14 DIAGNOSIS — N40.1 BPH WITH OBSTRUCTION/LOWER URINARY TRACT SYMPTOMS: ICD-10-CM

## 2025-04-14 DIAGNOSIS — I10 ESSENTIAL HYPERTENSION: ICD-10-CM

## 2025-04-14 DIAGNOSIS — Z95.5 PRESENCE OF DRUG COATED STENT IN CORONARY ARTERY: ICD-10-CM

## 2025-04-14 DIAGNOSIS — I42.8 TACHYCARDIA-INDUCED CARDIOMYOPATHY: ICD-10-CM

## 2025-04-14 DIAGNOSIS — R35.0 FREQUENCY OF URINATION: ICD-10-CM

## 2025-04-14 DIAGNOSIS — H61.23 BILATERAL IMPACTED CERUMEN: ICD-10-CM

## 2025-04-14 DIAGNOSIS — I25.10 CORONARY ARTERY DISEASE INVOLVING NATIVE CORONARY ARTERY OF NATIVE HEART WITHOUT ANGINA PECTORIS: ICD-10-CM

## 2025-04-14 DIAGNOSIS — E11.649 TYPE 2 DIABETES MELLITUS WITH HYPOGLYCEMIA WITHOUT COMA, WITHOUT LONG-TERM CURRENT USE OF INSULIN: ICD-10-CM

## 2025-04-14 RX ORDER — CLOPIDOGREL BISULFATE 75 MG/1
75 TABLET ORAL DAILY
Qty: 30 TABLET | Refills: 1 | Status: SHIPPED | OUTPATIENT
Start: 2025-04-14

## 2025-04-14 RX ORDER — GABAPENTIN 100 MG/1
100 CAPSULE ORAL 3 TIMES DAILY
Qty: 90 CAPSULE | Refills: 1 | Status: SHIPPED | OUTPATIENT
Start: 2025-04-14

## 2025-04-14 RX ORDER — TAMSULOSIN HYDROCHLORIDE 0.4 MG/1
1 CAPSULE ORAL NIGHTLY
Qty: 30 CAPSULE | Refills: 1 | Status: SHIPPED | OUTPATIENT
Start: 2025-04-14

## 2025-04-14 RX ORDER — CARVEDILOL 6.25 MG/1
6.25 TABLET ORAL 2 TIMES DAILY WITH MEALS
Qty: 60 TABLET | Refills: 1 | Status: SHIPPED | OUTPATIENT
Start: 2025-04-14

## 2025-04-14 RX ORDER — FUROSEMIDE 20 MG/1
20 TABLET ORAL DAILY
Qty: 30 TABLET | Refills: 1 | Status: SHIPPED | OUTPATIENT
Start: 2025-04-14

## 2025-04-14 RX ORDER — SPIRONOLACTONE 25 MG/1
25 TABLET ORAL DAILY
Qty: 30 TABLET | Refills: 1 | Status: SHIPPED | OUTPATIENT
Start: 2025-04-14

## 2025-04-14 RX ORDER — CLOPIDOGREL BISULFATE 75 MG/1
1 TABLET ORAL DAILY
COMMUNITY
Start: 2025-04-03 | End: 2025-04-14 | Stop reason: SDUPTHER

## 2025-04-14 NOTE — PROGRESS NOTES
"    Chief Complaint  Hospital Follow Up Visit (Recently had stent placed and went to [facility] for rehab. ), Diabetes (A1C 6.5 on 4/3/2025  Friday night patient's blood sugar dropped to 52 and had to call an ambulance.  He was not transported to the hospital. ), and urine frequency (Patient states he is waking up 3-4 times a night )    Subjective        Gary Tinoco presents to [facility]  Diabetes      See below.     Objective   Vital Signs:  /68   Pulse 83   Temp 96.3 °F (35.7 °C) (Temporal)   Ht 180.3 cm (71\")   Wt 87.1 kg (192 lb)   SpO2 98%   BMI 26.78 kg/m²   Estimated body mass index is 26.78 kg/m² as calculated from the following:    Height as of this encounter: 180.3 cm (71\").    Weight as of this encounter: 87.1 kg (192 lb).           Physical Exam  Constitutional:       Comments: Seen and discussed with his wife.   HENT:      Head: Normocephalic and atraumatic.      Right Ear: There is impacted cerumen.      Left Ear: There is impacted cerumen.   Eyes:      Conjunctiva/sclera: Conjunctivae normal.      Pupils: Pupils are equal, round, and reactive to light.   Cardiovascular:      Rate and Rhythm: Normal rate. Rhythm irregular.      Heart sounds: Normal heart sounds.      Comments: Looks euvolemic on exam.  Pulmonary:      Effort: Pulmonary effort is normal. No respiratory distress.      Breath sounds: Normal breath sounds.   Musculoskeletal:         General: No swelling.   Skin:     General: Skin is warm and dry.      Findings: No rash.   Neurological:      Mental Status: He is alert and oriented to person, place, and time.      Comments: Parkinson's evident.   Psychiatric:      Comments: Flat affect, but conversational.       Result Review :  Left heart catheterization at [facility] on 4/3:  Single-vessel disease involving large OM1 that covers the lateral wall.   Moderate LV systolic dysfunction with EF estimated at 40 to 45%.   Mildly elevated LV end-diastolic " pressures.   Successful PCI of OM1 (2.75 x 26 mm Tawanda frontier) utilizing drug-eluting   stent.     Echocardiogram at [facility] on 4/2:    Left Ventricle: Reduced left ventricular systolic function with a   visually estimated EF of 30 - 35%. Left ventricle size is normal.   Increased wall thickness. Findings consistent with mild concentric   hypertrophy. Global hypokinesis present with relative sparing of the basal   inferolateral wall. Indeterminate diastolic function due to atrial   fibrillation.    Right Ventricle: Right ventricle size is normal. Normal systolic   function. TAPSE is 1.6 cm.     Mitral Valve: Mild regurgitation.     Aorta: Normal sized aortic root and ascending aorta.     Pericardium: Trivial pericardial effusion present. No indication of   cardiac tamponade.     CBC on 4/7 was unremarkable.  Hemoglobin 14.3.  BMP on 4/7 showed a sodium of 133 and a creatinine 1.2.    Hemoglobin A1c was 6.5 on 4/3.  TSH was 1.05.  Ear Cerumen Removal    Date/Time: 4/14/2025 11:20 AM    Performed by: CURT Gutierrez DO  Authorized by: CURT Gutierrez DO    Anesthesia:  Local Anesthetic: none  Location details: left ear and right ear  Patient tolerance: patient tolerated the procedure well with no immediate complications  Procedure type: instrumentation, curette   Sedation:  Patient sedated: no              Assessment and Plan   Diagnoses and all orders for this visit:    1. Hospital discharge follow-up (Primary)    2. Persistent atrial fibrillation  -     carvedilol (Coreg) 6.25 MG tablet; Take 1 tablet by mouth 2 (Two) Times a Day With Meals.  Dispense: 60 tablet; Refill: 1  -     apixaban (ELIQUIS) 5 MG tablet tablet; Take 1 tablet by mouth 2 (Two) Times a Day for 35 days.  Dispense: 60 tablet; Refill: 1    3. Tachycardia-induced cardiomyopathy  -     sacubitril-valsartan (ENTRESTO) 24-26 MG tablet; Take 1 tablet by mouth 2 (Two) Times a Day.  Dispense: 60 tablet; Refill: 1  -     spironolactone  (Aldactone) 25 MG tablet; Take 1 tablet by mouth Daily.  Dispense: 30 tablet; Refill: 1  -     furosemide (Lasix) 20 MG tablet; Take 1 tablet by mouth Daily.  Dispense: 30 tablet; Refill: 1    4. Coronary artery disease involving native coronary artery of native heart without angina pectoris  -     clopidogrel (PLAVIX) 75 MG tablet; Take 1 tablet by mouth Daily.  Dispense: 30 tablet; Refill: 1    5. Presence of drug coated stent in coronary artery  -     clopidogrel (PLAVIX) 75 MG tablet; Take 1 tablet by mouth Daily.  Dispense: 30 tablet; Refill: 1    6. Essential hypertension  -     Basic metabolic panel    7. Mixed hyperlipidemia  -     Lipid Panel    8. Type 2 diabetes mellitus with hypoglycemia without coma, without long-term current use of insulin  -     metFORMIN (GLUCOPHAGE) 850 MG tablet; Take 1 tablet by mouth 2 (Two) Times a Day With Meals.  Dispense: 180 tablet; Refill: 1    9. Frequency of urination  -     Urinalysis without microscopic (no culture) - Urine, Clean Catch    10. BPH with obstruction/lower urinary tract symptoms  -     tamsulosin (FLOMAX) 0.4 MG capsule 24 hr capsule; Take 1 capsule by mouth Every Night.  Dispense: 30 capsule; Refill: 1    11. Spinal stenosis of lumbar region with neurogenic claudication  -     gabapentin (NEURONTIN) 100 MG capsule; Take 1 capsule by mouth 3 (Three) Times a Day.  Dispense: 90 capsule; Refill: 1    12. Bilateral impacted cerumen  -     Ear Cerumen Removal       Presents today for hospital discharge follow-up.  He is not a TCM as contact was not made.    He went to the emergency department at [facility] on 4/2 complaining of a dry cough, difficulty breathing, and lower extremity edema.  He was found to be in rapid atrial fibrillation.  He ultimately had a 2D echocardiogram that revealed an ejection fraction of 30-35%.  He ultimately underwent a left heart catheterization to assess for possible ischemia and was found to have disease in the obtuse marginal that  required a drug-eluting stent. He is currently on Plavix and Eliquis.    After discharge from [facility], he spent a few days at [facility], but he and his wife ultimately signed out because they felt like they were not getting adequate therapy and also felt that staff had been rude to them.  With leaving [facility] early, I have concerns that he did not receive all of the medications that were started at [facility].  We went through his medication list as is in our system today and then compared it directly to the discharge summary dated 4/7.  - He was able to get Plavix and Eliquis.  - He has not started Entresto or spironolactone and substitution for losartan-HCTZ.  - He has not been taking furosemide.  - He has not been taking Cardizem CD or carvedilol for rate control.    Today in the office, he does remain in atrial fibrillation by auscultation and palpation.  However, his rates are presently in the 80s.  I plan to start him on 6.25 mg of carvedilol rather than the proposed 12.5 mg.  It does not appear that his blood pressure needs this.  He is also going to go ahead make the change to Entresto and spironolactone rather than the losartan-HCTZ.  He does look euvolemic on exam today so I am placing him on 20 mg of furosemide rather than the proposed 40 mg.  I would like for him to get a new BMP today.  I would also suggest that he he should also have a BMP repeated when he sees cardiology at [facility] on 5/2.    It appears that cardiology at [facility] believes that his CHF is more than likely related to a tachycardia induced issue rather than ischemic cardiomyopathy.  There are hopes that his ejection fraction rebounds with rate control, other appropriate medications.  He and his wife relate that they were told that they may consider doing a cardioversion later on.    He needs a new lipid panel.  He was kept on lovastatin at 40 mg.    He has been having some low blood sugar episodes.  He needs to go off glyburide in my opinion.  He  can continue on metformin at 850 mg and we will look at having him take this twice daily rather than once daily.  His most recent hemoglobin A1c was 6.5.    He has had some problems with urinary frequency and retention since coming out of the hospital.  Did not require a Millard catheter while hospitalized, but did require an In-N-Out catheterization at least once according to his wife.  He has previously undergone a greenlight laser ablation with Dr. Messer.  Dr. Messer saw him in September 2024 and at that point in time the patient did not wish to be back on pharmacologic therapy.  I do feel that he should resume Flomax.  He is in agreement.  We will also check urinalysis today.    He needs a refill on his gabapentin.  He has chronic low back pain and has been under the care of Dr. Chiang.  He tells me today that he might be interested in going to Dr. Verduzco with pain management, but we will wait on this until his cardiac status is better sorted out.    Has recurrent bilateral cerumen impactions and his ears were cleansed today with benefit.    Will have him come back in 3 months.  He will be due for annual wellness at that time.  I will update him on the above labs as soon as they are available.        Follow Up   Return in about 3 months (around 7/14/2025) for Annual Medicare Wellness Visit.  Patient was given instructions and counseling regarding his condition or for health maintenance advice. Please see specific information pulled into the AVS if appropriate.      WALKER Gutierrez DO       Electronically signed by CURT Gutierrez DO, 04/14/25, 10:37 AM CDT.

## 2025-04-15 LAB
BUN SERPL-MCNC: 25 MG/DL (ref 8–23)
BUN/CREAT SERPL: 21.9 (ref 7–25)
CALCIUM SERPL-MCNC: 9.2 MG/DL (ref 8.6–10.5)
CHLORIDE SERPL-SCNC: 97 MMOL/L (ref 98–107)
CHOLEST SERPL-MCNC: 122 MG/DL (ref 0–200)
CO2 SERPL-SCNC: 24.1 MMOL/L (ref 22–29)
CREAT SERPL-MCNC: 1.14 MG/DL (ref 0.76–1.27)
EGFRCR SERPLBLD CKD-EPI 2021: 65 ML/MIN/1.73
GLUCOSE SERPL-MCNC: 147 MG/DL (ref 65–99)
HDLC SERPL-MCNC: 30 MG/DL (ref 40–60)
LDLC SERPL CALC-MCNC: 71 MG/DL (ref 0–100)
POTASSIUM SERPL-SCNC: 5.1 MMOL/L (ref 3.5–5.2)
SODIUM SERPL-SCNC: 135 MMOL/L (ref 136–145)
TRIGL SERPL-MCNC: 116 MG/DL (ref 0–150)
VLDLC SERPL CALC-MCNC: 21 MG/DL (ref 5–40)

## 2025-05-05 ENCOUNTER — TELEPHONE (OUTPATIENT)
Dept: CARDIOLOGY CLINIC | Age: 80
End: 2025-05-05

## 2025-05-05 ENCOUNTER — OFFICE VISIT (OUTPATIENT)
Dept: CARDIOLOGY CLINIC | Age: 80
End: 2025-05-05
Payer: MEDICARE

## 2025-05-05 VITALS
HEART RATE: 60 BPM | DIASTOLIC BLOOD PRESSURE: 60 MMHG | WEIGHT: 179 LBS | OXYGEN SATURATION: 96 % | HEIGHT: 71 IN | BODY MASS INDEX: 25.06 KG/M2 | SYSTOLIC BLOOD PRESSURE: 120 MMHG

## 2025-05-05 DIAGNOSIS — I50.22 CHRONIC SYSTOLIC HEART FAILURE (HCC): ICD-10-CM

## 2025-05-05 DIAGNOSIS — I48.0 PAF (PAROXYSMAL ATRIAL FIBRILLATION) (HCC): ICD-10-CM

## 2025-05-05 DIAGNOSIS — I10 ESSENTIAL HYPERTENSION: ICD-10-CM

## 2025-05-05 DIAGNOSIS — I25.10 CORONARY ARTERY DISEASE INVOLVING NATIVE CORONARY ARTERY OF NATIVE HEART WITHOUT ANGINA PECTORIS: Primary | ICD-10-CM

## 2025-05-05 PROCEDURE — 99214 OFFICE O/P EST MOD 30 MIN: CPT | Performed by: NURSE PRACTITIONER

## 2025-05-05 PROCEDURE — 1111F DSCHRG MED/CURRENT MED MERGE: CPT | Performed by: NURSE PRACTITIONER

## 2025-05-05 PROCEDURE — 3078F DIAST BP <80 MM HG: CPT | Performed by: NURSE PRACTITIONER

## 2025-05-05 PROCEDURE — G8420 CALC BMI NORM PARAMETERS: HCPCS | Performed by: NURSE PRACTITIONER

## 2025-05-05 PROCEDURE — 1159F MED LIST DOCD IN RCRD: CPT | Performed by: NURSE PRACTITIONER

## 2025-05-05 PROCEDURE — G8427 DOCREV CUR MEDS BY ELIG CLIN: HCPCS | Performed by: NURSE PRACTITIONER

## 2025-05-05 PROCEDURE — 1123F ACP DISCUSS/DSCN MKR DOCD: CPT | Performed by: NURSE PRACTITIONER

## 2025-05-05 PROCEDURE — 93000 ELECTROCARDIOGRAM COMPLETE: CPT | Performed by: NURSE PRACTITIONER

## 2025-05-05 PROCEDURE — 1036F TOBACCO NON-USER: CPT | Performed by: NURSE PRACTITIONER

## 2025-05-05 PROCEDURE — 3074F SYST BP LT 130 MM HG: CPT | Performed by: NURSE PRACTITIONER

## 2025-05-05 RX ORDER — TAMSULOSIN HYDROCHLORIDE 0.4 MG/1
0.4 CAPSULE ORAL NIGHTLY
COMMUNITY
Start: 2025-04-14

## 2025-05-05 ASSESSMENT — ENCOUNTER SYMPTOMS
SHORTNESS OF BREATH: 1
CHEST TIGHTNESS: 0
COUGH: 0
SORE THROAT: 0
WHEEZING: 0

## 2025-05-05 NOTE — TELEPHONE ENCOUNTER
Spoke with Bong Mack, have DCCV scheduled for 5/13/25 , advised patient will receive a phone call from cath lab day prior to procedure with arrival time. Patient request earliest arrival. Advised to be NPO after midnight, may take all morning medications with a sip of water.  Advised to check in at [facility] registration, come into the main entrance of the hospital, make immediate left, check in at end of hallway.  Advised patient will need someone to drive them home.     Cardioversion     Definition   Cardioversion is the delivery of an electric shock to the chest through electrodes or paddles. The shock is given to correct a dangerous heart rhythm or hearbeat.   Cardioversion can be done as an elective (scheduled) procedure or may be done urgently if an abnormal heartbeat is immediately life-threatening.     External Cardioversion        2011 [employer].   Reasons for Procedure   If the heart is not beating regularly, it may prevent the normal circulation of blood through the body. This may deprive various organs, including the brain and heart, of oxygen. Without oxygen, the organs cannot properly function and will eventually die.   Nonemergency cardioversion may be used to treat the following conditions:   Atrial fibrillation very rapid, irregular twitching in the atrium, when the ventricular heart rate is not too fast   Atrial flutter rapid but regular contractions in the atrium, when the ventricular heart rate is not too fast   Emergency cardioversion may be used to treat the following types of irregular heartbeats, which can lead to death if they are not immediately converted to a more normal rhythm:   Atrial tachycardia rapid beating of the heart, originating in the atrium with rapid ventricular heart rate   Ventricular tachycardiarapid beating of the heart, originating in the ventricle   Ventricular fibrillation rapid movement of the ventricular muscle without effective pumping   Possible

## 2025-05-05 NOTE — PROGRESS NOTES
cardiovascular care.      VIKTORIA Sy NP 5/5/2025 11:15 AM CDT                    This dictation was generated by voice recognition computer software. Although all attempts are made to edit dictation for accuracy, there may be errors in the transcription that are not intended.

## 2025-05-07 ENCOUNTER — TELEPHONE (OUTPATIENT)
Dept: INTERNAL MEDICINE | Facility: CLINIC | Age: 80
End: 2025-05-07
Payer: MEDICARE

## 2025-05-07 DIAGNOSIS — E11.9 TYPE 2 DIABETES MELLITUS WITHOUT COMPLICATION, WITHOUT LONG-TERM CURRENT USE OF INSULIN: Primary | ICD-10-CM

## 2025-05-07 RX ORDER — AVOBENZONE, HOMOSALATE, OCTISALATE, OCTOCRYLENE 30; 40; 45; 26 MG/ML; MG/ML; MG/ML; MG/ML
1 CREAM TOPICAL DAILY
Qty: 100 EACH | Refills: 1 | Status: SHIPPED | OUTPATIENT
Start: 2025-05-07

## 2025-05-07 NOTE — TELEPHONE ENCOUNTER
Caller: EarnestKatelyn    Relationship: Emergency Contact    Best call back number: [phone]     What medication are you requesting: GLUCOSE TEST STRIPS AND LANCETS FOR THE KEELY-TEST WAS PRESCRIBED    What are your current symptoms: DIABETIC    How long have you been experiencing symptoms: ON-GOING    Have you had these symptoms before:    [x] Yes  [] No    Have you been treated for these symptoms before:   [x] Yes  [] No    If a prescription is needed, what is your preferred pharmacy and phone number: [id number] - [place] - [address] [phone] [facility] [phone]      Additional notes:

## 2025-05-08 DIAGNOSIS — N13.8 BPH WITH OBSTRUCTION/LOWER URINARY TRACT SYMPTOMS: ICD-10-CM

## 2025-05-08 DIAGNOSIS — I42.8 TACHYCARDIA-INDUCED CARDIOMYOPATHY: ICD-10-CM

## 2025-05-08 DIAGNOSIS — N40.1 BPH WITH OBSTRUCTION/LOWER URINARY TRACT SYMPTOMS: ICD-10-CM

## 2025-05-08 DIAGNOSIS — I48.19 PERSISTENT ATRIAL FIBRILLATION: ICD-10-CM

## 2025-05-08 RX ORDER — CARVEDILOL 6.25 MG/1
6.25 TABLET ORAL 2 TIMES DAILY WITH MEALS
Qty: 90 TABLET | Refills: 1 | Status: SHIPPED | OUTPATIENT
Start: 2025-05-08

## 2025-05-08 RX ORDER — SPIRONOLACTONE 25 MG/1
25 TABLET ORAL DAILY
Qty: 90 TABLET | Refills: 1 | Status: SHIPPED | OUTPATIENT
Start: 2025-05-08

## 2025-05-08 RX ORDER — TAMSULOSIN HYDROCHLORIDE 0.4 MG/1
1 CAPSULE ORAL NIGHTLY
Qty: 90 CAPSULE | Refills: 1 | Status: SHIPPED | OUTPATIENT
Start: 2025-05-08

## 2025-05-13 ENCOUNTER — ANESTHESIA EVENT (OUTPATIENT)
Age: 80
End: 2025-05-13
Payer: MEDICARE

## 2025-05-13 ENCOUNTER — TELEPHONE (OUTPATIENT)
Dept: NEUROLOGY | Facility: CLINIC | Age: 80
End: 2025-05-13
Payer: MEDICARE

## 2025-05-13 ENCOUNTER — HOSPITAL ENCOUNTER (OUTPATIENT)
Age: 80
Discharge: HOME | End: 2025-05-13
Attending: INTERNAL MEDICINE | Admitting: INTERNAL MEDICINE
Payer: MEDICARE

## 2025-05-13 ENCOUNTER — ANESTHESIA (OUTPATIENT)
Age: 80
End: 2025-05-13
Payer: MEDICARE

## 2025-05-13 VITALS
TEMPERATURE: 96 F | OXYGEN SATURATION: 98 % | BODY MASS INDEX: 24.92 KG/M2 | DIASTOLIC BLOOD PRESSURE: 70 MMHG | HEART RATE: 75 BPM | SYSTOLIC BLOOD PRESSURE: 114 MMHG | HEIGHT: 71 IN | RESPIRATION RATE: 12 BRPM | WEIGHT: 178 LBS

## 2025-05-13 DIAGNOSIS — I48.0 PAROXYSMAL ATRIAL FIBRILLATION (HCC): ICD-10-CM

## 2025-05-13 LAB
ANION GAP SERPL CALCULATED.3IONS-SCNC: 14 MMOL/L (ref 8–16)
BUN SERPL-MCNC: 39 MG/DL (ref 8–23)
CALCIUM SERPL-MCNC: 9.8 MG/DL (ref 8.8–10.2)
CHLORIDE SERPL-SCNC: 93 MMOL/L (ref 98–107)
CO2 SERPL-SCNC: 25 MMOL/L (ref 22–29)
CREAT SERPL-MCNC: 1.3 MG/DL (ref 0.7–1.2)
ECHO BSA: 2.01 M2
ERYTHROCYTE [DISTWIDTH] IN BLOOD BY AUTOMATED COUNT: 13.5 % (ref 11.5–14.5)
GLUCOSE SERPL-MCNC: 122 MG/DL (ref 70–99)
HCT VFR BLD AUTO: 44.2 % (ref 42–52)
HGB BLD-MCNC: 14.4 G/DL (ref 14–18)
MCH RBC QN AUTO: 27.6 PG (ref 27–31)
MCHC RBC AUTO-ENTMCNC: 32.6 G/DL (ref 33–37)
MCV RBC AUTO: 84.8 FL (ref 80–94)
PLATELET # BLD AUTO: 206 K/UL (ref 130–400)
PMV BLD AUTO: 10.2 FL (ref 9.4–12.4)
POTASSIUM SERPL-SCNC: 5.1 MMOL/L (ref 3.5–5)
RBC # BLD AUTO: 5.21 M/UL (ref 4.7–6.1)
SODIUM SERPL-SCNC: 132 MMOL/L (ref 136–145)
WBC # BLD AUTO: 6.5 K/UL (ref 4.8–10.8)

## 2025-05-13 PROCEDURE — 6360000002 HC RX W HCPCS

## 2025-05-13 PROCEDURE — 7100000010 HC PHASE II RECOVERY - FIRST 15 MIN: Performed by: INTERNAL MEDICINE

## 2025-05-13 PROCEDURE — 92960 CARDIOVERSION ELECTRIC EXT: CPT | Performed by: INTERNAL MEDICINE

## 2025-05-13 PROCEDURE — 85027 COMPLETE CBC AUTOMATED: CPT

## 2025-05-13 PROCEDURE — 36415 COLL VENOUS BLD VENIPUNCTURE: CPT

## 2025-05-13 PROCEDURE — 80048 BASIC METABOLIC PNL TOTAL CA: CPT

## 2025-05-13 PROCEDURE — 7100000011 HC PHASE II RECOVERY - ADDTL 15 MIN: Performed by: INTERNAL MEDICINE

## 2025-05-13 PROCEDURE — 2580000003 HC RX 258: Performed by: INTERNAL MEDICINE

## 2025-05-13 PROCEDURE — 3700000000 HC ANESTHESIA ATTENDED CARE: Performed by: INTERNAL MEDICINE

## 2025-05-13 PROCEDURE — 92960 CARDIOVERSION ELECTRIC EXT: CPT

## 2025-05-13 PROCEDURE — 6370000000 HC RX 637 (ALT 250 FOR IP): Performed by: INTERNAL MEDICINE

## 2025-05-13 RX ORDER — SODIUM CHLORIDE 9 MG/ML
INJECTION, SOLUTION INTRAVENOUS PRN
Status: DISCONTINUED | OUTPATIENT
Start: 2025-05-13 | End: 2025-05-15 | Stop reason: HOSPADM

## 2025-05-13 RX ORDER — PROPOFOL 10 MG/ML
INJECTION, EMULSION INTRAVENOUS
Status: DISCONTINUED | OUTPATIENT
Start: 2025-05-13 | End: 2025-05-13 | Stop reason: SDUPTHER

## 2025-05-13 RX ORDER — AMIODARONE HYDROCHLORIDE 200 MG/1
200 TABLET ORAL 2 TIMES DAILY
Qty: 13 TABLET | Refills: 0 | Status: SHIPPED | OUTPATIENT
Start: 2025-05-13 | End: 2025-05-20

## 2025-05-13 RX ORDER — SODIUM CHLORIDE 0.9 % (FLUSH) 0.9 %
5-40 SYRINGE (ML) INJECTION PRN
Status: DISCONTINUED | OUTPATIENT
Start: 2025-05-13 | End: 2025-05-15 | Stop reason: HOSPADM

## 2025-05-13 RX ORDER — SODIUM CHLORIDE 0.9 % (FLUSH) 0.9 %
5-40 SYRINGE (ML) INJECTION EVERY 12 HOURS SCHEDULED
Status: DISCONTINUED | OUTPATIENT
Start: 2025-05-13 | End: 2025-05-15 | Stop reason: HOSPADM

## 2025-05-13 RX ORDER — AMIODARONE HYDROCHLORIDE 200 MG/1
200 TABLET ORAL DAILY
Status: DISCONTINUED | OUTPATIENT
Start: 2025-05-20 | End: 2025-05-15 | Stop reason: HOSPADM

## 2025-05-13 RX ORDER — AMIODARONE HYDROCHLORIDE 200 MG/1
200 TABLET ORAL 2 TIMES DAILY
Status: DISCONTINUED | OUTPATIENT
Start: 2025-05-13 | End: 2025-05-15 | Stop reason: HOSPADM

## 2025-05-13 RX ORDER — SACUBITRIL AND VALSARTAN 24; 26 MG/1; MG/1
1 TABLET, FILM COATED ORAL 2 TIMES DAILY
COMMUNITY

## 2025-05-13 RX ORDER — AMIODARONE HYDROCHLORIDE 200 MG/1
200 TABLET ORAL DAILY
Qty: 90 TABLET | Refills: 3 | Status: SHIPPED | OUTPATIENT
Start: 2025-05-20

## 2025-05-13 RX ORDER — SODIUM CHLORIDE 9 MG/ML
INJECTION, SOLUTION INTRAVENOUS CONTINUOUS
Status: DISCONTINUED | OUTPATIENT
Start: 2025-05-13 | End: 2025-05-15 | Stop reason: HOSPADM

## 2025-05-13 RX ADMIN — PROPOFOL 40 MG: 10 INJECTION, EMULSION INTRAVENOUS at 08:19

## 2025-05-13 RX ADMIN — AMIODARONE HYDROCHLORIDE 200 MG: 200 TABLET ORAL at 08:52

## 2025-05-13 RX ADMIN — SODIUM CHLORIDE: 0.9 INJECTION, SOLUTION INTRAVENOUS at 07:05

## 2025-05-13 ASSESSMENT — ENCOUNTER SYMPTOMS: SHORTNESS OF BREATH: 1

## 2025-05-13 NOTE — ANESTHESIA PRE PROCEDURE
Department of Anesthesiology  Preprocedure Note       Name:  Bong Mack   Age:  80 y.o.  :  1945                                          MRN:  [id number]         Date:  2025      Surgeon: * No surgeons listed *    Procedure: * No procedures listed *    Medications prior to admission:   Prior to Admission medications    Medication Sig Start Date End Date Taking? Authorizing Provider   sacubitril-valsartan (ENTRESTO) 24-26 MG per tablet Take 1 tablet by mouth 2 times daily   Yes ProviderManuel MD   tamsulosin (FLOMAX) 0.4 MG capsule Take 1 capsule by mouth nightly 25  Yes Manuel Guzmán MD   apixaban (ELIQUIS) 5 MG TABS tablet Take 1 tablet by mouth 2 times daily 25 Yes Day, VIKTORIA Marin CNP   carvedilol (COREG) 12.5 MG tablet Take 1 tablet by mouth 2 times daily (with meals) 25  Yes Day, VIKTORIA Marin CNP   dilTIAZem (CARDIZEM CD) 120 MG extended release capsule Take 1 capsule by mouth daily 25  Yes Day, VIKTORIA Marin CNP   furosemide (LASIX) 40 MG tablet Take 1 tablet by mouth daily 25  Yes Day, VIKTORIA Marin CNP   spironolactone (ALDACTONE) 25 MG tablet Take 1 tablet by mouth daily 25  Yes Day, VIKTORIA Marin CNP   clopidogrel (PLAVIX) 75 MG tablet Take 1 tablet by mouth daily 25  Yes Day, VIKTORIA Marin CNP   amantadine (SYMMETREL) 100 MG capsule Take 1 capsule by mouth 2 times daily 12/3/24  Yes ProviderManuel MD   metFORMIN (GLUCOPHAGE) 850 MG tablet Take 1 tablet by mouth in the morning and at bedtime 24  Yes ProviderManuel MD   lovastatin (MEVACOR) 40 MG tablet Take 1 tablet by mouth nightly 21  Yes ProviderManuel MD   gabapentin (NEURONTIN) 100 MG capsule Take 1 capsule by mouth 3 times daily for 3 days. Max Daily Amount: 300 mg  Patient taking differently: Take 1 capsule by mouth as needed. 25  Day, Joby L, APRN - CNP   carbidopa-levodopa (SINEMET)  MG per tablet Take 1 tablet by mouth

## 2025-05-13 NOTE — PROGRESS NOTES
Returned post cardioversion.  Awake and alert.  NSR on monitor.  Denies any c/o pain. Wife at bedside.

## 2025-05-13 NOTE — PROGRESS NOTES
TRANSFER - OUT REPORT:    Verbal report given to Elvira IRELAND on Bong Mack being transferred to [place] [facility] in cath holding for routine progression of patient care       Report consisted of patient's Situation, Background, Assessment and   Recommendations(SBAR).     Information from the following report(s) Nurse Handoff Report was reviewed with the receiving nurse.    Opportunity for questions and clarification was provided.      Patient transported with:   Registered Nurse

## 2025-05-13 NOTE — ADDENDUM NOTE
Addendum  created 05/13/25 0852 by Yonatan Reese APRN - CRNA    Review and Sign - Ready for Procedure

## 2025-05-13 NOTE — ANESTHESIA POSTPROCEDURE EVALUATION
Department of Anesthesiology  Postprocedure Note    Patient: Bong Mack  MRN: [id number]  YOB: 1945  Date of evaluation: 5/13/2025    Procedure Summary       Date: 05/13/25 Room / Location: [facility] Cardiac Cath Lab    Anesthesia Start: 0818 Anesthesia Stop: 0829    Procedure: CARDIOVERSION EXTERNAL Diagnosis:       Paroxysmal atrial fibrillation (HCC)      Paroxysmal atrial fibrillation (HCC)    Scheduled Providers: Abdifatah Galeano MD Responsible Provider: Yonatan Reese APRN - CRNA    Anesthesia Type: general, TIVA ASA Status: 3            Anesthesia Type: No value filed.    Oralia Phase I: Oralia Score: 10    Oralia Phase II:      Anesthesia Post Evaluation    Patient location during evaluation: bedside  Patient participation: complete - patient participated  Level of consciousness: sleepy but conscious  Pain score: 0  Airway patency: patent  Nausea & Vomiting: no vomiting  Cardiovascular status: blood pressure returned to baseline and hemodynamically stable  Respiratory status: acceptable and nasal cannula  Hydration status: stable  Comments: Blood pressure 122/81, pulse 92, temperature (!) 96 °F (35.6 °C), temperature source Temporal, resp. rate 16, height 1.803 m (5' 11\"), weight 80.7 kg (178 lb), SpO2 99%.    Pain management: adequate    No notable events documented.

## 2025-05-13 NOTE — TELEPHONE ENCOUNTER
PATIENT SPOUSE CALLING TO ADVISE PATIENT BECOMES SICK WHEN TAKING MEDICATION amantadine (SYMMETREL) 100 MG capsule     HE HAS NOT TAKEN carbidopa-levodopa (SINEMET)  MG per tablet BECAUSE OF THE SAME REACTION.    PLEASE ADVISE

## 2025-05-13 NOTE — PROCEDURES
PROCEDURE NOTE  Date: 5/13/2025   Name: Bong Mack  YOB: 1945    Procedures      Date of Procedure:  5/13/2025     Indications:  Atrial fibrillation with an appropriate duration of anticoagulation     Conscious Sedation Protocol Used During this Procedure -anesthesia provided by anesthesia service        Anesthesia: Moderate   Sedation:    0 mg Midazolam (Versed)     0 mcg Fentanyl   Start time: 8:20 AM   Stop time: 8:25 AM   ASA Class: 3   EBL Not applicable     A trained medical personnel administered medications at my direction.  The patient was monitored continuously with ECG, pulse oximetry, blood pressure monitoring and direct observation.       After obtaining informed written consent and an appropriate level of conscious sedation, DCCV with 360 J was successful in restoring sinus rhythm.      Complications:  none      Impression:  Successful DC cardioversion of atrial fibrillation to normal sinus rhythm on Eliquis used for anticoagulation.  Initiate amiodarone 200 mg p.o. twice daily for 7 days and then 20 mg once daily.  Reevaluate LV function by echo 3 months.    Electronically signed by Abdifatah Galeano MD on 5/13/25

## 2025-05-14 NOTE — TELEPHONE ENCOUNTER
Detailed voicemail left.   Explained that pt has been on medication for quite some time and it would not just now start making pt feel sick- this is more than likely something else going.     It is ok to stop medication if they are not seeing any improvement with it, they feel as if it is causing worsening symptoms, or even feel as if it is making him sick.     OK FOR HUB TO RELAY

## 2025-05-17 LAB
EKG P AXIS: 30 DEGREES
EKG P AXIS: NORMAL DEGREES
EKG P-R INTERVAL: 240 MS
EKG P-R INTERVAL: NORMAL MS
EKG Q-T INTERVAL: 378 MS
EKG Q-T INTERVAL: 432 MS
EKG QRS DURATION: 130 MS
EKG QRS DURATION: 130 MS
EKG QTC CALCULATION (BAZETT): 420 MS
EKG QTC CALCULATION (BAZETT): 453 MS
EKG T AXIS: 56 DEGREES
EKG T AXIS: 94 DEGREES

## 2025-05-18 ENCOUNTER — APPOINTMENT (OUTPATIENT)
Dept: CT IMAGING | Age: 80
End: 2025-05-18
Payer: MEDICARE

## 2025-05-18 ENCOUNTER — HOSPITAL ENCOUNTER (EMERGENCY)
Age: 80
Discharge: HOME | End: 2025-05-18
Attending: PEDIATRICS
Payer: MEDICARE

## 2025-05-18 VITALS
SYSTOLIC BLOOD PRESSURE: 122 MMHG | DIASTOLIC BLOOD PRESSURE: 56 MMHG | RESPIRATION RATE: 14 BRPM | TEMPERATURE: 97.9 F | HEART RATE: 66 BPM | OXYGEN SATURATION: 98 %

## 2025-05-18 DIAGNOSIS — S09.90XA INJURY OF HEAD, INITIAL ENCOUNTER: ICD-10-CM

## 2025-05-18 DIAGNOSIS — T07.XXXA ABRASIONS OF MULTIPLE SITES: ICD-10-CM
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
## 2025-05-18 PROCEDURE — 72125 CT NECK SPINE W/O DYE: CPT

## 2025-05-18 PROCEDURE — 99284 EMERGENCY DEPT VISIT MOD MDM: CPT

## 2025-05-18 PROCEDURE — 6370000000 HC RX 637 (ALT 250 FOR IP): Performed by: PEDIATRICS

## 2025-05-18 PROCEDURE — 70450 CT HEAD/BRAIN W/O DYE: CPT

## 2025-05-18 RX ORDER — TRAMADOL HYDROCHLORIDE 50 MG/1
50 TABLET ORAL ONCE
Status: COMPLETED | OUTPATIENT
Start: 2025-05-18 | End: 2025-05-18

## 2025-05-18 RX ADMIN — TRAMADOL HYDROCHLORIDE 50 MG: 50 TABLET ORAL at 03:26

## 2025-05-18 ASSESSMENT — PAIN DESCRIPTION - DESCRIPTORS: DESCRIPTORS: ACHING

## 2025-05-18 ASSESSMENT — PAIN SCALES - GENERAL
PAINLEVEL_OUTOF10: 7
PAINLEVEL_OUTOF10: 10

## 2025-05-18 ASSESSMENT — PAIN DESCRIPTION - LOCATION: LOCATION: BACK

## 2025-05-18 ASSESSMENT — PAIN - FUNCTIONAL ASSESSMENT: PAIN_FUNCTIONAL_ASSESSMENT: 0-10

## 2025-05-18 NOTE — DISCHARGE INSTRUCTIONS
Return or seek medical attention with symptoms of concern such as confusion, difficulty walking or speaking, redness of skin, or other concerns.

## 2025-05-18 NOTE — ED PROVIDER NOTES
[facility] EMERGENCY DEPARTMENT  eMERGENCY dEPARTMENT eNCOUnter      Pt Name: Bong Mack  MRN: [id number]  Birthdate 1945  Date of evaluation: 5/18/2025  Provider: Nicole Escobedo MD    CHIEF COMPLAINT       Chief Complaint   Patient presents with    Fall     Patient arrive by [facility] from home with c/o fall.  Patient reports he fell over a small gate used for a dog.  Patient hit his forehead, right knee, has bleeding from skin tear on right elbow and left previous skin tear bleeding.  Patient denies loc           HISTORY OF PRESENT ILLNESS   (Location/Symptom, Timing/Onset,Context/Setting, Quality, Duration, Modifying Factors, Severity)  Note limiting factors.   Bong Mack is a 80 y.o. male who presents to the emergency department after fall.  Patient states that he fell over a gate that he is used for the dog.  Injury occurred at approximately 12:20 AM.  Patient states that he hit his forehead, his right knee, and has some bleeding from the skin tear near his right elbow.  Patient denies loss of consciousness or vomiting.  Patient states that his tetanus shot is up-to-date.  Patient denies neck or back pain.  Patient is concerned regarding bleeding because he is currently taking Eliquis and Plavix.  Patient states that he has been on 1 other time this week.  Patient states that he has Parkinson's disease.    HPI    NursingNotes were reviewed.    REVIEW OF SYSTEMS    (2-9 systems for level 4, 10 or more for level 5)     Review of Systems   Constitutional:  Negative for chills and fever.   HENT:  Negative for congestion and rhinorrhea.    Eyes:  Negative for pain.   Respiratory:  Negative for cough and shortness of breath.    Cardiovascular:  Negative for chest pain and leg swelling.   Gastrointestinal:  Negative for abdominal pain, nausea and vomiting.   Genitourinary:  Negative for difficulty urinating and dysuria.   Musculoskeletal:  Negative for back pain and neck pain.   Skin:  Positive for  Disp-90 tablet, R-3Normal      tamsulosin (FLOMAX) 0.4 MG capsule Take 1 capsule by mouth nightlyHistorical Med      apixaban (ELIQUIS) 5 MG TABS tablet Take 1 tablet by mouth 2 times daily, Disp-60 tablet, R-0Normal      gabapentin (NEURONTIN) 100 MG capsule Take 1 capsule by mouth 3 times daily for 3 days. Max Daily Amount: 300 mg, Disp-9 capsule, R-0Normal      carbidopa-levodopa (SINEMET)  MG per tablet Take 1 tablet by mouth 2 times daily, Disp-90 tablet, R-3Normal      carvedilol (COREG) 12.5 MG tablet Take 1 tablet by mouth 2 times daily (with meals), Disp-60 tablet, R-3Normal      furosemide (LASIX) 40 MG tablet Take 1 tablet by mouth daily, Disp-60 tablet, R-3Normal      spironolactone (ALDACTONE) 25 MG tablet Take 1 tablet by mouth daily, Disp-30 tablet, R-3Normal      clopidogrel (PLAVIX) 75 MG tablet Take 1 tablet by mouth daily, Disp-30 tablet, R-3Normal      amantadine (SYMMETREL) 100 MG capsule Take 1 capsule by mouth 2 times dailyHistorical Med      metFORMIN (GLUCOPHAGE) 850 MG tablet Take 1 tablet by mouth in the morning and at bedtimeHistorical Med      lovastatin (MEVACOR) 40 MG tablet Take 1 tablet by mouth nightlyHistorical Med       !! - Potential duplicate medications found. Please discuss with provider.          ALLERGIES     Patient has no known allergies.    FAMILY HISTORY       Family History   Problem Relation Age of Onset    High Cholesterol Mother     Cancer Father     Cancer Brother           SOCIAL HISTORY       Social History     Socioeconomic History    Marital status:      Spouse name: None    Number of children: None    Years of education: None    Highest education level: None   Tobacco Use    Smoking status: Never    Smokeless tobacco: Never   Vaping Use    Vaping status: Never Used   Substance and Sexual Activity    Alcohol use: Yes     Comment: rarely    Drug use: No     Social Drivers of Health     Food Insecurity: No Food Insecurity (4/2/2025)    Hunger Vital

## 2025-05-20 ENCOUNTER — APPOINTMENT (OUTPATIENT)
Dept: CT IMAGING | Age: 80
End: 2025-05-20
Payer: MEDICARE

## 2025-05-20 ENCOUNTER — NURSE TRIAGE (OUTPATIENT)
Dept: CALL CENTER | Facility: HOSPITAL | Age: 80
End: 2025-05-20
Payer: MEDICARE

## 2025-05-20 ENCOUNTER — HOSPITAL ENCOUNTER (EMERGENCY)
Age: 80
Discharge: HOME | End: 2025-05-20
Attending: EMERGENCY MEDICINE
Payer: MEDICARE

## 2025-05-20 ENCOUNTER — APPOINTMENT (OUTPATIENT)
Dept: ULTRASOUND IMAGING | Age: 80
End: 2025-05-20
Payer: MEDICARE

## 2025-05-20 ENCOUNTER — TELEPHONE (OUTPATIENT)
Dept: INTERNAL MEDICINE | Facility: CLINIC | Age: 80
End: 2025-05-20

## 2025-05-20 VITALS
OXYGEN SATURATION: 97 % | WEIGHT: 178 LBS | HEART RATE: 58 BPM | SYSTOLIC BLOOD PRESSURE: 127 MMHG | DIASTOLIC BLOOD PRESSURE: 60 MMHG | BODY MASS INDEX: 24.83 KG/M2 | RESPIRATION RATE: 17 BRPM | TEMPERATURE: 98.1 F

## 2025-05-20 DIAGNOSIS — S09.90XA CLOSED HEAD INJURY, INITIAL ENCOUNTER: ICD-10-CM
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
## 2025-05-20 DIAGNOSIS — R10.9 ABDOMINAL PAIN, UNSPECIFIED ABDOMINAL LOCATION: ICD-10-CM

## 2025-05-20 DIAGNOSIS — R11.2 NAUSEA AND VOMITING, UNSPECIFIED VOMITING TYPE: Primary | ICD-10-CM

## 2025-05-20 LAB
ALBUMIN SERPL-MCNC: 4.3 G/DL (ref 3.5–5.2)
ALP SERPL-CCNC: 56 U/L (ref 40–129)
ALT SERPL-CCNC: 12 U/L (ref 10–50)
ANION GAP SERPL CALCULATED.3IONS-SCNC: 14 MMOL/L (ref 8–16)
APTT PPP: 26.8 SEC (ref 26–36.2)
AST SERPL-CCNC: 19 U/L (ref 10–50)
BASOPHILS # BLD: 0 K/UL (ref 0–0.2)
BASOPHILS NFR BLD: 0.6 % (ref 0–1)
BILIRUB SERPL-MCNC: 0.6 MG/DL (ref 0.2–1.2)
BILIRUB UR QL STRIP: NEGATIVE
BUN SERPL-MCNC: 35 MG/DL (ref 8–23)
CALCIUM SERPL-MCNC: 9.7 MG/DL (ref 8.8–10.2)
CHLORIDE SERPL-SCNC: 96 MMOL/L (ref 98–107)
CLARITY UR: CLEAR
CO2 SERPL-SCNC: 25 MMOL/L (ref 22–29)
COLOR UR: YELLOW
CREAT SERPL-MCNC: 1.2 MG/DL (ref 0.7–1.2)
EOSINOPHIL # BLD: 0.1 K/UL (ref 0–0.6)
EOSINOPHIL NFR BLD: 1.2 % (ref 0–5)
ERYTHROCYTE [DISTWIDTH] IN BLOOD BY AUTOMATED COUNT: 14 % (ref 11.5–14.5)
GLUCOSE SERPL-MCNC: 109 MG/DL (ref 70–99)
GLUCOSE UR STRIP.AUTO-MCNC: NEGATIVE MG/DL
HCT VFR BLD AUTO: 40.8 % (ref 42–52)
HGB BLD-MCNC: 13.5 G/DL (ref 14–18)
HGB UR STRIP.AUTO-MCNC: NEGATIVE MG/L
IMM GRANULOCYTES # BLD: 0 K/UL
INR PPP: 1.39 (ref 0.88–1.18)
KETONES UR STRIP.AUTO-MCNC: NEGATIVE MG/DL
LEUKOCYTE ESTERASE UR QL STRIP.AUTO: NEGATIVE
LIPASE SERPL-CCNC: 32 U/L (ref 13–60)
LYMPHOCYTES # BLD: 1.4 K/UL (ref 1.1–4.5)
LYMPHOCYTES NFR BLD: 21.1 % (ref 20–40)
MCH RBC QN AUTO: 28.5 PG (ref 27–31)
MCHC RBC AUTO-ENTMCNC: 33.1 G/DL (ref 33–37)
MCV RBC AUTO: 86.3 FL (ref 80–94)
MONOCYTES # BLD: 0.6 K/UL (ref 0–0.9)
MONOCYTES NFR BLD: 9 % (ref 0–10)
NEUTROPHILS # BLD: 4.5 K/UL (ref 1.5–7.5)
NEUTS SEG NFR BLD: 67.6 % (ref 50–65)
NITRITE UR QL STRIP.AUTO: NEGATIVE
PH UR STRIP.AUTO: 7 [PH] (ref 5–8)
PLATELET # BLD AUTO: 179 K/UL (ref 130–400)
PMV BLD AUTO: 9.8 FL (ref 9.4–12.4)
POTASSIUM SERPL-SCNC: 4.9 MMOL/L (ref 3.5–5.1)
PROT SERPL-MCNC: 7 G/DL (ref 6.4–8.3)
PROT UR STRIP.AUTO-MCNC: NEGATIVE MG/DL
PROTHROMBIN TIME: 17 SEC (ref 12–14.6)
RBC # BLD AUTO: 4.73 M/UL (ref 4.7–6.1)
SODIUM SERPL-SCNC: 135 MMOL/L (ref 136–145)
SP GR UR STRIP.AUTO: 1.03 (ref 1–1.03)
UROBILINOGEN UR STRIP.AUTO-MCNC: 1 E.U./DL
WBC # BLD AUTO: 6.6 K/UL (ref 4.8–10.8)

## 2025-05-20 PROCEDURE — 85610 PROTHROMBIN TIME: CPT

## 2025-05-20 PROCEDURE — 93005 ELECTROCARDIOGRAM TRACING: CPT | Performed by: EMERGENCY MEDICINE

## 2025-05-20 PROCEDURE — 83690 ASSAY OF LIPASE: CPT

## 2025-05-20 PROCEDURE — 85730 THROMBOPLASTIN TIME PARTIAL: CPT

## 2025-05-20 PROCEDURE — 99285 EMERGENCY DEPT VISIT HI MDM: CPT

## 2025-05-20 PROCEDURE — 80053 COMPREHEN METABOLIC PANEL: CPT

## 2025-05-20 PROCEDURE — 6360000004 HC RX CONTRAST MEDICATION: Performed by: EMERGENCY MEDICINE

## 2025-05-20 PROCEDURE — 85025 COMPLETE CBC W/AUTO DIFF WBC: CPT

## 2025-05-20 PROCEDURE — 81003 URINALYSIS AUTO W/O SCOPE: CPT

## 2025-05-20 PROCEDURE — 74177 CT ABD & PELVIS W/CONTRAST: CPT

## 2025-05-20 PROCEDURE — 76705 ECHO EXAM OF ABDOMEN: CPT

## 2025-05-20 PROCEDURE — 70450 CT HEAD/BRAIN W/O DYE: CPT

## 2025-05-20 PROCEDURE — 36415 COLL VENOUS BLD VENIPUNCTURE: CPT

## 2025-05-20 RX ORDER — DICYCLOMINE HYDROCHLORIDE 10 MG/1
10 CAPSULE ORAL
Qty: 15 CAPSULE | Refills: 0 | Status: SHIPPED | OUTPATIENT
Start: 2025-05-20

## 2025-05-20 RX ORDER — ONDANSETRON 4 MG/1
4 TABLET, ORALLY DISINTEGRATING ORAL 3 TIMES DAILY PRN
Qty: 21 TABLET | Refills: 0 | Status: SHIPPED | OUTPATIENT
Start: 2025-05-20

## 2025-05-20 RX ORDER — IOPAMIDOL 755 MG/ML
75 INJECTION, SOLUTION INTRAVASCULAR
Status: COMPLETED | OUTPATIENT
Start: 2025-05-20 | End: 2025-05-20

## 2025-05-20 RX ADMIN — IOPAMIDOL 75 ML: 755 INJECTION, SOLUTION INTRAVENOUS at 15:01

## 2025-05-20 ASSESSMENT — ENCOUNTER SYMPTOMS
COUGH: 0
DIARRHEA: 0
NAUSEA: 1
ABDOMINAL PAIN: 0
VOMITING: 1
SHORTNESS OF BREATH: 0

## 2025-05-20 NOTE — TELEPHONE ENCOUNTER
[place] with transfer center called and reported patient fell 5/15/2025 and again 5/17/2025. Patient is on 2 blood thinners Eliquis and Plavix. Patient has been throwing up for two days also. Advised patient needs to go to ER

## 2025-05-20 NOTE — TELEPHONE ENCOUNTER
HUB call  Fell Saturday hit his head Not acting normal  Went to the ED and CT scan was negative    He fell on Thursday and Saturday (Hit his forehead)    Today did not want breakfast  Vomiting this am after breakfast  Has been vomiting for the last couple of days  Hit his forehead during a fall  Takes a blood thinners    Does not want to go to the ED Wants to be seen by Dr Gutierrez  Called Dr Gutierrez office Spoke with Tung Recommended to go to the ED  Dr Gutierrez not available to see him today    Care advice given per guideline. Explained Vomiting can be a delayed sign and he takes 2 blood thinners. Patient needs to be evaluated at the ED  Reason for Disposition   Concussion symptoms are worsening    Additional Information   Negative: [1] ACUTE NEURO SYMPTOM AND [2] present now  (DEFINITION: difficult to awaken OR confused thinking and talking OR slurred speech OR weakness of arms OR unsteady walking)   Negative: Knocked out (unconscious) > 1 minute   Negative: Seizure (convulsion) occurred  (Exception: Prior history of seizures and now alert and without Acute Neuro Symptoms.)   Negative: Penetrating head injury (e.g., knife, gun shot wound, metal object)   Negative: [1] Major bleeding (e.g., actively dripping or spurting) AND [2] can't be stopped   Negative: [1] Dangerous mechanism of injury (e.g., MVA, diving, trampoline, contact sports, fall > 10 feet or 3 meters) AND [2] NECK pain AND [3] began < 1 hour after injury   Negative: Sounds like a life-threatening emergency to the triager   Negative: Weakness (i.e., paralysis, loss of muscle strength) of the face, arm or leg on one side of the body   Negative: Loss of speech or garbled speech   Negative: Difficult to awaken or acting confused (e.g., disoriented, slurred speech)   Negative: Sounds like a life-threatening emergency to the triager   Negative: [1] Concussion suspected AND [2] has not been examined by a doctor (or NP/PA)   Negative: [1] Mild traumatic brain  "injury (mTBI; concussion) AND [2] more than 14 days since head injury   Negative: [1] Black eyes on both sides AND [2] onset within 24 hours of head injury   [1] Diagnosed with concussion AND [2] within last 14 days    Answer Assessment - Initial Assessment Questions  1. MECHANISM: \"How did the injury happen?\" For falls, ask: \"What height did you fall from?\" and \"What surface did you fall against?\"       *No Answer*  2. ONSET: \"When did the injury happen?\" (Minutes or hours ago)       *No Answer*  3. NEUROLOGIC SYMPTOMS: \"Was there any loss of consciousness?\" \"Are there any other neurological symptoms?\"       *No Answer*  4. MENTAL STATUS: \"Does the person know who they are, who you are, and where they are?\"      A&O  5. LOCATION: \"What part of the head was hit?\"       forehead  6. SCALP APPEARANCE: \"What does the scalp look like? Is it bleeding now?\" If Yes, ask: \"Is it difficult to stop?\"       *No Answer*  7. SIZE: For cuts, bruises, or swelling, ask: \"How large is it?\" (e.g., inches or centimeters)       *No Answer*  8. PAIN: \"Is there any pain?\" If Yes, ask: \"How bad is it?\"  (e.g., Scale 1-10; or mild, moderate, severe)  Denies pain  9. TETANUS: For any breaks in the skin, ask: \"When was the last tetanus booster?\"      *No Answer*  10. OTHER SYMPTOMS: \"Do you have any other symptoms?\" (e.g., neck pain, vomiting)  vomiting  11. PREGNANCY: \"Is there any chance you are pregnant?\" \"When was your last menstrual period?\"        *No Answer*    Answer Assessment - Initial Assessment Questions  1. SYMPTOMS: \"What symptom are you most concerned about?\"      Vomiting last several days  2. OTHER SYMPTOMS: \"Do you have any other symptoms?\" (e.g., nausea, difficulty concentrating)  Nausea vomiting last several days  3. ONSET / PATTERN:  \"Are you the same, getting better, or getting worse?\"  \"What's changed?\"       vomiting  4. HEADACHE: \"Do you have any headache pain?\" If Yes, ask: \"How bad is it?\"  (Scale 1-10; or mild, " "moderate, severe)    - MILD - Doesn't interfere with normal activities    - MODERATE - Interferes with normal activities (e.g., work or school) or awakens from sleep    - SEVERE - Excruciating pain, unable to do any normal activities  denies  5. mTBI: \"When did your head injury happen?\" \"How did it occur?\"   Fell on Thursday and Saturday  6. mTBI DIAGNOSIS:  \"Who diagnosed your concussion?\"      Was evaluated at [facility] on Saturday 05/10/2025  7. mTBI TESTING: \"Did you have a CT scan or MRI of your head?\"    CT scan performed  8. mTBI LAST VISIT:  \"When were you seen for your head injury?\"     Saturday 05/10/2025  9. MEDICINES:  \"Did you receive any prescription meds?\"  If Yes, ask:  \"What are they?\" \" Were any OTC meds recommended?\"      Takes Plavix and Eliquis  10. FOLLOW-UP APPT:  \"Do you have a follow-up appointment?\"    no    Protocols used: Head Injury-ADULT-, Concussion (mTBI) Less Than 14 Days Ago Follow-up Call-ADULT-    "

## 2025-05-20 NOTE — ED PROVIDER NOTES
Cancer Father     Cancer Brother           SOCIAL HISTORY       Social History     Socioeconomic History    Marital status:      Spouse name: None    Number of children: None    Years of education: None    Highest education level: None   Tobacco Use    Smoking status: Never    Smokeless tobacco: Never   Vaping Use    Vaping status: Never Used   Substance and Sexual Activity    Alcohol use: Yes     Comment: rarely    Drug use: No     Social Drivers of Health     Food Insecurity: No Food Insecurity (4/2/2025)    Hunger Vital Sign     Worried About Running Out of Food in the Last Year: Never true     Ran Out of Food in the Last Year: Never true   Transportation Needs: No Transportation Needs (4/2/2025)    PRAPARE - Transportation     Lack of Transportation (Medical): No     Lack of Transportation (Non-Medical): No   Housing Stability: Low Risk  (4/2/2025)    Housing Stability Vital Sign     Unable to Pay for Housing in the Last Year: No     Number of Times Moved in the Last Year: 0     Homeless in the Last Year: No       SCREENINGS    [place] Coma Scale  Eye Opening: Spontaneous  Best Verbal Response: Oriented  Best Motor Response: Obeys commands  [place] Coma Scale Score: 15        PHYSICAL EXAM    (up to 7 for level 4, 8 or more for level 5)     ED Triage Vitals [05/20/25 1200]   BP Systolic BP Percentile Diastolic BP Percentile Temp Temp src Pulse Respirations SpO2   122/65 -- -- 98.1 °F (36.7 °C) -- 68 18 93 %      Height Weight - Scale         -- 80.7 kg (178 lb)             Physical Exam  Vitals and nursing note reviewed.   Constitutional:       Appearance: Normal appearance. He is well-developed. He is not ill-appearing or diaphoretic.   HENT:      Head: Normocephalic and atraumatic.      Nose: Nose normal.      Mouth/Throat:      Mouth: Mucous membranes are moist.   Eyes:      Conjunctiva/sclera: Conjunctivae normal.   Neck:      Trachea: No tracheal deviation.   Cardiovascular:      Rate and Rhythm:

## 2025-05-21 ENCOUNTER — TELEPHONE (OUTPATIENT)
Dept: INTERNAL MEDICINE | Facility: CLINIC | Age: 80
End: 2025-05-21

## 2025-05-21 LAB
EKG P AXIS: 13 DEGREES
EKG P-R INTERVAL: 232 MS
EKG Q-T INTERVAL: 430 MS
EKG QRS DURATION: 126 MS
EKG QTC CALCULATION (BAZETT): 433 MS
EKG T AXIS: 86 DEGREES

## 2025-05-21 PROCEDURE — 93010 ELECTROCARDIOGRAM REPORT: CPT | Performed by: INTERNAL MEDICINE

## 2025-05-21 NOTE — TELEPHONE ENCOUNTER
Caller: Katelyn Tinoco    Relationship to patient: Emergency Contact    Best call back number: [phone]     New or established patient?  [] New  [x] Established    Date of discharge: 5-20-25    Facility discharged from: [facility]    Diagnosis/Symptoms: FOR VOMITING AND FOUND GALLBLADDER STONES    Length of stay (If applicable): SAME DAY      Additional Details: WANTS NEXT WEEK. NOTHING AVAILABLE

## 2025-05-27 ASSESSMENT — ENCOUNTER SYMPTOMS
VOMITING: 0
ABDOMINAL PAIN: 0
RHINORRHEA: 0
EYE PAIN: 0
COLOR CHANGE: 0
BACK PAIN: 0
NAUSEA: 0
COUGH: 0
SHORTNESS OF BREATH: 0

## 2025-05-30 ENCOUNTER — PATIENT OUTREACH (OUTPATIENT)
Dept: CASE MANAGEMENT | Facility: OTHER | Age: 80
End: 2025-05-30
Payer: MEDICARE

## 2025-05-30 ENCOUNTER — OFFICE VISIT (OUTPATIENT)
Dept: INTERNAL MEDICINE | Facility: CLINIC | Age: 80
End: 2025-05-30
Payer: MEDICARE

## 2025-05-30 VITALS
HEIGHT: 71 IN | DIASTOLIC BLOOD PRESSURE: 68 MMHG | HEART RATE: 69 BPM | OXYGEN SATURATION: 95 % | TEMPERATURE: 96.3 F | WEIGHT: 180 LBS | SYSTOLIC BLOOD PRESSURE: 130 MMHG | BODY MASS INDEX: 25.2 KG/M2

## 2025-05-30 DIAGNOSIS — G20.B2 PARKINSON'S DISEASE WITH DYSKINESIA AND FLUCTUATING MANIFESTATIONS: Primary | ICD-10-CM

## 2025-05-30 DIAGNOSIS — I25.10 CORONARY ARTERY DISEASE INVOLVING NATIVE CORONARY ARTERY OF NATIVE HEART WITHOUT ANGINA PECTORIS: ICD-10-CM

## 2025-05-30 DIAGNOSIS — I48.91 ATRIAL FIBRILLATION STATUS POST CARDIOVERSION: ICD-10-CM

## 2025-05-30 DIAGNOSIS — Z95.5 PRESENCE OF DRUG COATED STENT IN CORONARY ARTERY: ICD-10-CM

## 2025-05-30 DIAGNOSIS — G20.B2 PARKINSON'S DISEASE WITH DYSKINESIA AND FLUCTUATING MANIFESTATIONS: ICD-10-CM

## 2025-05-30 DIAGNOSIS — K80.20 GALLSTONES: ICD-10-CM

## 2025-05-30 DIAGNOSIS — R11.2 NAUSEA AND VOMITING, UNSPECIFIED VOMITING TYPE: Primary | ICD-10-CM

## 2025-05-30 RX ORDER — AMIODARONE HYDROCHLORIDE 200 MG/1
200 TABLET ORAL DAILY
COMMUNITY

## 2025-05-30 RX ORDER — DICYCLOMINE HYDROCHLORIDE 10 MG/1
10 CAPSULE ORAL
COMMUNITY
Start: 2025-05-20 | End: 2025-05-31 | Stop reason: SDUPTHER

## 2025-05-30 NOTE — PROGRESS NOTES
"    Chief Complaint  Hospital Follow Up Visit (Discharged from [facility] ER 5/20/2025 due to vomiting and gallstones.)    Subjective        Gary Tinoco presents to [facility]  History of Present Illness  See below.     Objective   Vital Signs:  /68   Pulse 69   Temp 96.3 °F (35.7 °C) (Temporal)   Ht 180.3 cm (71\")   Wt 81.6 kg (180 lb)   SpO2 95%   BMI 25.10 kg/m²   Estimated body mass index is 25.1 kg/m² as calculated from the following:    Height as of this encounter: 180.3 cm (71\").    Weight as of this encounter: 81.6 kg (180 lb).         Physical Exam  Constitutional:       Comments: Seen and discussed with his wife.    HENT:      Head: Normocephalic and atraumatic.      Ears:      Comments: Minimal cerumen bilaterally. I did not feel like he needed cleansed today.   Eyes:      Conjunctiva/sclera: Conjunctivae normal.      Pupils: Pupils are equal, round, and reactive to light.   Cardiovascular:      Rate and Rhythm: Normal rate and regular rhythm.      Heart sounds: Normal heart sounds.      Comments: NSR by auscultation and palpation.  He does not look volume overloaded on exam.  Pulmonary:      Effort: Pulmonary effort is normal. No respiratory distress.      Breath sounds: Normal breath sounds.   Musculoskeletal:         General: Swelling (trace) present.      Cervical back: Neck supple.   Skin:     General: Skin is warm and dry.      Findings: No rash.   Neurological:      Mental Status: He is alert and oriented to person, place, and time.   Psychiatric:      Comments: Extremely flat affect related to his Parkinson's.        Result Review :  Workup from [facility] on 5/20:  1.  CMP was fairly unremarkable.  LFTs were normal.  2.  Lipase is normal.  3.  CBC showed a hemoglobin of 13.5.  White blood cell count was normal.    Gallbladder ultrasound  Impression  1.  Pancreas not visualized.  2.  Sludge and gallstones in the gallbladder.  Distended gallbladder.  No other " "evidence of  cholecystitis. Clinical correlation advised.  3.  Otherwise normal right upper quadrant abdomen ultrasound.  _____________________________________  Electronically signed by: CHANTE HARRIS M.D.  Date:     05/20/2025  Time:    16:48         Assessment and Plan   Diagnoses and all orders for this visit:    1. Nausea and vomiting, unspecified vomiting type (Primary)    2. Gallstones    3. Parkinson's disease with dyskinesia and fluctuating manifestations    4. Coronary artery disease involving native coronary artery of native heart without angina pectoris    5. Presence of drug coated stent in coronary artery    6. Atrial fibrillation status post cardioversion         Presents today for a recent issue.    His wife took him to the emergency department at [facility] on 5/20.  He had been having nausea and vomiting.  He had been having abdominal pain.  He was worked up and found to have fairly unremarkable labs.  Gallbladder ultrasound showed distention of the gallbladder with sludge and gallstones.  They recommended that he follow-up with us.  He has not had any problems since seeing them.  I explained to the patient and his wife that surgery would not be of his best interest given his comorbidities, age.  He also would not be a candidate for an elective procedure at the moment given his recent stenting at [facility].  He would not be able to interrupt his Plavix and Eliquis.  He states that he would not want surgery at this point anyway.  No plans to make a referral.  If this becomes a more recurrent, problematic issue then we need to reassess.    He thought that his amantadine might be contributing to his symptoms so he stopped it for a few days.  His wife states that this was a \"huge mistake.\"  He was very rigid and could barely get out of bed while off of the medication.    He has become much more weak after his recent hospitalization.  His voice is also very weak.  He has done \"big and loud therapy\" previously.  At " present, he and his wife are interested in home health.  I will have outpatient case management reach out to them.    He is currently in sinus rhythm.  He underwent cardioversion a few weeks ago with Dr. Jensen.  He remains on amiodarone and Eliquis.    He asked today if we should recheck his PSA.  I feel that we should not given his age and other comorbidities.  He is in agreement with that.  He last had it checked in April 2024 and it was normal at that point.    His regular follow-up is upcoming.  He will keep that.  He and his wife can reach out with any other issues.      Follow Up   Return for Next scheduled follow up.  Patient was given instructions and counseling regarding his condition or for health maintenance advice. Please see specific information pulled into the AVS if appropriate.      WALKER Gutierrez DO       Electronically signed by CURT Gutierrez DO, 05/30/25, 10:50 AM CDT.

## 2025-05-30 NOTE — OUTREACH NOTE
AMBULATORY CASE MANAGEMENT NOTE    Names and Relationships of Patient/Support Persons: Contact: Katelyn Tinoco; Relationship: Emergency Contact -     Patient Outreach    Received PCP request to speak with patient about home health services. Spoke with patient's spouse and she would like to have [facility]. Discussed services of SN, PT, and ST.     Care Coordination    Updated PCP patient choice for [facility] and referral has been placed by provider.         Imelda BAKER  Ambulatory Case Management    5/30/2025, 12:58 CDT

## 2025-06-01 DIAGNOSIS — G20.A2 PARKINSON'S DISEASE WITH FLUCTUATING MANIFESTATIONS, UNSPECIFIED WHETHER DYSKINESIA PRESENT: ICD-10-CM

## 2025-06-02 ENCOUNTER — TELEPHONE (OUTPATIENT)
Dept: INTERNAL MEDICINE | Facility: CLINIC | Age: 80
End: 2025-06-02

## 2025-06-02 RX ORDER — DICYCLOMINE HYDROCHLORIDE 10 MG/1
10 CAPSULE ORAL
Qty: 120 CAPSULE | Refills: 1 | Status: SHIPPED | OUTPATIENT
Start: 2025-06-02

## 2025-06-02 RX ORDER — AMANTADINE HYDROCHLORIDE 100 MG/1
100 CAPSULE, GELATIN COATED ORAL 2 TIMES DAILY
Qty: 180 CAPSULE | Refills: 0 | Status: SHIPPED | OUTPATIENT
Start: 2025-06-02

## 2025-06-02 NOTE — TELEPHONE ENCOUNTER
Angela with [facility] called and wants to know if Nursing can be added to the order. She said if it is just PT/OT they can't take it. She requested call back at [phone]

## 2025-06-04 ENCOUNTER — TELEPHONE (OUTPATIENT)
Dept: INTERNAL MEDICINE | Facility: CLINIC | Age: 80
End: 2025-06-04

## 2025-06-04 ENCOUNTER — PATIENT OUTREACH (OUTPATIENT)
Dept: CASE MANAGEMENT | Facility: OTHER | Age: 80
End: 2025-06-04
Payer: MEDICARE

## 2025-06-04 NOTE — OUTREACH NOTE
AMBULATORY CASE MANAGEMENT NOTE    Names and Relationships of Patient/Support Persons: Contact: [facility]; Relationship: Home Health -     Care Coordination    Spoke with [facility] and they are scheduled to admit the patient today.         Imelda S  Ambulatory Case Management    6/4/2025, 09:33 CDT

## 2025-06-04 NOTE — TELEPHONE ENCOUNTER
Patient's wife, Katelyn called and said patient is still vomiting 2-3 times a night.  She also said the right side of his throat is sunken in and she can see his collarbone. She said she can send a picture if needed. Please call her back at [phone]

## 2025-06-04 NOTE — TELEPHONE ENCOUNTER
Patient's wife called wanting a referral to General Surgery Dr. Tavo Lang's office at [facility]. She said patient is not a candidate for home health until the vomiting issue has been resolved. I scheduled patient with Melanie Hull 6/5/2025.

## 2025-06-05 ENCOUNTER — OFFICE VISIT (OUTPATIENT)
Dept: INTERNAL MEDICINE | Facility: CLINIC | Age: 80
End: 2025-06-05
Payer: MEDICARE

## 2025-06-05 VITALS
SYSTOLIC BLOOD PRESSURE: 126 MMHG | BODY MASS INDEX: 24.64 KG/M2 | HEART RATE: 62 BPM | OXYGEN SATURATION: 98 % | TEMPERATURE: 96.4 F | WEIGHT: 176 LBS | DIASTOLIC BLOOD PRESSURE: 72 MMHG | HEIGHT: 71 IN

## 2025-06-05 DIAGNOSIS — R10.11 RIGHT UPPER QUADRANT PAIN: ICD-10-CM

## 2025-06-05 DIAGNOSIS — K82.8 GALLBLADDER SLUDGE: Primary | ICD-10-CM

## 2025-06-05 PROCEDURE — 1125F AMNT PAIN NOTED PAIN PRSNT: CPT | Performed by: NURSE PRACTITIONER

## 2025-06-05 PROCEDURE — 1159F MED LIST DOCD IN RCRD: CPT | Performed by: NURSE PRACTITIONER

## 2025-06-05 PROCEDURE — 3074F SYST BP LT 130 MM HG: CPT | Performed by: NURSE PRACTITIONER

## 2025-06-05 PROCEDURE — 99214 OFFICE O/P EST MOD 30 MIN: CPT | Performed by: NURSE PRACTITIONER

## 2025-06-05 PROCEDURE — 3078F DIAST BP <80 MM HG: CPT | Performed by: NURSE PRACTITIONER

## 2025-06-05 PROCEDURE — 1160F RVW MEDS BY RX/DR IN RCRD: CPT | Performed by: NURSE PRACTITIONER

## 2025-06-05 NOTE — PROGRESS NOTES
Chief Complaint  Vomiting (Has been going on for weeks, saw Dr Gutierrez on Friday, has been sick every since they saw him.  Afraid to eat because he is worried about vomiting.  He did have tuna and crackers yesterday and was able to keep that down.  Wants to have gall bladder removed.)    Subjective        Gary Tinoco is a 80 y.o. male who presents today for evaluation of the above problems.    History of Present Illness  The patient is an 80-year-old male who presents for evaluation of gallbladder issues, constipation, and vomiting.  This is my first encounter with patient as his PCP is Dr. Gutierrez.    He has been experiencing persistent vomiting since his visit on 5 - 30 - 25, which has led to a fear of eating due to the potential for further episodes.  Reports taking Zofran without improvement.  He was evaluated in the emergency department at [facility] on 5 - 20 - 25 and had a gallbladder ultrasound which showed sludge and gallstones with no cholecystitis.. His diet includes spaghetti, and he recently attempted to consume a salad with chili from [employer] but was unable to finish it due to nausea. He reports no abdominal pain associated with his gallbladder. He maintains hydration by consuming fluids and urinates approximately three times daily, which is consistent with his usual pattern.  A dietitian was consulted at his last appointment but his wife did not answer the call.  She has to call back.  His wife is present with him during his exam today.  Denies fever, chills, or any other constitutional symptoms.    He also reports constipation, for which he takes MiraLAX almost daily and supplements his diet with prunes and prune juice. His last bowel movement was on 06/02/2025.  He only takes Dulcolax if it is been 10 days since his last bowel movement.    He is requesting a referral to general surgery because he would like to have cholecystectomy.  It was explained to him at his last appointment since he has had  "recent cardiac stents and placed on anticoagulants that he would have to wait 6 to 12 months before he would be cleared for surgery.    PAST SURGICAL HISTORY:  Stent placement: 04/2025    Review of Systems - Negative except as noted per HPI  History obtained from the patient    Objective   Vital Signs:  /72 (BP Location: Left arm, Patient Position: Sitting, Cuff Size: Adult)   Pulse 62   Temp 96.4 °F (35.8 °C) (Temporal)   Ht 180.3 cm (70.98\")   Wt 79.8 kg (176 lb)   SpO2 98%   BMI 24.56 kg/m²   Estimated body mass index is 24.56 kg/m² as calculated from the following:    Height as of this encounter: 180.3 cm (70.98\").    Weight as of this encounter: 79.8 kg (176 lb).           Physical Exam  Vitals and nursing note reviewed.   Constitutional:       Appearance: Normal appearance. He is normal weight.   HENT:      Mouth/Throat:      Mouth: Mucous membranes are moist.   Cardiovascular:      Rate and Rhythm: Normal rate and regular rhythm.      Pulses: Normal pulses.      Heart sounds: Normal heart sounds. No murmur heard.     No friction rub. No gallop.   Pulmonary:      Effort: Pulmonary effort is normal. No respiratory distress.      Breath sounds: Normal breath sounds. No wheezing.   Abdominal:      General: Bowel sounds are normal.      Palpations: Abdomen is soft.      Tenderness: There is no abdominal tenderness. Negative signs include Retana's sign.   Musculoskeletal:      Cervical back: Normal range of motion and neck supple.   Skin:     General: Skin is warm and dry.      Capillary Refill: Capillary refill takes less than 2 seconds.   Neurological:      General: No focal deficit present.      Mental Status: He is alert and oriented to person, place, and time.      Comments: Has rigidity from Parkinson's disease.  Has a very raspy voice and masklike face.  Takes several seconds to answer questions.   Psychiatric:         Mood and Affect: Mood normal.         Behavior: Behavior normal.         " Thought Content: Thought content normal.         Judgment: Judgment normal.          Result Review :  The following data was reviewed by: DELVIS Vaz on 06/05/2025:  Common labs          4/14/2025    10:26 5/13/2025    06:37 5/20/2025    13:50   Common Labs   Glucose 147   109       BUN 25   35       Creatinine 1.14   1.2       Sodium 135   135       Potassium 5.1   4.9       Chloride 97   96       Calcium 9.2   9.7       Albumin   4.3       Total Bilirubin   0.6       Alkaline Phosphatase   56       AST (SGOT)   19       ALT (SGPT)   12       WBC  6.5     6.6       Hemoglobin  14.4     13.5       Hematocrit  44.2     40.8       Platelets  206     179       Total Cholesterol 122      Triglycerides 116      HDL Cholesterol 30      LDL Cholesterol  71         Details          This result is from an external source.                        Assessment and Plan   Diagnoses and all orders for this visit:    1. Gallbladder sludge (Primary)  -     Ambulatory Referral to General Surgery    2. Right upper quadrant pain  -     Ambulatory Referral to General Surgery    - Persistent vomiting has led to a fear of eating due to potential further episodes. He has not sought any pharmacological intervention for his nausea.  - His diet includes spaghetti, and he recently attempted to consume a salad with chili from [employer] but was unable to finish it. He reports no abdominal pain associated with his gallbladder. He maintains hydration by consuming fluids and urinates approximately three times daily, which is consistent with his usual pattern.  - He has expressed interest in consulting with a dietitian. He has a sufficient supply of Zofran at home.  - A referral to general surgery will be initiated for further evaluation. He is advised to maintain a bland diet, including foods such as pudding, rice, Popsicles, and soup, while avoiding spicy or greasy foods that could potentially exacerbate his gallbladder condition. He is  encouraged to consult with a dietitian for additional dietary guidance and potential supplement recommendations. If he experiences difficulty in retaining food for a period of 3 days, he should seek immediate medical attention at the emergency room for fluid administration and a repeat ultrasound to assess the need for hospital admission.  - He supplements his diet with prunes and prune juice and uses MiraLAX almost daily. He took four Dulcolax tablets after his surgery.  - He is advised to continue using Dulcolax to manage his constipation, as this may also alleviate his vomiting symptoms.  - If the current treatment regimen proves ineffective, he is instructed to inform us so that an alternative medication or regimen can be considered.       I spent 30 minutes caring for Gary on this date of service. This time includes time spent by me in the following activities:preparing for the visit, reviewing tests, obtaining and/or reviewing a separately obtained history, performing a medically appropriate examination and/or evaluation , counseling and educating the patient/family/caregiver, ordering medications, tests, or procedures, referring and communicating with other health care professionals , documenting information in the medical record, independently interpreting results and communicating that information with the patient/family/caregiver, and care coordination  Follow Up   Return if symptoms worsen or fail to improve.  Patient was given instructions and counseling regarding his condition or for health maintenance advice. Please see specific information pulled into the AVS if appropriate.       Patient or patient representative verbalized consent for the use of Ambient Listening during the visit with  DELVIS Vaz for chart documentation. 6/5/2025  11:20 CDT

## 2025-06-07 DIAGNOSIS — I42.8 TACHYCARDIA-INDUCED CARDIOMYOPATHY: ICD-10-CM

## 2025-06-08 DIAGNOSIS — I42.8 TACHYCARDIA-INDUCED CARDIOMYOPATHY: ICD-10-CM

## 2025-06-09 ENCOUNTER — TELEPHONE (OUTPATIENT)
Dept: INTERNAL MEDICINE | Facility: CLINIC | Age: 80
End: 2025-06-09

## 2025-06-09 DIAGNOSIS — G20.A2 PARKINSON'S DISEASE WITH FLUCTUATING MANIFESTATIONS, UNSPECIFIED WHETHER DYSKINESIA PRESENT: Primary | ICD-10-CM

## 2025-06-09 DIAGNOSIS — R26.89 LOSS OF BALANCE: ICD-10-CM

## 2025-06-09 RX ORDER — FUROSEMIDE 20 MG/1
20 TABLET ORAL DAILY
Qty: 30 TABLET | Refills: 1 | Status: SHIPPED | OUTPATIENT
Start: 2025-06-09

## 2025-06-09 RX ORDER — SACUBITRIL AND VALSARTAN 24; 26 MG/1; MG/1
1 TABLET, FILM COATED ORAL 2 TIMES DAILY
Qty: 60 TABLET | Refills: 1 | Status: SHIPPED | OUTPATIENT
Start: 2025-06-09

## 2025-06-09 NOTE — TELEPHONE ENCOUNTER
Caller: Katelyn Tinoco    Relationship: Emergency Contact    Best call back number: [phone]     What form or medical record are you requesting: ORDER FOR TRANSPORTATION CHAIR    Who is requesting this form or medical record from you: PERSONAL, NEEDED TO ASSIST PATIENT'S WIFE WITH TRANSPORTING PATIENT TO APPOINTMENTS     How would you like to receive the form or medical records (pick-up, mail, fax): PATIENT'S WIFE IS UNSURE WHERE THIS NEEDS TO BE SENT.     Timeframe paperwork needed: AS SOON AS POSSIBLE     Additional notes:     PLEASE FOLLOW-UP WITH PATIENT'S WIFE REGARDING THIS REQUEST.

## 2025-06-10 ENCOUNTER — OFFICE VISIT (OUTPATIENT)
Dept: CARDIOLOGY CLINIC | Age: 80
End: 2025-06-10
Payer: MEDICARE

## 2025-06-10 VITALS
DIASTOLIC BLOOD PRESSURE: 60 MMHG | OXYGEN SATURATION: 99 % | WEIGHT: 175 LBS | HEART RATE: 75 BPM | BODY MASS INDEX: 24.5 KG/M2 | HEIGHT: 71 IN | SYSTOLIC BLOOD PRESSURE: 110 MMHG

## 2025-06-10 DIAGNOSIS — I25.10 CORONARY ARTERY DISEASE INVOLVING NATIVE CORONARY ARTERY OF NATIVE HEART WITHOUT ANGINA PECTORIS: Primary | ICD-10-CM

## 2025-06-10 DIAGNOSIS — I48.0 PAF (PAROXYSMAL ATRIAL FIBRILLATION) (HCC): ICD-10-CM

## 2025-06-10 DIAGNOSIS — I10 ESSENTIAL HYPERTENSION: ICD-10-CM

## 2025-06-10 PROCEDURE — G8420 CALC BMI NORM PARAMETERS: HCPCS | Performed by: NURSE PRACTITIONER

## 2025-06-10 PROCEDURE — 3078F DIAST BP <80 MM HG: CPT | Performed by: NURSE PRACTITIONER

## 2025-06-10 PROCEDURE — 1159F MED LIST DOCD IN RCRD: CPT | Performed by: NURSE PRACTITIONER

## 2025-06-10 PROCEDURE — 1123F ACP DISCUSS/DSCN MKR DOCD: CPT | Performed by: NURSE PRACTITIONER

## 2025-06-10 PROCEDURE — 99214 OFFICE O/P EST MOD 30 MIN: CPT | Performed by: NURSE PRACTITIONER

## 2025-06-10 PROCEDURE — 3074F SYST BP LT 130 MM HG: CPT | Performed by: NURSE PRACTITIONER

## 2025-06-10 PROCEDURE — 93000 ELECTROCARDIOGRAM COMPLETE: CPT | Performed by: NURSE PRACTITIONER

## 2025-06-10 PROCEDURE — G8427 DOCREV CUR MEDS BY ELIG CLIN: HCPCS | Performed by: NURSE PRACTITIONER

## 2025-06-10 PROCEDURE — 1036F TOBACCO NON-USER: CPT | Performed by: NURSE PRACTITIONER

## 2025-06-10 ASSESSMENT — ENCOUNTER SYMPTOMS
SHORTNESS OF BREATH: 0
CHEST TIGHTNESS: 0
SORE THROAT: 0
WHEEZING: 0
COUGH: 0

## 2025-06-10 NOTE — PROGRESS NOTES
and your confidence in allowing me to participate in his cardiovascular care.    VIKTORIA Sy NP  6/10/2025 10:15 AM CDT                    This dictation was generated by voice recognition computer software. Although all attempts are made to edit dictation for accuracy, there may be errors in the transcription that are not intended.

## 2025-06-12 ENCOUNTER — OFFICE VISIT (OUTPATIENT)
Dept: SURGERY | Age: 80
End: 2025-06-12
Payer: MEDICARE

## 2025-06-12 VITALS — BODY MASS INDEX: 24.5 KG/M2 | OXYGEN SATURATION: 97 % | HEART RATE: 68 BPM | WEIGHT: 175 LBS | HEIGHT: 71 IN

## 2025-06-12 DIAGNOSIS — Z95.5 CORONARY ARTERY DISEASE STATUS POST CORONARY STENT INSERTION: ICD-10-CM

## 2025-06-12 DIAGNOSIS — I25.10 CORONARY ARTERY DISEASE STATUS POST CORONARY STENT INSERTION: ICD-10-CM

## 2025-06-12 DIAGNOSIS — I25.5 ISCHEMIC CARDIOMYOPATHY: ICD-10-CM

## 2025-06-12 DIAGNOSIS — I50.21 ACUTE SYSTOLIC HEART FAILURE (HCC): ICD-10-CM

## 2025-06-12 DIAGNOSIS — K80.20 CALCULUS OF GALLBLADDER WITHOUT CHOLECYSTITIS WITHOUT OBSTRUCTION: Primary | ICD-10-CM

## 2025-06-12 PROCEDURE — 1036F TOBACCO NON-USER: CPT | Performed by: SURGERY

## 2025-06-12 PROCEDURE — G8420 CALC BMI NORM PARAMETERS: HCPCS | Performed by: SURGERY

## 2025-06-12 PROCEDURE — 99203 OFFICE O/P NEW LOW 30 MIN: CPT | Performed by: SURGERY

## 2025-06-12 PROCEDURE — 1159F MED LIST DOCD IN RCRD: CPT | Performed by: SURGERY

## 2025-06-12 PROCEDURE — 1123F ACP DISCUSS/DSCN MKR DOCD: CPT | Performed by: SURGERY

## 2025-06-12 PROCEDURE — G8427 DOCREV CUR MEDS BY ELIG CLIN: HCPCS | Performed by: SURGERY

## 2025-06-12 NOTE — PROGRESS NOTES
[facility]  [facility] GENERAL SURGERY  [address]  [place] [facility] [address]  [phone]     Patient: Bong Mack   YOB: 1945   Date: 6/12/2025         History of Present Illness  Chief Complaint   Patient presents with    New Patient       This is a 80 y.o. male presents today complaining of abdominal pain.  The patient states that he has been having postprandial nausea with emesis.  He did have a right upper quadrant ultrasound that demonstrated gallstones.  He also had a CAT scan of the abdomen and pelvis that demonstrated distended gallbladder.  He had normal liver function test.    Of note in April 2025 he did have a cardiac stent placed and is on blood thinners.    I have reviewed the patient's chart including past visits, office notes, hospital reports, procedures, tests, labs, medications, and other reports.     Past Medical History:   Diagnosis Date    Arthritis     Atrial fibrillation (HCC)     Coronary artery disease status post coronary stent insertion 04/04/2025    Diabetes     Hypertension     New onset atrial fibrillation (HCC) 04/02/2025    Parkinson disease (HCC)     Tinnitus       Past Surgical History:   Procedure Laterality Date    CARDIAC PROCEDURE N/A 04/03/2025    Left heart cath / coronary angiography performed by Abdifatah Galeano MD at [facility] CARDIAC CATH LAB    CATARACT REMOVAL Bilateral     HERNIA REPAIR      2    KNEE SURGERY        Social History     Socioeconomic History    Marital status:      Spouse name: None    Number of children: None    Years of education: None    Highest education level: None   Tobacco Use    Smoking status: Never    Smokeless tobacco: Never   Vaping Use    Vaping status: Never Used   Substance and Sexual Activity    Alcohol use: Yes     Comment: rarely    Drug use: No     Social Drivers of Health     Food Insecurity: No Food Insecurity (4/2/2025)    Hunger Vital Sign     Worried About Running Out of Food in the

## 2025-06-16 ENCOUNTER — TELEPHONE (OUTPATIENT)
Dept: CARDIOLOGY CLINIC | Age: 80
End: 2025-06-16

## 2025-06-16 NOTE — TELEPHONE ENCOUNTER
Merry- patient's wife left message that you were going to check on what type of stent patient had to see if he can wear shirts with built in magnet closures.

## 2025-06-16 NOTE — TELEPHONE ENCOUNTER
Patient's wife left message that patient has been dx with gallstones and he saw Dr. Miller and they advised he wasn't a candidate for surgery due to blood thinner. She said he is sick with vomiting and she doesn't feel he is getting any nutrients. Stent placed 4.3.25 by Dr. Galeano- Plavix 6 months uninterrupted. She is requesting a call back to discuss blood thinner. Is there anything else I can advise for her? Discuss further with PCP about the vomiting?           PROCEDURE NOTE  Date: 4/3/2025   Name: Bong Mack  YOB: 1945     Procedures        Cardiac catheterization preliminary note:     Single-vessel disease involving a large OM1 that covers the lateral wall with 80% stenosis.  RCA proximal 30% stenosis with patent LAD.  Moderate LV systolic dysfunction.  LVEDP 20 mmHg.     Plan: OM lesion alone might not explain LV dysfunction but this is a large vessel that covers the entire lateral wall.  Will proceed with PCI and continue management of heart failure and atrial fibrillation.  Successful PCI of OM1 (2.75 x 26 mm Doug frontier) utilizing drug-eluting stent x 1.  Plavix uninterrupted for 6 months.  Can switch Lovenox to Eliquis 5 mg twice daily.  Heart failure management with Entresto carvedilol and Aldactone to be added.  Can consider SGLT 1/2 agent as well.               Electronically signed by Abdifatah Galeano MD at 4/3/2025  2:51 PM

## 2025-06-17 NOTE — TELEPHONE ENCOUNTER
Per Merry patient may wear shirt with magnetic buttons. Patients wife notified and voiced understanding.

## 2025-06-25 ENCOUNTER — APPOINTMENT (OUTPATIENT)
Dept: GENERAL RADIOLOGY | Age: 80
DRG: 683 | End: 2025-06-25
Payer: MEDICARE

## 2025-06-25 ENCOUNTER — APPOINTMENT (OUTPATIENT)
Dept: ULTRASOUND IMAGING | Age: 80
DRG: 683 | End: 2025-06-25
Payer: MEDICARE

## 2025-06-25 ENCOUNTER — HOSPITAL ENCOUNTER (INPATIENT)
Age: 80
LOS: 5 days | Discharge: NURSING, REHAB OR LONG-TERM CARE | DRG: 683 | End: 2025-06-30
Attending: EMERGENCY MEDICINE | Admitting: HOSPITALIST
Payer: MEDICARE

## 2025-06-25 DIAGNOSIS — R53.1 GENERAL WEAKNESS: ICD-10-CM

## 2025-06-25 DIAGNOSIS — I50.22 CHRONIC SYSTOLIC CHF (CONGESTIVE HEART FAILURE) (HCC): ICD-10-CM

## 2025-06-25 DIAGNOSIS — Z95.5 HISTORY OF CORONARY ARTERY STENT PLACEMENT: ICD-10-CM

## 2025-06-25 DIAGNOSIS — R11.2 NAUSEA AND VOMITING, UNSPECIFIED VOMITING TYPE: Primary | ICD-10-CM

## 2025-06-25 DIAGNOSIS — R52 GENERALIZED PAIN: ICD-10-CM

## 2025-06-25 DIAGNOSIS — Z79.01 ON CONTINUOUS ORAL ANTICOAGULATION: ICD-10-CM

## 2025-06-25 DIAGNOSIS — N28.9 ACUTE RENAL INSUFFICIENCY: ICD-10-CM

## 2025-06-25 PROBLEM — N17.9 AKI (ACUTE KIDNEY INJURY): Status: ACTIVE | Noted: 2025-06-25

## 2025-06-25 PROBLEM — I48.0 PAROXYSMAL A-FIB (HCC): Status: ACTIVE | Noted: 2025-06-25

## 2025-06-25 LAB
ALBUMIN SERPL-MCNC: 4.3 G/DL (ref 3.5–5.2)
ALP SERPL-CCNC: 68 U/L (ref 40–129)
ALT SERPL-CCNC: 18 U/L (ref 10–50)
ANION GAP SERPL CALCULATED.3IONS-SCNC: 16 MMOL/L (ref 8–16)
AST SERPL-CCNC: 25 U/L (ref 10–50)
BASOPHILS # BLD: 0 K/UL (ref 0–0.2)
BASOPHILS NFR BLD: 0.4 % (ref 0–1)
BILIRUB SERPL-MCNC: 0.7 MG/DL (ref 0.2–1.2)
BILIRUB UR QL STRIP: NEGATIVE
BUN SERPL-MCNC: 69 MG/DL (ref 8–23)
CALCIUM SERPL-MCNC: 9.7 MG/DL (ref 8.8–10.2)
CHLORIDE SERPL-SCNC: 93 MMOL/L (ref 98–107)
CLARITY UR: CLEAR
CO2 SERPL-SCNC: 23 MMOL/L (ref 22–29)
COLOR UR: YELLOW
CREAT SERPL-MCNC: 2.1 MG/DL (ref 0.7–1.2)
CREAT UR-MCNC: 56 MG/DL (ref 39–259)
EOSINOPHIL # BLD: 0.1 K/UL (ref 0–0.6)
EOSINOPHIL NFR BLD: 1 % (ref 0–5)
ERYTHROCYTE [DISTWIDTH] IN BLOOD BY AUTOMATED COUNT: 15 % (ref 11.5–14.5)
GLUCOSE BLD-MCNC: 101 MG/DL (ref 70–99)
GLUCOSE BLD-MCNC: 141 MG/DL (ref 70–99)
GLUCOSE SERPL-MCNC: 146 MG/DL (ref 70–99)
GLUCOSE UR STRIP.AUTO-MCNC: NEGATIVE MG/DL
HBA1C MFR BLD: 6.5 % (ref 4–5.6)
HCT VFR BLD AUTO: 38.8 % (ref 42–52)
HGB BLD-MCNC: 13 G/DL (ref 14–18)
HGB UR STRIP.AUTO-MCNC: NEGATIVE MG/L
IMM GRANULOCYTES # BLD: 0.1 K/UL
INR PPP: 1.47 (ref 0.88–1.18)
KETONES UR STRIP.AUTO-MCNC: NEGATIVE MG/DL
LEUKOCYTE ESTERASE UR QL STRIP.AUTO: NEGATIVE
LIPASE SERPL-CCNC: 33 U/L (ref 13–60)
LYMPHOCYTES # BLD: 1.9 K/UL (ref 1.1–4.5)
LYMPHOCYTES NFR BLD: 24.6 % (ref 20–40)
MCH RBC QN AUTO: 28.8 PG (ref 27–31)
MCHC RBC AUTO-ENTMCNC: 33.5 G/DL (ref 33–37)
MCV RBC AUTO: 86 FL (ref 80–94)
MONOCYTES # BLD: 0.8 K/UL (ref 0–0.9)
MONOCYTES NFR BLD: 9.7 % (ref 0–10)
NEUTROPHILS # BLD: 5 K/UL (ref 1.5–7.5)
NEUTS SEG NFR BLD: 63.5 % (ref 50–65)
NITRITE UR QL STRIP.AUTO: NEGATIVE
PERFORMED ON: ABNORMAL
PERFORMED ON: ABNORMAL
PH UR STRIP.AUTO: 5.5 [PH] (ref 5–8)
PLATELET # BLD AUTO: 212 K/UL (ref 130–400)
PMV BLD AUTO: 9.8 FL (ref 9.4–12.4)
POTASSIUM SERPL-SCNC: 4.9 MMOL/L (ref 3.5–5.1)
PROT SERPL-MCNC: 7.3 G/DL (ref 6.4–8.3)
PROT UR STRIP.AUTO-MCNC: NEGATIVE MG/DL
PROTHROMBIN TIME: 17.6 SEC (ref 12–14.6)
RBC # BLD AUTO: 4.51 M/UL (ref 4.7–6.1)
SODIUM SERPL-SCNC: 132 MMOL/L (ref 136–145)
SODIUM UR-SCNC: 69 MMOL/L
SP GR UR STRIP.AUTO: 1.01 (ref 1–1.03)
TROPONIN, HIGH SENSITIVITY: 41 NG/L (ref 0–22)
TROPONIN, HIGH SENSITIVITY: 50 NG/L (ref 0–22)
TSH SERPL DL<=0.005 MIU/L-ACNC: 1.64 UIU/ML (ref 0.27–4.2)
UROBILINOGEN UR STRIP.AUTO-MCNC: 1 E.U./DL
WBC # BLD AUTO: 7.9 K/UL (ref 4.8–10.8)

## 2025-06-25 PROCEDURE — 2580000003 HC RX 258: Performed by: EMERGENCY MEDICINE

## 2025-06-25 PROCEDURE — 82962 GLUCOSE BLOOD TEST: CPT

## 2025-06-25 PROCEDURE — 71045 X-RAY EXAM CHEST 1 VIEW: CPT

## 2025-06-25 PROCEDURE — 81003 URINALYSIS AUTO W/O SCOPE: CPT

## 2025-06-25 PROCEDURE — 51701 INSERT BLADDER CATHETER: CPT

## 2025-06-25 PROCEDURE — 36415 COLL VENOUS BLD VENIPUNCTURE: CPT

## 2025-06-25 PROCEDURE — 85025 COMPLETE CBC W/AUTO DIFF WBC: CPT

## 2025-06-25 PROCEDURE — 1200000000 HC SEMI PRIVATE

## 2025-06-25 PROCEDURE — 76705 ECHO EXAM OF ABDOMEN: CPT

## 2025-06-25 PROCEDURE — 85610 PROTHROMBIN TIME: CPT

## 2025-06-25 PROCEDURE — 6370000000 HC RX 637 (ALT 250 FOR IP): Performed by: NURSE PRACTITIONER

## 2025-06-25 PROCEDURE — 83036 HEMOGLOBIN GLYCOSYLATED A1C: CPT

## 2025-06-25 PROCEDURE — 84300 ASSAY OF URINE SODIUM: CPT

## 2025-06-25 PROCEDURE — 82570 ASSAY OF URINE CREATININE: CPT

## 2025-06-25 PROCEDURE — 84443 ASSAY THYROID STIM HORMONE: CPT

## 2025-06-25 PROCEDURE — 99285 EMERGENCY DEPT VISIT HI MDM: CPT

## 2025-06-25 PROCEDURE — 84484 ASSAY OF TROPONIN QUANT: CPT

## 2025-06-25 PROCEDURE — 93005 ELECTROCARDIOGRAM TRACING: CPT | Performed by: EMERGENCY MEDICINE

## 2025-06-25 PROCEDURE — 76770 US EXAM ABDO BACK WALL COMP: CPT

## 2025-06-25 PROCEDURE — 80053 COMPREHEN METABOLIC PANEL: CPT

## 2025-06-25 PROCEDURE — 83690 ASSAY OF LIPASE: CPT

## 2025-06-25 PROCEDURE — 2580000003 HC RX 258: Performed by: NURSE PRACTITIONER

## 2025-06-25 RX ORDER — DICYCLOMINE HYDROCHLORIDE 10 MG/1
10 CAPSULE ORAL 3 TIMES DAILY PRN
Status: DISCONTINUED | OUTPATIENT
Start: 2025-06-25 | End: 2025-06-30 | Stop reason: HOSPADM

## 2025-06-25 RX ORDER — GLUCAGON 1 MG/ML
1 KIT INJECTION PRN
Status: DISCONTINUED | OUTPATIENT
Start: 2025-06-25 | End: 2025-06-30 | Stop reason: HOSPADM

## 2025-06-25 RX ORDER — ONDANSETRON 2 MG/ML
4 INJECTION INTRAMUSCULAR; INTRAVENOUS EVERY 6 HOURS PRN
Status: DISCONTINUED | OUTPATIENT
Start: 2025-06-25 | End: 2025-06-30 | Stop reason: HOSPADM

## 2025-06-25 RX ORDER — GABAPENTIN 100 MG/1
100 CAPSULE ORAL 3 TIMES DAILY
Status: DISCONTINUED | OUTPATIENT
Start: 2025-06-25 | End: 2025-06-30 | Stop reason: HOSPADM

## 2025-06-25 RX ORDER — AMANTADINE HYDROCHLORIDE 100 MG/1
100 CAPSULE, GELATIN COATED ORAL 2 TIMES DAILY
Status: DISCONTINUED | OUTPATIENT
Start: 2025-06-25 | End: 2025-06-30 | Stop reason: HOSPADM

## 2025-06-25 RX ORDER — CLOPIDOGREL BISULFATE 75 MG/1
75 TABLET ORAL DAILY
Status: DISCONTINUED | OUTPATIENT
Start: 2025-06-25 | End: 2025-06-30 | Stop reason: HOSPADM

## 2025-06-25 RX ORDER — ACETAMINOPHEN 650 MG/1
650 SUPPOSITORY RECTAL EVERY 6 HOURS PRN
Status: DISCONTINUED | OUTPATIENT
Start: 2025-06-25 | End: 2025-06-30 | Stop reason: HOSPADM

## 2025-06-25 RX ORDER — DEXTROSE MONOHYDRATE 100 MG/ML
INJECTION, SOLUTION INTRAVENOUS CONTINUOUS PRN
Status: DISCONTINUED | OUTPATIENT
Start: 2025-06-25 | End: 2025-06-30 | Stop reason: HOSPADM

## 2025-06-25 RX ORDER — ACETAMINOPHEN 325 MG/1
650 TABLET ORAL EVERY 6 HOURS PRN
Status: DISCONTINUED | OUTPATIENT
Start: 2025-06-25 | End: 2025-06-30 | Stop reason: HOSPADM

## 2025-06-25 RX ORDER — ONDANSETRON 4 MG/1
4 TABLET, ORALLY DISINTEGRATING ORAL EVERY 8 HOURS PRN
Status: DISCONTINUED | OUTPATIENT
Start: 2025-06-25 | End: 2025-06-30 | Stop reason: HOSPADM

## 2025-06-25 RX ORDER — AMIODARONE HYDROCHLORIDE 200 MG/1
200 TABLET ORAL DAILY
Status: DISCONTINUED | OUTPATIENT
Start: 2025-06-25 | End: 2025-06-30 | Stop reason: HOSPADM

## 2025-06-25 RX ORDER — ATORVASTATIN CALCIUM 10 MG/1
10 TABLET, FILM COATED ORAL DAILY
Status: DISCONTINUED | OUTPATIENT
Start: 2025-06-25 | End: 2025-06-30 | Stop reason: HOSPADM

## 2025-06-25 RX ORDER — SODIUM CHLORIDE 9 MG/ML
INJECTION, SOLUTION INTRAVENOUS CONTINUOUS
Status: ACTIVE | OUTPATIENT
Start: 2025-06-25 | End: 2025-06-26

## 2025-06-25 RX ORDER — SODIUM CHLORIDE, SODIUM LACTATE, POTASSIUM CHLORIDE, AND CALCIUM CHLORIDE .6; .31; .03; .02 G/100ML; G/100ML; G/100ML; G/100ML
1000 INJECTION, SOLUTION INTRAVENOUS ONCE
Status: COMPLETED | OUTPATIENT
Start: 2025-06-25 | End: 2025-06-25

## 2025-06-25 RX ORDER — SODIUM CHLORIDE 0.9 % (FLUSH) 0.9 %
5-40 SYRINGE (ML) INJECTION EVERY 12 HOURS SCHEDULED
Status: DISCONTINUED | OUTPATIENT
Start: 2025-06-25 | End: 2025-06-30 | Stop reason: HOSPADM

## 2025-06-25 RX ORDER — ENOXAPARIN SODIUM 100 MG/ML
1 INJECTION SUBCUTANEOUS 2 TIMES DAILY
Status: DISCONTINUED | OUTPATIENT
Start: 2025-06-25 | End: 2025-06-25

## 2025-06-25 RX ORDER — SODIUM CHLORIDE 9 MG/ML
INJECTION, SOLUTION INTRAVENOUS PRN
Status: DISCONTINUED | OUTPATIENT
Start: 2025-06-25 | End: 2025-06-30 | Stop reason: HOSPADM

## 2025-06-25 RX ORDER — CARVEDILOL 12.5 MG/1
12.5 TABLET ORAL 2 TIMES DAILY WITH MEALS
Status: DISCONTINUED | OUTPATIENT
Start: 2025-06-25 | End: 2025-06-30 | Stop reason: HOSPADM

## 2025-06-25 RX ORDER — SODIUM CHLORIDE 0.9 % (FLUSH) 0.9 %
5-40 SYRINGE (ML) INJECTION PRN
Status: DISCONTINUED | OUTPATIENT
Start: 2025-06-25 | End: 2025-06-30 | Stop reason: HOSPADM

## 2025-06-25 RX ORDER — INSULIN LISPRO 100 [IU]/ML
0-4 INJECTION, SOLUTION INTRAVENOUS; SUBCUTANEOUS
Status: DISCONTINUED | OUTPATIENT
Start: 2025-06-25 | End: 2025-06-30 | Stop reason: HOSPADM

## 2025-06-25 RX ADMIN — GABAPENTIN 100 MG: 100 CAPSULE ORAL at 17:16

## 2025-06-25 RX ADMIN — SODIUM CHLORIDE: 0.9 INJECTION, SOLUTION INTRAVENOUS at 16:33

## 2025-06-25 RX ADMIN — CARVEDILOL 12.5 MG: 12.5 TABLET, FILM COATED ORAL at 17:16

## 2025-06-25 RX ADMIN — SODIUM CHLORIDE, SODIUM LACTATE, POTASSIUM CHLORIDE, AND CALCIUM CHLORIDE 1000 ML: .6; .31; .03; .02 INJECTION, SOLUTION INTRAVENOUS at 10:15

## 2025-06-25 RX ADMIN — AMANTADINE HYDROCHLORIDE 100 MG: 100 CAPSULE, LIQUID FILLED ORAL at 19:44

## 2025-06-25 RX ADMIN — APIXABAN 2.5 MG: 2.5 TABLET, FILM COATED ORAL at 19:45

## 2025-06-25 RX ADMIN — GABAPENTIN 100 MG: 100 CAPSULE ORAL at 19:44

## 2025-06-25 NOTE — CARE COORDINATION
Case Management Assessment  Initial Evaluation    Date/Time of Evaluation: 6/25/2025 2:14 PM  Assessment Completed by: Sulma Reid    If patient is discharged prior to next notation, then this note serves as note for discharge by case management.    Patient Name: Bong Mack                   YOB: 1945  Diagnosis: YG (acute kidney injury) [N17.9]                   Date / Time: 6/25/2025  9:12 AM    Patient Admission Status: Inpatient   Readmission Risk (Low < 19, Mod (19-27), High > 27): Readmission Risk Score: 19    Current PCP: Chadd Blake, DO  PCP verified by CM? Yes    Chart Reviewed: Yes      History Provided by: Patient, Significant Other  Patient Orientation: Alert and Oriented    Patient Cognition: Alert    Hospitalization in the last 30 days (Readmission):  No    If yes, Readmission Assessment in  Navigator will be completed.    Advance Directives:      Code Status: Prior   Patient's Primary Decision Maker is: Legal Next of Kin      Discharge Planning:    Patient lives with: Spouse/Significant Other Type of Home: House  Primary Care Giver: Family  Patient Support Systems include: Spouse/Significant Other   Current Financial resources: Medicare  Current community resources: None  Current services prior to admission: Durable Medical Equipment            Current DME: Walker            Type of Home Care services:  None    ADLS  Prior functional level: Assistance with the following:, Bathing, Dressing, Toileting, Feeding, Cooking, Housework, Shopping, Mobility  Current functional level: Assistance with the following:, Bathing, Dressing, Toileting, Feeding, Cooking, Housework, Shopping, Mobility    PT AM-PAC:   /24  OT AM-PAC:   /24    Family can provide assistance at DC: Yes  Would you like Case Management to discuss the discharge plan with any other family members/significant others, and if so, who? Yes (spouse)  Plans to Return to Present Housing: Yes  Potential Assistance

## 2025-06-25 NOTE — ED NOTES
ED TO INPATIENT SBAR HANDOFF    Patient Name: Bong Mack   : 1945  80 y.o.   Family/Caregiver Present: Yes  Code Status Order: Prior    C-SSRS: Risk of Suicide: No Risk  Sitter No  Restraints:         Situation  Chief Complaint:   Chief Complaint   Patient presents with    Nausea & Vomiting     Approx 4 weeks, pt family states he had a cardiac stent placed in April and is due to have cholecystectomy      Patient Diagnosis: YG (acute kidney injury) [N17.9]     Brief Description of Patient's Condition: Pt arrived to day c/o weakness and vomiting.  Pt stated that he was told he had problems with his gallbladder.  Pt states that vomiting has gotten so bad that he has lost a lot of weight and that he can't keep anything down.  Pt is alert and oriented x4. Pt wife is at bedside. VSS.        Mental Status: oriented, alert, coherent, logical, thought processes intact, and able to concentrate and follow conversation  Arrived from: home    Imaging:   XR CHEST PORTABLE   Final Result   Cardiomediastinal countours appear stable.  There is no focal pulmonary consolidation.  No pleural effusion or pneumothorax.  Chronic elevation of the right diaphragm. This appears stable.       No acute cardiopulmonary process.           ______________________________________    Electronically signed by: DEBBI BUCKNER M.D.   Date:     2025   Time:    11:05        GALLBLADDER RUQ   Final Result       Distended gallbladder containing cholelithiasis and biliary sludge. No additional sonographic evidence of acute cholecystitis.           ______________________________________    Electronically signed by: ALEXSANDRA ORDONEZ M.D.   Date:     2025   Time:    10:39         COVID-19 Results:   Internal Administration   First Dose COVID-19, MODERNA BLUE border, Primary or Immunocompromised, (age 12y+), IM, 100 mcg/0.5mL  2021   Second Dose COVID-19, MODERNA BLUE border, Primary or Immunocompromised, (age 12y+), IM, 100 mcg/0.5mL

## 2025-06-25 NOTE — H&P
[facility]      Hospitalist - History & Physical      PCP: Chadd Blake DO    Date of Admission: 6/25/2025    Date of Service: 6/25/2025    Chief Complaint:  Nausea and vomiting     History Of Present Illness:   The patient is a 80 y.o. male with past medical history of recent diagnosis of Afib on Eliquis with significant cardiomyopathy with EF of 30-35%, s/p cardiac stent to OM lesion on Plavix, Hypertension, diabetes, Parkinson's disease who presented to [facility] ED for evaluation of ongoing nausea and vomiting and progressively worsening generalized weakness. Reported ongoing nausea and vomiting for past few weeks. Was recently found to have cholelithiasis and was seen by general surgery outpatient, whom recommended laparoscopic versus open cholecystectomy once safe to come off his antiplatelets agents. Family reported significant weight loss over the last month since symptoms started. Denied fever or chills. Denied abdominal pain. Denied shortness of breath or chest pain.     Of note, was admitted for Afib RVR with significant cardiomyopathy with EF of 30-35%, underwent cardiac cath with findings of single vessel disease, s/p PCI with stent to OM lesion (4/3/2025). Cardiology recommended Plavix uninterrupted for 6 months, management of heart failure and Atrial fibrillation.     Workup in ED revealed Na 132, BUN 69, Cr 2.1, troponin 50, Hgb 13.0, US gallbladder indicated distended gallbladder containing cholelithiasis and biliary sludge, no additional sonographic evidence of acute cholecystitis, Cxray unremarkable. Patient was admitted to hospital medicine for further evaluation.           Past Medical History:        Diagnosis Date    Arthritis     Atrial fibrillation (HCC)     Coronary artery disease status post coronary stent insertion 04/04/2025    Diabetes     Hypertension     New onset atrial fibrillation (HCC) 04/02/2025    Parkinson disease (HCC)     Tinnitus        Past Surgical

## 2025-06-25 NOTE — ED PROVIDER NOTES
[facility] EMERGENCY DEPARTMENT  EMERGENCY DEPARTMENT ENCOUNTER      Pt Name: Bong Mack  MRN: [id number]  Birthdate 1945  Date of evaluation: 2025  Provider: Chadd Mayers Jr, MD    CHIEF COMPLAINT       Chief Complaint   Patient presents with    Nausea & Vomiting     Approx 4 weeks, pt family states he had a cardiac stent placed in April and is due to have cholecystectomy          HISTORY OF PRESENT ILLNESS   (Location/Symptom, Timing/Onset,Context/Setting, Quality, Duration, Modifying Factors, Severity)  Note limiting factors.   Bong Mack is a 80 y.o. male who presents to the emergency department for evaluation after having persistent nausea and vomiting with gradual worsening generalized weakness and fatigue.  Nausea and vomiting been ongoing for the last month.  Had coronary artery stent placed in April.  Also has atrial fibrillation and is anticoagulated on Eliquis.  Was recently found to have cholelithiasis.  Was referred to general surgery but due to patient's anticoagulated state and recent coronary stent, was not a candidate for surgery in the near future.  Supposed to see cardiology tomorrow for consideration of a Watchman device.  Family reports significant weight loss over the last month since symptoms started.  Patient has gotten to the point now where he is unable to even sit upright on his own due to the weakness which is unusual for him.  Patient denies any abdominal pain, fever, or other symptoms  Family says that he had a few good days without a lot of vomiting recently but symptoms started again a few days ago.  Wife says she was particularly concerned when he started talking about dying and talking about making his  arrangements last night.  She states last night he requested steak and gravy, knowing that it was going to make him sick where he has been trying to eat a more bland diet recently    On review of medical records, patient has history of systolic CHF with

## 2025-06-26 LAB
ALBUMIN SERPL-MCNC: 3.8 G/DL (ref 3.5–5.2)
ALP SERPL-CCNC: 57 U/L (ref 40–129)
ALT SERPL-CCNC: 17 U/L (ref 10–50)
ANION GAP SERPL CALCULATED.3IONS-SCNC: 11 MMOL/L (ref 8–16)
AST SERPL-CCNC: 24 U/L (ref 10–50)
BASOPHILS # BLD: 0 K/UL (ref 0–0.2)
BASOPHILS NFR BLD: 0.3 % (ref 0–1)
BILIRUB SERPL-MCNC: 0.7 MG/DL (ref 0.2–1.2)
BUN SERPL-MCNC: 55 MG/DL (ref 8–23)
CALCIUM SERPL-MCNC: 9 MG/DL (ref 8.8–10.2)
CHLORIDE SERPL-SCNC: 98 MMOL/L (ref 98–107)
CO2 SERPL-SCNC: 26 MMOL/L (ref 22–29)
CREAT SERPL-MCNC: 1.7 MG/DL (ref 0.7–1.2)
EKG P AXIS: NORMAL DEGREES
EKG P-R INTERVAL: NORMAL MS
EKG Q-T INTERVAL: 444 MS
EKG QRS DURATION: 154 MS
EKG QTC CALCULATION (BAZETT): 454 MS
EKG T AXIS: 103 DEGREES
EOSINOPHIL # BLD: 0.1 K/UL (ref 0–0.6)
EOSINOPHIL NFR BLD: 1.8 % (ref 0–5)
ERYTHROCYTE [DISTWIDTH] IN BLOOD BY AUTOMATED COUNT: 14.8 % (ref 11.5–14.5)
GLUCOSE BLD-MCNC: 138 MG/DL (ref 70–99)
GLUCOSE BLD-MCNC: 147 MG/DL (ref 70–99)
GLUCOSE BLD-MCNC: 194 MG/DL (ref 70–99)
GLUCOSE BLD-MCNC: 94 MG/DL (ref 70–99)
GLUCOSE SERPL-MCNC: 103 MG/DL (ref 70–99)
HCT VFR BLD AUTO: 37.3 % (ref 42–52)
HGB BLD-MCNC: 12.5 G/DL (ref 14–18)
IMM GRANULOCYTES # BLD: 0 K/UL
LYMPHOCYTES # BLD: 1.9 K/UL (ref 1.1–4.5)
LYMPHOCYTES NFR BLD: 30.4 % (ref 20–40)
MCH RBC QN AUTO: 28.5 PG (ref 27–31)
MCHC RBC AUTO-ENTMCNC: 33.5 G/DL (ref 33–37)
MCV RBC AUTO: 85.2 FL (ref 80–94)
MONOCYTES # BLD: 0.7 K/UL (ref 0–0.9)
MONOCYTES NFR BLD: 10.7 % (ref 0–10)
NEUTROPHILS # BLD: 3.5 K/UL (ref 1.5–7.5)
NEUTS SEG NFR BLD: 56.2 % (ref 50–65)
PERFORMED ON: ABNORMAL
PERFORMED ON: NORMAL
PLATELET # BLD AUTO: 160 K/UL (ref 130–400)
PMV BLD AUTO: 9.7 FL (ref 9.4–12.4)
POTASSIUM SERPL-SCNC: 4.4 MMOL/L (ref 3.5–5)
PROT SERPL-MCNC: 6.4 G/DL (ref 6.4–8.3)
RBC # BLD AUTO: 4.38 M/UL (ref 4.7–6.1)
SODIUM SERPL-SCNC: 135 MMOL/L (ref 136–145)
WBC # BLD AUTO: 6.2 K/UL (ref 4.8–10.8)

## 2025-06-26 PROCEDURE — 82962 GLUCOSE BLOOD TEST: CPT

## 2025-06-26 PROCEDURE — 1200000000 HC SEMI PRIVATE

## 2025-06-26 PROCEDURE — 85025 COMPLETE CBC W/AUTO DIFF WBC: CPT

## 2025-06-26 PROCEDURE — 6360000002 HC RX W HCPCS: Performed by: NURSE PRACTITIONER

## 2025-06-26 PROCEDURE — 93010 ELECTROCARDIOGRAM REPORT: CPT | Performed by: INTERNAL MEDICINE

## 2025-06-26 PROCEDURE — 51701 INSERT BLADDER CATHETER: CPT

## 2025-06-26 PROCEDURE — 80053 COMPREHEN METABOLIC PANEL: CPT

## 2025-06-26 PROCEDURE — 36415 COLL VENOUS BLD VENIPUNCTURE: CPT

## 2025-06-26 PROCEDURE — 94760 N-INVAS EAR/PLS OXIMETRY 1: CPT

## 2025-06-26 PROCEDURE — 51798 US URINE CAPACITY MEASURE: CPT

## 2025-06-26 PROCEDURE — 6370000000 HC RX 637 (ALT 250 FOR IP): Performed by: NURSE PRACTITIONER

## 2025-06-26 RX ORDER — PANTOPRAZOLE SODIUM 40 MG/1
40 TABLET, DELAYED RELEASE ORAL
Status: DISCONTINUED | OUTPATIENT
Start: 2025-06-27 | End: 2025-06-30 | Stop reason: HOSPADM

## 2025-06-26 RX ORDER — TAMSULOSIN HYDROCHLORIDE 0.4 MG/1
0.4 CAPSULE ORAL NIGHTLY
Status: DISCONTINUED | OUTPATIENT
Start: 2025-06-26 | End: 2025-06-30 | Stop reason: HOSPADM

## 2025-06-26 RX ORDER — CARBIDOPA AND LEVODOPA 25; 100 MG/1; MG/1
1 TABLET ORAL 2 TIMES DAILY
Status: DISCONTINUED | OUTPATIENT
Start: 2025-06-26 | End: 2025-06-30 | Stop reason: HOSPADM

## 2025-06-26 RX ADMIN — ATORVASTATIN CALCIUM 10 MG: 10 TABLET, FILM COATED ORAL at 07:43

## 2025-06-26 RX ADMIN — ONDANSETRON 4 MG: 2 INJECTION, SOLUTION INTRAMUSCULAR; INTRAVENOUS at 18:45

## 2025-06-26 RX ADMIN — GABAPENTIN 100 MG: 100 CAPSULE ORAL at 20:17

## 2025-06-26 RX ADMIN — CARVEDILOL 12.5 MG: 12.5 TABLET, FILM COATED ORAL at 17:31

## 2025-06-26 RX ADMIN — CARVEDILOL 12.5 MG: 12.5 TABLET, FILM COATED ORAL at 08:53

## 2025-06-26 RX ADMIN — INSULIN LISPRO 1 UNITS: 100 INJECTION, SOLUTION INTRAVENOUS; SUBCUTANEOUS at 20:16

## 2025-06-26 RX ADMIN — AMANTADINE HYDROCHLORIDE 100 MG: 100 CAPSULE, LIQUID FILLED ORAL at 20:17

## 2025-06-26 RX ADMIN — TAMSULOSIN HYDROCHLORIDE 0.4 MG: 0.4 CAPSULE ORAL at 20:17

## 2025-06-26 RX ADMIN — CARBIDOPA AND LEVODOPA 1 TABLET: 25; 100 TABLET ORAL at 12:51

## 2025-06-26 RX ADMIN — GABAPENTIN 100 MG: 100 CAPSULE ORAL at 12:51

## 2025-06-26 RX ADMIN — APIXABAN 2.5 MG: 2.5 TABLET, FILM COATED ORAL at 20:17

## 2025-06-26 RX ADMIN — CLOPIDOGREL BISULFATE 75 MG: 75 TABLET, FILM COATED ORAL at 07:42

## 2025-06-26 RX ADMIN — CARBIDOPA AND LEVODOPA 1 TABLET: 25; 100 TABLET ORAL at 20:17

## 2025-06-26 RX ADMIN — GABAPENTIN 100 MG: 100 CAPSULE ORAL at 07:43

## 2025-06-26 RX ADMIN — AMANTADINE HYDROCHLORIDE 100 MG: 100 CAPSULE, LIQUID FILLED ORAL at 07:42

## 2025-06-26 RX ADMIN — APIXABAN 2.5 MG: 2.5 TABLET, FILM COATED ORAL at 07:43

## 2025-06-26 RX ADMIN — AMIODARONE HYDROCHLORIDE 200 MG: 200 TABLET ORAL at 08:53

## 2025-06-26 NOTE — PLAN OF CARE
Problem: Chronic Conditions and Co-morbidities  Goal: Patient's chronic conditions and co-morbidity symptoms are monitored and maintained or improved  6/25/2025 2239 by Epifanio Oates RN  Outcome: Progressing  6/25/2025 1652 by Cheyenne Camarena RN  Outcome: Progressing     Problem: Discharge Planning  Goal: Discharge to home or other facility with appropriate resources  Outcome: Progressing     Problem: Skin/Tissue Integrity  Goal: Skin integrity remains intact  Description: 1.  Monitor for areas of redness and/or skin breakdown  2.  Assess vascular access sites hourly  3.  Every 4-6 hours minimum:  Change oxygen saturation probe site  4.  Every 4-6 hours:  If on nasal continuous positive airway pressure, respiratory therapy assess nares and determine need for appliance change or resting period  6/25/2025 2239 by Epifanio Oates RN  Outcome: Progressing  6/25/2025 1652 by Cheyenne Camarena RN  Outcome: Progressing     Problem: ABCDS Injury Assessment  Goal: Absence of physical injury  6/25/2025 2239 by Epifanio Oates RN  Outcome: Progressing  6/25/2025 1652 by Cheyenne Camarena RN  Outcome: Progressing     Problem: Safety - Adult  Goal: Free from fall injury  6/25/2025 2239 by Epifanio Oates RN  Outcome: Progressing  6/25/2025 1652 by Cheyenne Camarena RN  Outcome: Progressing

## 2025-06-27 PROBLEM — E44.0 MODERATE MALNUTRITION: Status: ACTIVE | Noted: 2025-06-27

## 2025-06-27 LAB
ALBUMIN SERPL-MCNC: 3.6 G/DL (ref 3.5–5.2)
ALP SERPL-CCNC: 58 U/L (ref 40–129)
ALT SERPL-CCNC: 5 U/L (ref 10–50)
ANION GAP SERPL CALCULATED.3IONS-SCNC: 10 MMOL/L (ref 8–16)
AST SERPL-CCNC: 23 U/L (ref 10–50)
BASOPHILS # BLD: 0 K/UL (ref 0–0.2)
BASOPHILS NFR BLD: 0.5 % (ref 0–1)
BILIRUB SERPL-MCNC: 0.5 MG/DL (ref 0.2–1.2)
BUN SERPL-MCNC: 37 MG/DL (ref 8–23)
CALCIUM SERPL-MCNC: 8.7 MG/DL (ref 8.8–10.2)
CHLORIDE SERPL-SCNC: 100 MMOL/L (ref 98–107)
CO2 SERPL-SCNC: 25 MMOL/L (ref 22–29)
CREAT SERPL-MCNC: 1.3 MG/DL (ref 0.7–1.2)
EOSINOPHIL # BLD: 0.1 K/UL (ref 0–0.6)
EOSINOPHIL NFR BLD: 2.2 % (ref 0–5)
ERYTHROCYTE [DISTWIDTH] IN BLOOD BY AUTOMATED COUNT: 15.2 % (ref 11.5–14.5)
GLUCOSE BLD-MCNC: 123 MG/DL (ref 70–99)
GLUCOSE BLD-MCNC: 134 MG/DL (ref 70–99)
GLUCOSE BLD-MCNC: 152 MG/DL (ref 70–99)
GLUCOSE BLD-MCNC: 225 MG/DL (ref 70–99)
GLUCOSE SERPL-MCNC: 113 MG/DL (ref 70–99)
HCT VFR BLD AUTO: 35.6 % (ref 42–52)
HGB BLD-MCNC: 11.6 G/DL (ref 14–18)
IMM GRANULOCYTES # BLD: 0 K/UL
LYMPHOCYTES # BLD: 1.9 K/UL (ref 1.1–4.5)
LYMPHOCYTES NFR BLD: 32.1 % (ref 20–40)
MCH RBC QN AUTO: 28.6 PG (ref 27–31)
MCHC RBC AUTO-ENTMCNC: 32.6 G/DL (ref 33–37)
MCV RBC AUTO: 87.7 FL (ref 80–94)
MONOCYTES # BLD: 0.7 K/UL (ref 0–0.9)
MONOCYTES NFR BLD: 11.9 % (ref 0–10)
NEUTROPHILS # BLD: 3.2 K/UL (ref 1.5–7.5)
NEUTS SEG NFR BLD: 52.6 % (ref 50–65)
PERFORMED ON: ABNORMAL
PLATELET # BLD AUTO: 160 K/UL (ref 130–400)
PMV BLD AUTO: 9.8 FL (ref 9.4–12.4)
POTASSIUM SERPL-SCNC: 4.4 MMOL/L (ref 3.5–5)
PROT SERPL-MCNC: 6.3 G/DL (ref 6.4–8.3)
RBC # BLD AUTO: 4.06 M/UL (ref 4.7–6.1)
SODIUM SERPL-SCNC: 135 MMOL/L (ref 136–145)
WBC # BLD AUTO: 6 K/UL (ref 4.8–10.8)

## 2025-06-27 PROCEDURE — 80053 COMPREHEN METABOLIC PANEL: CPT

## 2025-06-27 PROCEDURE — 51798 US URINE CAPACITY MEASURE: CPT

## 2025-06-27 PROCEDURE — 82962 GLUCOSE BLOOD TEST: CPT

## 2025-06-27 PROCEDURE — 51702 INSERT TEMP BLADDER CATH: CPT

## 2025-06-27 PROCEDURE — 85025 COMPLETE CBC W/AUTO DIFF WBC: CPT

## 2025-06-27 PROCEDURE — 94760 N-INVAS EAR/PLS OXIMETRY 1: CPT

## 2025-06-27 PROCEDURE — 6370000000 HC RX 637 (ALT 250 FOR IP)

## 2025-06-27 PROCEDURE — 6370000000 HC RX 637 (ALT 250 FOR IP): Performed by: NURSE PRACTITIONER

## 2025-06-27 PROCEDURE — 36415 COLL VENOUS BLD VENIPUNCTURE: CPT

## 2025-06-27 PROCEDURE — 51701 INSERT BLADDER CATHETER: CPT

## 2025-06-27 PROCEDURE — 1200000000 HC SEMI PRIVATE

## 2025-06-27 PROCEDURE — 2500000003 HC RX 250 WO HCPCS: Performed by: NURSE PRACTITIONER

## 2025-06-27 RX ORDER — SENNOSIDES 8.6 MG/1
2 TABLET ORAL DAILY
Status: DISCONTINUED | OUTPATIENT
Start: 2025-06-27 | End: 2025-06-30 | Stop reason: HOSPADM

## 2025-06-27 RX ORDER — POLYETHYLENE GLYCOL 3350 17 G/17G
17 POWDER, FOR SOLUTION ORAL DAILY
Status: DISCONTINUED | OUTPATIENT
Start: 2025-06-27 | End: 2025-06-30 | Stop reason: HOSPADM

## 2025-06-27 RX ADMIN — PANTOPRAZOLE SODIUM 40 MG: 40 TABLET, DELAYED RELEASE ORAL at 05:45

## 2025-06-27 RX ADMIN — GABAPENTIN 100 MG: 100 CAPSULE ORAL at 20:31

## 2025-06-27 RX ADMIN — SENNOSIDES 17.2 MG: 8.6 TABLET, COATED ORAL at 20:30

## 2025-06-27 RX ADMIN — APIXABAN 2.5 MG: 2.5 TABLET, FILM COATED ORAL at 08:56

## 2025-06-27 RX ADMIN — POLYETHYLENE GLYCOL 3350 17 G: 17 POWDER, FOR SOLUTION ORAL at 20:31

## 2025-06-27 RX ADMIN — SODIUM CHLORIDE, PRESERVATIVE FREE 10 ML: 5 INJECTION INTRAVENOUS at 20:31

## 2025-06-27 RX ADMIN — GABAPENTIN 100 MG: 100 CAPSULE ORAL at 08:56

## 2025-06-27 RX ADMIN — APIXABAN 2.5 MG: 2.5 TABLET, FILM COATED ORAL at 20:31

## 2025-06-27 RX ADMIN — AMIODARONE HYDROCHLORIDE 200 MG: 200 TABLET ORAL at 08:57

## 2025-06-27 RX ADMIN — TAMSULOSIN HYDROCHLORIDE 0.4 MG: 0.4 CAPSULE ORAL at 20:31

## 2025-06-27 RX ADMIN — SODIUM CHLORIDE, PRESERVATIVE FREE 10 ML: 5 INJECTION INTRAVENOUS at 09:03

## 2025-06-27 RX ADMIN — SODIUM CHLORIDE, PRESERVATIVE FREE 10 ML: 5 INJECTION INTRAVENOUS at 05:49

## 2025-06-27 RX ADMIN — CARBIDOPA AND LEVODOPA 1 TABLET: 25; 100 TABLET ORAL at 08:57

## 2025-06-27 RX ADMIN — CARBIDOPA AND LEVODOPA 1 TABLET: 25; 100 TABLET ORAL at 20:30

## 2025-06-27 RX ADMIN — AMANTADINE HYDROCHLORIDE 100 MG: 100 CAPSULE, LIQUID FILLED ORAL at 20:31

## 2025-06-27 RX ADMIN — ATORVASTATIN CALCIUM 10 MG: 10 TABLET, FILM COATED ORAL at 08:56

## 2025-06-27 RX ADMIN — INSULIN LISPRO 1 UNITS: 100 INJECTION, SOLUTION INTRAVENOUS; SUBCUTANEOUS at 11:46

## 2025-06-27 RX ADMIN — CLOPIDOGREL BISULFATE 75 MG: 75 TABLET, FILM COATED ORAL at 08:56

## 2025-06-27 RX ADMIN — AMANTADINE HYDROCHLORIDE 100 MG: 100 CAPSULE, LIQUID FILLED ORAL at 08:56

## 2025-06-27 RX ADMIN — CARVEDILOL 12.5 MG: 12.5 TABLET, FILM COATED ORAL at 18:22

## 2025-06-27 NOTE — PLAN OF CARE
Nutrition Problem #1: Inadequate protein-energy intake, Altered nutrition-related lab values  Intervention: Food and/or Nutrient Delivery: Continue Current Diet, Modify Oral Nutrition Supplement  Nutritional

## 2025-06-27 NOTE — ACP (ADVANCE CARE PLANNING)
Advance Care Planning     Advance Care Planning Inpatient Note  [facility]    Today's Date: 6/26/2025  Unit: [facility]    Received request from rounding.  Upon review of chart and communication with care team, patient's decision making abilities are not in question.. Patient was/were present in the room during visit.    Goals of ACP Conversation:  Facilitate a discussion related to patient's goals of care as they align with the patient's values and beliefs.    Health Care Decision Makers:       Primary Decision Maker: Liliana Mack - [facility] - [phone]  Summary:  Verified Healthcare Decision Maker     Advance Care Planning Documents (Patient Wishes):  None     Assessment:  Mr. Mack named his wife as PDM.    Interventions:  Encouraged ongoing ACP conversation with future decision makers and loved ones    Care Preferences Communicated:   No    Outcomes/Plan:  ACP Discussion: Completed    Electronically signed by Chaplain Quarles Mai on 6/26/2025 at 7:37 PM

## 2025-06-27 NOTE — PLAN OF CARE
Problem: Chronic Conditions and Co-morbidities  Goal: Patient's chronic conditions and co-morbidity symptoms are monitored and maintained or improved  6/26/2025 2031 by Epifanio Oates RN  Outcome: Progressing  6/26/2025 1540 by Leatha Barboza RN  Outcome: Progressing  Flowsheets (Taken 6/26/2025 0853)  Care Plan - Patient's Chronic Conditions and Co-Morbidity Symptoms are Monitored and Maintained or Improved: Monitor and assess patient's chronic conditions and comorbid symptoms for stability, deterioration, or improvement     Problem: Discharge Planning  Goal: Discharge to home or other facility with appropriate resources  6/26/2025 2031 by Epifanio Oates RN  Outcome: Progressing  6/26/2025 1540 by Leatha Barboza RN  Outcome: Progressing  Flowsheets (Taken 6/26/2025 0853)  Discharge to home or other facility with appropriate resources:   Identify barriers to discharge with patient and caregiver   Arrange for needed discharge resources and transportation as appropriate   Identify discharge learning needs (meds, wound care, etc)     Problem: Skin/Tissue Integrity  Goal: Skin integrity remains intact  Description: 1.  Monitor for areas of redness and/or skin breakdown  2.  Assess vascular access sites hourly  3.  Every 4-6 hours minimum:  Change oxygen saturation probe site  4.  Every 4-6 hours:  If on nasal continuous positive airway pressure, respiratory therapy assess nares and determine need for appliance change or resting period  6/26/2025 2031 by Epifanio Oates RN  Outcome: Progressing  6/26/2025 1540 by Leatha Barboza RN  Outcome: Progressing  Flowsheets (Taken 6/26/2025 0853)  Skin Integrity Remains Intact:   Monitor for areas of redness and/or skin breakdown   Assess vascular access sites hourly     Problem: ABCDS Injury Assessment  Goal: Absence of physical injury  6/26/2025 2031 by Epifanio Oates RN  Outcome: Progressing  6/26/2025 1540 by Leatha Barboza RN  Outcome: Progressing     Problem: Safety - Adult  Goal: Free

## 2025-06-27 NOTE — CONSULTS
Nutrition Assessment     Type and Reason for Visit: Reassess    Nutrition Recommendations/Plan:   Modify current diet order to include Carb control  Start Glucerna as ONS     Malnutrition Assessment:  Malnutrition Status: Moderate malnutrition    Nutrition Assessment:  Patient eating breakfast at time of visit.  Less c/o nausea and vomiting at this time.  Modified current ONS as pt did not tolerate Ensure Clear at all.  Modifying diet also with addition of Carb contrrol as accuchek's are .  A1c 6.5  New weight not available.    Estimated Daily Nutrient Needs:  Energy (kcal):  1985-2382 kcals (25-30 kcals/kg) Weight Used for Energy Requirements: Current     Protein (g):  79-159g Weight Used for Protein Requirements: Current        Fluid (ml/day):  1985-2382 ml Method Used for Fluid Requirements: 1 ml/kcal    Nutrition Related Findings:   no edema noted.   BM 6/24 Wound Type: Stage II    Current Nutrition Therapies:    ADULT DIET; Regular; 4 carb choices (60 gm/meal); GI Donley (GERD/Peptic Ulcer)  ADULT ORAL NUTRITION SUPPLEMENT; Breakfast, Lunch, Dinner; Diabetic Oral Supplement    Anthropometric Measures:  Height: 180.3 cm (5' 10.98\")  Current Body Wt: 79.4 kg (175 lb 0.7 oz)   BMI: 24.4        Nutrition Diagnosis:   Inadequate protein-energy intake, Altered nutrition-related lab values related to altered GI function, nausea/vomiting/diarrhea, increase demand for energy/nutrients, endocrine dysfunction as evidenced by intake 26-50%, poor intake prior to admission, nausea, vomiting, wounds, lab values    Nutrition Interventions:   Food and/or Nutrient Delivery: Continue Current Diet, Modify Oral Nutrition Supplement  Nutrition Education/Counseling: Education/Counseling completed  Coordination of Nutrition Care: Continue to monitor while inpatient  Plan of Care discussed with: pt and wife    Goals:  Goals: Meet at least 75% of estimated needs, PO intake 50% or greater, Maintain adequate nutrition status  Type of

## 2025-06-27 NOTE — CARE COORDINATION
Pt listed with [facility]   P   F ()   F (Ref to Keli Smith)    They accepted just waiting for bed to open, he is first on wait list.     Electronically signed by Yonatan Barrios MBA, BSW on 6/27/2025 at 12:48 PM

## 2025-06-28 ENCOUNTER — APPOINTMENT (OUTPATIENT)
Dept: GENERAL RADIOLOGY | Age: 80
DRG: 683 | End: 2025-06-28
Payer: MEDICARE

## 2025-06-28 LAB
ALBUMIN SERPL-MCNC: 3.6 G/DL (ref 3.5–5.2)
ALP SERPL-CCNC: 57 U/L (ref 40–129)
ALT SERPL-CCNC: 9 U/L (ref 10–50)
ANION GAP SERPL CALCULATED.3IONS-SCNC: 10 MMOL/L (ref 8–16)
AST SERPL-CCNC: 22 U/L (ref 10–50)
BASOPHILS # BLD: 0.1 K/UL (ref 0–0.2)
BASOPHILS NFR BLD: 0.7 % (ref 0–1)
BILIRUB SERPL-MCNC: 0.4 MG/DL (ref 0.2–1.2)
BUN SERPL-MCNC: 32 MG/DL (ref 8–23)
CALCIUM SERPL-MCNC: 8.7 MG/DL (ref 8.8–10.2)
CHLORIDE SERPL-SCNC: 100 MMOL/L (ref 98–107)
CO2 SERPL-SCNC: 26 MMOL/L (ref 22–29)
CREAT SERPL-MCNC: 1.3 MG/DL (ref 0.7–1.2)
EOSINOPHIL # BLD: 0.2 K/UL (ref 0–0.6)
EOSINOPHIL NFR BLD: 2.5 % (ref 0–5)
ERYTHROCYTE [DISTWIDTH] IN BLOOD BY AUTOMATED COUNT: 15 % (ref 11.5–14.5)
GLUCOSE BLD-MCNC: 113 MG/DL (ref 70–99)
GLUCOSE BLD-MCNC: 128 MG/DL (ref 70–99)
GLUCOSE BLD-MCNC: 129 MG/DL (ref 70–99)
GLUCOSE BLD-MCNC: 226 MG/DL (ref 70–99)
GLUCOSE SERPL-MCNC: 124 MG/DL (ref 70–99)
HCT VFR BLD AUTO: 35.7 % (ref 42–52)
HGB BLD-MCNC: 11.8 G/DL (ref 14–18)
IMM GRANULOCYTES # BLD: 0 K/UL
LYMPHOCYTES # BLD: 1.8 K/UL (ref 1.1–4.5)
LYMPHOCYTES NFR BLD: 25.4 % (ref 20–40)
MCH RBC QN AUTO: 29.1 PG (ref 27–31)
MCHC RBC AUTO-ENTMCNC: 33.1 G/DL (ref 33–37)
MCV RBC AUTO: 87.9 FL (ref 80–94)
MONOCYTES # BLD: 0.8 K/UL (ref 0–0.9)
MONOCYTES NFR BLD: 10.8 % (ref 0–10)
NEUTROPHILS # BLD: 4.4 K/UL (ref 1.5–7.5)
NEUTS SEG NFR BLD: 60 % (ref 50–65)
PERFORMED ON: ABNORMAL
PLATELET # BLD AUTO: 150 K/UL (ref 130–400)
PMV BLD AUTO: 9.9 FL (ref 9.4–12.4)
POTASSIUM SERPL-SCNC: 4.6 MMOL/L (ref 3.5–5)
PROT SERPL-MCNC: 6.2 G/DL (ref 6.4–8.3)
RBC # BLD AUTO: 4.06 M/UL (ref 4.7–6.1)
SODIUM SERPL-SCNC: 136 MMOL/L (ref 136–145)
WBC # BLD AUTO: 7.2 K/UL (ref 4.8–10.8)

## 2025-06-28 PROCEDURE — 80053 COMPREHEN METABOLIC PANEL: CPT

## 2025-06-28 PROCEDURE — 1200000000 HC SEMI PRIVATE

## 2025-06-28 PROCEDURE — 85025 COMPLETE CBC W/AUTO DIFF WBC: CPT

## 2025-06-28 PROCEDURE — 51798 US URINE CAPACITY MEASURE: CPT

## 2025-06-28 PROCEDURE — 36415 COLL VENOUS BLD VENIPUNCTURE: CPT

## 2025-06-28 PROCEDURE — 2500000003 HC RX 250 WO HCPCS: Performed by: NURSE PRACTITIONER

## 2025-06-28 PROCEDURE — 6370000000 HC RX 637 (ALT 250 FOR IP)

## 2025-06-28 PROCEDURE — 6370000000 HC RX 637 (ALT 250 FOR IP): Performed by: NURSE PRACTITIONER

## 2025-06-28 PROCEDURE — 74018 RADEX ABDOMEN 1 VIEW: CPT

## 2025-06-28 PROCEDURE — 82962 GLUCOSE BLOOD TEST: CPT

## 2025-06-28 PROCEDURE — 94760 N-INVAS EAR/PLS OXIMETRY 1: CPT

## 2025-06-28 RX ORDER — BISACODYL 10 MG
10 SUPPOSITORY, RECTAL RECTAL DAILY
Status: DISCONTINUED | OUTPATIENT
Start: 2025-06-28 | End: 2025-06-30 | Stop reason: HOSPADM

## 2025-06-28 RX ORDER — GUAIFENESIN 600 MG/1
600 TABLET, EXTENDED RELEASE ORAL 2 TIMES DAILY
Status: DISCONTINUED | OUTPATIENT
Start: 2025-06-28 | End: 2025-06-30 | Stop reason: HOSPADM

## 2025-06-28 RX ADMIN — ATORVASTATIN CALCIUM 10 MG: 10 TABLET, FILM COATED ORAL at 09:11

## 2025-06-28 RX ADMIN — CLOPIDOGREL BISULFATE 75 MG: 75 TABLET, FILM COATED ORAL at 09:11

## 2025-06-28 RX ADMIN — GABAPENTIN 100 MG: 100 CAPSULE ORAL at 14:45

## 2025-06-28 RX ADMIN — GUAIFENESIN 600 MG: 600 TABLET, EXTENDED RELEASE ORAL at 14:44

## 2025-06-28 RX ADMIN — AMANTADINE HYDROCHLORIDE 100 MG: 100 CAPSULE, LIQUID FILLED ORAL at 09:12

## 2025-06-28 RX ADMIN — SENNOSIDES 17.2 MG: 8.6 TABLET, COATED ORAL at 09:11

## 2025-06-28 RX ADMIN — AMIODARONE HYDROCHLORIDE 200 MG: 200 TABLET ORAL at 09:12

## 2025-06-28 RX ADMIN — GABAPENTIN 100 MG: 100 CAPSULE ORAL at 20:16

## 2025-06-28 RX ADMIN — APIXABAN 2.5 MG: 2.5 TABLET, FILM COATED ORAL at 09:11

## 2025-06-28 RX ADMIN — TAMSULOSIN HYDROCHLORIDE 0.4 MG: 0.4 CAPSULE ORAL at 20:15

## 2025-06-28 RX ADMIN — BISACODYL 10 MG: 10 SUPPOSITORY RECTAL at 10:36

## 2025-06-28 RX ADMIN — POLYETHYLENE GLYCOL 3350 17 G: 17 POWDER, FOR SOLUTION ORAL at 09:11

## 2025-06-28 RX ADMIN — PANTOPRAZOLE SODIUM 40 MG: 40 TABLET, DELAYED RELEASE ORAL at 05:18

## 2025-06-28 RX ADMIN — AMANTADINE HYDROCHLORIDE 100 MG: 100 CAPSULE, LIQUID FILLED ORAL at 20:15

## 2025-06-28 RX ADMIN — SODIUM CHLORIDE, PRESERVATIVE FREE 10 ML: 5 INJECTION INTRAVENOUS at 09:14

## 2025-06-28 RX ADMIN — CARBIDOPA AND LEVODOPA 1 TABLET: 25; 100 TABLET ORAL at 20:16

## 2025-06-28 RX ADMIN — INSULIN LISPRO 1 UNITS: 100 INJECTION, SOLUTION INTRAVENOUS; SUBCUTANEOUS at 11:53

## 2025-06-28 RX ADMIN — GABAPENTIN 100 MG: 100 CAPSULE ORAL at 09:11

## 2025-06-28 RX ADMIN — GUAIFENESIN 600 MG: 600 TABLET, EXTENDED RELEASE ORAL at 20:16

## 2025-06-28 RX ADMIN — APIXABAN 2.5 MG: 2.5 TABLET, FILM COATED ORAL at 20:15

## 2025-06-28 RX ADMIN — CARBIDOPA AND LEVODOPA 1 TABLET: 25; 100 TABLET ORAL at 09:11

## 2025-06-28 RX ADMIN — SODIUM CHLORIDE, PRESERVATIVE FREE 10 ML: 5 INJECTION INTRAVENOUS at 20:15

## 2025-06-28 NOTE — PLAN OF CARE
Problem: Chronic Conditions and Co-morbidities  Goal: Patient's chronic conditions and co-morbidity symptoms are monitored and maintained or improved  6/27/2025 2337 by Epifanio Oates RN  Outcome: Progressing  6/27/2025 1528 by Leatha Barboza RN  Outcome: Progressing  Flowsheets (Taken 6/27/2025 0910)  Care Plan - Patient's Chronic Conditions and Co-Morbidity Symptoms are Monitored and Maintained or Improved: Monitor and assess patient's chronic conditions and comorbid symptoms for stability, deterioration, or improvement     Problem: Discharge Planning  Goal: Discharge to home or other facility with appropriate resources  6/27/2025 2337 by Epifanio Oates RN  Outcome: Progressing  6/27/2025 1528 by Leatha Barboza RN  Outcome: Progressing  Flowsheets (Taken 6/27/2025 0910)  Discharge to home or other facility with appropriate resources:   Identify barriers to discharge with patient and caregiver   Arrange for needed discharge resources and transportation as appropriate   Identify discharge learning needs (meds, wound care, etc)     Problem: Skin/Tissue Integrity  Goal: Skin integrity remains intact  Description: 1.  Monitor for areas of redness and/or skin breakdown  2.  Assess vascular access sites hourly  3.  Every 4-6 hours minimum:  Change oxygen saturation probe site  4.  Every 4-6 hours:  If on nasal continuous positive airway pressure, respiratory therapy assess nares and determine need for appliance change or resting period  6/27/2025 2337 by Epifanio Oates RN  Outcome: Progressing  6/27/2025 1528 by Leatha Barboza RN  Outcome: Progressing  Flowsheets (Taken 6/27/2025 0910)  Skin Integrity Remains Intact: Monitor for areas of redness and/or skin breakdown     Problem: ABCDS Injury Assessment  Goal: Absence of physical injury  6/27/2025 2337 by Epifanio Oates RN  Outcome: Progressing  6/27/2025 1528 by Leatha Barboza RN  Outcome: Progressing     Problem: Safety - Adult  Goal: Free from fall injury  6/27/2025 2337 by

## 2025-06-29 LAB
25(OH)D3 SERPL-MCNC: 20.9 NG/ML
ALBUMIN SERPL-MCNC: 3.7 G/DL (ref 3.5–5.2)
ALP SERPL-CCNC: 59 U/L (ref 40–129)
ALT SERPL-CCNC: 9 U/L (ref 10–50)
ANION GAP SERPL CALCULATED.3IONS-SCNC: 11 MMOL/L (ref 8–16)
AST SERPL-CCNC: 20 U/L (ref 10–50)
BASOPHILS # BLD: 0 K/UL (ref 0–0.2)
BASOPHILS NFR BLD: 0.5 % (ref 0–1)
BILIRUB SERPL-MCNC: 0.5 MG/DL (ref 0.2–1.2)
BUN SERPL-MCNC: 34 MG/DL (ref 8–23)
CALCIUM SERPL-MCNC: 8.8 MG/DL (ref 8.8–10.2)
CHLORIDE SERPL-SCNC: 99 MMOL/L (ref 98–107)
CO2 SERPL-SCNC: 25 MMOL/L (ref 22–29)
CREAT SERPL-MCNC: 1.6 MG/DL (ref 0.7–1.2)
EOSINOPHIL # BLD: 0.2 K/UL (ref 0–0.6)
EOSINOPHIL NFR BLD: 2.9 % (ref 0–5)
ERYTHROCYTE [DISTWIDTH] IN BLOOD BY AUTOMATED COUNT: 15.2 % (ref 11.5–14.5)
FERRITIN SERPL-MCNC: 744 NG/ML (ref 30–400)
FOLATE SERPL-MCNC: 8 NG/ML (ref 4.5–32.2)
GLUCOSE BLD-MCNC: 133 MG/DL (ref 70–99)
GLUCOSE BLD-MCNC: 144 MG/DL (ref 70–99)
GLUCOSE BLD-MCNC: 165 MG/DL (ref 70–99)
GLUCOSE BLD-MCNC: 168 MG/DL (ref 70–99)
GLUCOSE SERPL-MCNC: 126 MG/DL (ref 70–99)
HCT VFR BLD AUTO: 34.1 % (ref 42–52)
HGB BLD-MCNC: 11.2 G/DL (ref 14–18)
IMM GRANULOCYTES # BLD: 0 K/UL
IRON SATN MFR SERPL: 32 % (ref 20–50)
IRON SERPL-MCNC: 63 UG/DL (ref 59–158)
LYMPHOCYTES # BLD: 1.6 K/UL (ref 1.1–4.5)
LYMPHOCYTES NFR BLD: 28 % (ref 20–40)
MCH RBC QN AUTO: 29 PG (ref 27–31)
MCHC RBC AUTO-ENTMCNC: 32.8 G/DL (ref 33–37)
MCV RBC AUTO: 88.3 FL (ref 80–94)
MONOCYTES # BLD: 0.7 K/UL (ref 0–0.9)
MONOCYTES NFR BLD: 11.6 % (ref 0–10)
NEUTROPHILS # BLD: 3.2 K/UL (ref 1.5–7.5)
NEUTS SEG NFR BLD: 56.3 % (ref 50–65)
PERFORMED ON: ABNORMAL
PLATELET # BLD AUTO: 143 K/UL (ref 130–400)
PMV BLD AUTO: 10.2 FL (ref 9.4–12.4)
POTASSIUM SERPL-SCNC: 4.7 MMOL/L (ref 3.5–5)
PROT SERPL-MCNC: 6.3 G/DL (ref 6.4–8.3)
RBC # BLD AUTO: 3.86 M/UL (ref 4.7–6.1)
SODIUM SERPL-SCNC: 135 MMOL/L (ref 136–145)
TIBC SERPL-MCNC: 199 UG/DL (ref 250–400)
VIT B12 SERPL-MCNC: 237 PG/ML (ref 232–1245)
WBC # BLD AUTO: 5.6 K/UL (ref 4.8–10.8)

## 2025-06-29 PROCEDURE — 1200000000 HC SEMI PRIVATE

## 2025-06-29 PROCEDURE — 51701 INSERT BLADDER CATHETER: CPT

## 2025-06-29 PROCEDURE — 82728 ASSAY OF FERRITIN: CPT

## 2025-06-29 PROCEDURE — 82962 GLUCOSE BLOOD TEST: CPT

## 2025-06-29 PROCEDURE — 82306 VITAMIN D 25 HYDROXY: CPT

## 2025-06-29 PROCEDURE — 51798 US URINE CAPACITY MEASURE: CPT

## 2025-06-29 PROCEDURE — 83540 ASSAY OF IRON: CPT

## 2025-06-29 PROCEDURE — 80053 COMPREHEN METABOLIC PANEL: CPT

## 2025-06-29 PROCEDURE — 85025 COMPLETE CBC W/AUTO DIFF WBC: CPT

## 2025-06-29 PROCEDURE — 82746 ASSAY OF FOLIC ACID SERUM: CPT

## 2025-06-29 PROCEDURE — 6360000002 HC RX W HCPCS: Performed by: NURSE PRACTITIONER

## 2025-06-29 PROCEDURE — 2580000003 HC RX 258

## 2025-06-29 PROCEDURE — 83550 IRON BINDING TEST: CPT

## 2025-06-29 PROCEDURE — 94760 N-INVAS EAR/PLS OXIMETRY 1: CPT

## 2025-06-29 PROCEDURE — 6370000000 HC RX 637 (ALT 250 FOR IP): Performed by: NURSE PRACTITIONER

## 2025-06-29 PROCEDURE — 82607 VITAMIN B-12: CPT

## 2025-06-29 PROCEDURE — 36415 COLL VENOUS BLD VENIPUNCTURE: CPT

## 2025-06-29 PROCEDURE — 6370000000 HC RX 637 (ALT 250 FOR IP)

## 2025-06-29 RX ORDER — SODIUM CHLORIDE 9 MG/ML
INJECTION, SOLUTION INTRAVENOUS CONTINUOUS
Status: ACTIVE | OUTPATIENT
Start: 2025-06-29 | End: 2025-06-29

## 2025-06-29 RX ADMIN — GABAPENTIN 100 MG: 100 CAPSULE ORAL at 19:33

## 2025-06-29 RX ADMIN — AMIODARONE HYDROCHLORIDE 200 MG: 200 TABLET ORAL at 08:15

## 2025-06-29 RX ADMIN — GUAIFENESIN 600 MG: 600 TABLET, EXTENDED RELEASE ORAL at 08:11

## 2025-06-29 RX ADMIN — SENNOSIDES 17.2 MG: 8.6 TABLET, COATED ORAL at 08:11

## 2025-06-29 RX ADMIN — AMANTADINE HYDROCHLORIDE 100 MG: 100 CAPSULE, LIQUID FILLED ORAL at 19:33

## 2025-06-29 RX ADMIN — CARBIDOPA AND LEVODOPA 1 TABLET: 25; 100 TABLET ORAL at 08:11

## 2025-06-29 RX ADMIN — GUAIFENESIN 600 MG: 600 TABLET, EXTENDED RELEASE ORAL at 19:33

## 2025-06-29 RX ADMIN — TAMSULOSIN HYDROCHLORIDE 0.4 MG: 0.4 CAPSULE ORAL at 19:33

## 2025-06-29 RX ADMIN — ONDANSETRON 4 MG: 2 INJECTION, SOLUTION INTRAMUSCULAR; INTRAVENOUS at 21:06

## 2025-06-29 RX ADMIN — GABAPENTIN 100 MG: 100 CAPSULE ORAL at 14:47

## 2025-06-29 RX ADMIN — AMANTADINE HYDROCHLORIDE 100 MG: 100 CAPSULE, LIQUID FILLED ORAL at 08:11

## 2025-06-29 RX ADMIN — GABAPENTIN 100 MG: 100 CAPSULE ORAL at 08:11

## 2025-06-29 RX ADMIN — CARBIDOPA AND LEVODOPA 1 TABLET: 25; 100 TABLET ORAL at 19:33

## 2025-06-29 RX ADMIN — ATORVASTATIN CALCIUM 10 MG: 10 TABLET, FILM COATED ORAL at 08:12

## 2025-06-29 RX ADMIN — POLYETHYLENE GLYCOL 3350 17 G: 17 POWDER, FOR SOLUTION ORAL at 08:15

## 2025-06-29 RX ADMIN — SODIUM CHLORIDE: 0.9 INJECTION, SOLUTION INTRAVENOUS at 08:10

## 2025-06-29 RX ADMIN — PANTOPRAZOLE SODIUM 40 MG: 40 TABLET, DELAYED RELEASE ORAL at 03:00

## 2025-06-29 RX ADMIN — CLOPIDOGREL BISULFATE 75 MG: 75 TABLET, FILM COATED ORAL at 08:11

## 2025-06-29 RX ADMIN — APIXABAN 2.5 MG: 2.5 TABLET, FILM COATED ORAL at 08:12

## 2025-06-29 RX ADMIN — APIXABAN 2.5 MG: 2.5 TABLET, FILM COATED ORAL at 19:33

## 2025-06-29 NOTE — PLAN OF CARE
Problem: Chronic Conditions and Co-morbidities  Goal: Patient's chronic conditions and co-morbidity symptoms are monitored and maintained or improved  6/29/2025 1733 by Gladys Sarkar RN  Outcome: Progressing  6/29/2025 1709 by Gladys Sarkar RN  Outcome: Progressing     Problem: Discharge Planning  Goal: Discharge to home or other facility with appropriate resources  6/29/2025 1733 by Gladys Sarkar RN  Outcome: Progressing  6/29/2025 1709 by Gladys Sarkar RN  Outcome: Progressing     Problem: Skin/Tissue Integrity  Goal: Skin integrity remains intact  Description: 1.  Monitor for areas of redness and/or skin breakdown  2.  Assess vascular access sites hourly  3.  Every 4-6 hours minimum:  Change oxygen saturation probe site  4.  Every 4-6 hours:  If on nasal continuous positive airway pressure, respiratory therapy assess nares and determine need for appliance change or resting period  6/29/2025 1733 by Gladys Sarkar RN  Outcome: Progressing  6/29/2025 1709 by Gladys Sarkar RN  Outcome: Progressing     Problem: ABCDS Injury Assessment  Goal: Absence of physical injury  6/29/2025 1733 by Gladys Sarkar RN  Outcome: Progressing  6/29/2025 1709 by Gladys Sarkar RN  Outcome: Progressing     Problem: Safety - Adult  Goal: Free from fall injury  6/29/2025 1733 by Gladys Sarkar RN  Outcome: Progressing  6/29/2025 1709 by Gladys Sarkar RN  Outcome: Progressing     Problem: Nutrition Deficit:  Goal: Optimize nutritional status  6/29/2025 1733 by Gladys Sarkar RN  Outcome: Progressing  6/29/2025 1709 by Gladys Sarkar RN  Outcome: Progressing

## 2025-06-29 NOTE — PLAN OF CARE
Problem: Chronic Conditions and Co-morbidities  Goal: Patient's chronic conditions and co-morbidity symptoms are monitored and maintained or improved  Outcome: Progressing     Problem: Discharge Planning  Goal: Discharge to home or other facility with appropriate resources  Outcome: Progressing     Problem: Skin/Tissue Integrity  Goal: Skin integrity remains intact  Description: 1.  Monitor for areas of redness and/or skin breakdown  2.  Assess vascular access sites hourly  3.  Every 4-6 hours minimum:  Change oxygen saturation probe site  4.  Every 4-6 hours:  If on nasal continuous positive airway pressure, respiratory therapy assess nares and determine need for appliance change or resting period  Outcome: Progressing     Problem: ABCDS Injury Assessment  Goal: Absence of physical injury  Outcome: Progressing     Problem: Safety - Adult  Goal: Free from fall injury  Outcome: Progressing     Problem: Nutrition Deficit:  Goal: Optimize nutritional status  Outcome: Progressing

## 2025-06-30 ENCOUNTER — TELEPHONE (OUTPATIENT)
Dept: UROLOGY | Age: 80
End: 2025-06-30

## 2025-06-30 VITALS
WEIGHT: 176.1 LBS | TEMPERATURE: 97 F | BODY MASS INDEX: 24.65 KG/M2 | HEART RATE: 62 BPM | DIASTOLIC BLOOD PRESSURE: 63 MMHG | OXYGEN SATURATION: 98 % | SYSTOLIC BLOOD PRESSURE: 125 MMHG | HEIGHT: 71 IN | RESPIRATION RATE: 16 BRPM

## 2025-06-30 PROBLEM — R33.9 URINARY RETENTION: Status: ACTIVE | Noted: 2025-06-30

## 2025-06-30 LAB
ALBUMIN SERPL-MCNC: 3.4 G/DL (ref 3.5–5.2)
ALP SERPL-CCNC: 56 U/L (ref 40–129)
ALT SERPL-CCNC: 11 U/L (ref 10–50)
ANION GAP SERPL CALCULATED.3IONS-SCNC: 9 MMOL/L (ref 8–16)
AST SERPL-CCNC: 19 U/L (ref 10–50)
BASOPHILS # BLD: 0 K/UL (ref 0–0.2)
BASOPHILS NFR BLD: 0.2 % (ref 0–1)
BILIRUB SERPL-MCNC: 0.5 MG/DL (ref 0.2–1.2)
BUN SERPL-MCNC: 26 MG/DL (ref 8–23)
CALCIUM SERPL-MCNC: 8.2 MG/DL (ref 8.8–10.2)
CHLORIDE SERPL-SCNC: 103 MMOL/L (ref 98–107)
CO2 SERPL-SCNC: 23 MMOL/L (ref 22–29)
CREAT SERPL-MCNC: 1.1 MG/DL (ref 0.7–1.2)
EOSINOPHIL # BLD: 0.1 K/UL (ref 0–0.6)
EOSINOPHIL NFR BLD: 1.9 % (ref 0–5)
ERYTHROCYTE [DISTWIDTH] IN BLOOD BY AUTOMATED COUNT: 15.1 % (ref 11.5–14.5)
GLUCOSE BLD-MCNC: 114 MG/DL (ref 70–99)
GLUCOSE BLD-MCNC: 142 MG/DL (ref 70–99)
GLUCOSE SERPL-MCNC: 136 MG/DL (ref 70–99)
HCT VFR BLD AUTO: 32.9 % (ref 42–52)
HGB BLD-MCNC: 11.2 G/DL (ref 14–18)
IMM GRANULOCYTES # BLD: 0 K/UL
LYMPHOCYTES # BLD: 0.5 K/UL (ref 1.1–4.5)
LYMPHOCYTES NFR BLD: 10.9 % (ref 20–40)
MCH RBC QN AUTO: 29.2 PG (ref 27–31)
MCHC RBC AUTO-ENTMCNC: 34 G/DL (ref 33–37)
MCV RBC AUTO: 85.9 FL (ref 80–94)
MONOCYTES # BLD: 0.4 K/UL (ref 0–0.9)
MONOCYTES NFR BLD: 9 % (ref 0–10)
NEUTROPHILS # BLD: 3.6 K/UL (ref 1.5–7.5)
NEUTS SEG NFR BLD: 77.6 % (ref 50–65)
PERFORMED ON: ABNORMAL
PERFORMED ON: ABNORMAL
PLATELET # BLD AUTO: 124 K/UL (ref 130–400)
PMV BLD AUTO: 9.9 FL (ref 9.4–12.4)
POTASSIUM SERPL-SCNC: 4.8 MMOL/L (ref 3.5–5)
PROT SERPL-MCNC: 5.8 G/DL (ref 6.4–8.3)
RBC # BLD AUTO: 3.83 M/UL (ref 4.7–6.1)
SODIUM SERPL-SCNC: 135 MMOL/L (ref 136–145)
WBC # BLD AUTO: 4.7 K/UL (ref 4.8–10.8)

## 2025-06-30 PROCEDURE — 82962 GLUCOSE BLOOD TEST: CPT

## 2025-06-30 PROCEDURE — 94760 N-INVAS EAR/PLS OXIMETRY 1: CPT

## 2025-06-30 PROCEDURE — 6370000000 HC RX 637 (ALT 250 FOR IP)

## 2025-06-30 PROCEDURE — 6370000000 HC RX 637 (ALT 250 FOR IP): Performed by: NURSE PRACTITIONER

## 2025-06-30 PROCEDURE — 2500000003 HC RX 250 WO HCPCS: Performed by: NURSE PRACTITIONER

## 2025-06-30 PROCEDURE — 36415 COLL VENOUS BLD VENIPUNCTURE: CPT

## 2025-06-30 PROCEDURE — 85025 COMPLETE CBC W/AUTO DIFF WBC: CPT

## 2025-06-30 PROCEDURE — 80053 COMPREHEN METABOLIC PANEL: CPT

## 2025-06-30 RX ORDER — SPIRONOLACTONE 25 MG/1
25 TABLET ORAL DAILY
Qty: 30 TABLET | Refills: 0 | Status: SHIPPED | OUTPATIENT
Start: 2025-06-30 | End: 2025-07-30

## 2025-06-30 RX ORDER — AMIODARONE HYDROCHLORIDE 200 MG/1
200 TABLET ORAL DAILY
Qty: 30 TABLET | Refills: 0 | Status: SHIPPED | OUTPATIENT
Start: 2025-06-30 | End: 2025-07-30

## 2025-06-30 RX ORDER — CLOPIDOGREL BISULFATE 75 MG/1
75 TABLET ORAL DAILY
Qty: 30 TABLET | Refills: 0 | Status: SHIPPED | OUTPATIENT
Start: 2025-06-30 | End: 2025-07-30

## 2025-06-30 RX ORDER — AMANTADINE HYDROCHLORIDE 100 MG/1
100 CAPSULE, GELATIN COATED ORAL 2 TIMES DAILY
Qty: 60 CAPSULE | Refills: 0 | Status: SHIPPED | OUTPATIENT
Start: 2025-06-30 | End: 2025-07-30

## 2025-06-30 RX ORDER — CARVEDILOL 12.5 MG/1
12.5 TABLET ORAL 2 TIMES DAILY WITH MEALS
Qty: 60 TABLET | Refills: 0 | Status: SHIPPED | OUTPATIENT
Start: 2025-06-30 | End: 2025-07-30

## 2025-06-30 RX ORDER — CARBIDOPA AND LEVODOPA 25; 100 MG/1; MG/1
1 TABLET ORAL 2 TIMES DAILY
Qty: 60 TABLET | Refills: 0 | Status: SHIPPED | OUTPATIENT
Start: 2025-06-30 | End: 2025-07-30

## 2025-06-30 RX ORDER — PANTOPRAZOLE SODIUM 40 MG/1
40 TABLET, DELAYED RELEASE ORAL
Qty: 30 TABLET | Refills: 0 | Status: SHIPPED | OUTPATIENT
Start: 2025-07-01 | End: 2025-07-31

## 2025-06-30 RX ORDER — DICYCLOMINE HYDROCHLORIDE 10 MG/1
10 CAPSULE ORAL 3 TIMES DAILY PRN
Qty: 30 CAPSULE | Refills: 0 | Status: SHIPPED | OUTPATIENT
Start: 2025-06-30 | End: 2025-07-10

## 2025-06-30 RX ORDER — SACUBITRIL AND VALSARTAN 24; 26 MG/1; MG/1
1 TABLET, FILM COATED ORAL 2 TIMES DAILY
Qty: 60 TABLET | Refills: 0 | Status: SHIPPED | OUTPATIENT
Start: 2025-06-30 | End: 2025-07-30

## 2025-06-30 RX ORDER — TAMSULOSIN HYDROCHLORIDE 0.4 MG/1
0.4 CAPSULE ORAL NIGHTLY
Qty: 30 CAPSULE | Refills: 0 | Status: SHIPPED | OUTPATIENT
Start: 2025-06-30 | End: 2025-07-30

## 2025-06-30 RX ORDER — LOVASTATIN 40 MG/1
40 TABLET ORAL NIGHTLY
Qty: 30 TABLET | Refills: 0 | Status: SHIPPED | OUTPATIENT
Start: 2025-06-30 | End: 2025-07-30

## 2025-06-30 RX ORDER — ONDANSETRON 4 MG/1
4 TABLET, ORALLY DISINTEGRATING ORAL 3 TIMES DAILY PRN
Qty: 21 TABLET | Refills: 0 | Status: SHIPPED | OUTPATIENT
Start: 2025-06-30 | End: 2025-07-10

## 2025-06-30 RX ADMIN — AMIODARONE HYDROCHLORIDE 200 MG: 200 TABLET ORAL at 08:48

## 2025-06-30 RX ADMIN — AMANTADINE HYDROCHLORIDE 100 MG: 100 CAPSULE, LIQUID FILLED ORAL at 08:48

## 2025-06-30 RX ADMIN — SENNOSIDES 17.2 MG: 8.6 TABLET, COATED ORAL at 08:48

## 2025-06-30 RX ADMIN — GUAIFENESIN 600 MG: 600 TABLET, EXTENDED RELEASE ORAL at 08:48

## 2025-06-30 RX ADMIN — SODIUM CHLORIDE, PRESERVATIVE FREE 10 ML: 5 INJECTION INTRAVENOUS at 08:48

## 2025-06-30 RX ADMIN — BISACODYL 10 MG: 10 SUPPOSITORY RECTAL at 08:48

## 2025-06-30 RX ADMIN — CARVEDILOL 12.5 MG: 12.5 TABLET, FILM COATED ORAL at 08:48

## 2025-06-30 RX ADMIN — ATORVASTATIN CALCIUM 10 MG: 10 TABLET, FILM COATED ORAL at 08:48

## 2025-06-30 RX ADMIN — APIXABAN 2.5 MG: 2.5 TABLET, FILM COATED ORAL at 08:48

## 2025-06-30 RX ADMIN — CARBIDOPA AND LEVODOPA 1 TABLET: 25; 100 TABLET ORAL at 08:48

## 2025-06-30 RX ADMIN — GABAPENTIN 100 MG: 100 CAPSULE ORAL at 08:48

## 2025-06-30 RX ADMIN — CLOPIDOGREL BISULFATE 75 MG: 75 TABLET, FILM COATED ORAL at 08:48

## 2025-06-30 RX ADMIN — PANTOPRAZOLE SODIUM 40 MG: 40 TABLET, DELAYED RELEASE ORAL at 08:48

## 2025-06-30 RX ADMIN — POLYETHYLENE GLYCOL 3350 17 G: 17 POWDER, FOR SOLUTION ORAL at 08:49

## 2025-06-30 NOTE — CARE COORDINATION
Pt accepted the bed at [facility]. Can d/c when medically stable.  [facility]   P   F ()   F (Ref to [facility])  Electronically signed by Ly Staley on 6/30/2025 at 11:35 AM

## 2025-06-30 NOTE — CARE COORDINATION
Sw followed up with pt and pts spouse about SNF due to miguel angel has denied. Pts spouse states that she needs time to think on it.  Electronically signed by Ly Staley on 6/30/2025 at 11:15 AM

## 2025-06-30 NOTE — CARE COORDINATION
06/30/25 1134   IMM Letter   IMM Letter given to Patient/Family/Significant other/Guardian/POA/by: given to patient by vinayak limon   IMM Letter date given: 06/30/25   IMM Letter time given: 1130     Second IMM given to patient and explained with patient verbalizing understanding.  All questions and concerns addressed     Signed letter placed in pt soft chart   Patient declined waiting 4 hr period prior to discharge.  Electronically signed by Vinayak Staley on 6/30/2025 at 11:34 AM

## 2025-06-30 NOTE — PLAN OF CARE
Problem: Chronic Conditions and Co-morbidities  Goal: Patient's chronic conditions and co-morbidity symptoms are monitored and maintained or improved  6/29/2025 1936 by Lian Guy RN  Outcome: Progressing  6/29/2025 1733 by Gladys Sarkar RN  Outcome: Progressing  6/29/2025 1709 by Gladys Sarkar RN  Outcome: Progressing     Problem: Discharge Planning  Goal: Discharge to home or other facility with appropriate resources  6/29/2025 1936 by Lian Guy, RN  Outcome: Progressing  6/29/2025 1733 by Gladys Sarkar RN  Outcome: Progressing  6/29/2025 1709 by Gladys Sarkar RN  Outcome: Progressing     Problem: Skin/Tissue Integrity  Goal: Skin integrity remains intact  Description: 1.  Monitor for areas of redness and/or skin breakdown  2.  Assess vascular access sites hourly  3.  Every 4-6 hours minimum:  Change oxygen saturation probe site  4.  Every 4-6 hours:  If on nasal continuous positive airway pressure, respiratory therapy assess nares and determine need for appliance change or resting period  6/29/2025 1936 by Lian Guy, RN  Outcome: Progressing  6/29/2025 1733 by Gladys Sarkar RN  Outcome: Progressing  6/29/2025 1709 by Gladys Sarkar RN  Outcome: Progressing     Problem: ABCDS Injury Assessment  Goal: Absence of physical injury  6/29/2025 1936 by Lian Guy, RN  Outcome: Progressing  6/29/2025 1733 by Gladys Sarkar RN  Outcome: Progressing  6/29/2025 1709 by Gladys Sarkar RN  Outcome: Progressing     Problem: Safety - Adult  Goal: Free from fall injury  6/29/2025 1936 by Lian Guy, RN  Outcome: Progressing  6/29/2025 1733 by Gladys Sarkar RN  Outcome: Progressing  6/29/2025 1709 by Gladys Sarkar RN  Outcome: Progressing     Problem: Nutrition Deficit:  Goal: Optimize nutritional status  6/29/2025 1936 by Lian Guy, RN  Outcome: Progressing  6/29/2025 1733 by Gladys Sarkar RN  Outcome: Progressing  6/29/2025 1709 by Gladys Sarkar RN  Outcome: Progressing

## 2025-06-30 NOTE — DISCHARGE SUMMARY
Discharge Summary    NAME: Bong Mack  :  1945  MRN:  [id number]    Admit date:  2025  Discharge date:  2025    Admitting Physician:  Michelle Sharp MD    Advance Directive: Full Code    Consults: none    Primary Care Physician:  Chadd Blake,     Discharge Diagnoses:  Principal Problem:    YG (acute kidney injury)  Active Problems:    Parkinson disease with dyskinesia and fluctuating manifestations (HCC)    Type 2 diabetes mellitus without complication, without long-term current use of insulin (HCC)    HFrEF (heart failure with reduced ejection fraction) (HCC)    Ischemic cardiomyopathy    Coronary artery disease status post coronary stent insertion    Calculus of gallbladder without cholecystitis without obstruction    Paroxysmal A-fib (HCC)    Moderate malnutrition    Urinary retention  Resolved Problems:    * No resolved hospital problems. *      Significant Diagnostic Studies:   US RENAL COMPLETE  Result Date: 2025  EXAM: ULTRASOUND RETROPERITONEAL COMPLETE.  History: Acute kidney injury.  Comparison: none.  Technique: Grayscale, color flow, and spectral doppler ultrasound of the kidneys and urinary bladder.  Findings: Right kidney measures 12.1 cm. Normal parenchymal echogenicity. No mass, calcification, or hydronephrosis.  Left kidney measures 11.7 cm. Normal parenchymal echogenicity. No mass, calcification, or hydronephrosis.  Urinary bladder within normal limits.      Impression: No acute sonographic abnormality.    ______________________________________ Electronically signed by: ARNOLDO BRAY D.O. Date:     2025 Time:    16:42     XR CHEST PORTABLE  Result Date: 2025  EXAM: XR CHEST PORTABLE  HISTORY: weakness  COMPARISON: 2025      Cardiomediastinal countours appear stable.  There is no focal pulmonary consolidation.  No pleural effusion or pneumothorax.  Chronic elevation of the right diaphragm. This appears stable.  No acute cardiopulmonary

## 2025-06-30 NOTE — TELEPHONE ENCOUNTER
Romeo from hospital left VM stating the patient is discharging to SNF and that Dr. Silva wants him to follow up in 2 weeks.  does not see consult note on how patient is needing to follow up. Please advise.

## 2025-07-01 NOTE — PROGRESS NOTES
Subjective    Mr. Tinoco is 80 y.o. male    Chief Complaint: BPH    History of Present Illness  Patient presents for hospital follow-up he has a catheter in place.  He has been seen in the past with BPH and normally had no difficulties urinating.  During hospitalization he developed urinary retention.  Millard was placed 0 627 and is tamsulosin was resumed which had been stopped.  Also had a large stool burden and was given laxative.  He is here to have catheter removal.  Patient has not tamsulosin.    The following portions of the patient's history were reviewed and updated as appropriate: allergies, current medications, past family history, past medical history, past social history, past surgical history and problem list.    Review of Systems   Genitourinary:  Positive for difficulty urinating.         Current Outpatient Medications:     amantadine (SYMMETREL) 100 MG capsule, TAKE 1 CAPSULE BY MOUTH TWICE A DAY, Disp: 180 capsule, Rfl: 0    amiodarone (PACERONE) 200 MG tablet, Take 1 tablet by mouth Daily., Disp: , Rfl:     apixaban (ELIQUIS) 5 MG tablet tablet, Take 1 tablet by mouth 2 (Two) Times a Day., Disp: , Rfl:     carbidopa-levodopa (SINEMET)  MG per tablet, Take 1 tablet by mouth 2 (Two) Times a Day., Disp: , Rfl:     carvedilol (Coreg) 6.25 MG tablet, Take 1 tablet by mouth 2 (Two) Times a Day With Meals., Disp: 90 tablet, Rfl: 1    clopidogrel (PLAVIX) 75 MG tablet, Take 1 tablet by mouth Daily., Disp: 30 tablet, Rfl: 1    clotrimazole-betamethasone (LOTRISONE) 1-0.05 % cream, Apply 1 Application topically to the appropriate area as directed 2 (Two) Times a Day., Disp: 15 g, Rfl: 1    Denta 5000 Plus 1.1 % cream, APPLY A THIN RIBBON TO BRUSH BRUSH FOR 2 MINUTES PREFERABLY AT BEDTIME, Disp: , Rfl:     dicyclomine (BENTYL) 10 MG capsule, Take 1 capsule by mouth 4 (Four) Times a Day Before Meals & at Bedtime., Disp: 120 capsule, Rfl: 1    Entresto 24-26 MG tablet, TAKE 1 TABLET BY MOUTH TWICE A DAY,  Disp: 60 tablet, Rfl: 1    furosemide (LASIX) 20 MG tablet, TAKE 1 TABLET BY MOUTH EVERY DAY, Disp: 30 tablet, Rfl: 1    gabapentin (NEURONTIN) 100 MG capsule, Take 1 capsule by mouth 3 (Three) Times a Day., Disp: 90 capsule, Rfl: 1    glucose blood test strip, Use as instructed, Disp: 100 each, Rfl: 3    Lancets misc, Use 1 each Daily., Disp: 100 each, Rfl: 1    lovastatin (MEVACOR) 40 MG tablet, TAKE 1 TABLET BY MOUTH EVERY DAY, Disp: 90 tablet, Rfl: 3    metFORMIN (GLUCOPHAGE) 850 MG tablet, Take 1 tablet by mouth 2 (Two) Times a Day With Meals., Disp: 180 tablet, Rfl: 1    mupirocin (BACTROBAN) 2 % nasal ointment, Administer 1 Application into the nostril(s) as directed by provider 2 (Two) Times a Day., Disp: 30 g, Rfl: 1    naloxone (NARCAN) 4 MG/0.1ML nasal spray, , Disp: , Rfl:     ondansetron ODT (ZOFRAN-ODT) 4 MG disintegrating tablet, Take 1 tablet by mouth., Disp: , Rfl:     pantoprazole (PROTONIX) 40 MG EC tablet, Take 1 tablet by mouth., Disp: , Rfl:     spironolactone (Aldactone) 25 MG tablet, Take 1 tablet by mouth Daily., Disp: 90 tablet, Rfl: 1    tamsulosin (FLOMAX) 0.4 MG capsule 24 hr capsule, Take 1 capsule by mouth Every Night., Disp: 90 capsule, Rfl: 1    traMADol (ULTRAM) 50 MG tablet, TAKE 1 TABLET BY MOUTH EVERY 12 HOURS AS NEEDED FOR MODERATE PAIN., Disp: 60 tablet, Rfl: 0    Past Medical History:   Diagnosis Date    Arthritis     Cataract     had surgery in Feb 2023 both eyes    CHF (congestive heart failure) April 3    Heart catheter resulted in having 1 stent placed    Colon polyp     Diabetes mellitus     Difficulty walking     Diverticulosis     History of adenomatous polyp of colon     HL (hearing loss)     Hyperlipidemia     Hypertension     Parkinson's disease     Shingles     Tinnitus     Tremor        Past Surgical History:   Procedure Laterality Date    APPENDECTOMY      COLONOSCOPY  04/19/2016    polyp, tubular adenoma, diverticulosis    COLONOSCOPY N/A 03/19/2021    Procedure:  "COLONOSCOPY WITH ANESTHESIA;  Surgeon: Philip Ferrari MD;  Location:  [facility];  Service: Gastroenterology;  Laterality: N/A;  pre: hx polyps  post: poor prep  George Bond MD    COLONOSCOPY N/A 04/01/2021    Procedure: COLONOSCOPY WITH ANESTHESIA;  Surgeon: Philip Ferrari MD;  Location:  [facility];  Service: Gastroenterology;  Laterality: N/A;  preop; hx of polyps  postop; polyps   PCP George Bond     EYE SURGERY  feb 2023    HERNIA REPAIR      JOINT REPLACEMENT      KNEE SURGERY      VASECTOMY  1974       Social History     Socioeconomic History    Marital status:    Tobacco Use    Smoking status: Never     Passive exposure: Never    Smokeless tobacco: Never   Vaping Use    Vaping status: Never Used   Substance and Sexual Activity    Alcohol use: Not Currently    Drug use: Never    Sexual activity: Not Currently     Partners: Female     Birth control/protection: Vasectomy       Family History   Problem Relation Age of Onset    Parkinsonism Father     Dementia Mother     Rheum arthritis Mother     Colon cancer Neg Hx        Objective    Temp 97.4 °F (36.3 °C)   Ht 180.3 cm (70.98\")   Wt 79.8 kg (176 lb)   BMI 24.56 kg/m²     Physical Exam  Constitutional:       Appearance: Normal appearance.   HENT:      Head: Atraumatic.   Pulmonary:      Effort: Pulmonary effort is normal.   Skin:     Coloration: Skin is pale.   Neurological:      Mental Status: He is alert.           Assessment and Plan    Diagnoses and all orders for this visit:    1. BPH with obstruction/lower urinary tract symptoms (Primary)    2. Urinary retention      Patient whilst in the hospital with unrelated health issues developed urinary retention while in the hospital catheter was placed he is on tamsulosin which I recommend he continue.    Prior to hospitalization overall he was not having any difficulties urinating.    Remove catheter for voiding trial in 1 to 2 weeks.  Patient recently had cardiac stent " placed so he is not a surgical candidate at this time.

## 2025-07-02 ENCOUNTER — LAB REQUISITION (OUTPATIENT)
Dept: LAB | Facility: HOSPITAL | Age: 80
End: 2025-07-02
Payer: MEDICARE

## 2025-07-02 DIAGNOSIS — E11.9 TYPE 2 DIABETES MELLITUS WITHOUT COMPLICATIONS: ICD-10-CM

## 2025-07-02 DIAGNOSIS — I50.20 UNSPECIFIED SYSTOLIC (CONGESTIVE) HEART FAILURE: ICD-10-CM

## 2025-07-02 DIAGNOSIS — N17.9 ACUTE KIDNEY FAILURE, UNSPECIFIED: ICD-10-CM

## 2025-07-02 DIAGNOSIS — I25.5 ISCHEMIC CARDIOMYOPATHY: ICD-10-CM

## 2025-07-02 DIAGNOSIS — K21.9 GASTRO-ESOPHAGEAL REFLUX DISEASE WITHOUT ESOPHAGITIS: ICD-10-CM

## 2025-07-02 DIAGNOSIS — E44.0 MODERATE PROTEIN-CALORIE MALNUTRITION: ICD-10-CM

## 2025-07-02 DIAGNOSIS — G20.B2 PARKINSON'S DISEASE WITH DYSKINESIA, WITH FLUCTUATIONS: ICD-10-CM

## 2025-07-02 DIAGNOSIS — I48.0 PAROXYSMAL ATRIAL FIBRILLATION: ICD-10-CM

## 2025-07-02 DIAGNOSIS — K80.00 CALCULUS OF GALLBLADDER WITH ACUTE CHOLECYSTITIS WITHOUT OBSTRUCTION: ICD-10-CM

## 2025-07-02 DIAGNOSIS — K58.1 IRRITABLE BOWEL SYNDROME WITH CONSTIPATION: ICD-10-CM

## 2025-07-02 DIAGNOSIS — I10 ESSENTIAL (PRIMARY) HYPERTENSION: ICD-10-CM

## 2025-07-02 DIAGNOSIS — R11.2 NAUSEA WITH VOMITING, UNSPECIFIED: ICD-10-CM

## 2025-07-02 LAB
ALBUMIN SERPL-MCNC: 3.4 G/DL (ref 3.5–5.2)
ALBUMIN/GLOB SERPL: 1.7 G/DL
ALP SERPL-CCNC: 59 U/L (ref 39–117)
ALT SERPL W P-5'-P-CCNC: 8 U/L (ref 1–41)
ANION GAP SERPL CALCULATED.3IONS-SCNC: 10 MMOL/L (ref 5–15)
AST SERPL-CCNC: 12 U/L (ref 1–40)
BILIRUB SERPL-MCNC: 0.4 MG/DL (ref 0–1.2)
BUN SERPL-MCNC: 20.1 MG/DL (ref 8–23)
BUN/CREAT SERPL: 20.7 (ref 7–25)
CALCIUM SPEC-SCNC: 8.3 MG/DL (ref 8.6–10.5)
CHLORIDE SERPL-SCNC: 103 MMOL/L (ref 98–107)
CHOLEST SERPL-MCNC: 95 MG/DL (ref 0–200)
CO2 SERPL-SCNC: 25 MMOL/L (ref 22–29)
CREAT SERPL-MCNC: 0.97 MG/DL (ref 0.76–1.27)
DEPRECATED RDW RBC AUTO: 49.2 FL (ref 37–54)
EGFRCR SERPLBLD CKD-EPI 2021: 78.9 ML/MIN/1.73
ERYTHROCYTE [DISTWIDTH] IN BLOOD BY AUTOMATED COUNT: 15.8 % (ref 12.3–15.4)
GLOBULIN UR ELPH-MCNC: 2 GM/DL
GLUCOSE SERPL-MCNC: 108 MG/DL (ref 65–99)
HBA1C MFR BLD: 6.5 % (ref 4.8–5.6)
HCT VFR BLD AUTO: 30.9 % (ref 37.5–51)
HDLC SERPL-MCNC: 29 MG/DL (ref 40–60)
HGB BLD-MCNC: 10.2 G/DL (ref 13–17.7)
LDLC SERPL CALC-MCNC: 40 MG/DL (ref 0–100)
LDLC/HDLC SERPL: 1.21 {RATIO}
MCH RBC QN AUTO: 28.4 PG (ref 26.6–33)
MCHC RBC AUTO-ENTMCNC: 33 G/DL (ref 31.5–35.7)
MCV RBC AUTO: 86.1 FL (ref 79–97)
PLATELET # BLD AUTO: 148 10*3/MM3 (ref 140–450)
PMV BLD AUTO: 10.3 FL (ref 6–12)
POTASSIUM SERPL-SCNC: 4.5 MMOL/L (ref 3.5–5.2)
PROT SERPL-MCNC: 5.4 G/DL (ref 6–8.5)
RBC # BLD AUTO: 3.59 10*6/MM3 (ref 4.14–5.8)
SODIUM SERPL-SCNC: 138 MMOL/L (ref 136–145)
TRIGL SERPL-MCNC: 154 MG/DL (ref 0–150)
VLDLC SERPL-MCNC: 26 MG/DL (ref 5–40)
WBC NRBC COR # BLD AUTO: 4.03 10*3/MM3 (ref 3.4–10.8)

## 2025-07-02 PROCEDURE — 36415 COLL VENOUS BLD VENIPUNCTURE: CPT | Performed by: FAMILY MEDICINE

## 2025-07-02 PROCEDURE — 85027 COMPLETE CBC AUTOMATED: CPT | Performed by: FAMILY MEDICINE

## 2025-07-02 PROCEDURE — 80061 LIPID PANEL: CPT | Performed by: FAMILY MEDICINE

## 2025-07-02 PROCEDURE — 80053 COMPREHEN METABOLIC PANEL: CPT | Performed by: FAMILY MEDICINE

## 2025-07-02 PROCEDURE — 83036 HEMOGLOBIN GLYCOSYLATED A1C: CPT | Performed by: FAMILY MEDICINE

## 2025-07-02 NOTE — PROGRESS NOTES
[facility]    Progress Note    Patient:  Bong Mack  YOB: 1945  Date of Service: 6/29/2025  MRN: [id number]   Acct: [id number]   Primary Care Physician: Chadd Blake DO  Advance Directive: Full Code  Admit Date: 6/25/2025       Hospital Day: 4    Portions of this note have been copied forward, however, updated to reflect the most current clinical status of this patient.     CHIEF COMPLAINT: n/v    SUBJECTIVE: Denies n/v, tolerating oral intake    CUMULATIVE HOSPITAL COURSE:     The patient is a 80 y.o. male with past medical history of recent diagnosis of Afib on Eliquis with significant cardiomyopathy with EF of 30-35%, s/p cardiac stent to OM lesion on Plavix, Hypertension, diabetes, Parkinson's disease who presented to [facility] ED for evaluation of ongoing nausea and vomiting and progressively worsening generalized weakness. Reported ongoing nausea and vomiting for past few weeks. Was recently found to have cholelithiasis and was seen by general surgery outpatient, whom recommended laparoscopic versus open cholecystectomy once safe to come off his antiplatelets agents. Family reported significant weight loss over the last month since symptoms started. Denied fever or chills. Denied abdominal pain. Denied shortness of breath or chest pain.      Of note, patient was admitted for Afib RVR with significant cardiomyopathy with EF of 30-35%, underwent cardiac cath with findings of single vessel disease, s/p PCI with stent to OM lesion (4/3/2025). Cardiology recommended Plavix uninterrupted for 6 months, management of heart failure and Atrial fibrillation.     In ED, found to have YG with creatinine 2.1, otherwise lab work reassuring.  Troponin 15 with repeat 41.  Gallbladder ultrasound indicates a distended gallbladder with cholelithiasis and biliary sludge with no sonographic evidence of acute cholecystitis.  CXR unremarkable.  He was admitted to hospitalist for further evaluation 
  Physician Progress Note      PATIENT:               DARCI RAMESH  CSN #:                  [id number]  :                       1945  ADMIT DATE:       2025 10:12 AM  DISCH DATE:        2025 5:25 PM  RESPONDING  PROVIDER #:        MEL HAAS          QUERY TEXT:    Please clarify the patient?s nutritional status:    The clinical indicators include:  80 y.o. male with past medical history of recent diagnosis of Afib on Eliquis   with significant cardiomyopathy with EF of 30-35%, s/p cardiac stent to OM   lesion on Plavix, Hypertension, diabetes for evaluation of ongoing nausea and   vomiting and progressively worsening generalized weakness:    \"Moderate malnutrition\" in DS  (Mel Haas, APRN - CNP)  Malnutrition Status: Moderate malnutrition in RD consult  ( Renetta Smith, MS, RD, LD)  Nutrition Assessment:  Patient eating breakfast at time of visit.  Less c/o nausea and vomiting at   this time.  Modified current ONS as pt did not tolerate Ensure Clear at all.    Modifying diet also with addition of Carb contrrol as accuchek's are .    A1c 6.5  New weight not available.  Estimated Daily Nutrient Needs:  Energy (kcal):  8614-0037 kcals (25-30 kcals/kg) Weight Used for Energy   Requirements: Current  Protein (g):  79-159g Weight Used for Protein Requirements: Current  Fluid (ml/day):  2365-2782 ml Method Used for Fluid Requirements: 1 ml/kcal  Anthropometric Measures:  Height: 180.3 cm (5' 10.98\")  Current Body Wt: 79.4 kg (175 lb 0.7 oz)  BMI: 24.4  -Continue current diet, anthrometric measurement.    Thank you,  Conor Wood S CDS  Options provided:  -- Protein calorie malnutrition moderate  -- Other - I will add my own diagnosis  -- Disagree - Not applicable / Not valid  -- Disagree - Clinically unable to determine / Unknown  -- Refer to Clinical Documentation Reviewer    PROVIDER RESPONSE TEXT:    This patient has moderate protein calorie malnutrition.    Query 
  [facility]    [facility]    Patient:  Bong Mack  YOB: 1945  Date of Service: 6/27/2025  MRN: [id number]   Acct: [id number]   Primary Care Physician: Chadd Blake DO  Advance Directive: Full Code  Admit Date: 6/25/2025       Hospital Day: 2    Portions of this note have been copied forward, however, updated to reflect the most current clinical status of this patient.     CHIEF COMPLAINT: n/v    SUBJECTIVE: Denies n/v, tolerating oral intake    CUMULATIVE HOSPITAL COURSE:     The patient is a 80 y.o. male with past medical history of recent diagnosis of Afib on Eliquis with significant cardiomyopathy with EF of 30-35%, s/p cardiac stent to OM lesion on Plavix, Hypertension, diabetes, Parkinson's disease who presented to [facility] ED for evaluation of ongoing nausea and vomiting and progressively worsening generalized weakness. Reported ongoing nausea and vomiting for past few weeks. Was recently found to have cholelithiasis and was seen by general surgery outpatient, whom recommended laparoscopic versus open cholecystectomy once safe to come off his antiplatelets agents. Family reported significant weight loss over the last month since symptoms started. Denied fever or chills. Denied abdominal pain. Denied shortness of breath or chest pain.      Of note, patient was admitted for Afib RVR with significant cardiomyopathy with EF of 30-35%, underwent cardiac cath with findings of single vessel disease, s/p PCI with stent to OM lesion (4/3/2025). Cardiology recommended Plavix uninterrupted for 6 months, management of heart failure and Atrial fibrillation.     In ED, found to have YG with creatinine 2.1, lab work reassuring.  Troponin 15 with repeat 41.  Gallbladder ultrasound indicates a distended gallbladder with cholelithiasis and biliary sludge with no sonographic evidence of acute cholecystitis.  CXR unremarkable.  He was admitted to hospitalist for further evaluation and 
4 Eyes Skin Assessment     NAME:  Bong Mack  YOB: 1945  MEDICAL RECORD NUMBER:  [id number]    The patient is being assessed for  Admission    I agree that at least one RN has performed a thorough Head to Toe Skin Assessment on the patient. ALL assessment sites listed below have been assessed.      Areas assessed by both nurses:    Head, Face, Ears, Shoulders, Back, Chest, Arms, Elbows, Hands, Sacrum. Buttock, Coccyx, Ischium, Legs. Feet and Heels, and Under Medical Devices         Does the Patient have a Wound? Yes wound(s) were present on assessment. LDA wound assessment was Initiated and completed by RN       Faustino Prevention initiated by RN: Yes  Wound Care Orders initiated by RN: No    Pressure Injury (Stage 3,4, Unstageable, DTI, NWPT, and Complex wounds) if present, place Wound referral order by RN under : No    New Ostomies, if present place, Ostomy referral order under : No     Nurse 1 eSignature: Electronically signed by Cheyenne Camarena RN on 6/25/25 at 4:14 PM CDT    **SHARE this note so that the co-signing nurse can place an eSignature**    Nurse 2 eSignature: {Esignature:[id number]}   
Comprehensive Nutrition Assessment    Type and Reason for Visit:  Initial, Positive nutrition screen    Nutrition Recommendations/Plan:   Follow for po intake tolerance, question re: diet     Malnutrition Assessment:  Malnutrition Status:  At risk for malnutrition (06/26/25 1229)    Context:  Chronic Illness     Findings of the 6 clinical characteristics of malnutrition:  Energy Intake:  Mild decrease in energy intake  Weight Loss:  Greater than 10% over 6 months     Body Fat Loss:  Mild body fat loss Orbital   Muscle Mass Loss:  No muscle mass loss    Fluid Accumulation:  No fluid accumulation     Strength:  Not Performed    Nutrition Assessment:    +NS for dereased weight and po intake.  Per family and pt has had persistent nausea and vomiting for the past month.  Does not seem to matter what he eats.  Did not have issues with breakfast this morning.  Diet has been advanced to Peptic ulcer bland.  Has not had lunch yet.  Answered numerous questios and diet copy for Gallbladder/Peptic Ulcer diet given to pt and wife.  Very receptive.  If using stated weight pt has lost approx 24# or 12.2% of UBW subce 1/28/2025--this is a significant loss.  If lunch is tolerated, will try Ensure clear berry as ONS for added caloraies and protein.    Nutrition Related Findings:    no edema noted.   BM 6/24 Wound Type: Stage II       Current Nutrition Intake & Therapies:    Average Meal Intake: 26-50%  Average Supplements Intake: None Ordered  ADULT ORAL NUTRITION SUPPLEMENT; Breakfast, Lunch, Dinner; Clear Liquid Oral Supplement  ADULT DIET; Regular; GI [place] (GERD/Peptic Ulcer)    Anthropometric Measures:  Height: 180.3 cm (5' 10.98\")  Ideal Body Weight (IBW): 172 lbs (78 kg)    Admission Body Weight: 79.4 kg (175 lb 0.7 oz)  Current Body Weight: 79.4 kg (175 lb 0.7 oz), 101.8 % IBW. Weight Source: Stated  Current BMI (kg/m2): 24.4  Usual Body Weight: 90.3 kg (199 lb 1.2 oz) (1/28/2025)    % Weight Change (Calculated): 
Notified Yudith Blanca NP that patient was not able to void and having a lot of discomfort. Straight cath with 825mls of urine out. Urine sent to lab. Will bladder scan again in 6hrs  
Nutrition Education    Educated on Peptic ulcer Gallbladder diet  Learners: Patient and Significant Other  Readiness: Eager  Method: Explanation and Handout  Response: Verbalizes Understanding  Contact name and number provided.    Renetta Smith MS, RD, LD  Contact Number: [phone]    
Spiritual Health History and Assessment/Progress Note  [facility]    (P) Advance Care Planning,  ,  ,      Name: Bong Mack MRN: [id number]    Age: 80 y.o.     Sex: male   Language: English   Restorationist: Jewish   YG (acute kidney injury)     Date: 6/26/2025            Total Time Calculated: (P) 30 min              Spiritual Assessment began in [facility] 3 CONNIE/VAS/MED        Referral/Consult From: (P) Rounding   Encounter Overview/Reason: (P) Advance Care Planning  Service Provided For: (P) Patient    Teresita, Belief, Meaning:   Patient identifies as spiritual and is connected with a teresita tradition or spiritual practice  Family/Friends No family/friends present      Importance and Influence:  Patient has spiritual/personal beliefs that influence decisions regarding their health  Family/Friends No family/friends present    Community:  Patient is connected with a spiritual community and feels well-supported. Support system includes: Spouse/Partner and Teresita Community  Family/Friends No family/friends present    Assessment and Plan of Care:   Patient was very grateful to God's blessings. Has good support from his wife and the Hindu.  Patient Interventions include: Facilitated expression of thoughts and feelings, Explored spiritual coping/struggle/distress, and Assisted in Advance Care Planning conversation  Family/Friends Interventions include: No family/friends present    Patient Plan of Care: Spiritual Care available upon further referral  Family/Friends Plan of Care: No family/friends present    Electronically signed by Chaplain Quarles Mai on 6/26/2025 at 7:34 PM    
UPPER QUADRANT  INDICATION: Nausea and vomiting gallstones 80-year-old male patient.  COMPARISON: Ultrasound gallbladder right upper quadrant 5/20/25 CT abdomen/pelvis 5/20/2025  TECHNIQUE: Sonography of the right upper quadrant of the abdomen utilizing grayscale and duplex color doppler and spectral Doppler technique(s). All 3 plane dimensions are given in length by height by width or craniocaudad / anterior-posterior /medial-lateral unless otherwise stated.  FINDINGS: Pancreatic tail not well visualized or evaluated due to obscuration by shadowing artifact and poor penetration by the sonographic beam. Remainder of the visualized portions of the pancreas are unremarkable. Midline portion of the liver is obscured. Remainder of the liver appears normal with normal echogenicity no mass and no intrahepatic biliary ductal dilatation.  the main portal vein is patent with normal hepatopetal flow. The gallbladder contains cholelithiasis and biliary sludge is distended up  to 10.8 x 4.3 x 5.1 cm. There is no wall thickening or pericholecystic fluid. The sonographic Faustin's sign was negative. The right kidney measures 1.2 x 5.1 x 5.5 cm with normal parenchymal thickness no hydronephrosis nephrolithiasis or lesion. The common duct as measured by myself measures up to 8 mm in caliber with no choledocholithiasis. The caliber of the common duct is within normal limits for the age of the patient.       Distended gallbladder containing cholelithiasis and biliary sludge. No additional sonographic evidence of acute cholecystitis.   ______________________________________ Electronically signed by: ALEXSANDRA ORDONEZ M.D. Date:     06/25/2025 Time:    10:39       Culture Results:    No results for input(s): \"CXSURG\" in the last 720 hours.    Blood Culture Recent: No results for input(s): \"BC\" in the last 720 hours.    No results for input(s): \"BC\", \"BLOODCULT2\", \"ORG\" in the last 72 hours.    Assessment/Plan:     YG (acute kidney injury)-    
caliber with no choledocholithiasis. The caliber of the common duct is within normal limits for the age of the patient.       Distended gallbladder containing cholelithiasis and biliary sludge. No additional sonographic evidence of acute cholecystitis.   ______________________________________ Electronically signed by: ALEXSANDRA ORDONEZ M.D. Date:     06/25/2025 Time:    10:39           Assessment/Plan   Principal Problem:    YG (acute kidney injury)  Active Problems:    Parkinson disease with dyskinesia and fluctuating manifestations ([facility])    Type 2 diabetes mellitus without complication, without long-term current use of insulin ([facility])    HFrEF (heart failure with reduced ejection fraction) ([facility])    Ischemic cardiomyopathy    Coronary artery disease status post coronary stent insertion    Calculus of gallbladder without cholecystitis without obstruction    Paroxysmal A-fib ([facility])  Resolved Problems:    * No resolved hospital problems. *      Principal Problem:    YG (acute kidney injury)-               - Creatinine 1.7 today               - Continue gentle IVFs    - Hold home Entresto    - Renal US unremarkable               - Monitor I's and O's closely              - Monitor labs closely              - Avoid hypotension              - Avoid nephrotoxic agents         - Nephrology consultation as warranted         Active Problems:    Urinary retention-    - Resume home Flomax   - PRN Bladder scan    - PRN Intermittent cath    - Monitor I's and O's       Calculus of gallbladder without cholecystitis without obstruction-               - Follow up with general surgery outpatient               - US gallbladder reviewed              - LFTs unremarkable               - Antiemetics as needed               - Advance diet as tolerated          Ischemic cardiomyopathy/ HFrEF (heart failure with reduced ejection fraction) ([facility])-               - Hold home Entresto given YG              - Hold home diuretics given YG   - Monitor for

## 2025-07-03 ENCOUNTER — TELEPHONE (OUTPATIENT)
Dept: CARDIOLOGY CLINIC | Age: 80
End: 2025-07-03

## 2025-07-03 NOTE — TELEPHONE ENCOUNTER
Date: TBD    Cardiologist: Froylan    Procedure: Lap Cholecystectomy    Surgeon: Angela    Last Office Visit: 6/10/25  Reason for office visit and medical concerns addressed at this office visit: afib, chf, cad, dm, htn,     Testing Performed and Date of Service:  4/3/25 Cath Single-vessel disease involving large OM1 that covers the lateral wall.  Moderate LV systolic dysfunction with EF estimated at 40 to 45%.  Mildly elevated LV end-diastolic pressures.  Successful PCI of OM1 (2.75 x 26 mm [place]) utilizing drug-eluting stent.    RCRI = 6.6   METs 4    Current Medications: tamsulosin, spironolactone, entresto, protonix, metformin, lovastatin, dicyclomine, plavix, coreg, cabidopa/levodopa, eliquis, amiodarone, amantadine    Is the patient currently taking an anticoagulant? If so, what is the diagnosis the patient has been given to warrant the need for the anticoagulant? Plavix for QIAN 4/3/25    Additional Notes: requesting cardiac clearance and to hold plavix      Detail Level: Generalized Detail Level: Zone Detail Level: Detailed

## 2025-07-07 ENCOUNTER — OFFICE VISIT (OUTPATIENT)
Dept: UROLOGY | Facility: CLINIC | Age: 80
End: 2025-07-07
Payer: MEDICARE

## 2025-07-07 VITALS — WEIGHT: 176 LBS | HEIGHT: 71 IN | BODY MASS INDEX: 24.64 KG/M2 | TEMPERATURE: 97.4 F

## 2025-07-07 DIAGNOSIS — R33.9 URINARY RETENTION: ICD-10-CM

## 2025-07-07 DIAGNOSIS — N13.8 BPH WITH OBSTRUCTION/LOWER URINARY TRACT SYMPTOMS: Primary | ICD-10-CM

## 2025-07-07 DIAGNOSIS — N40.1 BPH WITH OBSTRUCTION/LOWER URINARY TRACT SYMPTOMS: Primary | ICD-10-CM

## 2025-07-07 RX ORDER — ONDANSETRON 4 MG/1
4 TABLET, ORALLY DISINTEGRATING ORAL
COMMUNITY
Start: 2025-06-30 | End: 2025-07-11

## 2025-07-07 RX ORDER — CARBIDOPA AND LEVODOPA 25; 100 MG/1; MG/1
1 TABLET ORAL 2 TIMES DAILY
COMMUNITY
Start: 2025-06-30 | End: 2025-07-31

## 2025-07-07 RX ORDER — PANTOPRAZOLE SODIUM 40 MG/1
40 TABLET, DELAYED RELEASE ORAL
COMMUNITY
Start: 2025-07-01 | End: 2025-08-01

## 2025-07-08 ENCOUNTER — TELEPHONE (OUTPATIENT)
Dept: UROLOGY | Facility: CLINIC | Age: 80
End: 2025-07-08
Payer: MEDICARE

## 2025-07-08 NOTE — TELEPHONE ENCOUNTER
Provider: CANDIDA JOHNSTON    Caller: ELIZABETH     Relationship to Patient: SPOUSE    Phone Number: [phone]    PT WENT BACK TO [facility] AFTER APPT W/CANDIDA  AND WAS UNABLE TO URINATE AND HE WAS CATHETERIZED AGAIN.  WIFE WANTS TO KNOW IF THEY KEEP THE 07/21 APPT WITH CANDIDA OR SHOULD HE COME IN NEXT WEEK TO HAVE IT REMOVED AGAIN.

## 2025-07-08 NOTE — TELEPHONE ENCOUNTER
Called and spoke with patient's spouse, advised from cardiac standpoint that patient is not able to hold his plavix due to recent stent placement and that risk of stent clotting off with holding plavix too early.  She advised that he has lost 30 lbs and is continuously vomiting and not feeling well. She advised he can't wait 4 more months for this surgery.  Advised she would need ot discuss with Dr. Miller as holding plavix was not an option at this point.  She advised she would reach out to his office.

## 2025-07-08 NOTE — TELEPHONE ENCOUNTER
Patient's wife left message that patient needs surgery and has lost about 30 lbs and is so sick with vomiting. She left on message if Dr. Galeano and Dr. Miller could discuss further.

## 2025-07-10 ENCOUNTER — TELEPHONE (OUTPATIENT)
Dept: SURGERY | Age: 80
End: 2025-07-10

## 2025-07-10 DIAGNOSIS — I42.8 TACHYCARDIA-INDUCED CARDIOMYOPATHY: ICD-10-CM

## 2025-07-10 RX ORDER — FUROSEMIDE 20 MG/1
20 TABLET ORAL DAILY
Qty: 90 TABLET | Refills: 1 | Status: SHIPPED | OUTPATIENT
Start: 2025-07-10

## 2025-07-10 NOTE — PROGRESS NOTES
Subjective    Mr. Tinoco is 80 y.o. male    Chief Complaint: Urinary Retention     History of Present Illness  Patient who was in the hospital for hospitalization developed urinary retention he does have a history of BPH in the past but had not had no difficulties urinating previously.  When I saw him 07/07/2025 we removed the catheter which was placed 06/27/2025.  However that evening he had to have catheter replaced after not voiding for approximately 14 hours.  Patient here for another voiding trial catheter removal.  Patient's spouse did say that 1 time he had to have a catheter for approximately 6 weeks before he resume normal voiding after previous hospitalization.    The following portions of the patient's history were reviewed and updated as appropriate: allergies, current medications, past family history, past medical history, past social history, past surgical history and problem list.    Review of Systems   Genitourinary:  Positive for difficulty urinating.         Current Outpatient Medications:     amantadine (SYMMETREL) 100 MG capsule, TAKE 1 CAPSULE BY MOUTH TWICE A DAY, Disp: 180 capsule, Rfl: 0    amiodarone (PACERONE) 200 MG tablet, Take 1 tablet by mouth Daily., Disp: , Rfl:     apixaban (ELIQUIS) 5 MG tablet tablet, Take 1 tablet by mouth 2 (Two) Times a Day., Disp: , Rfl:     carbidopa-levodopa (SINEMET)  MG per tablet, Take 1 tablet by mouth 2 (Two) Times a Day., Disp: , Rfl:     carvedilol (Coreg) 6.25 MG tablet, Take 1 tablet by mouth 2 (Two) Times a Day With Meals., Disp: 90 tablet, Rfl: 1    clopidogrel (PLAVIX) 75 MG tablet, Take 1 tablet by mouth Daily., Disp: 30 tablet, Rfl: 1    clotrimazole-betamethasone (LOTRISONE) 1-0.05 % cream, Apply 1 Application topically to the appropriate area as directed 2 (Two) Times a Day., Disp: 15 g, Rfl: 1    Denta 5000 Plus 1.1 % cream, APPLY A THIN RIBBON TO BRUSH BRUSH FOR 2 MINUTES PREFERABLY AT BEDTIME, Disp: , Rfl:     Entresto 24-26 MG tablet,  TAKE 1 TABLET BY MOUTH TWICE A DAY, Disp: 60 tablet, Rfl: 1    furosemide (LASIX) 20 MG tablet, Take 1 tablet by mouth Daily., Disp: 90 tablet, Rfl: 1    gabapentin (NEURONTIN) 100 MG capsule, Take 1 capsule by mouth 3 (Three) Times a Day., Disp: 90 capsule, Rfl: 1    glucose blood test strip, Use as instructed, Disp: 100 each, Rfl: 3    Lancets misc, Use 1 each Daily., Disp: 100 each, Rfl: 1    lovastatin (MEVACOR) 40 MG tablet, TAKE 1 TABLET BY MOUTH EVERY DAY, Disp: 90 tablet, Rfl: 3    metFORMIN (GLUCOPHAGE) 850 MG tablet, Take 1 tablet by mouth 2 (Two) Times a Day With Meals., Disp: 180 tablet, Rfl: 1    mupirocin (BACTROBAN) 2 % nasal ointment, Administer 1 Application into the nostril(s) as directed by provider 2 (Two) Times a Day., Disp: 30 g, Rfl: 1    naloxone (NARCAN) 4 MG/0.1ML nasal spray, , Disp: , Rfl:     pantoprazole (PROTONIX) 40 MG EC tablet, Take 1 tablet by mouth., Disp: , Rfl:     spironolactone (Aldactone) 25 MG tablet, Take 1 tablet by mouth Daily., Disp: 90 tablet, Rfl: 1    tamsulosin (FLOMAX) 0.4 MG capsule 24 hr capsule, Take 1 capsule by mouth Every Night., Disp: 90 capsule, Rfl: 1    traMADol (ULTRAM) 50 MG tablet, TAKE 1 TABLET BY MOUTH EVERY 12 HOURS AS NEEDED FOR MODERATE PAIN., Disp: 60 tablet, Rfl: 0    dicyclomine (BENTYL) 10 MG capsule, Take 1 capsule by mouth 4 (Four) Times a Day Before Meals & at Bedtime. (Patient not taking: Reported on 7/15/2025), Disp: 120 capsule, Rfl: 1    Past Medical History:   Diagnosis Date    Arthritis     Cataract     had surgery in Feb 2023 both eyes    CHF (congestive heart failure) April 3    Heart catheter resulted in having 1 stent placed    Colon polyp     Diabetes mellitus     Difficulty walking     Diverticulosis     History of adenomatous polyp of colon     HL (hearing loss)     Hyperlipidemia     Hypertension     Parkinson's disease     Shingles     Tinnitus     Tremor        Past Surgical History:   Procedure Laterality Date     "APPENDECTOMY      COLONOSCOPY  04/19/2016    polyp, tubular adenoma, diverticulosis    COLONOSCOPY N/A 03/19/2021    Procedure: COLONOSCOPY WITH ANESTHESIA;  Surgeon: Philip Ferrari MD;  Location:  [facility];  Service: Gastroenterology;  Laterality: N/A;  pre: hx polyps  post: poor prep  George Bond MD    COLONOSCOPY N/A 04/01/2021    Procedure: COLONOSCOPY WITH ANESTHESIA;  Surgeon: Philip Ferrari MD;  Location:  [facility];  Service: Gastroenterology;  Laterality: N/A;  preop; hx of polyps  postop; polyps   PCP George Bond     EYE SURGERY  feb 2023    HERNIA REPAIR      JOINT REPLACEMENT      KNEE SURGERY      VASECTOMY  1974       Social History     Socioeconomic History    Marital status:    Tobacco Use    Smoking status: Never     Passive exposure: Never    Smokeless tobacco: Never   Vaping Use    Vaping status: Never Used   Substance and Sexual Activity    Alcohol use: Not Currently    Drug use: Never    Sexual activity: Not Currently     Partners: Female     Birth control/protection: Vasectomy       Family History   Problem Relation Age of Onset    Parkinsonism Father     Dementia Mother     Rheum arthritis Mother     Colon cancer Neg Hx        Objective    Temp 96.9 °F (36.1 °C)   Ht 180.3 cm (70.98\")   Wt 79.8 kg (176 lb)   BMI 24.56 kg/m²     Physical Exam  Constitutional:       General: He is not in acute distress.     Appearance: Normal appearance. He is not toxic-appearing.   Pulmonary:      Effort: Pulmonary effort is normal.   Neurological:      Mental Status: He is alert.   Psychiatric:         Mood and Affect: Mood normal.         Behavior: Behavior normal.           Assessment and Plan    Diagnoses and all orders for this visit:    1. Urinary retention (Primary)    2. BPH with obstruction/lower urinary tract symptoms    Patient had catheter after being hospitalized with history of BPH catheter was removed 07/07/2025 he had to have it replaced that evening after " not voiding for approximately 14 hours.    We attempted voiding trial today 200 cc of was inserted into his bladder he was not able to void therefore catheter was replaced.  Leave catheter in for approximately 2 to 3 weeks then attempt second voiding trial.  If fails options are having indwelling catheter to be changed monthly at this time he is not really a surgical candidate although we could do a cystoscopy just to evaluate for obstruction he had a recent stent placement.    Continue tamsulosin             [Negative] : Allergic/Immunologic [Joint Pain] : joint pain

## 2025-07-10 NOTE — TELEPHONE ENCOUNTER
7/08/2025 Patient spouse, Liliana, called and stated that her  is very sick and vomiting  He is needing to have gallbladder surgery.  She requested that Dr Miller and Dr Galeano get together and work out a plan so that her  can have surgery.    Callback # [phone]  loretta

## 2025-07-14 ENCOUNTER — TELEPHONE (OUTPATIENT)
Dept: SURGERY | Age: 80
End: 2025-07-14

## 2025-07-14 NOTE — TELEPHONE ENCOUNTER
Pt's wife calling again to ask if her  can be given an \"antidote\" possibly so he could have gallbladder surgery - He has been at the [facility] for the past 2 wks and is getting weaker by the day because he is not able to keep any food down - she states she knows Dr Miller is aware but she is asking to forward another message    Cc: [facility] Gen Surg Staff

## 2025-07-14 NOTE — TELEPHONE ENCOUNTER
Called and spoke with Mrs Mack. She stressed the severity of her husbands sickness and that he has lost 30lbs due to the excessive vomiting. Mrs Mack feels her  is getting little to no nutrition. I explained to Mrs Mack that we can not do surgery without a cardiac clearance. Recommended her reaching out to cardiology to move his appointment up with NP to evaluate medications and re discuss clearance. If patient can't get appointment moved up and is having the described excessive vomiting as explained then he needs to be taken to the ER. Mrs Mack voiced her understanding.

## 2025-07-15 ENCOUNTER — OFFICE VISIT (OUTPATIENT)
Dept: UROLOGY | Facility: CLINIC | Age: 80
End: 2025-07-15
Payer: MEDICARE

## 2025-07-15 VITALS — BODY MASS INDEX: 24.64 KG/M2 | HEIGHT: 71 IN | TEMPERATURE: 96.9 F | WEIGHT: 176 LBS

## 2025-07-15 DIAGNOSIS — N13.8 BPH WITH OBSTRUCTION/LOWER URINARY TRACT SYMPTOMS: ICD-10-CM

## 2025-07-15 DIAGNOSIS — R33.9 URINARY RETENTION: Primary | ICD-10-CM

## 2025-07-15 DIAGNOSIS — N40.1 BPH WITH OBSTRUCTION/LOWER URINARY TRACT SYMPTOMS: ICD-10-CM

## 2025-07-15 NOTE — PROGRESS NOTES
Patient of Dr. Messer states he is here today to have his catheter removed. The patient denies any fever, chills or  N&V. Using the catheter in place and sterile water 200cc was installed into the bladder with no complications. Patient was able to urinate 0cc.    Using sterile technique a new 16fr catheter was placed, balloon inflated using 10cc sterile water,  200cc of urine drained from patients bladder via catheter then catheter was connected to leg bag.  Jerzy Alejandra PA-C was in the office at the time of procedure.    I have reviewed and agree with medical assistance documentation above.    Patient was advised to drink clear fluids for the next couple hours and urinate. he was advised he may experience some blood in the urine and burning with urination for the next couple days. If the patient is unable to urinate or develops fever, chills, N&V or suprapubic pain he will call to return for an appt at clinic or seek medical treatment at [facility] ER, PCP or Urgent Care after hours. Patient verbalized understanding and all questions were answered. Helen SAMS MA    I have reviewed and agree with medical assistance documentation above

## 2025-07-16 ENCOUNTER — HOSPITAL ENCOUNTER (OUTPATIENT)
Age: 80
Discharge: HOME | End: 2025-07-18
Attending: INTERNAL MEDICINE
Payer: MEDICARE

## 2025-07-16 ENCOUNTER — OFFICE VISIT (OUTPATIENT)
Dept: CARDIOLOGY CLINIC | Age: 80
End: 2025-07-16
Payer: MEDICARE

## 2025-07-16 VITALS
BODY MASS INDEX: 24.62 KG/M2 | SYSTOLIC BLOOD PRESSURE: 92 MMHG | WEIGHT: 172 LBS | DIASTOLIC BLOOD PRESSURE: 48 MMHG | HEIGHT: 70 IN | HEART RATE: 56 BPM

## 2025-07-16 VITALS
HEIGHT: 70 IN | BODY MASS INDEX: 24.62 KG/M2 | SYSTOLIC BLOOD PRESSURE: 103 MMHG | DIASTOLIC BLOOD PRESSURE: 50 MMHG | WEIGHT: 172 LBS

## 2025-07-16 DIAGNOSIS — I48.0 PAROXYSMAL A-FIB (HCC): Primary | ICD-10-CM

## 2025-07-16 DIAGNOSIS — R06.02 SHORTNESS OF BREATH: ICD-10-CM

## 2025-07-16 DIAGNOSIS — R07.9 CHEST PAIN, UNSPECIFIED TYPE: ICD-10-CM

## 2025-07-16 DIAGNOSIS — I48.91 ATRIAL FIBRILLATION, UNSPECIFIED TYPE (HCC): ICD-10-CM

## 2025-07-16 LAB
ECHO BSA: 1.96 M2
ECHO LV EDV A2C: 105 ML
ECHO LV EDV A4C: 112 ML
ECHO LV EDV INDEX A4C: 57 ML/M2
ECHO LV EDV NDEX A2C: 54 ML/M2
ECHO LV EF PHYSICIAN: 40 %
ECHO LV EJECTION FRACTION A2C: 40 %
ECHO LV EJECTION FRACTION A4C: 40 %
ECHO LV EJECTION FRACTION BIPLANE: 41 % (ref 55–100)
ECHO LV ESV A2C: 63 ML
ECHO LV ESV A4C: 67 ML
ECHO LV ESV INDEX A2C: 32 ML/M2
ECHO LV ESV INDEX A4C: 34 ML/M2
ECHO LV FRACTIONAL SHORTENING: 28 % (ref 28–44)
ECHO LV INTERNAL DIMENSION DIASTOLE INDEX: 2.7 CM/M2
ECHO LV INTERNAL DIMENSION DIASTOLIC: 5.3 CM (ref 4.2–5.9)
ECHO LV INTERNAL DIMENSION SYSTOLIC INDEX: 1.94 CM/M2
ECHO LV INTERNAL DIMENSION SYSTOLIC: 3.8 CM
ECHO LV IVSD: 1.2 CM (ref 0.6–1)
ECHO LV MASS 2D: 227.7 G (ref 88–224)
ECHO LV MASS INDEX 2D: 116.2 G/M2 (ref 49–115)
ECHO LV POSTERIOR WALL DIASTOLIC: 1 CM (ref 0.6–1)
ECHO LV RELATIVE WALL THICKNESS RATIO: 0.38

## 2025-07-16 PROCEDURE — 93325 DOPPLER ECHO COLOR FLOW MAPG: CPT | Performed by: INTERNAL MEDICINE

## 2025-07-16 PROCEDURE — 1123F ACP DISCUSS/DSCN MKR DOCD: CPT | Performed by: INTERNAL MEDICINE

## 2025-07-16 PROCEDURE — 93308 TTE F-UP OR LMTD: CPT | Performed by: INTERNAL MEDICINE

## 2025-07-16 PROCEDURE — 99204 OFFICE O/P NEW MOD 45 MIN: CPT | Performed by: INTERNAL MEDICINE

## 2025-07-16 PROCEDURE — 1159F MED LIST DOCD IN RCRD: CPT | Performed by: INTERNAL MEDICINE

## 2025-07-16 PROCEDURE — 6360000004 HC RX CONTRAST MEDICATION: Performed by: INTERNAL MEDICINE

## 2025-07-16 PROCEDURE — C8924 2D TTE W OR W/O FOL W/CON,FU: HCPCS

## 2025-07-16 PROCEDURE — 93000 ELECTROCARDIOGRAM COMPLETE: CPT | Performed by: INTERNAL MEDICINE

## 2025-07-16 RX ORDER — ONDANSETRON 4 MG/1
4 TABLET, FILM COATED ORAL EVERY 8 HOURS PRN
COMMUNITY

## 2025-07-16 RX ADMIN — SULFUR HEXAFLUORIDE 2 ML: 60.7; .19; .19 INJECTION, POWDER, LYOPHILIZED, FOR SUSPENSION INTRAVENOUS; INTRAVESICAL at 16:10

## 2025-07-17 ENCOUNTER — FOLLOWUP TELEPHONE ENCOUNTER (OUTPATIENT)
Dept: CARDIOLOGY CLINIC | Age: 80
End: 2025-07-17

## 2025-07-17 NOTE — PROGRESS NOTES
Please notify pt and Dr. Miller that it is ok for pt to have surgery per Dr. Naik.  He can hold Eliquis and continue taking Plavix.

## 2025-07-18 NOTE — PROGRESS NOTES
[facility]  Cardiology Office Note  [address], [place] [facility]  [address]  Phone: [phone]  Fax: [phone]                            Date:  7/18/2025  Patient: Bong Mack  Age:  80 y.o., 1945    Referral: Manuel Miller MD      PROBLEM LIST:    Patient Active Problem List    Diagnosis Date Noted    Acute systolic heart failure (HCC) 04/03/2025     Priority: High    Urinary retention 06/30/2025    Moderate malnutrition 06/27/2025    YG (acute kidney injury) 06/25/2025    Paroxysmal A-fib ([facility]) 06/25/2025    Calculus of gallbladder without cholecystitis without obstruction 06/12/2025    Ischemic cardiomyopathy 04/04/2025    Coronary artery disease status post coronary stent insertion 04/04/2025    HFrEF (heart failure with reduced ejection fraction) ([facility]) 04/03/2025    Chest pain 04/02/2025    Parkinson disease with dyskinesia and fluctuating manifestations ([facility]) 10/23/2023    Type 2 diabetes mellitus without complication, without long-term current use of insulin ([facility]) 07/22/2020         PRESENTATION: Bong Mack is a 80 y.o. year old male patient of Chadd Blake DO who presents with   Chief Complaint   Patient presents with    Follow-Up from Hospital     Mr. Mack, a patient with a history of recent stent placement and atrial fibrillation, presents for follow-up and to discuss the timing of a planned gallbladder removal in the context of his current anticoagulation therapy.    The patient was recently hospitalized due to severe coughing, which was diagnosed as congestive heart failure. During this hospitalization, an echocardiogram revealed atrial fibrillation, and subsequently, a stent was placed in the obtuse-marginal branch. His ejection fraction was noted to be 30-35%. Following the stent placement, he was initially prescribed aspirin and Plavix, but his current regimen includes Eliquis and Plavix. He is also taking Entresto (sacubitril +

## 2025-07-21 ENCOUNTER — HOSPITAL ENCOUNTER (INPATIENT)
Age: 80
LOS: 2 days | Discharge: NURSING, REHAB OR LONG-TERM CARE | DRG: 444 | End: 2025-07-23
Attending: EMERGENCY MEDICINE | Admitting: HOSPITALIST
Payer: MEDICARE

## 2025-07-21 ENCOUNTER — APPOINTMENT (OUTPATIENT)
Dept: CT IMAGING | Age: 80
DRG: 444 | End: 2025-07-21
Payer: MEDICARE

## 2025-07-21 ENCOUNTER — APPOINTMENT (OUTPATIENT)
Dept: GENERAL RADIOLOGY | Age: 80
DRG: 444 | End: 2025-07-21
Payer: MEDICARE

## 2025-07-21 DIAGNOSIS — E87.5 HYPERKALEMIA: ICD-10-CM

## 2025-07-21 DIAGNOSIS — I25.5 ISCHEMIC CARDIOMYOPATHY: ICD-10-CM

## 2025-07-21 DIAGNOSIS — R55 SYNCOPE AND COLLAPSE: Primary | ICD-10-CM

## 2025-07-21 DIAGNOSIS — Z79.01 CHRONIC ANTICOAGULATION: ICD-10-CM

## 2025-07-21 DIAGNOSIS — N17.9 AKI (ACUTE KIDNEY INJURY): ICD-10-CM

## 2025-07-21 DIAGNOSIS — E87.1 HYPONATREMIA: ICD-10-CM

## 2025-07-21 DIAGNOSIS — E11.65 TYPE 2 DIABETES MELLITUS WITH HYPERGLYCEMIA, WITHOUT LONG-TERM CURRENT USE OF INSULIN (HCC): ICD-10-CM

## 2025-07-21 DIAGNOSIS — G20.A1 PARKINSON'S DISEASE, UNSPECIFIED WHETHER DYSKINESIA PRESENT, UNSPECIFIED WHETHER MANIFESTATIONS FLUCTUATE (HCC): ICD-10-CM

## 2025-07-21 LAB
ALBUMIN SERPL-MCNC: 3.6 G/DL (ref 3.5–5.2)
ALBUMIN SERPL-MCNC: 3.8 G/DL (ref 3.5–5.2)
ALP SERPL-CCNC: 68 U/L (ref 40–129)
ALP SERPL-CCNC: 71 U/L (ref 40–129)
ALT SERPL-CCNC: 12 U/L (ref 10–50)
ALT SERPL-CCNC: 14 U/L (ref 10–50)
ANION GAP SERPL CALCULATED.3IONS-SCNC: 13 MMOL/L (ref 8–16)
ANION GAP SERPL CALCULATED.3IONS-SCNC: 15 MMOL/L (ref 8–16)
AST SERPL-CCNC: 17 U/L (ref 10–50)
AST SERPL-CCNC: 22 U/L (ref 10–50)
BACTERIA #/AREA URNS HPF: ABNORMAL /HPF
BASOPHILS # BLD: 0.1 K/UL (ref 0–0.2)
BASOPHILS NFR BLD: 0.6 % (ref 0–1)
BILIRUB SERPL-MCNC: 0.4 MG/DL (ref 0.2–1.2)
BILIRUB SERPL-MCNC: 0.6 MG/DL (ref 0.2–1.2)
BILIRUB UR QL STRIP: NEGATIVE
BUN SERPL-MCNC: 50 MG/DL (ref 8–23)
BUN SERPL-MCNC: 52 MG/DL (ref 8–23)
CALCIUM SERPL-MCNC: 8.7 MG/DL (ref 8.8–10.2)
CALCIUM SERPL-MCNC: 9.3 MG/DL (ref 8.8–10.2)
CHLORIDE SERPL-SCNC: 92 MMOL/L (ref 98–107)
CHLORIDE SERPL-SCNC: 92 MMOL/L (ref 98–107)
CHP ED QC CHECK: 187
CLARITY UR: ABNORMAL
CO2 SERPL-SCNC: 18 MMOL/L (ref 22–29)
CO2 SERPL-SCNC: 20 MMOL/L (ref 22–29)
COLOR UR: YELLOW
CREAT SERPL-MCNC: 1.8 MG/DL (ref 0.7–1.2)
CREAT SERPL-MCNC: 1.8 MG/DL (ref 0.7–1.2)
CRYSTALS URNS MICRO: ABNORMAL /HPF
EOSINOPHIL # BLD: 0.2 K/UL (ref 0–0.6)
EOSINOPHIL NFR BLD: 2 % (ref 0–5)
ERYTHROCYTE [DISTWIDTH] IN BLOOD BY AUTOMATED COUNT: 15.9 % (ref 11.5–14.5)
GLUCOSE BLD-MCNC: 187 MG/DL (ref 70–99)
GLUCOSE BLD-MCNC: 97 MG/DL (ref 70–99)
GLUCOSE SERPL-MCNC: 105 MG/DL (ref 70–99)
GLUCOSE SERPL-MCNC: 198 MG/DL (ref 70–99)
GLUCOSE UR STRIP.AUTO-MCNC: NEGATIVE MG/DL
HCT VFR BLD AUTO: 36.2 % (ref 42–52)
HGB BLD-MCNC: 12.2 G/DL (ref 14–18)
HGB UR STRIP.AUTO-MCNC: ABNORMAL MG/L
IMM GRANULOCYTES # BLD: 0.1 K/UL
INR PPP: 1.29 (ref 0.88–1.18)
KETONES UR STRIP.AUTO-MCNC: ABNORMAL MG/DL
LEUKOCYTE ESTERASE UR QL STRIP.AUTO: ABNORMAL
LYMPHOCYTES # BLD: 1.4 K/UL (ref 1.1–4.5)
LYMPHOCYTES NFR BLD: 16.3 % (ref 20–40)
MCH RBC QN AUTO: 29.3 PG (ref 27–31)
MCHC RBC AUTO-ENTMCNC: 33.7 G/DL (ref 33–37)
MCV RBC AUTO: 87 FL (ref 80–94)
MONOCYTES # BLD: 0.6 K/UL (ref 0–0.9)
MONOCYTES NFR BLD: 7.3 % (ref 0–10)
NEUTROPHILS # BLD: 6.1 K/UL (ref 1.5–7.5)
NEUTS SEG NFR BLD: 72.1 % (ref 50–65)
NITRITE UR QL STRIP.AUTO: NEGATIVE
OSMOLALITY SERPL CALC.SUM OF ELEC: 291 MOSM/KG (ref 275–300)
PERFORMED ON: ABNORMAL
PERFORMED ON: NORMAL
PH UR STRIP.AUTO: 5.5 [PH] (ref 5–8)
PLATELET # BLD AUTO: 274 K/UL (ref 130–400)
PMV BLD AUTO: 9.2 FL (ref 9.4–12.4)
POTASSIUM SERPL-SCNC: 5.9 MMOL/L (ref 3.5–5)
POTASSIUM SERPL-SCNC: 6 MMOL/L (ref 3.5–5.1)
PROT SERPL-MCNC: 6.2 G/DL (ref 6.4–8.3)
PROT SERPL-MCNC: 6.6 G/DL (ref 6.4–8.3)
PROT UR STRIP.AUTO-MCNC: 30 MG/DL
PROTHROMBIN TIME: 15.9 SEC (ref 12–14.6)
RBC # BLD AUTO: 4.16 M/UL (ref 4.7–6.1)
RBC #/AREA URNS HPF: ABNORMAL /HPF (ref 0–2)
SODIUM SERPL-SCNC: 123 MMOL/L (ref 136–145)
SODIUM SERPL-SCNC: 127 MMOL/L (ref 136–145)
SP GR UR STRIP.AUTO: 1.02 (ref 1–1.03)
SQUAMOUS #/AREA URNS HPF: ABNORMAL /HPF
UROBILINOGEN UR STRIP.AUTO-MCNC: 1 E.U./DL
WBC # BLD AUTO: 8.5 K/UL (ref 4.8–10.8)
WBC #/AREA URNS HPF: ABNORMAL /HPF (ref 0–5)

## 2025-07-21 PROCEDURE — 70450 CT HEAD/BRAIN W/O DYE: CPT

## 2025-07-21 PROCEDURE — 97162 PT EVAL MOD COMPLEX 30 MIN: CPT

## 2025-07-21 PROCEDURE — 80053 COMPREHEN METABOLIC PANEL: CPT

## 2025-07-21 PROCEDURE — 87086 URINE CULTURE/COLONY COUNT: CPT

## 2025-07-21 PROCEDURE — 93005 ELECTROCARDIOGRAM TRACING: CPT | Performed by: EMERGENCY MEDICINE

## 2025-07-21 PROCEDURE — 94760 N-INVAS EAR/PLS OXIMETRY 1: CPT

## 2025-07-21 PROCEDURE — 2500000003 HC RX 250 WO HCPCS: Performed by: NURSE PRACTITIONER

## 2025-07-21 PROCEDURE — 2580000003 HC RX 258: Performed by: EMERGENCY MEDICINE

## 2025-07-21 PROCEDURE — 6370000000 HC RX 637 (ALT 250 FOR IP): Performed by: NURSE PRACTITIONER

## 2025-07-21 PROCEDURE — 87077 CULTURE AEROBIC IDENTIFY: CPT

## 2025-07-21 PROCEDURE — 97530 THERAPEUTIC ACTIVITIES: CPT

## 2025-07-21 PROCEDURE — 82962 GLUCOSE BLOOD TEST: CPT

## 2025-07-21 PROCEDURE — 2580000003 HC RX 258: Performed by: NURSE PRACTITIONER

## 2025-07-21 PROCEDURE — 1200000000 HC SEMI PRIVATE

## 2025-07-21 PROCEDURE — 97165 OT EVAL LOW COMPLEX 30 MIN: CPT

## 2025-07-21 PROCEDURE — 85025 COMPLETE CBC W/AUTO DIFF WBC: CPT

## 2025-07-21 PROCEDURE — 85610 PROTHROMBIN TIME: CPT

## 2025-07-21 PROCEDURE — 36415 COLL VENOUS BLD VENIPUNCTURE: CPT

## 2025-07-21 PROCEDURE — 81001 URINALYSIS AUTO W/SCOPE: CPT

## 2025-07-21 PROCEDURE — 87186 SC STD MICRODIL/AGAR DIL: CPT

## 2025-07-21 PROCEDURE — 71045 X-RAY EXAM CHEST 1 VIEW: CPT

## 2025-07-21 PROCEDURE — 6360000002 HC RX W HCPCS: Performed by: NURSE PRACTITIONER

## 2025-07-21 PROCEDURE — 99285 EMERGENCY DEPT VISIT HI MDM: CPT

## 2025-07-21 PROCEDURE — 83930 ASSAY OF BLOOD OSMOLALITY: CPT

## 2025-07-21 RX ORDER — AMIODARONE HYDROCHLORIDE 200 MG/1
200 TABLET ORAL DAILY
Status: DISCONTINUED | OUTPATIENT
Start: 2025-07-21 | End: 2025-07-23 | Stop reason: HOSPADM

## 2025-07-21 RX ORDER — CLOPIDOGREL BISULFATE 75 MG/1
75 TABLET ORAL DAILY
Status: DISCONTINUED | OUTPATIENT
Start: 2025-07-21 | End: 2025-07-23 | Stop reason: HOSPADM

## 2025-07-21 RX ORDER — 0.9 % SODIUM CHLORIDE 0.9 %
500 INTRAVENOUS SOLUTION INTRAVENOUS ONCE
Status: COMPLETED | OUTPATIENT
Start: 2025-07-21 | End: 2025-07-21

## 2025-07-21 RX ORDER — PANTOPRAZOLE SODIUM 40 MG/1
40 TABLET, DELAYED RELEASE ORAL
Status: DISCONTINUED | OUTPATIENT
Start: 2025-07-22 | End: 2025-07-23 | Stop reason: HOSPADM

## 2025-07-21 RX ORDER — AMANTADINE HYDROCHLORIDE 100 MG/1
100 CAPSULE, GELATIN COATED ORAL 2 TIMES DAILY
Status: DISCONTINUED | OUTPATIENT
Start: 2025-07-21 | End: 2025-07-23 | Stop reason: HOSPADM

## 2025-07-21 RX ORDER — ENOXAPARIN SODIUM 100 MG/ML
40 INJECTION SUBCUTANEOUS DAILY
Status: DISCONTINUED | OUTPATIENT
Start: 2025-07-21 | End: 2025-07-21

## 2025-07-21 RX ORDER — SODIUM CHLORIDE 0.9 % (FLUSH) 0.9 %
5-40 SYRINGE (ML) INJECTION EVERY 12 HOURS SCHEDULED
Status: DISCONTINUED | OUTPATIENT
Start: 2025-07-21 | End: 2025-07-23 | Stop reason: HOSPADM

## 2025-07-21 RX ORDER — ACETAMINOPHEN 650 MG/1
650 SUPPOSITORY RECTAL EVERY 6 HOURS PRN
Status: DISCONTINUED | OUTPATIENT
Start: 2025-07-21 | End: 2025-07-23 | Stop reason: HOSPADM

## 2025-07-21 RX ORDER — CARBIDOPA AND LEVODOPA 25; 100 MG/1; MG/1
1 TABLET ORAL 2 TIMES DAILY
Status: DISCONTINUED | OUTPATIENT
Start: 2025-07-21 | End: 2025-07-23 | Stop reason: HOSPADM

## 2025-07-21 RX ORDER — ATORVASTATIN CALCIUM 10 MG/1
10 TABLET, FILM COATED ORAL DAILY
Status: DISCONTINUED | OUTPATIENT
Start: 2025-07-21 | End: 2025-07-22

## 2025-07-21 RX ORDER — ONDANSETRON 2 MG/ML
4 INJECTION INTRAMUSCULAR; INTRAVENOUS EVERY 6 HOURS PRN
Status: DISCONTINUED | OUTPATIENT
Start: 2025-07-21 | End: 2025-07-23 | Stop reason: HOSPADM

## 2025-07-21 RX ORDER — CARVEDILOL 12.5 MG/1
12.5 TABLET ORAL 2 TIMES DAILY WITH MEALS
Status: DISCONTINUED | OUTPATIENT
Start: 2025-07-21 | End: 2025-07-23 | Stop reason: HOSPADM

## 2025-07-21 RX ORDER — SODIUM CHLORIDE 0.9 % (FLUSH) 0.9 %
5-40 SYRINGE (ML) INJECTION PRN
Status: DISCONTINUED | OUTPATIENT
Start: 2025-07-21 | End: 2025-07-23 | Stop reason: HOSPADM

## 2025-07-21 RX ORDER — SODIUM CHLORIDE 9 MG/ML
INJECTION, SOLUTION INTRAVENOUS CONTINUOUS
Status: ACTIVE | OUTPATIENT
Start: 2025-07-21 | End: 2025-07-22

## 2025-07-21 RX ORDER — INSULIN LISPRO 100 [IU]/ML
0-4 INJECTION, SOLUTION INTRAVENOUS; SUBCUTANEOUS
Status: DISCONTINUED | OUTPATIENT
Start: 2025-07-21 | End: 2025-07-23 | Stop reason: HOSPADM

## 2025-07-21 RX ORDER — SPIRONOLACTONE 25 MG/1
25 TABLET ORAL DAILY
Status: DISCONTINUED | OUTPATIENT
Start: 2025-07-21 | End: 2025-07-23 | Stop reason: HOSPADM

## 2025-07-21 RX ORDER — TAMSULOSIN HYDROCHLORIDE 0.4 MG/1
0.4 CAPSULE ORAL NIGHTLY
Status: DISCONTINUED | OUTPATIENT
Start: 2025-07-21 | End: 2025-07-23 | Stop reason: HOSPADM

## 2025-07-21 RX ORDER — SODIUM CHLORIDE 9 MG/ML
INJECTION, SOLUTION INTRAVENOUS PRN
Status: DISCONTINUED | OUTPATIENT
Start: 2025-07-21 | End: 2025-07-23 | Stop reason: HOSPADM

## 2025-07-21 RX ORDER — ACETAMINOPHEN 325 MG/1
650 TABLET ORAL EVERY 6 HOURS PRN
Status: DISCONTINUED | OUTPATIENT
Start: 2025-07-21 | End: 2025-07-23 | Stop reason: HOSPADM

## 2025-07-21 RX ORDER — GLUCAGON 1 MG/ML
1 KIT INJECTION PRN
Status: DISCONTINUED | OUTPATIENT
Start: 2025-07-21 | End: 2025-07-23 | Stop reason: HOSPADM

## 2025-07-21 RX ORDER — ONDANSETRON 4 MG/1
4 TABLET, ORALLY DISINTEGRATING ORAL EVERY 8 HOURS PRN
Status: DISCONTINUED | OUTPATIENT
Start: 2025-07-21 | End: 2025-07-23 | Stop reason: HOSPADM

## 2025-07-21 RX ORDER — SACUBITRIL AND VALSARTAN 24; 26 MG/1; MG/1
1 TABLET, FILM COATED ORAL 2 TIMES DAILY
Status: DISCONTINUED | OUTPATIENT
Start: 2025-07-21 | End: 2025-07-23 | Stop reason: HOSPADM

## 2025-07-21 RX ORDER — DEXTROSE MONOHYDRATE 100 MG/ML
INJECTION, SOLUTION INTRAVENOUS CONTINUOUS PRN
Status: DISCONTINUED | OUTPATIENT
Start: 2025-07-21 | End: 2025-07-23 | Stop reason: HOSPADM

## 2025-07-21 RX ORDER — 0.9 % SODIUM CHLORIDE 0.9 %
250 INTRAVENOUS SOLUTION INTRAVENOUS ONCE
Status: COMPLETED | OUTPATIENT
Start: 2025-07-21 | End: 2025-07-21

## 2025-07-21 RX ADMIN — SODIUM CHLORIDE: 0.9 INJECTION, SOLUTION INTRAVENOUS at 14:09

## 2025-07-21 RX ADMIN — TAMSULOSIN HYDROCHLORIDE 0.4 MG: 0.4 CAPSULE ORAL at 21:38

## 2025-07-21 RX ADMIN — ONDANSETRON 4 MG: 2 INJECTION, SOLUTION INTRAMUSCULAR; INTRAVENOUS at 17:04

## 2025-07-21 RX ADMIN — APIXABAN 2.5 MG: 2.5 TABLET, FILM COATED ORAL at 21:38

## 2025-07-21 RX ADMIN — CLOPIDOGREL BISULFATE 75 MG: 75 TABLET, FILM COATED ORAL at 16:59

## 2025-07-21 RX ADMIN — AMANTADINE HYDROCHLORIDE 100 MG: 100 CAPSULE, LIQUID FILLED ORAL at 21:38

## 2025-07-21 RX ADMIN — ENOXAPARIN SODIUM 40 MG: 100 INJECTION SUBCUTANEOUS at 14:11

## 2025-07-21 RX ADMIN — WATER 1000 MG: 1 INJECTION INTRAMUSCULAR; INTRAVENOUS; SUBCUTANEOUS at 17:30

## 2025-07-21 RX ADMIN — ATORVASTATIN CALCIUM 10 MG: 10 TABLET, FILM COATED ORAL at 16:59

## 2025-07-21 RX ADMIN — SODIUM CHLORIDE, PRESERVATIVE FREE 10 ML: 5 INJECTION INTRAVENOUS at 21:42

## 2025-07-21 RX ADMIN — CARBIDOPA AND LEVODOPA 1 TABLET: 25; 100 TABLET ORAL at 21:38

## 2025-07-21 RX ADMIN — SODIUM CHLORIDE 500 ML: 9 INJECTION, SOLUTION INTRAVENOUS at 12:47

## 2025-07-21 RX ADMIN — AMIODARONE HYDROCHLORIDE 200 MG: 200 TABLET ORAL at 16:59

## 2025-07-21 RX ADMIN — SODIUM CHLORIDE 250 ML: 0.9 INJECTION, SOLUTION INTRAVENOUS at 11:39

## 2025-07-21 ASSESSMENT — ENCOUNTER SYMPTOMS
DIARRHEA: 0
SHORTNESS OF BREATH: 0
ABDOMINAL PAIN: 0
COUGH: 0
BLOOD IN STOOL: 0
BACK PAIN: 0
VOMITING: 1
WHEEZING: 0
COLOR CHANGE: 0
NAUSEA: 1
ABDOMINAL DISTENTION: 0

## 2025-07-21 ASSESSMENT — PAIN - FUNCTIONAL ASSESSMENT: PAIN_FUNCTIONAL_ASSESSMENT: NONE - DENIES PAIN

## 2025-07-21 ASSESSMENT — LIFESTYLE VARIABLES: HOW OFTEN DO YOU HAVE A DRINK CONTAINING ALCOHOL: NEVER

## 2025-07-21 NOTE — PROGRESS NOTES
Occupational Therapy  Facility/Department: [facility] 3 CONNIE/VAS/MED  Occupational Therapy Initial Assessment    Name: Bong Mack  : 1945  MRN: [id number]  Date of Service: 2025    Discharge Recommendations:  Subacute/Skilled Nursing Facility          Patient Diagnosis(es): The primary encounter diagnosis was Syncope and collapse. Diagnoses of YG (acute kidney injury), Hyponatremia, Hyperkalemia, Parkinson's disease, unspecified whether dyskinesia present, unspecified whether manifestations fluctuate (HCC), Ischemic cardiomyopathy, Chronic anticoagulation, and Type 2 diabetes mellitus with hyperglycemia, without long-term current use of insulin (HCC) were also pertinent to this visit.  Past Medical History:  has a past medical history of Arthritis, Atrial fibrillation (HCC), Coronary artery disease status post coronary stent insertion, Diabetes, Hypertension, New onset atrial fibrillation (HCC), Parkinson disease (HCC), and Tinnitus.  Past Surgical History:  has a past surgical history that includes hernia repair; knee surgery; Cardiac procedure (N/A, 2025); and Cataract removal (Bilateral).    Treatment Diagnosis: AMS, Syncope and collapse      Assessment  Performance deficits / Impairments: Decreased functional mobility ;Decreased cognition;Decreased balance;Decreased ADL status;Decreased safe awareness;Decreased endurance  Assessment: Will progress as tolerated  Treatment Diagnosis: AMS, Syncope and collapse  Prognosis: Good  Decision Making: Low Complexity  REQUIRES OT FOLLOW-UP: Yes  Activity Tolerance  Activity Tolerance: Patient Tolerated treatment well     Plan  Occupational Therapy Plan  Times Per Week: 3-5x/week  Times Per Day: Once a day    Restrictions  Restrictions/Precautions  Restrictions/Precautions: Fall Risk    Subjective  General  Chart Reviewed: Yes  Patient assessed for rehabilitation services?: Yes  Additional Pertinent Hx: Hx. of Parkinsons  Family / Caregiver Present:

## 2025-07-21 NOTE — PROGRESS NOTES
oBng Mack arrived to room # 312 .   Presented with: YG  Mental Status: Patient is oriented, alert, coherent, logical, thought processes intact, and able to concentrate and follow conversation.   Vitals:    07/21/25 1416   BP: (!) 112/56   Pulse: 54   Resp: 18   Temp: 98.5 °F (36.9 °C)   SpO2: 98%     Patient safety contract and falls prevention contract reviewed with patient Yes.  Oriented Patient and Family to room.  Call light within reach. Yes.  Needs, issues or concerns expressed at this time: no.      Electronically signed by Emily Andrade RN on 7/21/2025 at 2:27 PM

## 2025-07-21 NOTE — H&P
significant abnormality. Intraparenchymal hemorrhage: None. Parenchyma: Similar mild chronic small vessel ischemic changes.  No mass effect or midline shift. Extra-axial spaces and basal cisterns: No acute findings Ventricles: Normal size and morphology for age. Paranasal sinuses and mastoid air cells: No acute finding Orbits: Bilateral pseudophakia. No acute findings. Sella/Skull Base: No acute findings. Other: No acute findings.      No acute intracranial abnormality.  All CT scans are performed using dose optimization techniques as appropriate to the performed exam and include at least one of the following: Automated exposure control, adjustment of the mA and/or kV according to size, and the use of iterative reconstruction technique.  ______________________________________ Electronically signed by: PEPE BAEZ D.O. Date:     07/21/2025 Time:    12:53     XR CHEST PORTABLE  Result Date: 7/21/2025  EXAM: CHEST RADIOGRAPH  TECHNIQUE: Single frontal chest radiograph.  HISTORY: Altered mental status, weakness. No additional clinical history is provided  COMPARISON: 06/25/2025  FINDINGS:  Similar configuration of the cardiomediastinal silhouette in central pulmonary vessels. Stable calcified left upper lobe pulmonary granuloma and elevation of the right hemidiaphragm. Lungs are otherwise clear.      1.  Stable chest without acute intrathoracic process identified.    ______________________________________ Electronically signed by: YVETTE HILL M.D. Date:     07/21/2025 Time:    12:50       Assessment/Plan:   Principal Problem:    YG (acute kidney injury)  Active Problems:    Type 2 diabetes mellitus without complication, without long-term current use of insulin (HCC)    HFrEF (heart failure with reduced ejection fraction) (HCC)    Ischemic cardiomyopathy    Paroxysmal A-fib (HCC)    Urinary retention  Resolved Problems:    * No resolved hospital problems. *       Principal Problem:    YG (acute kidney injury)-                          - Creatinine 1.8 today               - IVFs               - Monitor I's and O's closely              - Monitor labs closely              - Avoid hypotension              - Avoid nephrotoxic agents         Active Problems:   Syncope-              - Neuro checks              - Orthostatic blood pressure and pulse daily               - PT/OT              - Fall precaution              - Bed alarm on              - Monitor on telemetry                  Ongoing nausea and vomiting-    - General surgery consulted to discuss timing of cholecystectomy per patient and family request    - LFTs WNL     - Monitor labs       Type 2 diabetes mellitus without complication, without long-term current use of insulin ([facility])-    - SSI   - Accu-Checks    - Hypoglycemia treatment protocol in place       HFrEF (heart failure with reduced ejection fraction) ([facility])-   - Monitor I's and O's    - Monitor for excerebration       Ischemic cardiomyopathy-    - s/p recent cardiac stents   - Continue Plavix    - Monitor on telemetry       Paroxysmal A-fib ([facility])-    - resume home Amiodarone    - resume home Eliquis    - Monitor on telemetry       Urinary retention-    - Chronic ramirez    - Resume Flomax      UTI-    - IV Rocephin    - Follow urine culture   - Monitor I's and O's closely             DVT Prophylaxis: Eliquis      GI prophylaxis:  Protonix      Discharge planning:  TBD      Advance Directive: Full Code    Diet: Diet NPO     Consults Made:   IP CONSULT TO GENERAL SURGERY    Further Orders per Clinical course/attending.     Signed:  Electronically signed by VIKTORIA Gresham CNP on 7/21/25 at 4:31 PM CDT     EMR Dragon/Transcription disclaimer:   Much of this encounter note is an electronic transcription/translation of spoken language to printed text. The electronic translation of spoken language may permit erroneous, or at times, nonsensical words or phrases to be inadvertently transcribed; although attempts

## 2025-07-21 NOTE — ED PROVIDER NOTES
[facility] EMERGENCY DEPARTMENT  eMERGENCY dEPARTMENT eNCOUnter      Pt Name: Bong Mack  MRN: [id number]  Birthdate 1945  Date of evaluation: 7/21/2025  Provider: Blaise Stewart MD    CHIEF COMPLAINT       Chief Complaint   Patient presents with    Altered Mental Status     Pt arrives via EMS from other side of our hospital. Pt was going to a doctor apt, when wife stated pt became altered. She was trying to get him into a w/c and he wouldn't go because he said it was broken. Upon arrival pt is  alert and oriented and states what his wife said was \"BS\".          HISTORY OF PRESENT ILLNESS   (Location/Symptom, Timing/Onset,Context/Setting, Quality, Duration, Modifying Factors, Severity)  Note limiting factors.   Bong Mack is a 80 y.o. male who presents to the emergency department with altered mental status unresponsiveness.  The patient presents after being brought from [facility] by his wife to the hospital for an appointment with Dr. Mackey.  He supposed to get his gallbladder out in the next couple months but he is recently had stents placed and is on blood thinners.  She tells me that he cannot eat or drink she thinks he is dehydrated he has been vomiting a lot over the last weeks.  He has a chronic indwelling Lawrence.  He has not had any labs done since 2 July with his PCP he has ef 3-40% and is on entresto, Cardiology note states \"Cardiovascular: No palpitations reported, low blood pressure (92/48).\"   She states he was sitting up in the wheelchair [place]  He was in a wheelchair.  She states he went unresponsive for 2 to 3 minutes there was no shaking episode no seizure there was no trauma.  He is on blood thinners he denies any headache on my evaluating him in the ER he has normal.  He presents via EMS.  His glucose was 224.  His blood pressure initially was 88/44 and then 79/55.  The patient is awake alert

## 2025-07-21 NOTE — CARE COORDINATION
Patient is a bedhold at [facility]. Can return when medically stable.  [facility]   P   F ()   F (Ref to Keli Smith)  Electronically signed by Ly Staley on 7/21/2025 at 3:13 PM

## 2025-07-21 NOTE — CARE COORDINATION
Case Management Assessment  Initial Evaluation    Date/Time of Evaluation: 7/21/2025 1:46 PM  Assessment Completed by: Sulma Reid    If patient is discharged prior to next notation, then this note serves as note for discharge by case management.    Patient Name: Bong Mack                   YOB: 1945  Diagnosis: YG (acute kidney injury) [N17.9]                   Date / Time: 7/21/2025 10:54 AM    Patient Admission Status: Inpatient   Readmission Risk (Low < 19, Mod (19-27), High > 27): Readmission Risk Score: 22.3    Current PCP: Chadd Blake, DO  PCP verified by CM? (P) Yes    Chart Reviewed: Yes      History Provided by: (P) Patient, Significant Other  Patient Orientation: (P) Alert and Oriented, Other (see comment) (heard to hear, but responsive)    Patient Cognition: (P) Alert    Hospitalization in the last 30 days (Readmission):  Yes    If yes, Readmission Assessment in  Navigator will be completed.    Advance Directives:      Code Status: Prior   Patient's Primary Decision Maker is: (P) Legal Next of Kin    Primary Decision Maker: Liliana Mack - Spouse - [phone]    Discharge Planning:    Patient lives with: (P) Other (Comment) ([facility]) Type of Home: (P) Skilled Nursing Facility  Primary Care Giver: (P) Other (Comment) ([facility])  Patient Support Systems include: (P) Spouse/Significant Other, Other (Comment) ([facility])   Current Financial resources: (P) Medicare  Current community resources: (P) Other (Comment) ([facility])  Current services prior to admission: (P) Skilled Nursing Facility            Current DME: (P) Other (Comment) ([facility])            Type of Home Care services:  (P) Skilled Therapy    ADLS  Prior functional level: (P) Assistance with the following:, Bathing, Dressing, Toileting, Feeding, Cooking, Housework, Shopping,

## 2025-07-21 NOTE — PROGRESS NOTES
Physical Therapy  Facility/Department: [facility] 3 CONNIE/VAS/MED  Physical Therapy Initial Assessment    Name: Bong Mack  : 1945  MRN: [id number]  Date of Service: 2025    Discharge Recommendations:  Continue to assess pending progress, 24 hour supervision or assist, Patient would benefit from continued therapy after discharge          Patient Diagnosis(es): The primary encounter diagnosis was Syncope and collapse. Diagnoses of YG (acute kidney injury), Hyponatremia, Hyperkalemia, Parkinson's disease, unspecified whether dyskinesia present, unspecified whether manifestations fluctuate (HCC), Ischemic cardiomyopathy, Chronic anticoagulation, and Type 2 diabetes mellitus with hyperglycemia, without long-term current use of insulin (HCC) were also pertinent to this visit.  Past Medical History:  has a past medical history of Arthritis, Atrial fibrillation (HCC), Coronary artery disease status post coronary stent insertion, Diabetes, Hypertension, New onset atrial fibrillation (HCC), Parkinson disease (HCC), and Tinnitus.  Past Surgical History:  has a past surgical history that includes hernia repair; knee surgery; Cardiac procedure (N/A, 2025); and Cataract removal (Bilateral).    Assessment  Body Structures, Functions, Activity Limitations Requiring Skilled Therapeutic Intervention: Decreased functional mobility ;Decreased ADL status;Decreased ROM;Decreased endurance;Decreased cognition;Decreased safe awareness;Decreased strength;Decreased balance;Decreased posture;Decreased coordination  Assessment: Pt. will benefit from cont. PT to decrease impairments. Pt. encouraged to sit up in the chair, but he declined today. Pt. needed more A on return trip to door. Pt. needed v. cues to step inside RW. May be beneficial to take orthostatics with mobility, have 2A and use w/c follow. Anticipate return to SNF.  Treatment Diagnosis: impaired gait and mobility  Therapy Prognosis: Good  Decision Making: Medium

## 2025-07-21 NOTE — CARE COORDINATION
07/21/25 1350   Readmission Assessment   Number of Days since last admission? 8-30 days   Previous Disposition SNF   Who is being Interviewed Patient  (spouse)   What was the patient's/caregiver's perception as to why they think they needed to return back to the hospital? Other (Comment)  (New symptoms)   Did you visit your Primary Care Physician after you left the hospital, before you returned this time? No   Why weren't you able to visit your PCP? Other (Comment)  (SNF)   Did you see a specialist, such as Cardiac, Pulmonary, Orthopedic Physician, etc. after you left the hospital? No   Who advised the patient to return to the hospital? Other (Comment)  (spouse)   Does the patient report anything that got in the way of taking their medications? No   In our efforts to provide the best possible care to you and others like you, can you think of anything that we could have done to help you after you left the hospital the first time, so that you might not have needed to return so soon? Other (Comment)  (New symptoms.)

## 2025-07-21 NOTE — ED NOTES
ED TO INPATIENT SBAR HANDOFF    Patient Name: Bong Mack   : 1945  80 y.o.   Family/Caregiver Present: Yes  Code Status Order: Prior    C-SSRS: Risk of Suicide: No Risk  Sitter No  Restraints:         Situation  Chief Complaint:   Chief Complaint   Patient presents with    Altered Mental Status     Pt arrives via EMS from other side of our hospital. Pt was going to a doctor apt, when wife stated pt became altered. She was trying to get him into a w/c and he wouldn't go because he said it was broken. Upon arrival pt is  alert and oriented and states what his wife said was \"BS\".      Patient Diagnosis: YG (acute kidney injury) [N17.9]     Brief Description of Patient's Condition: Bong Mack is a 80 y.o. male who presents to the emergency department with altered mental status unresponsiveness.  The patient presents after being brought from [facility] by his wife to the hospital for an appointment with Dr. Mackey.  He supposed to get his gallbladder out in the next couple months but he is recently had stents placed and is on blood thinners.  She tells me that he cannot eat or drink she thinks he is dehydrated he has been vomiting a lot over the last weeks.  He has a chronic indwelling Lawrence.  He has not had any labs done since  with his PCP he has ef 3-40% and is on entresto, Cardiology note states \"Cardiovascular: No palpitations reported, low blood pressure (92/48).\"   She states he was sitting up in the wheelchair [place]  He was in a wheelchair.  She states he went unresponsive for 2 to 3 minutes there was no shaking episode no seizure there was no trauma.  He is on blood thinners he denies any headache on my evaluating him in the ER he has normal.  He presents via EMS.  His glucose was 224.  His blood pressure initially was 88/44 and then 79/55.  The patient is awake alert able to answer questions tell me

## 2025-07-21 NOTE — PROGRESS NOTES
4 Eyes Skin Assessment     NAME:  Bong Mack  YOB: 1945  MEDICAL RECORD NUMBER:  [id number]    The patient is being assessed for  Admission    I agree that at least one RN has performed a thorough Head to Toe Skin Assessment on the patient. ALL assessment sites listed below have been assessed.      Areas assessed by both nurses:    Head, Face, Ears, Shoulders, Back, Chest, Arms, Elbows, Hands, Sacrum. Buttock, Coccyx, Ischium, Legs. Feet and Heels, and Under Medical Devices         Does the Patient have a Wound? Yes wound(s) were present on assessment. LDA wound assessment was Initiated and completed by RN       Faustino Prevention initiated by RN: Yes  Wound Care Orders initiated by RN: No    For hospital-acquired stage 1 & 2 and ALL Stage 3,4, Unstageable, DTI, NWPT, and Complex wounds: place order “IP Wound Care/Ostomy Nurse Eval and Treat” by RN under : No    New Ostomies, if present place, Ostomy referral order under : No     Nurse 1 eSignature: Electronically signed by Emily Andrade RN on 7/21/25 at 2:28 PM CDT    **SHARE this note so that the co-signing nurse can place an eSignature**    Nurse 2 eSignature: Electronically signed by Nicole Moreno LPN on 7/21/25 at 2:43 PM CDT

## 2025-07-22 ENCOUNTER — APPOINTMENT (OUTPATIENT)
Dept: NUCLEAR MEDICINE | Age: 80
DRG: 444 | End: 2025-07-22
Attending: SURGERY
Payer: MEDICARE

## 2025-07-22 PROBLEM — E87.5 HYPERKALEMIA: Status: ACTIVE | Noted: 2025-07-22

## 2025-07-22 PROBLEM — E43 SEVERE MALNUTRITION: Status: ACTIVE | Noted: 2025-07-22

## 2025-07-22 PROBLEM — E87.1 HYPONATREMIA: Status: ACTIVE | Noted: 2025-07-22

## 2025-07-22 LAB
ALBUMIN SERPL-MCNC: 3.6 G/DL (ref 3.5–5.2)
ALP SERPL-CCNC: 66 U/L (ref 40–129)
ALT SERPL-CCNC: 13 U/L (ref 10–50)
ANION GAP SERPL CALCULATED.3IONS-SCNC: 8 MMOL/L (ref 8–16)
AST SERPL-CCNC: 16 U/L (ref 10–50)
BASOPHILS # BLD: 0 K/UL (ref 0–0.2)
BASOPHILS NFR BLD: 0.6 % (ref 0–1)
BILIRUB SERPL-MCNC: 0.4 MG/DL (ref 0.2–1.2)
BUN SERPL-MCNC: 48 MG/DL (ref 8–23)
CALCIUM SERPL-MCNC: 8.9 MG/DL (ref 8.8–10.2)
CHLORIDE SERPL-SCNC: 96 MMOL/L (ref 98–107)
CO2 SERPL-SCNC: 24 MMOL/L (ref 22–29)
CREAT SERPL-MCNC: 1.7 MG/DL (ref 0.7–1.2)
EKG P AXIS: 67 DEGREES
EKG P-R INTERVAL: 168 MS
EKG Q-T INTERVAL: 468 MS
EKG QRS DURATION: 140 MS
EKG QTC CALCULATION (BAZETT): 458 MS
EKG T AXIS: 85 DEGREES
EOSINOPHIL # BLD: 0.2 K/UL (ref 0–0.6)
EOSINOPHIL NFR BLD: 2.3 % (ref 0–5)
ERYTHROCYTE [DISTWIDTH] IN BLOOD BY AUTOMATED COUNT: 15.9 % (ref 11.5–14.5)
GLUCOSE BLD-MCNC: 75 MG/DL (ref 70–99)
GLUCOSE BLD-MCNC: 92 MG/DL (ref 70–99)
GLUCOSE BLD-MCNC: 95 MG/DL (ref 70–99)
GLUCOSE SERPL-MCNC: 86 MG/DL (ref 70–99)
HCT VFR BLD AUTO: 32.6 % (ref 42–52)
HGB BLD-MCNC: 11.1 G/DL (ref 14–18)
IMM GRANULOCYTES # BLD: 0.1 K/UL
LYMPHOCYTES # BLD: 1.6 K/UL (ref 1.1–4.5)
LYMPHOCYTES NFR BLD: 22.9 % (ref 20–40)
MCH RBC QN AUTO: 29.8 PG (ref 27–31)
MCHC RBC AUTO-ENTMCNC: 34 G/DL (ref 33–37)
MCV RBC AUTO: 87.6 FL (ref 80–94)
MONOCYTES # BLD: 0.6 K/UL (ref 0–0.9)
MONOCYTES NFR BLD: 8 % (ref 0–10)
NEUTROPHILS # BLD: 4.4 K/UL (ref 1.5–7.5)
NEUTS SEG NFR BLD: 64.5 % (ref 50–65)
PERFORMED ON: NORMAL
PLATELET # BLD AUTO: 209 K/UL (ref 130–400)
PMV BLD AUTO: 9.1 FL (ref 9.4–12.4)
POTASSIUM SERPL-SCNC: 5.8 MMOL/L (ref 3.5–5)
PROT SERPL-MCNC: 6 G/DL (ref 6.4–8.3)
RBC # BLD AUTO: 3.72 M/UL (ref 4.7–6.1)
SODIUM SERPL-SCNC: 128 MMOL/L (ref 136–145)
WBC # BLD AUTO: 6.9 K/UL (ref 4.8–10.8)

## 2025-07-22 PROCEDURE — 99223 1ST HOSP IP/OBS HIGH 75: CPT | Performed by: SURGERY

## 2025-07-22 PROCEDURE — 78226 HEPATOBILIARY SYSTEM IMAGING: CPT

## 2025-07-22 PROCEDURE — 80053 COMPREHEN METABOLIC PANEL: CPT

## 2025-07-22 PROCEDURE — 6360000002 HC RX W HCPCS: Performed by: NURSE PRACTITIONER

## 2025-07-22 PROCEDURE — 2700000000 HC OXYGEN THERAPY PER DAY

## 2025-07-22 PROCEDURE — 82962 GLUCOSE BLOOD TEST: CPT

## 2025-07-22 PROCEDURE — 97110 THERAPEUTIC EXERCISES: CPT

## 2025-07-22 PROCEDURE — 6370000000 HC RX 637 (ALT 250 FOR IP): Performed by: HOSPITALIST

## 2025-07-22 PROCEDURE — A9537 TC99M MEBROFENIN: HCPCS | Performed by: SURGERY

## 2025-07-22 PROCEDURE — 2500000003 HC RX 250 WO HCPCS: Performed by: NURSE PRACTITIONER

## 2025-07-22 PROCEDURE — 1200000000 HC SEMI PRIVATE

## 2025-07-22 PROCEDURE — 3430000000 HC RX DIAGNOSTIC RADIOPHARMACEUTICAL: Performed by: SURGERY

## 2025-07-22 PROCEDURE — 36415 COLL VENOUS BLD VENIPUNCTURE: CPT

## 2025-07-22 PROCEDURE — 2580000003 HC RX 258: Performed by: NURSE PRACTITIONER

## 2025-07-22 PROCEDURE — 6370000000 HC RX 637 (ALT 250 FOR IP): Performed by: NURSE PRACTITIONER

## 2025-07-22 PROCEDURE — 94760 N-INVAS EAR/PLS OXIMETRY 1: CPT

## 2025-07-22 PROCEDURE — 93010 ELECTROCARDIOGRAM REPORT: CPT | Performed by: INTERNAL MEDICINE

## 2025-07-22 PROCEDURE — 85025 COMPLETE CBC W/AUTO DIFF WBC: CPT

## 2025-07-22 RX ORDER — ATORVASTATIN CALCIUM 10 MG/1
10 TABLET, FILM COATED ORAL NIGHTLY
Status: DISCONTINUED | OUTPATIENT
Start: 2025-07-22 | End: 2025-07-23 | Stop reason: HOSPADM

## 2025-07-22 RX ADMIN — CARBIDOPA AND LEVODOPA 1 TABLET: 25; 100 TABLET ORAL at 21:10

## 2025-07-22 RX ADMIN — ATORVASTATIN CALCIUM 10 MG: 10 TABLET, FILM COATED ORAL at 21:10

## 2025-07-22 RX ADMIN — Medication 5 MILLICURIE: at 12:51

## 2025-07-22 RX ADMIN — CLOPIDOGREL BISULFATE 75 MG: 75 TABLET, FILM COATED ORAL at 12:02

## 2025-07-22 RX ADMIN — SODIUM CHLORIDE: 0.9 INJECTION, SOLUTION INTRAVENOUS at 03:32

## 2025-07-22 RX ADMIN — APIXABAN 2.5 MG: 2.5 TABLET, FILM COATED ORAL at 12:02

## 2025-07-22 RX ADMIN — CARBIDOPA AND LEVODOPA 1 TABLET: 25; 100 TABLET ORAL at 12:02

## 2025-07-22 RX ADMIN — TAMSULOSIN HYDROCHLORIDE 0.4 MG: 0.4 CAPSULE ORAL at 21:10

## 2025-07-22 RX ADMIN — SODIUM CHLORIDE 3000 MG: 9 INJECTION, SOLUTION INTRAVENOUS at 18:39

## 2025-07-22 RX ADMIN — AMANTADINE HYDROCHLORIDE 100 MG: 100 CAPSULE, LIQUID FILLED ORAL at 12:02

## 2025-07-22 RX ADMIN — AMANTADINE HYDROCHLORIDE 100 MG: 100 CAPSULE, LIQUID FILLED ORAL at 21:10

## 2025-07-22 RX ADMIN — AMIODARONE HYDROCHLORIDE 200 MG: 200 TABLET ORAL at 12:02

## 2025-07-22 RX ADMIN — WATER 1000 MG: 1 INJECTION INTRAMUSCULAR; INTRAVENOUS; SUBCUTANEOUS at 18:39

## 2025-07-22 RX ADMIN — SODIUM ZIRCONIUM CYCLOSILICATE 10 G: 10 POWDER, FOR SUSPENSION ORAL at 12:01

## 2025-07-22 RX ADMIN — APIXABAN 2.5 MG: 2.5 TABLET, FILM COATED ORAL at 21:10

## 2025-07-22 RX ADMIN — PANTOPRAZOLE SODIUM 40 MG: 40 TABLET, DELAYED RELEASE ORAL at 05:10

## 2025-07-22 ASSESSMENT — ENCOUNTER SYMPTOMS
ABDOMINAL PAIN: 0
WHEEZING: 0
BLOOD IN STOOL: 0
VOMITING: 0
CONSTIPATION: 0
COUGH: 0
NAUSEA: 0
COLOR CHANGE: 0
ABDOMINAL DISTENTION: 0
SHORTNESS OF BREATH: 0
DIARRHEA: 0

## 2025-07-22 NOTE — PROGRESS NOTES
Occupational Therapy  Facility/Department: [facility] 3 CONNIE/VAS/MED  Daily Treatment Note  NAME: Bong Mack  : 1945  MRN: [id number]    Date of Service: 2025    Discharge Recommendations:  Subacute/Skilled Nursing Facility       Assessment   Performance deficits / Impairments: Decreased functional mobility ;Decreased cognition;Decreased balance;Decreased ADL status;Decreased safe awareness;Decreased endurance  Assessment: Pt very weak and refused at first, but finally agreed to UE exercises in bed.  Pt continues to benefit from skilled OT services.  Treatment Diagnosis: AMS, Syncope and collapse  Prognosis: Good  Activity Tolerance  Activity Tolerance: Patient limited by fatigue         Patient Diagnosis(es): The primary encounter diagnosis was Syncope and collapse. Diagnoses of YG (acute kidney injury), Hyponatremia, Hyperkalemia, Parkinson's disease, unspecified whether dyskinesia present, unspecified whether manifestations fluctuate (HCC), Ischemic cardiomyopathy, Chronic anticoagulation, and Type 2 diabetes mellitus with hyperglycemia, without long-term current use of insulin (HCC) were also pertinent to this visit.      has a past medical history of Arthritis, Atrial fibrillation (HCC), Coronary artery disease status post coronary stent insertion, Diabetes, Hypertension, New onset atrial fibrillation (HCC), Parkinson disease (HCC), and Tinnitus.   has a past surgical history that includes hernia repair; knee surgery; Cardiac procedure (N/A, 2025); and Cataract removal (Bilateral).    Restrictions  Restrictions/Precautions  Restrictions/Precautions: Fall Risk  Subjective   General  Chart Reviewed: Yes  Patient assessed for rehabilitation services?: Yes  Additional Pertinent Hx: Hx. of Parkinsons  Response to previous treatment: Patient with no complaints from previous session  Family / Caregiver Present: Yes (spouse)  Diagnosis: AMS, Syncope and Collapse  General Comment  Comments: Pt. collapsed at

## 2025-07-22 NOTE — PROGRESS NOTES
Comprehensive Nutrition Assessment    Type and Reason for Visit:  Initial    Nutrition Recommendations/Plan:   Advance diet as tolerated and provide nutritional supplements when ready     Malnutrition Assessment:  Malnutrition Status:  Severe malnutrition (07/22/25 0933)    Context:  Acute Illness     Findings of the 6 clinical characteristics of malnutrition:  Energy Intake:  75% or less of estimated energy requirements for 7 or more days  Weight Loss:  Greater than 7.5% over 3 months     Body Fat Loss:  Unable to assess     Muscle Mass Loss:  Unable to assess    Fluid Accumulation:  No fluid accumulation     Strength:  Not Performed    Nutrition Assessment:    Patient is NPO for testing. At home/LTC follows a CCHO, Cardiac diet, C/o N/V, Pressure injuries present    Nutrition Related Findings:    Significant weight loss of 12.4% since April Wound Type: Multiple, Deep Tissue Injury, Stage III       Current Nutrition Intake & Therapies:    Average Meal Intake: NPO  Average Supplements Intake: None Ordered  Diet NPO Exceptions are: Sips of Water with Meds    Anthropometric Measures:  Height: 177.8 cm (5' 10\")  Ideal Body Weight (IBW): 166 lbs (75 kg)    Admission Body Weight: 78 kg (171 lb 15.3 oz)  Current Body Weight: 78 kg (171 lb 15.3 oz), 103.6 % IBW. Weight Source: Bed scale  Current BMI (kg/m2): 24.7  Usual Body Weight: 89 kg (196 lb 3.4 oz)     % Weight Change (Calculated): -12.4                    BMI Categories: Normal Weight (BMI 22.0 to 24.9) age over 65    Estimated Daily Nutrient Needs:  Energy Requirements Based On: Kcal/kg  Weight Used for Energy Requirements: Current  Energy (kcal/day): 1950-2340cal at 25-30cal/kg bw  Weight Used for Protein Requirements: Current  Protein (g/day): 78-156gms at 1-2gms/kg bw  Method Used for Fluid Requirements: 1 ml/kcal  Fluid (ml/day): 1950-2340cc    Nutrition Diagnosis:   Severe malnutrition related to inadequate protein-energy intake, nausea/vomiting/diarrhea,  decreased appetite, increase demand for energy/nutrients as evidenced by poor intake prior to admission, wounds, weight loss, lab values, nausea, vomiting    Nutrition Interventions:   Food and/or Nutrient Delivery: Start Oral Diet, Start Oral Nutrition Supplement  Nutrition Education/Counseling: No recommendation at this time  Coordination of Nutrition Care: Continue to monitor while inpatient, Interdisciplinary Rounds       Goals:  Goals: Meet at least 75% of estimated needs, Initiate PO diet  Type of Goal: New goal  Previous Goal Met: New Goal    Nutrition Monitoring and Evaluation:   Behavioral-Environmental Outcomes: None Identified  Food/Nutrient Intake Outcomes: Food and Nutrient Intake  Physical Signs/Symptoms Outcomes: Nausea or Vomiting, Skin, Weight, Fluid Status or Edema    Discharge Planning:    Too soon to determine     Merry Padilla RD  Contact: [phone]

## 2025-07-22 NOTE — PROGRESS NOTES
[facility]      Progress Note    Patient:  Bong Mack  YOB: 1945  Date of Service: 7/22/2025  MRN: [id number]   Acct: [id number]   Primary Care Physician: Chadd Blake DO  Advance Directive: Full Code  Admit Date: 7/21/2025       Hospital Day: 1    Portions of this note have been copied forward, however, updated to reflect the most current clinical status of this patient.     CHIEF COMPLAINT syncope     SUBJECTIVE:  Mr. Mack was resting in bed this morning. Denies nausea or vomiting. Denies SOB or chest pain. Denies fever or chills.       CUMULATIVE HOSPITAL COURSE:   The patient is a 80 y.o. male with past medical history of Afib on Eliquis, cardiomyopathy with EF of 30-35%, s/p cardiac stent to OM lesion on 4/3/25 on Plavix, Hypertension, diabetes, Parkinson's disease who presented to [facility] ED for evaluation of syncope and collapse. Wife stated she had brought patient from [facility] for his general surgery appointment. She noticed patient went unresponsive for couple of minutes. Denied fever or chills. Reported ongoing nausea and vomiting and unable to keep anything down. Denied abdominal pain. Denied chest pain. Reported shortness of breath at baseline. Workup in ED revealed hypotension on arrival (88/52), Na 123, K+ 6.0 (possible hemolysis), CO2 18, BUN 50, Cr 1.8, eGFR 37, Hgb 12.2, urinalysis indicated large leukocytes, +4 bacteria, WBC 21-30, CT head unremarkable. Cxray unremarkable. Patient was admitted to hospital medicine for further evaluation with general surgery consultation. IV fluids and empiric antibiotics were initiated. General surgery suggested he is not a surgical candidate at this time given severe electrolyte abnormalities and suggested HIDA scan. HIDA scan indicated nonvisualization of the gallbladder, with differential diagnosis including acute versus chronic cholecystitis. General surgery recommended transfer to tertiary care facility for percutaneous  cholecystectomy tube, patient is too high risk for surgery at this hospital. Discussed HIDA scan results and general surgery recommendations with patient and wife at bedside. Wife stated she would like to discuss with her son for transfer options.          Review of Systems   Constitutional:  Negative for chills, diaphoresis, fatigue and fever.   HENT:  Negative for congestion and ear pain.    Eyes:  Negative for visual disturbance.   Respiratory:  Negative for cough, shortness of breath and wheezing.    Cardiovascular:  Negative for chest pain, palpitations and leg swelling.   Gastrointestinal:  Negative for abdominal distention, abdominal pain, blood in stool, constipation, diarrhea, nausea and vomiting.   Endocrine: Negative for cold intolerance and heat intolerance.   Genitourinary:  Negative for difficulty urinating, flank pain, frequency and urgency.   Musculoskeletal:  Negative for arthralgias and myalgias.   Skin:  Negative for color change and wound.   Neurological:  Negative for dizziness, syncope, weakness, light-headedness, numbness and headaches.   Hematological:  Does not bruise/bleed easily.   Psychiatric/Behavioral:  Negative for agitation, confusion and dysphoric mood.         Objective:   VITALS:  /60   Pulse 60   Temp 97.2 °F (36.2 °C) (Oral)   Resp 15   Ht 1.778 m (5' 10\")   Wt 78 kg (172 lb)   SpO2 96%   BMI 24.68 kg/m²   24HR INTAKE/OUTPUT:    Intake/Output Summary (Last 24 hours) at 7/22/2025 1650  Last data filed at 7/22/2025 1302  Gross per 24 hour   Intake 1655.62 ml   Output 1950 ml   Net -294.38 ml         Physical Exam  Constitutional:       General: He is not in acute distress.     Appearance: Normal appearance. He is not ill-appearing.   HENT:      Head: Normocephalic and atraumatic.      Right Ear: External ear normal.      Left Ear: External ear normal.      Nose: Nose normal.      Mouth/Throat:      Mouth: Mucous membranes are moist.   Eyes:      Extraocular Movements:

## 2025-07-22 NOTE — PROGRESS NOTES
07/22/25 0900   Subjective   Subjective Attempt patient in [facility] NUCLEAR MEDICINE   PT Plan of Care   Tuesday D       Electronically signed by Jeremy Valle PTA on 7/22/2025 at 9:11 AM

## 2025-07-22 NOTE — CONSULTS
[facility] SurgeryConsult Note    Patient ID: Bong Mack  80 y.o.  male  YOB: 1945    Admitting Diagnosis: Syncope and collapse [R55]  Hyperkalemia [E87.5]  Hyponatremia [E87.1]  Ischemic cardiomyopathy [I25.5]  Chronic anticoagulation [Z79.01]  YG (acute kidney injury) [N17.9]  Type 2 diabetes mellitus with hyperglycemia, without long-term current use of insulin (HCC) [E11.65]  Parkinson's disease, unspecified whether dyskinesia present, unspecified whether manifestations fluctuate (HCC) [G20.A1]      HIDA scan noted. Patient needs transfer to a tertiary care facility for percutaneous cholecystostomy tube.  Patient is too high risk for surgery at this hospital.  His cardiac history and electrolyte abnormalities make him high risk for surgery at [facility]  Narrative: EXAM: [facility]     HISTORY:  Cholelithiasis with abdominal pain.     TECHNIQUE:     Dose:     4.1 mCi technetium 99m Choletec     Procedure:     Following the uneventful IV administration of Tc99m Choletec, dynamic hepatobiliary scintigraphy was performed of the right upper quadrant for 60 minutes acquisition according to protocol. Delayed imaging was also performed.     COMPARISON:  Ultrasound dated June 25, 2025.     FINDINGS:  There is prompt uptake of radiopharmaceutical seen within the parenchyma of the liver, indicative of normal hepatocellular function.     There is normal biliary to bowel transit of activity.     During the initial acquisition, gallbladder activity is not demonstrated. Similarly, delayed imaging shows no gallbladder activity.     Impression:    1. Nonvisualization of the gallbladder, with differential diagnosis including acute versus chronic cholecystitis. Surgical consultation is recommended.              ______________________________________   Electronically signed by: STEPHEN HUMES M.D.  Date:     07/22/2025  Time:    13:24        Assessment:     Principal Problem:

## 2025-07-22 NOTE — CONSULTS
[facility] SurgeryConsult Note    Patient ID: Bong Mack  80 y.o.  male  YOB: 1945    Admitting Diagnosis: Syncope and collapse [R55]  Hyperkalemia [E87.5]  Hyponatremia [E87.1]  Ischemic cardiomyopathy [I25.5]  Chronic anticoagulation [Z79.01]  YG (acute kidney injury) [N17.9]  Type 2 diabetes mellitus with hyperglycemia, without long-term current use of insulin (HCC) [E11.65]  Parkinson's disease, unspecified whether dyskinesia present, unspecified whether manifestations fluctuate (HCC) [G20.A1]    Chief Complaint:  Chief Complaint   Patient presents with    Altered Mental Status     Pt arrives via EMS from other side of our hospital. Pt was going to a doctor apt, when wife stated pt became altered. She was trying to get him into a w/c and he wouldn't go because he said it was broken. Upon arrival pt is  alert and oriented and states what his wife said was \"BS\".        HPI:    Mr. Mack is a 80 y.o. male who presents today with syncope.  The patient was supposed to see me in the office on July 21, 2025 for evaluation of cholecystectomy.  While he was entering the building he had a syncopal episode.    Prior to this he saw me on June 12, 2025 and was told he could not have cholecystectomy secondary to being on blood thinners for a recent cardiac stent.  His wife states that he has been at a facility and has continuous nausea and vomiting.  Currently he is complaining of lower abdominal pain.  No right upper quadrant pain.      He did have a right upper quadrant ultrasound that demonstrated gallstones.  He also had a CAT scan of the abdomen and pelvis that demonstrated distended gallbladder.  He had normal liver function test.     Of note in April 2025 he did have a cardiac stent placed and is on blood thinners.    I have reviewed the patient's chart including past visits, office notes, hospital reports, procedures, tests, labs, medications, and other reports.     Past Medical History:

## 2025-07-23 ENCOUNTER — TELEPHONE (OUTPATIENT)
Dept: INTERNAL MEDICINE | Facility: CLINIC | Age: 80
End: 2025-07-23

## 2025-07-23 VITALS
WEIGHT: 172 LBS | HEART RATE: 66 BPM | DIASTOLIC BLOOD PRESSURE: 57 MMHG | TEMPERATURE: 97 F | SYSTOLIC BLOOD PRESSURE: 121 MMHG | BODY MASS INDEX: 24.62 KG/M2 | RESPIRATION RATE: 20 BRPM | HEIGHT: 70 IN | OXYGEN SATURATION: 96 %

## 2025-07-23 LAB
ALBUMIN SERPL-MCNC: 3.7 G/DL (ref 3.5–5.2)
ALP SERPL-CCNC: 65 U/L (ref 40–129)
ALT SERPL-CCNC: 10 U/L (ref 10–50)
ANION GAP SERPL CALCULATED.3IONS-SCNC: 10 MMOL/L (ref 8–16)
AST SERPL-CCNC: 17 U/L (ref 10–50)
BASOPHILS # BLD: 0.1 K/UL (ref 0–0.2)
BASOPHILS NFR BLD: 0.8 % (ref 0–1)
BILIRUB SERPL-MCNC: 0.4 MG/DL (ref 0.2–1.2)
BUN SERPL-MCNC: 29 MG/DL (ref 8–23)
CALCIUM SERPL-MCNC: 9.2 MG/DL (ref 8.8–10.2)
CHLORIDE SERPL-SCNC: 100 MMOL/L (ref 98–107)
CO2 SERPL-SCNC: 23 MMOL/L (ref 22–29)
CREAT SERPL-MCNC: 1.2 MG/DL (ref 0.7–1.2)
EOSINOPHIL # BLD: 0.2 K/UL (ref 0–0.6)
EOSINOPHIL NFR BLD: 2.6 % (ref 0–5)
ERYTHROCYTE [DISTWIDTH] IN BLOOD BY AUTOMATED COUNT: 16 % (ref 11.5–14.5)
GLUCOSE BLD-MCNC: 133 MG/DL (ref 70–99)
GLUCOSE BLD-MCNC: 95 MG/DL (ref 70–99)
GLUCOSE SERPL-MCNC: 80 MG/DL (ref 70–99)
HCT VFR BLD AUTO: 34.7 % (ref 42–52)
HGB BLD-MCNC: 11.6 G/DL (ref 14–18)
IMM GRANULOCYTES # BLD: 0.1 K/UL
LYMPHOCYTES # BLD: 1.7 K/UL (ref 1.1–4.5)
LYMPHOCYTES NFR BLD: 28.5 % (ref 20–40)
MCH RBC QN AUTO: 29.3 PG (ref 27–31)
MCHC RBC AUTO-ENTMCNC: 33.4 G/DL (ref 33–37)
MCV RBC AUTO: 87.6 FL (ref 80–94)
MONOCYTES # BLD: 0.5 K/UL (ref 0–0.9)
MONOCYTES NFR BLD: 8.2 % (ref 0–10)
NEUTROPHILS # BLD: 3.6 K/UL (ref 1.5–7.5)
NEUTS SEG NFR BLD: 58.4 % (ref 50–65)
PERFORMED ON: ABNORMAL
PERFORMED ON: NORMAL
PLATELET # BLD AUTO: 222 K/UL (ref 130–400)
PMV BLD AUTO: 9.1 FL (ref 9.4–12.4)
POTASSIUM SERPL-SCNC: 5.3 MMOL/L (ref 3.5–5)
PROT SERPL-MCNC: 6.2 G/DL (ref 6.4–8.3)
RBC # BLD AUTO: 3.96 M/UL (ref 4.7–6.1)
SODIUM SERPL-SCNC: 133 MMOL/L (ref 136–145)
WBC # BLD AUTO: 6.1 K/UL (ref 4.8–10.8)

## 2025-07-23 PROCEDURE — 85025 COMPLETE CBC W/AUTO DIFF WBC: CPT

## 2025-07-23 PROCEDURE — 94760 N-INVAS EAR/PLS OXIMETRY 1: CPT

## 2025-07-23 PROCEDURE — 80053 COMPREHEN METABOLIC PANEL: CPT

## 2025-07-23 PROCEDURE — 6370000000 HC RX 637 (ALT 250 FOR IP): Performed by: NURSE PRACTITIONER

## 2025-07-23 PROCEDURE — 6370000000 HC RX 637 (ALT 250 FOR IP): Performed by: HOSPITALIST

## 2025-07-23 PROCEDURE — 2580000003 HC RX 258: Performed by: NURSE PRACTITIONER

## 2025-07-23 PROCEDURE — 82962 GLUCOSE BLOOD TEST: CPT

## 2025-07-23 PROCEDURE — 2500000003 HC RX 250 WO HCPCS: Performed by: NURSE PRACTITIONER

## 2025-07-23 PROCEDURE — 36415 COLL VENOUS BLD VENIPUNCTURE: CPT

## 2025-07-23 PROCEDURE — 6360000002 HC RX W HCPCS: Performed by: NURSE PRACTITIONER

## 2025-07-23 RX ORDER — CIPROFLOXACIN 2 MG/ML
400 INJECTION, SOLUTION INTRAVENOUS EVERY 12 HOURS
Status: DISCONTINUED | OUTPATIENT
Start: 2025-07-23 | End: 2025-07-23 | Stop reason: HOSPADM

## 2025-07-23 RX ORDER — CIPROFLOXACIN 500 MG/1
500 TABLET, FILM COATED ORAL 2 TIMES DAILY
Qty: 7 TABLET | Refills: 0 | Status: SHIPPED | OUTPATIENT
Start: 2025-07-23 | End: 2025-07-27

## 2025-07-23 RX ADMIN — SODIUM ZIRCONIUM CYCLOSILICATE 10 G: 10 POWDER, FOR SUSPENSION ORAL at 08:37

## 2025-07-23 RX ADMIN — APIXABAN 2.5 MG: 2.5 TABLET, FILM COATED ORAL at 08:37

## 2025-07-23 RX ADMIN — AMANTADINE HYDROCHLORIDE 100 MG: 100 CAPSULE, LIQUID FILLED ORAL at 08:37

## 2025-07-23 RX ADMIN — AMIODARONE HYDROCHLORIDE 200 MG: 200 TABLET ORAL at 08:37

## 2025-07-23 RX ADMIN — SODIUM CHLORIDE, PRESERVATIVE FREE 10 ML: 5 INJECTION INTRAVENOUS at 08:43

## 2025-07-23 RX ADMIN — CLOPIDOGREL BISULFATE 75 MG: 75 TABLET, FILM COATED ORAL at 08:37

## 2025-07-23 RX ADMIN — CARBIDOPA AND LEVODOPA 1 TABLET: 25; 100 TABLET ORAL at 08:37

## 2025-07-23 RX ADMIN — CIPROFLOXACIN 400 MG: 400 INJECTION, SOLUTION INTRAVENOUS at 08:41

## 2025-07-23 RX ADMIN — PANTOPRAZOLE SODIUM 40 MG: 40 TABLET, DELAYED RELEASE ORAL at 05:51

## 2025-07-23 RX ADMIN — CARVEDILOL 12.5 MG: 12.5 TABLET, FILM COATED ORAL at 08:37

## 2025-07-23 RX ADMIN — SODIUM CHLORIDE 3000 MG: 9 INJECTION, SOLUTION INTRAVENOUS at 00:28

## 2025-07-23 RX ADMIN — SODIUM CHLORIDE 3000 MG: 9 INJECTION, SOLUTION INTRAVENOUS at 05:51

## 2025-07-23 NOTE — PROGRESS NOTES
DIS Nurse Note  SUBJECTIVE: Patient assessed secondary to elevated Deterioration Index Score.      Deterioration Index Score:  Predictive Model Details          51 (Warning)  Factor Value    Calculated 7/22/2025 23:56 24% Age 80 years old    Deterioration Index Model 18% Neurological exam X     14% Potassium abnormal (5.8 mmol/L)     13% Jonathan coma scale 14     8% Sodium abnormal (128 mmol/L)     8% Cardiac rhythm Sinus carlito     5% Hematocrit abnormal (32.6 %)     5% Respiratory rate 18     2% Pulse 53     2% BUN abnormal (48 mg/dL)     2% Systolic 123     0% Temperature 97.5 °F (36.4 °C)     0% Pulse oximetry 96 %     0% WBC count 6.9 K/uL        Vital Signs:  Vitals:    07/22/25 0922 07/22/25 1526 07/22/25 1532 07/22/25 1926   BP:  123/60  123/60   Pulse:  57 60 53   Resp:  16 15 18   Temp:  97.2 °F (36.2 °C)  97.5 °F (36.4 °C)   TempSrc:  Oral  Oral   SpO2:  97% 96% 96%   Weight:       Height: 1.778 m (5' 10\")           Provider Notified: Reviewed patient status  & DIS score with Provider Dr. Becker    ASSESSMENT:  Patient only alert and oriented to self at this time. Patient was attempting to exit the bed due to confusion of setting and time.    Plan of Care: Re-orientate the patient as needed. Bed alarm on. Bed lowered and locked.     Electronically signed by Jaci Davis LPN

## 2025-07-23 NOTE — PLAN OF CARE
Problem: Chronic Conditions and Co-morbidities  Goal: Patient's chronic conditions and co-morbidity symptoms are monitored and maintained or improved  Outcome: Adequate for Discharge     Problem: Discharge Planning  Goal: Discharge to home or other facility with appropriate resources  Outcome: Adequate for Discharge     Problem: Skin/Tissue Integrity  Goal: Skin integrity remains intact  Description: 1.  Monitor for areas of redness and/or skin breakdown  2.  Assess vascular access sites hourly  3.  Every 4-6 hours minimum:  Change oxygen saturation probe site  4.  Every 4-6 hours:  If on nasal continuous positive airway pressure, respiratory therapy assess nares and determine need for appliance change or resting period  Outcome: Adequate for Discharge     Problem: Safety - Adult  Goal: Free from fall injury  Outcome: Adequate for Discharge     Problem: ABCDS Injury Assessment  Goal: Absence of physical injury  Outcome: Adequate for Discharge     Problem: Nutrition Deficit:  Goal: Optimize nutritional status  Outcome: Adequate for Discharge

## 2025-07-23 NOTE — PLAN OF CARE
Problem: Skin/Tissue Integrity  Goal: Skin integrity remains intact  Description: 1.  Monitor for areas of redness and/or skin breakdown  2.  Assess vascular access sites hourly  3.  Every 4-6 hours minimum:  Change oxygen saturation probe site  4.  Every 4-6 hours:  If on nasal continuous positive airway pressure, respiratory therapy assess nares and determine need for appliance change or resting period  Outcome: Progressing  Flowsheets (Taken 7/22/2025 2324)  Skin Integrity Remains Intact: Monitor for areas of redness and/or skin breakdown     Problem: Safety - Adult  Goal: Free from fall injury  Outcome: Progressing     Problem: ABCDS Injury Assessment  Goal: Absence of physical injury  Outcome: Progressing

## 2025-07-23 NOTE — PROGRESS NOTES
Spiritual Health History and Assessment/Progress Note  [facility]    (P) Spiritual/Emotional Needs,  , (P) Life Adjustments,      Name: Bong Mack MRN: [id number]    Age: 80 y.o.     Sex: male   Language: English   Sikhism: Jainism   YG (acute kidney injury)     Date: 7/22/2025            Total Time Calculated: (P) 10 min              Spiritual Assessment began in [facility] 3 CONNIE/VAS/MED        Referral/Consult From: (P) Rounding   Encounter Overview/Reason: (P) Spiritual/Emotional Needs  Service Provided For: (P) Patient and family together    Teresita, Belief, Meaning:   Patient identifies as spiritual. The patient attending [employer] and using Bible and prayer as his guide in his spiritual life.  Family/Friends are connected with a teresita tradition or spiritual practice      Importance and Influence:  Patient has spiritual/personal beliefs that influence decisions regarding their health  Family/Friends have spiritual/personal beliefs that influence decisions regarding the patient's health    Community:  Patient is connected with a spiritual community  Family/Friends feel well-supported. Support system includes: Spouse/Partner and Teresita Community    Assessment and Plan of Care:   The patient showed lots of interest to get better and move on his life.  He was raised up in a Rastafari home.  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    Patient Interventions include: Facilitated expression of thoughts and feelings and Explored spiritual coping/struggle/distress. The patient felt glad that his wife came and be with him this evening and promised to come back the next day.     Family/Friends Interventions include: Explored spiritual coping/struggle/distress    Patient Plan of Care: No spiritual needs identified for follow-up  Family/Friends Plan of Care: No future visits per patient/family request    Electronically signed by Umang Durham

## 2025-07-23 NOTE — DISCHARGE SUMMARY
[facility]    Discharge Summary      Bong Mack  :  1945  MRN:  [id number]    Admit date:  2025  Discharge date:    2025    Discharging Physician:  Dr. Michelle Sharp     Advance Directive: Full Code    Consults: general surgery    Primary Care Physician:  Chadd Blake, DO    Discharge Diagnoses:  Principal Problem:    YG (acute kidney injury)  Active Problems:    Type 2 diabetes mellitus without complication, without long-term current use of insulin (HCC)    HFrEF (heart failure with reduced ejection fraction) (HCC)    Ischemic cardiomyopathy    Calculus of gallbladder without cholecystitis without obstruction    Paroxysmal A-fib (HCC)    Urinary retention    Hyperkalemia    Hyponatremia    Severe malnutrition  Resolved Problems:    * No resolved hospital problems. *      Portions of this note have been copied forward, however, changed to reflect the most current clinical status of this patient.    Hospital Course:   The patient is a 80 y.o. male with past medical history of Afib on Eliquis, cardiomyopathy with EF of 30-35%, s/p cardiac stent to OM lesion on 4/3/25 on Plavix, Hypertension, diabetes, Parkinson's disease who presented to [facility] ED for evaluation of syncope and collapse. Wife stated she had brought patient from SNF for his general surgery appointment. She noticed patient went unresponsive for couple of minutes. Denied fever or chills. Reported ongoing nausea and vomiting and unable to keep anything down. Denied abdominal pain. Denied chest pain. Reported shortness of breath at baseline. Workup in ED revealed hypotension on arrival (88/52), Na 123, K+ 6.0 (possible hemolysis), CO2 18, BUN 50, Cr 1.8, eGFR 37, Hgb 12.2, urinalysis indicated large leukocytes, +4 bacteria, WBC 21-30, CT head unremarkable. Cxray unremarkable. Patient was admitted to hospital medicine for further evaluation with general surgery consultation. IV fluids and empiric antibiotics were

## 2025-07-23 NOTE — PROGRESS NOTES
07/23/25 1200   Subjective   Subjective Patient in bed eating lunch.  Patient declined TR to chair at this time.  Patient may DC later today.       Electronically signed by Jeremy Valle PTA on 7/23/2025 at 12:16 PM

## 2025-07-23 NOTE — PROGRESS NOTES
Physician Progress Note      PATIENT:               DARCI RAMESH  CSN #:                  [id number]  :                       1945  ADMIT DATE:       2025 11:54 AM  DISCH DATE:  RESPONDING  PROVIDER #:        VALE GAMEZ          QUERY TEXT:    Please clarify the patient?s nutritional status:    The clinical indicators include:  -Dietary consult  Severe malnutrition related to inadequate protein-energy   intake, nausea/vomiting/diarrhea, decreased appetite, increase demand for   energy/nutrients as evidenced by poor intake prior to admission, wounds,   weight loss, lab values, nausea, vomiting; energy intake 75% or less of   estimated energy requirements for 7 or more days; Weight loss  Greater than   7.5% over 3 months  -NPO per plan of care advance as appropriate  Options provided:  -- Protein calorie malnutrition severe  -- Other - I will add my own diagnosis  -- Disagree - Not applicable / Not valid  -- Disagree - Clinically unable to determine / Unknown  -- Refer to Clinical Documentation Reviewer    PROVIDER RESPONSE TEXT:    This patient has severe protein calorie malnutrition.    Query created by: Monique Orona on 2025 11:35 AM      Electronically signed by:  VALE GAMEZ 2025 12:10 PM

## 2025-07-24 ENCOUNTER — TELEPHONE (OUTPATIENT)
Dept: INTERNAL MEDICINE | Facility: CLINIC | Age: 80
End: 2025-07-24

## 2025-07-24 ENCOUNTER — TELEPHONE (OUTPATIENT)
Dept: CARDIOLOGY CLINIC | Age: 80
End: 2025-07-24

## 2025-07-24 DIAGNOSIS — G20.A2 PARKINSON'S DISEASE WITH FLUCTUATING MANIFESTATIONS, UNSPECIFIED WHETHER DYSKINESIA PRESENT: Primary | ICD-10-CM

## 2025-07-24 LAB
BACTERIA UR CULT: ABNORMAL
ORGANISM: ABNORMAL
ORGANISM: ABNORMAL

## 2025-07-24 NOTE — TELEPHONE ENCOUNTER
Patient's wife left message requesting order for hospital bed. I returned her call and advised that our office does not order those supplies and it will need to come from PCP. She voiced understanding.

## 2025-07-25 ENCOUNTER — TELEPHONE (OUTPATIENT)
Dept: NEUROLOGY | Facility: CLINIC | Age: 80
End: 2025-07-25

## 2025-07-25 ENCOUNTER — TELEPHONE (OUTPATIENT)
Dept: NEUROLOGY | Facility: CLINIC | Age: 80
End: 2025-07-25
Payer: MEDICARE

## 2025-07-25 NOTE — TELEPHONE ENCOUNTER
Spoke to pt wife and advised that if medication is causing him to be sick then he can stop it.     She states it was given to him when being discharged from the hospital due to it being on his medication list. She believes that is when it was restarted.    I did explain that at his last visit it was discussed for him not take it due to intolerance.     He is currently at [facility] in [place] (nursing facility) and has been for the last 3 weeks.  She will make them aware of our conversation to stop medication if it is causing issues and they are to call with any questions.     She does report that at night pt is having some visual hallucinations, seeing bugs crawling in his room and on the walls.     Unable to make [place] appt due to being very sick and in and out of hospital. Wife does not plan to reschedule at this time due to his condition.     I explained I would make Dr. Snyder aware of the above and to keep f/u appt next month.

## 2025-07-25 NOTE — TELEPHONE ENCOUNTER
Provider: DR RUIZ    Caller: Katelyn Tinoco    Relationship to Patient: Emergency Contact    Phone Number: [phone]    Reason for Call: STATES PATIENT HAS BEEN IN AND OUT OF THE HOSPITAL RECENTLY AND HAS BEEN PUT ON CARBIDOPA-LEVODOPA. STATES SHE HAS NOT NOTICED ANY IMPROVEMENT WITH HIS SHAKING ON THIS MEDICATION BUT HAS NOTICED THAT HE HAS BEEN VOMITING MORE SINCE STARTING THIS. WANTED TO GET PROVIDER'S OPINION ON IF THE MEDICATION COULD BE CAUSING THAT. PLEASE REVIEW & ADVISE, THANK YOU.

## 2025-07-29 ENCOUNTER — TELEPHONE (OUTPATIENT)
Dept: NEUROLOGY | Facility: CLINIC | Age: 80
End: 2025-07-29
Payer: MEDICARE

## 2025-07-29 NOTE — TELEPHONE ENCOUNTER
----- Message from Melanie Snyder sent at 7/28/2025  7:19 AM CDT -----  See recent telephone encounter from rehab--He is NOT supposed to be on the carbidopa-levodopa. They should STOP this.  ----- Message -----  From: Melanie Chacon MA  Sent: 7/25/2025   3:26 PM CDT  To: Melanie Snyder MD    See telephone encounter from today 07/25/2025

## 2025-07-29 NOTE — TELEPHONE ENCOUNTER
Unable to contact the nursing home, Katelyn said Gary is at home, he isn't taking Carbidopa/Levodopa.

## 2025-07-30 ENCOUNTER — TELEPHONE (OUTPATIENT)
Dept: CARDIOLOGY CLINIC | Age: 80
End: 2025-07-30

## 2025-07-30 NOTE — TELEPHONE ENCOUNTER
Patient's wife left message that patient just got home from nursing home and they were giving him medications differently than what she has down. She requested a call back to discuss. Returned call but had to leave message.       Patient's wife called back. Went over medications that we had listed and the nursing home was giving him different medications. They were not giving spironolactone or entresto, advised that patient can resume those as they are on our discharge summary. She had eliquis 5 mg bid at home but list now shows 2.5 mg bid, she will decrease that does. Nursing home was giving him carvedilol 6.25 mg BID but our list shows 12.5 bid, she will increase that. She asked about lasix, advised that is not on our d/c summary. Nursing home was also giving potassium, she will not give that since patient is going back on spironolactone. I did make a follow up for 8.1.25 with Dr. Naik for patient's wife to bring medications and go over at that time. She voiced understanding of instructions.

## 2025-08-01 ENCOUNTER — OFFICE VISIT (OUTPATIENT)
Dept: CARDIOLOGY CLINIC | Age: 80
End: 2025-08-01

## 2025-08-01 VITALS
WEIGHT: 172 LBS | HEART RATE: 54 BPM | DIASTOLIC BLOOD PRESSURE: 48 MMHG | BODY MASS INDEX: 24.62 KG/M2 | HEIGHT: 70 IN | SYSTOLIC BLOOD PRESSURE: 80 MMHG

## 2025-08-01 DIAGNOSIS — R06.02 SHORTNESS OF BREATH: ICD-10-CM

## 2025-08-01 DIAGNOSIS — I42.9 CARDIOMYOPATHY, UNSPECIFIED TYPE (HCC): ICD-10-CM

## 2025-08-01 DIAGNOSIS — I42.9 CARDIOMYOPATHY, UNSPECIFIED TYPE (HCC): Primary | ICD-10-CM

## 2025-08-01 LAB
ANION GAP SERPL CALCULATED.3IONS-SCNC: 15 MMOL/L (ref 8–16)
BUN SERPL-MCNC: 38 MG/DL (ref 8–23)
CALCIUM SERPL-MCNC: 9.1 MG/DL (ref 8.8–10.2)
CHLORIDE SERPL-SCNC: 94 MMOL/L (ref 98–107)
CO2 SERPL-SCNC: 22 MMOL/L (ref 22–29)
CREAT SERPL-MCNC: 1.6 MG/DL (ref 0.7–1.2)
GLUCOSE SERPL-MCNC: 173 MG/DL (ref 70–99)
POTASSIUM SERPL-SCNC: 4.8 MMOL/L (ref 3.5–5.1)
SODIUM SERPL-SCNC: 131 MMOL/L (ref 136–145)

## 2025-08-01 RX ORDER — SACUBITRIL AND VALSARTAN 24; 26 MG/1; MG/1
1 TABLET, FILM COATED ORAL 2 TIMES DAILY
COMMUNITY

## 2025-08-02 NOTE — PROGRESS NOTES
dysfunction with EF estimated at 40 to 45%.  Mildly elevated LV end-diastolic pressures.  Successful PCI of OM1 (2.75 x 26 mm Doug frontier) utilizing drug-eluting stent.    Signed by: Abdifatah Galeano MD on 4/3/2025  5:46 PM           ASSESSMENT and PLAN:    Diagnosis:    1. Cardiomyopathy, unspecified type (HCC)    2. Shortness of breath       Coronary Artery Disease (CAD)  Patient has a history of CAD with recent stent placement in the obtuse marginal branch of the circumflex artery in April. Currently on dual antiplatelet therapy with Plavix and Eliquis. No reported anginal symptoms or complications from the stent placement.  - Continue Plavix and Eliquis for 3 more months  - Reassess antiplatelet therapy at next visit with potential discontinuation of Plavix  - Continue rosuvastatin for cholesterol management    HFrEF  Patient has known congestive heart failure with a recent echocardiogram showing an ejection fraction of 40-45%. Currently on guideline-directed medical therapy including Entresto, Carvedilol, and spironolactone. Blood pressure today is 80 systolic, which is lower than desired. Heart rate is 60 bpm in regular sinus rhythm.  - Discontinue amiodarone due to potential side effects and current stable rhythm  - Continue Entresto, but hold if systolic blood pressure < 100 mmHg  - Continue Carvedilol 12.5 mg PO BID, but hold if heart rate < 50 bpm  - Continue spironolactone  - Patient to check blood pressure twice daily (morning and night) before medications  - Schedule echocardiogram prior to next visit  - Follow-up in 3 months    Constipation  Patient reports no bowel movement for 10 days, which is significantly longer than his usual daily pattern. Currently using MiraLax without effect.  - Urgent follow-up with primary care physician for evaluation and management of constipation    Orders:    Orders Placed This Encounter   Procedures    Basic Metabolic Panel     No orders of the defined types were placed in

## 2025-08-05 ENCOUNTER — OFFICE VISIT (OUTPATIENT)
Dept: UROLOGY | Facility: CLINIC | Age: 80
End: 2025-08-05
Payer: MEDICARE

## 2025-08-05 VITALS — BODY MASS INDEX: 24.08 KG/M2 | HEIGHT: 71 IN | WEIGHT: 172 LBS | TEMPERATURE: 96.8 F

## 2025-08-05 DIAGNOSIS — N13.8 BPH WITH OBSTRUCTION/LOWER URINARY TRACT SYMPTOMS: ICD-10-CM

## 2025-08-05 DIAGNOSIS — R33.9 URINARY RETENTION: Primary | ICD-10-CM

## 2025-08-05 DIAGNOSIS — N40.1 BPH WITH OBSTRUCTION/LOWER URINARY TRACT SYMPTOMS: ICD-10-CM

## 2025-08-05 RX ORDER — ONDANSETRON 4 MG/1
4 TABLET, FILM COATED ORAL EVERY 8 HOURS PRN
COMMUNITY

## 2025-08-06 ENCOUNTER — TELEPHONE (OUTPATIENT)
Dept: UROLOGY | Facility: CLINIC | Age: 80
End: 2025-08-06
Payer: MEDICARE

## 2025-08-07 DIAGNOSIS — G20.A2 PARKINSON'S DISEASE WITH FLUCTUATING MANIFESTATIONS, UNSPECIFIED WHETHER DYSKINESIA PRESENT: Primary | ICD-10-CM

## 2025-08-11 ENCOUNTER — OFFICE VISIT (OUTPATIENT)
Dept: NEUROLOGY | Facility: CLINIC | Age: 80
End: 2025-08-11
Payer: MEDICARE

## 2025-08-11 VITALS
HEIGHT: 70 IN | BODY MASS INDEX: 23.62 KG/M2 | SYSTOLIC BLOOD PRESSURE: 120 MMHG | WEIGHT: 165 LBS | OXYGEN SATURATION: 97 % | DIASTOLIC BLOOD PRESSURE: 60 MMHG | HEART RATE: 59 BPM

## 2025-08-11 DIAGNOSIS — G20.A2 PARKINSON'S DISEASE WITH FLUCTUATING MANIFESTATIONS, UNSPECIFIED WHETHER DYSKINESIA PRESENT: ICD-10-CM

## 2025-08-11 RX ORDER — AMANTADINE HYDROCHLORIDE 100 MG/1
100 CAPSULE, GELATIN COATED ORAL 2 TIMES DAILY
Qty: 180 CAPSULE | Refills: 1 | Status: SHIPPED | OUTPATIENT
Start: 2025-08-11 | End: 2026-02-07

## 2025-08-13 DIAGNOSIS — G20.A2 PARKINSON'S DISEASE WITH FLUCTUATING MANIFESTATIONS, UNSPECIFIED WHETHER DYSKINESIA PRESENT: Primary | ICD-10-CM

## 2025-08-14 ENCOUNTER — HOSPITAL ENCOUNTER (OUTPATIENT)
Age: 80
Discharge: HOME | End: 2025-08-14
Attending: INTERNAL MEDICINE
Payer: MEDICARE

## 2025-08-14 DIAGNOSIS — I42.9 CARDIOMYOPATHY, UNSPECIFIED TYPE (HCC): ICD-10-CM

## 2025-08-14 DIAGNOSIS — R06.02 SHORTNESS OF BREATH: ICD-10-CM

## 2025-08-14 LAB
ECHO AO ASC DIAM: 3.1 CM
ECHO AO ROOT DIAM: 2.7 CM
ECHO AO SINUS VALSALVA DIAM: 3.2 CM
ECHO AO ST JNCT DIAM: 2.9 CM
ECHO AV AREA PEAK VELOCITY: 2.4 CM2
ECHO AV AREA VTI: 2.6 CM2
ECHO AV MEAN GRADIENT: 2 MMHG
ECHO AV MEAN GRADIENT: 2 MMHG
ECHO AV MEAN VELOCITY: 0.6 M/S
ECHO AV PEAK GRADIENT: 3 MMHG
ECHO AV PEAK VELOCITY: 0.9 M/S
ECHO AV VELOCITY RATIO: 0.67
ECHO AV VTI: 18.2 CM
ECHO EST RA PRESSURE: 3 MMHG
ECHO LA AREA 2C: 11.7 CM2
ECHO LA AREA 4C: 14.5 CM2
ECHO LA DIAMETER: 3.6 CM
ECHO LA MAJOR AXIS: 5.6 CM
ECHO LA MINOR AXIS: 4.2 CM
ECHO LA TO AORTIC ROOT RATIO: 1.33
ECHO LA VOL MOD A2C: 26 ML (ref 18–58)
ECHO LA VOL MOD A4C: 31 ML (ref 18–58)
ECHO LV E' LATERAL VELOCITY: 9.57 CM/S
ECHO LV E' SEPTAL VELOCITY: 6.31 CM/S
ECHO LV EDV A2C: 108 ML
ECHO LV EDV A4C: 167 ML
ECHO LV EF PHYSICIAN: 40 %
ECHO LV EJECTION FRACTION A2C: 44 %
ECHO LV EJECTION FRACTION A4C: 52 %
ECHO LV ESV A2C: 60 ML
ECHO LV ESV A4C: 78 ML
ECHO LV FRACTIONAL SHORTENING: 21 % (ref 28–44)
ECHO LV INTERNAL DIMENSION DIASTOLIC: 6.7 CM (ref 4.2–5.9)
ECHO LV INTERNAL DIMENSION SYSTOLIC: 5.3 CM
ECHO LV IVSD: 0.8 CM (ref 0.6–1)
ECHO LV MASS 2D: 226.1 G (ref 88–224)
ECHO LV POSTERIOR WALL DIASTOLIC: 0.8 CM (ref 0.6–1)
ECHO LV RELATIVE WALL THICKNESS RATIO: 0.24
ECHO LVOT AREA: 3.8 CM2
ECHO LVOT AV VTI INDEX: 0.67
ECHO LVOT DIAM: 2.2 CM
ECHO LVOT MEAN GRADIENT: 1 MMHG
ECHO LVOT PEAK GRADIENT: 1 MMHG
ECHO LVOT PEAK VELOCITY: 0.6 M/S
ECHO LVOT SV: 46.4 ML
ECHO LVOT VTI: 12.2 CM
ECHO MV A VELOCITY: 0.6 M/S
ECHO MV AREA VTI: 1.9 CM2
ECHO MV E DECELERATION TIME (DT): 232 MS
ECHO MV E VELOCITY: 0.43 M/S
ECHO MV E/A RATIO: 0.72
ECHO MV E/E' LATERAL: 4.49
ECHO MV E/E' RATIO (AVERAGED): 5.65
ECHO MV E/E' SEPTAL: 6.81
ECHO MV LVOT VTI INDEX: 1.96
ECHO MV MAX VELOCITY: 0.8 M/S
ECHO MV MEAN GRADIENT: 1 MMHG
ECHO MV MEAN VELOCITY: 0.5 M/S
ECHO MV PEAK GRADIENT: 3 MMHG
ECHO MV VTI: 23.9 CM
ECHO RA AREA 4C: 5.3 CM2
ECHO RA VOLUME: 7 ML
ECHO RIGHT VENTRICULAR SYSTOLIC PRESSURE (RVSP): 24 MMHG
ECHO RV BASAL DIMENSION: 3 CM
ECHO RV INTERNAL DIMENSION: 3.1 CM
ECHO RV LONGITUDINAL DIMENSION: 8.9 CM
ECHO RV MID DIMENSION: 4 CM
ECHO RV TAPSE: 1.7 CM (ref 1.7–?)
ECHO TV REGURGITANT MAX VELOCITY: 2.31 M/S
ECHO TV REGURGITANT PEAK GRADIENT: 21 MMHG

## 2025-08-14 PROCEDURE — C8929 TTE W OR WO FOL WCON,DOPPLER: HCPCS

## 2025-08-14 PROCEDURE — 6360000004 HC RX CONTRAST MEDICATION: Performed by: INTERNAL MEDICINE

## 2025-08-14 RX ADMIN — SULFUR HEXAFLUORIDE 2 ML: 60.7; .19; .19 INJECTION, POWDER, LYOPHILIZED, FOR SUSPENSION INTRAVENOUS; INTRAVESICAL at 14:59

## 2025-08-18 ENCOUNTER — TELEPHONE (OUTPATIENT)
Dept: INTERNAL MEDICINE | Facility: CLINIC | Age: 80
End: 2025-08-18
Payer: MEDICARE

## 2025-08-25 DIAGNOSIS — I42.8 OTHER CARDIOMYOPATHIES (HCC): ICD-10-CM

## 2025-08-25 RX ORDER — SPIRONOLACTONE 25 MG/1
25 TABLET ORAL DAILY
Qty: 90 TABLET | Refills: 3 | Status: SHIPPED | OUTPATIENT
Start: 2025-08-25

## 2025-08-26 DIAGNOSIS — I48.19 PERSISTENT ATRIAL FIBRILLATION: ICD-10-CM

## 2025-08-27 RX ORDER — CARVEDILOL 6.25 MG/1
6.25 TABLET ORAL 2 TIMES DAILY WITH MEALS
Qty: 180 TABLET | Refills: 0 | Status: SHIPPED | OUTPATIENT
Start: 2025-08-27

## 2025-08-27 RX ORDER — CARVEDILOL 12.5 MG/1
12.5 TABLET ORAL 2 TIMES DAILY WITH MEALS
Qty: 60 TABLET | Refills: 5 | Status: SHIPPED | OUTPATIENT
Start: 2025-08-27 | End: 2025-08-29 | Stop reason: SDUPTHER

## 2025-08-29 RX ORDER — CARVEDILOL 12.5 MG/1
12.5 TABLET ORAL 2 TIMES DAILY WITH MEALS
Qty: 60 TABLET | Refills: 5 | Status: SHIPPED | OUTPATIENT
Start: 2025-08-29 | End: 2025-09-28

## (undated) DEVICE — SENSR O2 OXIMAX FNGR A/ 18IN NONSTR

## (undated) DEVICE — TBG SMPL FLTR LINE NASL 02/C02 A/ BX/100

## (undated) DEVICE — YANKAUER,BULB TIP WITH VENT: Brand: ARGYLE

## (undated) DEVICE — SNAR POLYP SENSATION MICRO OVL 13 240X40

## (undated) DEVICE — FRCP BIOP ENDO CAPTURAPRO SPK SERR 2.8MM 230CM

## (undated) DEVICE — HI-TORQUE BALANCE MIDDLEWEIGHT UNIVERSAL GUIDE WIRE .014 STRAIGHT TIP 3.0 CM X 190 CM: Brand: HI-TORQUE BALANCE MIDDLEWEIGHT UNIVERSAL

## (undated) DEVICE — Device: Brand: DEFENDO AIR/WATER/SUCTION AND BIOPSY VALVE

## (undated) DEVICE — KIT MFLD ISOLATN NACL CNTRST PRT TBNG SPIK W/ PRSS TRNSDUC

## (undated) DEVICE — CUFF,BP,DISP,1 TUBE,ADULT,HP: Brand: MEDLINE

## (undated) DEVICE — SYSTEM GWIRE L260CM DIA0035IN STD STEER HYDROPHOBIC STR TIP

## (undated) DEVICE — GUIDEWIRE VASC L260CM OD.038IN 3CM TIP 7MM TIP OD STD EXCHG

## (undated) DEVICE — CATH BLLN ANGIO 2.75X12MM NC EUPHORIA RX

## (undated) DEVICE — TOOL INSRT STRTCH

## (undated) DEVICE — MASK,OXYGEN,MED CONC,ADLT,7' TUB, UC: Brand: PENDING

## (undated) DEVICE — CATHETER GUID 6FR GWIRE 0.071IN COR EXTRA BKUP SUPP 3.75

## (undated) DEVICE — GLIDESHEATH SS KIT HYDROPHILIC COATED INTRODUCER SHEATH: Brand: GLIDESHEATH

## (undated) DEVICE — CATH BLLN ANGIO 2X12MM SC EUPHORA RX

## (undated) DEVICE — KIT ANGIO W/ AT P65 PREM HND CTRL FOR CNTRST DEL ANGIOTOUCH

## (undated) DEVICE — KIT INFL DEV 20ML INSRT TOOL 3 W STPCOCK TORQ GATEWY + Y

## (undated) DEVICE — RADIFOCUS OPTITORQUE ANGIOGRAPHIC CATHETER: Brand: OPTITORQUE

## (undated) DEVICE — THE CHANNEL CLEANING BRUSH IS A NYLON FLEXI BRUSH ATTACHED TO A FLEXIBLE PLASTIC SHEATH DESIGNED TO SAFELY REMOVE DEBRIS FROM FLEXIBLE ENDOSCOPES.

## (undated) DEVICE — TORQUE DEVICE: Brand: TORQUE DEVICE

## (undated) DEVICE — TR BAND RADIAL ARTERY COMPRESSION DEVICE: Brand: TR BAND

## (undated) DEVICE — Device

## (undated) DEVICE — VALVE HEMSTAT 8FR INNR LUMN CRSS SLT SEAL DSGN WATCHDOG

## (undated) DEVICE — CATHETER DIAG 5FR L110CM LUMN ID0.047IN PGTL 6 SIDE H HUB

## (undated) DEVICE — THE SINGLE USE ETRAP – POLYP TRAP IS USED FOR SUCTION RETRIEVAL OF ENDOSCOPICALLY REMOVED POLYPS.: Brand: ETRAP

## (undated) DEVICE — Device: Brand: NOMOLINE™ LH ADULT NASAL CO2 CANNULA WITH O2 4M